# Patient Record
Sex: FEMALE | Race: WHITE | NOT HISPANIC OR LATINO | Employment: UNEMPLOYED | URBAN - METROPOLITAN AREA
[De-identification: names, ages, dates, MRNs, and addresses within clinical notes are randomized per-mention and may not be internally consistent; named-entity substitution may affect disease eponyms.]

---

## 2022-10-14 ENCOUNTER — HOSPITAL ENCOUNTER (INPATIENT)
Facility: HOSPITAL | Age: 32
LOS: 9 days | Discharge: HOME | End: 2022-10-23
Attending: EMERGENCY MEDICINE | Admitting: INTERNAL MEDICINE

## 2022-10-14 ENCOUNTER — APPOINTMENT (OUTPATIENT)
Dept: GENERAL RADIOLOGY | Facility: HOSPITAL | Age: 32
End: 2022-10-14

## 2022-10-14 ENCOUNTER — APPOINTMENT (OUTPATIENT)
Dept: CT IMAGING | Facility: HOSPITAL | Age: 32
End: 2022-10-14

## 2022-10-14 DIAGNOSIS — I24.9 ACS (ACUTE CORONARY SYNDROME): Primary | ICD-10-CM

## 2022-10-14 DIAGNOSIS — Z95.1 S/P CABG (CORONARY ARTERY BYPASS GRAFT): ICD-10-CM

## 2022-10-14 LAB
ALBUMIN SERPL-MCNC: 5.1 G/DL (ref 3.5–5.2)
ALBUMIN/GLOB SERPL: 2.6 G/DL
ALP SERPL-CCNC: 48 U/L (ref 39–117)
ALT SERPL W P-5'-P-CCNC: 16 U/L (ref 1–33)
ANION GAP SERPL CALCULATED.3IONS-SCNC: 9.7 MMOL/L (ref 5–15)
APTT PPP: 30.1 SECONDS (ref 22.7–35.4)
APTT PPP: 31.8 SECONDS (ref 22.7–35.4)
AST SERPL-CCNC: 56 U/L (ref 1–32)
BASOPHILS # BLD AUTO: 0.02 10*3/MM3 (ref 0–0.2)
BASOPHILS NFR BLD AUTO: 0.2 % (ref 0–1.5)
BILIRUB SERPL-MCNC: 0.5 MG/DL (ref 0–1.2)
BUN SERPL-MCNC: 9 MG/DL (ref 6–20)
BUN/CREAT SERPL: 12.7 (ref 7–25)
CALCIUM SPEC-SCNC: 9.3 MG/DL (ref 8.6–10.5)
CHLORIDE SERPL-SCNC: 103 MMOL/L (ref 98–107)
CO2 SERPL-SCNC: 24.3 MMOL/L (ref 22–29)
CREAT SERPL-MCNC: 0.71 MG/DL (ref 0.57–1)
D DIMER PPP FEU-MCNC: 2.07 MCGFEU/ML (ref 0–0.49)
DEPRECATED RDW RBC AUTO: 40.6 FL (ref 37–54)
EGFRCR SERPLBLD CKD-EPI 2021: 115.3 ML/MIN/1.73
EOSINOPHIL # BLD AUTO: 0.03 10*3/MM3 (ref 0–0.4)
EOSINOPHIL NFR BLD AUTO: 0.3 % (ref 0.3–6.2)
ERYTHROCYTE [DISTWIDTH] IN BLOOD BY AUTOMATED COUNT: 12.5 % (ref 12.3–15.4)
GLOBULIN UR ELPH-MCNC: 2 GM/DL
GLUCOSE SERPL-MCNC: 154 MG/DL (ref 65–99)
HCG SERPL QL: NEGATIVE
HCT VFR BLD AUTO: 35.3 % (ref 34–46.6)
HGB BLD-MCNC: 11.9 G/DL (ref 12–15.9)
IMM GRANULOCYTES # BLD AUTO: 0.05 10*3/MM3 (ref 0–0.05)
IMM GRANULOCYTES NFR BLD AUTO: 0.5 % (ref 0–0.5)
INR PPP: 1.11 (ref 0.9–1.1)
LIPASE SERPL-CCNC: 27 U/L (ref 13–60)
LYMPHOCYTES # BLD AUTO: 1.44 10*3/MM3 (ref 0.7–3.1)
LYMPHOCYTES NFR BLD AUTO: 15.4 % (ref 19.6–45.3)
MCH RBC QN AUTO: 30.2 PG (ref 26.6–33)
MCHC RBC AUTO-ENTMCNC: 33.7 G/DL (ref 31.5–35.7)
MCV RBC AUTO: 89.6 FL (ref 79–97)
MONOCYTES # BLD AUTO: 0.48 10*3/MM3 (ref 0.1–0.9)
MONOCYTES NFR BLD AUTO: 5.1 % (ref 5–12)
NEUTROPHILS NFR BLD AUTO: 7.36 10*3/MM3 (ref 1.7–7)
NEUTROPHILS NFR BLD AUTO: 78.5 % (ref 42.7–76)
NRBC BLD AUTO-RTO: 0 /100 WBC (ref 0–0.2)
NT-PROBNP SERPL-MCNC: 461 PG/ML (ref 0–450)
PLATELET # BLD AUTO: 218 10*3/MM3 (ref 140–450)
PMV BLD AUTO: 8.9 FL (ref 6–12)
POTASSIUM SERPL-SCNC: 3.3 MMOL/L (ref 3.5–5.2)
PROT SERPL-MCNC: 7.1 G/DL (ref 6–8.5)
PROTHROMBIN TIME: 14.5 SECONDS (ref 11.7–14.2)
QT INTERVAL: 388 MS
RBC # BLD AUTO: 3.94 10*6/MM3 (ref 3.77–5.28)
SODIUM SERPL-SCNC: 137 MMOL/L (ref 136–145)
TROPONIN T SERPL-MCNC: 1.02 NG/ML (ref 0–0.03)
TROPONIN T SERPL-MCNC: 1.18 NG/ML (ref 0–0.03)
WBC NRBC COR # BLD: 9.38 10*3/MM3 (ref 3.4–10.8)

## 2022-10-14 PROCEDURE — 85610 PROTHROMBIN TIME: CPT | Performed by: EMERGENCY MEDICINE

## 2022-10-14 PROCEDURE — 93010 ELECTROCARDIOGRAM REPORT: CPT | Performed by: INTERNAL MEDICINE

## 2022-10-14 PROCEDURE — 84484 ASSAY OF TROPONIN QUANT: CPT | Performed by: EMERGENCY MEDICINE

## 2022-10-14 PROCEDURE — 25010000002 HEPARIN (PORCINE) 25000-0.45 UT/250ML-% SOLUTION: Performed by: EMERGENCY MEDICINE

## 2022-10-14 PROCEDURE — 25010000002 HEPARIN (PORCINE) PER 1000 UNITS: Performed by: EMERGENCY MEDICINE

## 2022-10-14 PROCEDURE — 0 IOPAMIDOL PER 1 ML: Performed by: EMERGENCY MEDICINE

## 2022-10-14 PROCEDURE — 86900 BLOOD TYPING SEROLOGIC ABO: CPT

## 2022-10-14 PROCEDURE — 83880 ASSAY OF NATRIURETIC PEPTIDE: CPT | Performed by: EMERGENCY MEDICINE

## 2022-10-14 PROCEDURE — 71275 CT ANGIOGRAPHY CHEST: CPT

## 2022-10-14 PROCEDURE — 85025 COMPLETE CBC W/AUTO DIFF WBC: CPT | Performed by: EMERGENCY MEDICINE

## 2022-10-14 PROCEDURE — 93005 ELECTROCARDIOGRAM TRACING: CPT | Performed by: EMERGENCY MEDICINE

## 2022-10-14 PROCEDURE — 86901 BLOOD TYPING SEROLOGIC RH(D): CPT

## 2022-10-14 PROCEDURE — 83690 ASSAY OF LIPASE: CPT | Performed by: EMERGENCY MEDICINE

## 2022-10-14 PROCEDURE — 82565 ASSAY OF CREATININE: CPT

## 2022-10-14 PROCEDURE — 85379 FIBRIN DEGRADATION QUANT: CPT | Performed by: EMERGENCY MEDICINE

## 2022-10-14 PROCEDURE — 99284 EMERGENCY DEPT VISIT MOD MDM: CPT

## 2022-10-14 PROCEDURE — 85730 THROMBOPLASTIN TIME PARTIAL: CPT | Performed by: EMERGENCY MEDICINE

## 2022-10-14 PROCEDURE — 84703 CHORIONIC GONADOTROPIN ASSAY: CPT | Performed by: EMERGENCY MEDICINE

## 2022-10-14 PROCEDURE — 80053 COMPREHEN METABOLIC PANEL: CPT | Performed by: EMERGENCY MEDICINE

## 2022-10-14 PROCEDURE — 71045 X-RAY EXAM CHEST 1 VIEW: CPT

## 2022-10-14 RX ORDER — SODIUM CHLORIDE 0.9 % (FLUSH) 0.9 %
10 SYRINGE (ML) INJECTION AS NEEDED
Status: DISCONTINUED | OUTPATIENT
Start: 2022-10-14 | End: 2022-10-16

## 2022-10-14 RX ORDER — ASPIRIN 325 MG
325 TABLET ORAL ONCE
Status: COMPLETED | OUTPATIENT
Start: 2022-10-14 | End: 2022-10-14

## 2022-10-14 RX ORDER — HEPARIN SODIUM 5000 [USP'U]/ML
30-60 INJECTION, SOLUTION INTRAVENOUS; SUBCUTANEOUS EVERY 6 HOURS PRN
Status: DISCONTINUED | OUTPATIENT
Start: 2022-10-14 | End: 2022-10-16 | Stop reason: SDUPTHER

## 2022-10-14 RX ORDER — HEPARIN SODIUM 10000 [USP'U]/100ML
12 INJECTION, SOLUTION INTRAVENOUS
Status: DISCONTINUED | OUTPATIENT
Start: 2022-10-14 | End: 2022-10-16 | Stop reason: SDUPTHER

## 2022-10-14 RX ORDER — HEPARIN SODIUM 5000 [USP'U]/ML
60 INJECTION, SOLUTION INTRAVENOUS; SUBCUTANEOUS ONCE
Status: COMPLETED | OUTPATIENT
Start: 2022-10-14 | End: 2022-10-14

## 2022-10-14 RX ADMIN — HEPARIN SODIUM 3800 UNITS: 5000 INJECTION INTRAVENOUS; SUBCUTANEOUS at 20:49

## 2022-10-14 RX ADMIN — ASPIRIN 325 MG: 325 TABLET ORAL at 20:44

## 2022-10-14 RX ADMIN — HEPARIN SODIUM 12 UNITS/KG/HR: 10000 INJECTION, SOLUTION INTRAVENOUS at 20:50

## 2022-10-14 RX ADMIN — IOPAMIDOL 100 ML: 755 INJECTION, SOLUTION INTRAVENOUS at 20:23

## 2022-10-15 ENCOUNTER — APPOINTMENT (OUTPATIENT)
Dept: GENERAL RADIOLOGY | Facility: HOSPITAL | Age: 32
End: 2022-10-15

## 2022-10-15 ENCOUNTER — ANESTHESIA EVENT (OUTPATIENT)
Dept: PERIOP | Facility: HOSPITAL | Age: 32
End: 2022-10-15

## 2022-10-15 ENCOUNTER — ANCILLARY PROCEDURE (OUTPATIENT)
Dept: PERIOP | Facility: HOSPITAL | Age: 32
End: 2022-10-15

## 2022-10-15 ENCOUNTER — ANESTHESIA (OUTPATIENT)
Dept: PERIOP | Facility: HOSPITAL | Age: 32
End: 2022-10-15

## 2022-10-15 LAB
ABO GROUP BLD: NORMAL
ACT BLD: 103 SECONDS (ref 82–152)
ACT BLD: 126 SECONDS (ref 82–152)
ACT BLD: 370 SECONDS (ref 82–152)
ACT BLD: 387 SECONDS (ref 82–152)
ACT BLD: 399 SECONDS (ref 82–152)
ACT BLD: 422 SECONDS (ref 82–152)
ACT BLD: 474 SECONDS (ref 82–152)
ACT BLD: 567 SECONDS (ref 82–152)
ALBUMIN SERPL-MCNC: 4.6 G/DL (ref 3.5–5.2)
ANION GAP SERPL CALCULATED.3IONS-SCNC: 10.5 MMOL/L (ref 5–15)
ANION GAP SERPL CALCULATED.3IONS-SCNC: 9.8 MMOL/L (ref 5–15)
APTT PPP: 32.3 SECONDS (ref 22.7–35.4)
APTT PPP: 72.9 SECONDS (ref 22.7–35.4)
ARTERIAL PATENCY WRIST A: ABNORMAL
ATMOSPHERIC PRESS: 754.6 MMHG
BASE EXCESS BLDA CALC-SCNC: -1 MMOL/L (ref -5–5)
BASE EXCESS BLDA CALC-SCNC: -1.2 MMOL/L (ref 0–2)
BASE EXCESS BLDA CALC-SCNC: -2 MMOL/L (ref -5–5)
BASE EXCESS BLDA CALC-SCNC: -4 MMOL/L (ref -5–5)
BASE EXCESS BLDA CALC-SCNC: -5 MMOL/L (ref -5–5)
BASE EXCESS BLDA CALC-SCNC: -5 MMOL/L (ref -5–5)
BASE EXCESS BLDA CALC-SCNC: 0 MMOL/L (ref -5–5)
BASE EXCESS BLDA CALC-SCNC: 2 MMOL/L (ref -5–5)
BASE EXCESS BLDA CALC-SCNC: 3 MMOL/L (ref -5–5)
BASOPHILS # BLD AUTO: 0.03 10*3/MM3 (ref 0–0.2)
BASOPHILS # BLD AUTO: 0.06 10*3/MM3 (ref 0–0.2)
BASOPHILS NFR BLD AUTO: 0.3 % (ref 0–1.5)
BASOPHILS NFR BLD AUTO: 0.4 % (ref 0–1.5)
BDY SITE: ABNORMAL
BLD GP AB SCN SERPL QL: NEGATIVE
BUN SERPL-MCNC: 7 MG/DL (ref 6–20)
BUN SERPL-MCNC: 9 MG/DL (ref 6–20)
BUN/CREAT SERPL: 13 (ref 7–25)
BUN/CREAT SERPL: 9.2 (ref 7–25)
CA-I BLD-MCNC: 4.9 MG/DL (ref 4.6–5.4)
CA-I BLDA-SCNC: ABNORMAL MMOL/L
CA-I SERPL ISE-MCNC: 1.22 MMOL/L (ref 1.15–1.35)
CALCIUM SPEC-SCNC: 9 MG/DL (ref 8.6–10.5)
CALCIUM SPEC-SCNC: 9.1 MG/DL (ref 8.6–10.5)
CHLORIDE SERPL-SCNC: 102 MMOL/L (ref 98–107)
CHLORIDE SERPL-SCNC: 113 MMOL/L (ref 98–107)
CO2 BLDA-SCNC: 21 MMOL/L (ref 24–29)
CO2 BLDA-SCNC: 23 MMOL/L (ref 24–29)
CO2 BLDA-SCNC: 23 MMOL/L (ref 24–29)
CO2 BLDA-SCNC: 25 MMOL/L (ref 24–29)
CO2 BLDA-SCNC: 26 MMOL/L (ref 24–29)
CO2 BLDA-SCNC: 26 MMOL/L (ref 24–29)
CO2 BLDA-SCNC: 28 MMOL/L (ref 24–29)
CO2 BLDA-SCNC: 29 MMOL/L (ref 24–29)
CO2 SERPL-SCNC: 22.2 MMOL/L (ref 22–29)
CO2 SERPL-SCNC: 24.5 MMOL/L (ref 22–29)
CREAT BLDA-MCNC: 0.7 MG/DL (ref 0.6–1.3)
CREAT SERPL-MCNC: 0.69 MG/DL (ref 0.57–1)
CREAT SERPL-MCNC: 0.76 MG/DL (ref 0.57–1)
DEPRECATED RDW RBC AUTO: 40.3 FL (ref 37–54)
DEPRECATED RDW RBC AUTO: 42 FL (ref 37–54)
EGFRCR SERPLBLD CKD-EPI 2021: 106.3 ML/MIN/1.73
EGFRCR SERPLBLD CKD-EPI 2021: 117.7 ML/MIN/1.73
EOSINOPHIL # BLD AUTO: 0.03 10*3/MM3 (ref 0–0.4)
EOSINOPHIL # BLD AUTO: 0.09 10*3/MM3 (ref 0–0.4)
EOSINOPHIL NFR BLD AUTO: 0.1 % (ref 0.3–6.2)
EOSINOPHIL NFR BLD AUTO: 1.2 % (ref 0.3–6.2)
ERYTHROCYTE [DISTWIDTH] IN BLOOD BY AUTOMATED COUNT: 12.5 % (ref 12.3–15.4)
ERYTHROCYTE [DISTWIDTH] IN BLOOD BY AUTOMATED COUNT: 12.8 % (ref 12.3–15.4)
FIBRINOGEN PPP-MCNC: 169 MG/DL (ref 219–464)
GLUCOSE BLDC GLUCOMTR-MCNC: 125 MG/DL (ref 70–130)
GLUCOSE BLDC GLUCOMTR-MCNC: 131 MG/DL (ref 70–130)
GLUCOSE BLDC GLUCOMTR-MCNC: 132 MG/DL (ref 70–130)
GLUCOSE BLDC GLUCOMTR-MCNC: 138 MG/DL (ref 70–130)
GLUCOSE BLDC GLUCOMTR-MCNC: 142 MG/DL (ref 70–130)
GLUCOSE BLDC GLUCOMTR-MCNC: 160 MG/DL (ref 70–130)
GLUCOSE BLDC GLUCOMTR-MCNC: 162 MG/DL (ref 70–130)
GLUCOSE BLDC GLUCOMTR-MCNC: 164 MG/DL (ref 70–130)
GLUCOSE BLDC GLUCOMTR-MCNC: 165 MG/DL (ref 70–130)
GLUCOSE BLDC GLUCOMTR-MCNC: 204 MG/DL (ref 70–130)
GLUCOSE BLDC GLUCOMTR-MCNC: 211 MG/DL (ref 70–130)
GLUCOSE SERPL-MCNC: 128 MG/DL (ref 65–99)
GLUCOSE SERPL-MCNC: 86 MG/DL (ref 65–99)
HCO3 BLDA-SCNC: 19.9 MMOL/L (ref 22–26)
HCO3 BLDA-SCNC: 21.2 MMOL/L (ref 22–26)
HCO3 BLDA-SCNC: 22.1 MMOL/L (ref 22–26)
HCO3 BLDA-SCNC: 22.3 MMOL/L (ref 22–28)
HCO3 BLDA-SCNC: 23.6 MMOL/L (ref 22–26)
HCO3 BLDA-SCNC: 24.7 MMOL/L (ref 22–26)
HCO3 BLDA-SCNC: 25.2 MMOL/L (ref 22–26)
HCO3 BLDA-SCNC: 26.8 MMOL/L (ref 22–26)
HCO3 BLDA-SCNC: 27.9 MMOL/L (ref 22–26)
HCT VFR BLD AUTO: 30.9 % (ref 34–46.6)
HCT VFR BLD AUTO: 35.7 % (ref 34–46.6)
HCT VFR BLDA CALC: 21 % (ref 38–51)
HCT VFR BLDA CALC: 21 % (ref 38–51)
HCT VFR BLDA CALC: 22 % (ref 38–51)
HCT VFR BLDA CALC: 24 % (ref 38–51)
HCT VFR BLDA CALC: 24 % (ref 38–51)
HCT VFR BLDA CALC: 25 % (ref 38–51)
HCT VFR BLDA CALC: 25 % (ref 38–51)
HCT VFR BLDA CALC: 37 % (ref 38–51)
HGB BLD-MCNC: 10.7 G/DL (ref 12–15.9)
HGB BLD-MCNC: 12 G/DL (ref 12–15.9)
HGB BLDA-MCNC: 12.6 G/DL (ref 12–17)
HGB BLDA-MCNC: 7.1 G/DL (ref 12–17)
HGB BLDA-MCNC: 7.1 G/DL (ref 12–17)
HGB BLDA-MCNC: 7.5 G/DL (ref 12–17)
HGB BLDA-MCNC: 8.2 G/DL (ref 12–17)
HGB BLDA-MCNC: 8.2 G/DL (ref 12–17)
HGB BLDA-MCNC: 8.5 G/DL (ref 12–17)
HGB BLDA-MCNC: 8.5 G/DL (ref 12–17)
IMM GRANULOCYTES # BLD AUTO: 0.02 10*3/MM3 (ref 0–0.05)
IMM GRANULOCYTES # BLD AUTO: 0.11 10*3/MM3 (ref 0–0.05)
IMM GRANULOCYTES NFR BLD AUTO: 0.3 % (ref 0–0.5)
IMM GRANULOCYTES NFR BLD AUTO: 0.5 % (ref 0–0.5)
INHALED O2 CONCENTRATION: 100 %
INR PPP: 1.66 (ref 0.9–1.1)
LYMPHOCYTES # BLD AUTO: 2.65 10*3/MM3 (ref 0.7–3.1)
LYMPHOCYTES # BLD AUTO: 2.87 10*3/MM3 (ref 0.7–3.1)
LYMPHOCYTES NFR BLD AUTO: 14 % (ref 19.6–45.3)
LYMPHOCYTES NFR BLD AUTO: 36.1 % (ref 19.6–45.3)
MAGNESIUM SERPL-MCNC: 3.4 MG/DL (ref 1.6–2.6)
MCH RBC QN AUTO: 30.1 PG (ref 26.6–33)
MCH RBC QN AUTO: 31 PG (ref 26.6–33)
MCHC RBC AUTO-ENTMCNC: 33.6 G/DL (ref 31.5–35.7)
MCHC RBC AUTO-ENTMCNC: 34.6 G/DL (ref 31.5–35.7)
MCV RBC AUTO: 89.5 FL (ref 79–97)
MCV RBC AUTO: 89.6 FL (ref 79–97)
MODALITY: ABNORMAL
MONOCYTES # BLD AUTO: 0.53 10*3/MM3 (ref 0.1–0.9)
MONOCYTES # BLD AUTO: 1.68 10*3/MM3 (ref 0.1–0.9)
MONOCYTES NFR BLD AUTO: 7.2 % (ref 5–12)
MONOCYTES NFR BLD AUTO: 8.2 % (ref 5–12)
NEUTROPHILS NFR BLD AUTO: 15.73 10*3/MM3 (ref 1.7–7)
NEUTROPHILS NFR BLD AUTO: 4.02 10*3/MM3 (ref 1.7–7)
NEUTROPHILS NFR BLD AUTO: 54.8 % (ref 42.7–76)
NEUTROPHILS NFR BLD AUTO: 76.9 % (ref 42.7–76)
NRBC BLD AUTO-RTO: 0 /100 WBC (ref 0–0.2)
NRBC BLD AUTO-RTO: 0 /100 WBC (ref 0–0.2)
O2 A-A PPRESDIFF RESPIRATORY: 0.6 MMHG
PCO2 BLDA: 32.3 MM HG (ref 35–45)
PCO2 BLDA: 34.8 MM HG (ref 35–45)
PCO2 BLDA: 41 MM HG (ref 35–45)
PCO2 BLDA: 41.2 MM HG (ref 35–45)
PCO2 BLDA: 41.5 MM HG (ref 35–45)
PCO2 BLDA: 43.1 MM HG (ref 35–45)
PCO2 BLDA: 44.2 MM HG (ref 35–45)
PCO2 BLDA: 44.7 MM HG (ref 35–45)
PCO2 BLDA: 45.1 MM HG (ref 35–45)
PEEP RESPIRATORY: 5 CM[H2O]
PH BLDA: 7.29 PH UNITS (ref 7.35–7.6)
PH BLDA: 7.37 PH UNITS (ref 7.35–7.6)
PH BLDA: 7.38 PH UNITS (ref 7.35–7.6)
PH BLDA: 7.39 PH UNITS (ref 7.35–7.6)
PH BLDA: 7.43 PH UNITS (ref 7.35–7.6)
PH BLDA: 7.43 PH UNITS (ref 7.35–7.6)
PH BLDA: 7.45 PH UNITS (ref 7.35–7.45)
PHOSPHATE SERPL-MCNC: 3.2 MG/DL (ref 2.5–4.5)
PLATELET # BLD AUTO: 164 10*3/MM3 (ref 140–450)
PLATELET # BLD AUTO: 247 10*3/MM3 (ref 140–450)
PMV BLD AUTO: 8.9 FL (ref 6–12)
PMV BLD AUTO: 9.1 FL (ref 6–12)
PO2 BLDA: 232 MMHG (ref 80–105)
PO2 BLDA: 245 MMHG (ref 80–105)
PO2 BLDA: 377 MMHG (ref 80–105)
PO2 BLDA: 39 MMHG (ref 80–105)
PO2 BLDA: 408 MMHG (ref 80–105)
PO2 BLDA: 422 MMHG (ref 80–105)
PO2 BLDA: 445.1 MM HG (ref 80–100)
PO2 BLDA: 447 MMHG (ref 80–105)
PO2 BLDA: 454 MMHG (ref 80–105)
POTASSIUM BLDA-SCNC: 3.2 MMOL/L (ref 3.5–4.9)
POTASSIUM BLDA-SCNC: 3.6 MMOL/L (ref 3.5–4.9)
POTASSIUM BLDA-SCNC: 3.7 MMOL/L (ref 3.5–4.9)
POTASSIUM BLDA-SCNC: 4.4 MMOL/L (ref 3.5–4.9)
POTASSIUM BLDA-SCNC: 4.8 MMOL/L (ref 3.5–4.9)
POTASSIUM BLDA-SCNC: 4.9 MMOL/L (ref 3.5–4.9)
POTASSIUM BLDA-SCNC: 5.6 MMOL/L (ref 3.5–4.9)
POTASSIUM BLDA-SCNC: 5.6 MMOL/L (ref 3.5–4.9)
POTASSIUM SERPL-SCNC: 3.8 MMOL/L (ref 3.5–5.2)
POTASSIUM SERPL-SCNC: 5.2 MMOL/L (ref 3.5–5.2)
PROTHROMBIN TIME: 19.8 SECONDS (ref 11.7–14.2)
QT INTERVAL: 364 MS
RBC # BLD AUTO: 3.45 10*6/MM3 (ref 3.77–5.28)
RBC # BLD AUTO: 3.99 10*6/MM3 (ref 3.77–5.28)
RH BLD: POSITIVE
SAO2 % BLDA: 100 % (ref 95–98)
SAO2 % BLDA: 72 % (ref 95–98)
SAO2 % BLDCOA: 100 % (ref 92–99)
SET MECH RESP RATE: 20
SODIUM SERPL-SCNC: 137 MMOL/L (ref 136–145)
SODIUM SERPL-SCNC: 145 MMOL/L (ref 136–145)
T&S EXPIRATION DATE: NORMAL
TOTAL RATE: 20 BREATHS/MINUTE
TROPONIN T SERPL-MCNC: 0.8 NG/ML (ref 0–0.03)
VENTILATOR MODE: AC
VT ON VENT VENT: 550 ML
WBC NRBC COR # BLD: 20.48 10*3/MM3 (ref 3.4–10.8)
WBC NRBC COR # BLD: 7.34 10*3/MM3 (ref 3.4–10.8)

## 2022-10-15 PROCEDURE — 021109W BYPASS CORONARY ARTERY, TWO ARTERIES FROM AORTA WITH AUTOLOGOUS VENOUS TISSUE, OPEN APPROACH: ICD-10-PCS | Performed by: THORACIC SURGERY (CARDIOTHORACIC VASCULAR SURGERY)

## 2022-10-15 PROCEDURE — 25010000002 ALBUMIN HUMAN 25% PER 50 ML

## 2022-10-15 PROCEDURE — 25010000002 ONDANSETRON PER 1 MG

## 2022-10-15 PROCEDURE — 25010000002 MIDAZOLAM PER 1 MG: Performed by: ANESTHESIOLOGY

## 2022-10-15 PROCEDURE — 33508 ENDOSCOPIC VEIN HARVEST: CPT | Performed by: PHYSICIAN ASSISTANT

## 2022-10-15 PROCEDURE — 86923 COMPATIBILITY TEST ELECTRIC: CPT

## 2022-10-15 PROCEDURE — 25010000002 EPINEPHRINE 1 MG/10ML SOLUTION PREFILLED SYRINGE: Performed by: ANESTHESIOLOGY

## 2022-10-15 PROCEDURE — 82947 ASSAY GLUCOSE BLOOD QUANT: CPT

## 2022-10-15 PROCEDURE — 25010000002 CEFAZOLIN IN DEXTROSE 2-4 GM/100ML-% SOLUTION: Performed by: NURSE PRACTITIONER

## 2022-10-15 PROCEDURE — 94799 UNLISTED PULMONARY SVC/PX: CPT

## 2022-10-15 PROCEDURE — 71045 X-RAY EXAM CHEST 1 VIEW: CPT

## 2022-10-15 PROCEDURE — P9047 ALBUMIN (HUMAN), 25%, 50ML: HCPCS

## 2022-10-15 PROCEDURE — C1751 CATH, INF, PER/CENT/MIDLINE: HCPCS | Performed by: ANESTHESIOLOGY

## 2022-10-15 PROCEDURE — 85610 PROTHROMBIN TIME: CPT | Performed by: NURSE PRACTITIONER

## 2022-10-15 PROCEDURE — 82803 BLOOD GASES ANY COMBINATION: CPT

## 2022-10-15 PROCEDURE — 85347 COAGULATION TIME ACTIVATED: CPT

## 2022-10-15 PROCEDURE — 25010000002 HEPARIN (PORCINE) PER 1000 UNITS

## 2022-10-15 PROCEDURE — 25010000002 EPINEPHRINE 1 MG/10ML SOLUTION PREFILLED SYRINGE

## 2022-10-15 PROCEDURE — 33518 CABG ARTERY-VEIN TWO: CPT | Performed by: PHYSICIAN ASSISTANT

## 2022-10-15 PROCEDURE — 25010000002 MORPHINE PER 10 MG: Performed by: THORACIC SURGERY (CARDIOTHORACIC VASCULAR SURGERY)

## 2022-10-15 PROCEDURE — 33518 CABG ARTERY-VEIN TWO: CPT | Performed by: THORACIC SURGERY (CARDIOTHORACIC VASCULAR SURGERY)

## 2022-10-15 PROCEDURE — 02Q00ZZ REPAIR CORONARY ARTERY, ONE ARTERY, OPEN APPROACH: ICD-10-PCS | Performed by: THORACIC SURGERY (CARDIOTHORACIC VASCULAR SURGERY)

## 2022-10-15 PROCEDURE — 82330 ASSAY OF CALCIUM: CPT | Performed by: NURSE PRACTITIONER

## 2022-10-15 PROCEDURE — 25010000002 EPINEPHRINE PER 0.1 MG: Performed by: ANESTHESIOLOGY

## 2022-10-15 PROCEDURE — 02100Z9 BYPASS CORONARY ARTERY, ONE ARTERY FROM LEFT INTERNAL MAMMARY, OPEN APPROACH: ICD-10-PCS | Performed by: THORACIC SURGERY (CARDIOTHORACIC VASCULAR SURGERY)

## 2022-10-15 PROCEDURE — 25010000002 VANCOMYCIN 1 G RECONSTITUTED SOLUTION

## 2022-10-15 PROCEDURE — 86850 RBC ANTIBODY SCREEN: CPT | Performed by: THORACIC SURGERY (CARDIOTHORACIC VASCULAR SURGERY)

## 2022-10-15 PROCEDURE — 25010000002 ONDANSETRON PER 1 MG: Performed by: ANESTHESIOLOGY

## 2022-10-15 PROCEDURE — 25010000002 AMIODARONE IN DEXTROSE 5% 360-4.14 MG/200ML-% SOLUTION: Performed by: ANESTHESIOLOGY

## 2022-10-15 PROCEDURE — 4A023N7 MEASUREMENT OF CARDIAC SAMPLING AND PRESSURE, LEFT HEART, PERCUTANEOUS APPROACH: ICD-10-PCS | Performed by: INTERNAL MEDICINE

## 2022-10-15 PROCEDURE — 06BQ4ZZ EXCISION OF LEFT SAPHENOUS VEIN, PERCUTANEOUS ENDOSCOPIC APPROACH: ICD-10-PCS | Performed by: THORACIC SURGERY (CARDIOTHORACIC VASCULAR SURGERY)

## 2022-10-15 PROCEDURE — 86900 BLOOD TYPING SEROLOGIC ABO: CPT | Performed by: THORACIC SURGERY (CARDIOTHORACIC VASCULAR SURGERY)

## 2022-10-15 PROCEDURE — 33533 CABG ARTERIAL SINGLE: CPT | Performed by: PHYSICIAN ASSISTANT

## 2022-10-15 PROCEDURE — 25010000002 MORPHINE SULFATE (PF) 2 MG/ML SOLUTION: Performed by: INTERNAL MEDICINE

## 2022-10-15 PROCEDURE — 86901 BLOOD TYPING SEROLOGIC RH(D): CPT | Performed by: THORACIC SURGERY (CARDIOTHORACIC VASCULAR SURGERY)

## 2022-10-15 PROCEDURE — B2111ZZ FLUOROSCOPY OF MULTIPLE CORONARY ARTERIES USING LOW OSMOLAR CONTRAST: ICD-10-PCS | Performed by: INTERNAL MEDICINE

## 2022-10-15 PROCEDURE — 25010000002 PROTAMINE SULFATE PER 10 MG: Performed by: ANESTHESIOLOGY

## 2022-10-15 PROCEDURE — 85018 HEMOGLOBIN: CPT

## 2022-10-15 PROCEDURE — 25010000002 MAGNESIUM SULFATE IN D5W 1G/100ML (PREMIX) 1-5 GM/100ML-% SOLUTION: Performed by: NURSE PRACTITIONER

## 2022-10-15 PROCEDURE — 25010000002 PAPAVERINE PER 60 MG: Performed by: THORACIC SURGERY (CARDIOTHORACIC VASCULAR SURGERY)

## 2022-10-15 PROCEDURE — 25010000002 HEPARIN (PORCINE) PER 1000 UNITS: Performed by: THORACIC SURGERY (CARDIOTHORACIC VASCULAR SURGERY)

## 2022-10-15 PROCEDURE — 25010000002 PHENYLEPHRINE 10 MG/ML SOLUTION 5 ML VIAL: Performed by: ANESTHESIOLOGY

## 2022-10-15 PROCEDURE — 36415 COLL VENOUS BLD VENIPUNCTURE: CPT | Performed by: EMERGENCY MEDICINE

## 2022-10-15 PROCEDURE — 25010000002 FENTANYL CITRATE (PF) 100 MCG/2ML SOLUTION: Performed by: ANESTHESIOLOGY

## 2022-10-15 PROCEDURE — C1894 INTRO/SHEATH, NON-LASER: HCPCS | Performed by: INTERNAL MEDICINE

## 2022-10-15 PROCEDURE — 25010000002 MAGNESIUM SULFATE PER 500 MG OF MAGNESIUM: Performed by: ANESTHESIOLOGY

## 2022-10-15 PROCEDURE — 99152 MOD SED SAME PHYS/QHP 5/>YRS: CPT | Performed by: INTERNAL MEDICINE

## 2022-10-15 PROCEDURE — B2151ZZ FLUOROSCOPY OF LEFT HEART USING LOW OSMOLAR CONTRAST: ICD-10-PCS | Performed by: INTERNAL MEDICINE

## 2022-10-15 PROCEDURE — 0 IOPAMIDOL PER 1 ML: Performed by: INTERNAL MEDICINE

## 2022-10-15 PROCEDURE — 25010000002 FENTANYL CITRATE (PF) 50 MCG/ML SOLUTION: Performed by: INTERNAL MEDICINE

## 2022-10-15 PROCEDURE — 93010 ELECTROCARDIOGRAM REPORT: CPT | Performed by: INTERNAL MEDICINE

## 2022-10-15 PROCEDURE — 5A1221Z PERFORMANCE OF CARDIAC OUTPUT, CONTINUOUS: ICD-10-PCS | Performed by: THORACIC SURGERY (CARDIOTHORACIC VASCULAR SURGERY)

## 2022-10-15 PROCEDURE — 85730 THROMBOPLASTIN TIME PARTIAL: CPT | Performed by: EMERGENCY MEDICINE

## 2022-10-15 PROCEDURE — 99153 MOD SED SAME PHYS/QHP EA: CPT | Performed by: INTERNAL MEDICINE

## 2022-10-15 PROCEDURE — P9041 ALBUMIN (HUMAN),5%, 50ML: HCPCS | Performed by: NURSE PRACTITIONER

## 2022-10-15 PROCEDURE — C1729 CATH, DRAINAGE: HCPCS | Performed by: THORACIC SURGERY (CARDIOTHORACIC VASCULAR SURGERY)

## 2022-10-15 PROCEDURE — 85730 THROMBOPLASTIN TIME PARTIAL: CPT | Performed by: NURSE PRACTITIONER

## 2022-10-15 PROCEDURE — 25010000002 METOCLOPRAMIDE PER 10 MG: Performed by: NURSE PRACTITIONER

## 2022-10-15 PROCEDURE — 25010000002 HEPARIN (PORCINE) PER 1000 UNITS: Performed by: ANESTHESIOLOGY

## 2022-10-15 PROCEDURE — 25010000002 PROPOFOL 10 MG/ML EMULSION: Performed by: ANESTHESIOLOGY

## 2022-10-15 PROCEDURE — B24BZZ4 ULTRASONOGRAPHY OF HEART WITH AORTA, TRANSESOPHAGEAL: ICD-10-PCS | Performed by: ANESTHESIOLOGY

## 2022-10-15 PROCEDURE — 93458 L HRT ARTERY/VENTRICLE ANGIO: CPT | Performed by: INTERNAL MEDICINE

## 2022-10-15 PROCEDURE — 83735 ASSAY OF MAGNESIUM: CPT | Performed by: NURSE PRACTITIONER

## 2022-10-15 PROCEDURE — 85025 COMPLETE CBC W/AUTO DIFF WBC: CPT | Performed by: NURSE PRACTITIONER

## 2022-10-15 PROCEDURE — A4648 IMPLANTABLE TISSUE MARKER: HCPCS | Performed by: THORACIC SURGERY (CARDIOTHORACIC VASCULAR SURGERY)

## 2022-10-15 PROCEDURE — 0 POTASSIUM CHLORIDE PER 2 MEQ: Performed by: ANESTHESIOLOGY

## 2022-10-15 PROCEDURE — 25010000002 MIDAZOLAM PER 1 MG: Performed by: INTERNAL MEDICINE

## 2022-10-15 PROCEDURE — 25010000002 HEPARIN (PORCINE) PER 1000 UNITS: Performed by: INTERNAL MEDICINE

## 2022-10-15 PROCEDURE — 63710000001 INSULIN REGULAR HUMAN PER 5 UNITS: Performed by: ANESTHESIOLOGY

## 2022-10-15 PROCEDURE — 85025 COMPLETE CBC W/AUTO DIFF WBC: CPT | Performed by: EMERGENCY MEDICINE

## 2022-10-15 PROCEDURE — 25010000002 ALBUMIN HUMAN 5% PER 50 ML: Performed by: NURSE PRACTITIONER

## 2022-10-15 PROCEDURE — 85014 HEMATOCRIT: CPT

## 2022-10-15 PROCEDURE — 0 METHADONE PER 10 MG: Performed by: ANESTHESIOLOGY

## 2022-10-15 PROCEDURE — 99222 1ST HOSP IP/OBS MODERATE 55: CPT | Performed by: THORACIC SURGERY (CARDIOTHORACIC VASCULAR SURGERY)

## 2022-10-15 PROCEDURE — 93318 ECHO TRANSESOPHAGEAL INTRAOP: CPT | Performed by: ANESTHESIOLOGY

## 2022-10-15 PROCEDURE — 84484 ASSAY OF TROPONIN QUANT: CPT | Performed by: INTERNAL MEDICINE

## 2022-10-15 PROCEDURE — 80048 BASIC METABOLIC PNL TOTAL CA: CPT | Performed by: INTERNAL MEDICINE

## 2022-10-15 PROCEDURE — 99222 1ST HOSP IP/OBS MODERATE 55: CPT | Performed by: INTERNAL MEDICINE

## 2022-10-15 PROCEDURE — 80069 RENAL FUNCTION PANEL: CPT | Performed by: NURSE PRACTITIONER

## 2022-10-15 PROCEDURE — 93005 ELECTROCARDIOGRAM TRACING: CPT | Performed by: INTERNAL MEDICINE

## 2022-10-15 PROCEDURE — 33533 CABG ARTERIAL SINGLE: CPT | Performed by: THORACIC SURGERY (CARDIOTHORACIC VASCULAR SURGERY)

## 2022-10-15 PROCEDURE — 33508 ENDOSCOPIC VEIN HARVEST: CPT | Performed by: THORACIC SURGERY (CARDIOTHORACIC VASCULAR SURGERY)

## 2022-10-15 PROCEDURE — 25010000002 NEOSTIGMINE 5 MG/10ML SOLUTION: Performed by: ANESTHESIOLOGY

## 2022-10-15 PROCEDURE — 85384 FIBRINOGEN ACTIVITY: CPT | Performed by: NURSE PRACTITIONER

## 2022-10-15 PROCEDURE — C1769 GUIDE WIRE: HCPCS | Performed by: INTERNAL MEDICINE

## 2022-10-15 PROCEDURE — 94002 VENT MGMT INPAT INIT DAY: CPT

## 2022-10-15 PROCEDURE — 93005 ELECTROCARDIOGRAM TRACING: CPT | Performed by: NURSE PRACTITIONER

## 2022-10-15 PROCEDURE — 82962 GLUCOSE BLOOD TEST: CPT

## 2022-10-15 RX ORDER — MILRINONE LACTATE 0.2 MG/ML
.25-.75 INJECTION, SOLUTION INTRAVENOUS CONTINUOUS PRN
Status: DISCONTINUED | OUTPATIENT
Start: 2022-10-15 | End: 2022-10-16

## 2022-10-15 RX ORDER — CHLORHEXIDINE GLUCONATE 500 MG/1
1 CLOTH TOPICAL EVERY 12 HOURS PRN
Status: DISCONTINUED | OUTPATIENT
Start: 2022-10-15 | End: 2022-10-16

## 2022-10-15 RX ORDER — FENTANYL CITRATE 50 UG/ML
INJECTION, SOLUTION INTRAMUSCULAR; INTRAVENOUS
Status: DISCONTINUED | OUTPATIENT
Start: 2022-10-15 | End: 2022-10-15 | Stop reason: HOSPADM

## 2022-10-15 RX ORDER — PHENYLEPHRINE HCL IN 0.9% NACL 0.5 MG/5ML
.2-3 SYRINGE (ML) INTRAVENOUS CONTINUOUS PRN
Status: DISCONTINUED | OUTPATIENT
Start: 2022-10-15 | End: 2022-10-15

## 2022-10-15 RX ORDER — MAGNESIUM SULFATE HEPTAHYDRATE 500 MG/ML
INJECTION, SOLUTION INTRAMUSCULAR; INTRAVENOUS AS NEEDED
Status: DISCONTINUED | OUTPATIENT
Start: 2022-10-15 | End: 2022-10-15 | Stop reason: SURG

## 2022-10-15 RX ORDER — SODIUM CHLORIDE 9 MG/ML
INJECTION, SOLUTION INTRAVENOUS CONTINUOUS PRN
Status: DISCONTINUED | OUTPATIENT
Start: 2022-10-15 | End: 2022-10-15 | Stop reason: SURG

## 2022-10-15 RX ORDER — MORPHINE SULFATE 2 MG/ML
INJECTION, SOLUTION INTRAMUSCULAR; INTRAVENOUS
Status: DISCONTINUED | OUTPATIENT
Start: 2022-10-15 | End: 2022-10-15 | Stop reason: HOSPADM

## 2022-10-15 RX ORDER — NICARDIPINE HYDROCHLORIDE 2.5 MG/ML
INJECTION INTRAVENOUS AS NEEDED
Status: DISCONTINUED | OUTPATIENT
Start: 2022-10-15 | End: 2022-10-15 | Stop reason: SURG

## 2022-10-15 RX ORDER — HEPARIN SODIUM 1000 [USP'U]/ML
INJECTION, SOLUTION INTRAVENOUS; SUBCUTANEOUS
Status: DISCONTINUED | OUTPATIENT
Start: 2022-10-15 | End: 2022-10-15 | Stop reason: HOSPADM

## 2022-10-15 RX ORDER — SODIUM CHLORIDE 9 MG/ML
30 INJECTION, SOLUTION INTRAVENOUS CONTINUOUS
Status: DISCONTINUED | OUTPATIENT
Start: 2022-10-15 | End: 2022-10-23 | Stop reason: HOSPADM

## 2022-10-15 RX ORDER — MORPHINE SULFATE 2 MG/ML
4 INJECTION, SOLUTION INTRAMUSCULAR; INTRAVENOUS
Status: DISCONTINUED | OUTPATIENT
Start: 2022-10-15 | End: 2022-10-15

## 2022-10-15 RX ORDER — CEFAZOLIN SODIUM 2 G/100ML
2 INJECTION, SOLUTION INTRAVENOUS
Status: COMPLETED | OUTPATIENT
Start: 2022-10-16 | End: 2022-10-15

## 2022-10-15 RX ORDER — ACETAMINOPHEN 160 MG/5ML
650 SOLUTION ORAL EVERY 4 HOURS PRN
Status: DISCONTINUED | OUTPATIENT
Start: 2022-10-16 | End: 2022-10-17

## 2022-10-15 RX ORDER — MIDAZOLAM HYDROCHLORIDE 1 MG/ML
2 INJECTION INTRAMUSCULAR; INTRAVENOUS
Status: DISCONTINUED | OUTPATIENT
Start: 2022-10-15 | End: 2022-10-17

## 2022-10-15 RX ORDER — POTASSIUM CHLORIDE 29.8 MG/ML
INJECTION INTRAVENOUS CONTINUOUS PRN
Status: DISCONTINUED | OUTPATIENT
Start: 2022-10-15 | End: 2022-10-15 | Stop reason: SURG

## 2022-10-15 RX ORDER — LIDOCAINE HYDROCHLORIDE 20 MG/ML
INJECTION, SOLUTION INFILTRATION; PERINEURAL
Status: DISCONTINUED | OUTPATIENT
Start: 2022-10-15 | End: 2022-10-15 | Stop reason: HOSPADM

## 2022-10-15 RX ORDER — MAGNESIUM SULFATE 1 G/100ML
1 INJECTION INTRAVENOUS EVERY 8 HOURS
Status: DISPENSED | OUTPATIENT
Start: 2022-10-15 | End: 2022-10-16

## 2022-10-15 RX ORDER — NITROGLYCERIN 20 MG/100ML
5-200 INJECTION INTRAVENOUS
Status: DISCONTINUED | OUTPATIENT
Start: 2022-10-15 | End: 2022-10-16

## 2022-10-15 RX ORDER — NITROGLYCERIN 0.4 MG/1
0.4 TABLET SUBLINGUAL
Status: DISCONTINUED | OUTPATIENT
Start: 2022-10-15 | End: 2022-10-15

## 2022-10-15 RX ORDER — POTASSIUM CHLORIDE 750 MG/1
40 TABLET, FILM COATED, EXTENDED RELEASE ORAL AS NEEDED
Status: DISCONTINUED | OUTPATIENT
Start: 2022-10-15 | End: 2022-10-23 | Stop reason: HOSPADM

## 2022-10-15 RX ORDER — AMINOCAPROIC ACID 250 MG/ML
INJECTION, SOLUTION INTRAVENOUS AS NEEDED
Status: DISCONTINUED | OUTPATIENT
Start: 2022-10-15 | End: 2022-10-15 | Stop reason: SURG

## 2022-10-15 RX ORDER — MIDAZOLAM HYDROCHLORIDE 1 MG/ML
INJECTION INTRAMUSCULAR; INTRAVENOUS AS NEEDED
Status: DISCONTINUED | OUTPATIENT
Start: 2022-10-15 | End: 2022-10-15 | Stop reason: SURG

## 2022-10-15 RX ORDER — NITROGLYCERIN 20 MG/100ML
INJECTION INTRAVENOUS
Status: COMPLETED | OUTPATIENT
Start: 2022-10-15 | End: 2022-10-15

## 2022-10-15 RX ORDER — MORPHINE SULFATE 1 MG/ML
1 INJECTION, SOLUTION EPIDURAL; INTRATHECAL; INTRAVENOUS EVERY 4 HOURS PRN
Status: DISCONTINUED | OUTPATIENT
Start: 2022-10-15 | End: 2022-10-15

## 2022-10-15 RX ORDER — METOCLOPRAMIDE HYDROCHLORIDE 5 MG/ML
10 INJECTION INTRAMUSCULAR; INTRAVENOUS EVERY 6 HOURS
Status: DISPENSED | OUTPATIENT
Start: 2022-10-15 | End: 2022-10-16

## 2022-10-15 RX ORDER — ALBUMIN, HUMAN INJ 5% 5 %
1500 SOLUTION INTRAVENOUS AS NEEDED
Status: DISPENSED | OUTPATIENT
Start: 2022-10-15 | End: 2022-10-16

## 2022-10-15 RX ORDER — DEXMEDETOMIDINE HYDROCHLORIDE 4 UG/ML
INJECTION, SOLUTION INTRAVENOUS CONTINUOUS PRN
Status: DISCONTINUED | OUTPATIENT
Start: 2022-10-15 | End: 2022-10-15 | Stop reason: SURG

## 2022-10-15 RX ORDER — MIDAZOLAM HYDROCHLORIDE 1 MG/ML
INJECTION INTRAMUSCULAR; INTRAVENOUS
Status: DISCONTINUED | OUTPATIENT
Start: 2022-10-15 | End: 2022-10-15 | Stop reason: HOSPADM

## 2022-10-15 RX ORDER — CHLORHEXIDINE GLUCONATE 0.12 MG/ML
15 RINSE ORAL ONCE
Status: DISCONTINUED | OUTPATIENT
Start: 2022-10-15 | End: 2022-10-16

## 2022-10-15 RX ORDER — ACETAMINOPHEN 325 MG/1
650 TABLET ORAL EVERY 4 HOURS PRN
Status: DISCONTINUED | OUTPATIENT
Start: 2022-10-16 | End: 2022-10-17

## 2022-10-15 RX ORDER — POTASSIUM CHLORIDE 7.45 MG/ML
10 INJECTION INTRAVENOUS
Status: DISCONTINUED | OUTPATIENT
Start: 2022-10-15 | End: 2022-10-23 | Stop reason: HOSPADM

## 2022-10-15 RX ORDER — POTASSIUM CHLORIDE 29.8 MG/ML
20 INJECTION INTRAVENOUS
Status: DISCONTINUED | OUTPATIENT
Start: 2022-10-15 | End: 2022-10-23 | Stop reason: HOSPADM

## 2022-10-15 RX ORDER — METHADONE HYDROCHLORIDE 10 MG/ML
INJECTION, SOLUTION INTRAMUSCULAR; INTRAVENOUS; SUBCUTANEOUS AS NEEDED
Status: DISCONTINUED | OUTPATIENT
Start: 2022-10-15 | End: 2022-10-15

## 2022-10-15 RX ORDER — NOREPINEPHRINE BITARTRATE 1 MG/ML
INJECTION, SOLUTION INTRAVENOUS CONTINUOUS PRN
Status: DISCONTINUED | OUTPATIENT
Start: 2022-10-15 | End: 2022-10-15

## 2022-10-15 RX ORDER — CEFAZOLIN SODIUM 2 G/100ML
2 INJECTION, SOLUTION INTRAVENOUS EVERY 8 HOURS
Status: COMPLETED | OUTPATIENT
Start: 2022-10-15 | End: 2022-10-17

## 2022-10-15 RX ORDER — AMOXICILLIN 250 MG
2 CAPSULE ORAL NIGHTLY
Status: DISCONTINUED | OUTPATIENT
Start: 2022-10-16 | End: 2022-10-23 | Stop reason: HOSPADM

## 2022-10-15 RX ORDER — DOPAMINE HYDROCHLORIDE 160 MG/100ML
2-20 INJECTION, SOLUTION INTRAVENOUS CONTINUOUS PRN
Status: DISCONTINUED | OUTPATIENT
Start: 2022-10-15 | End: 2022-10-15

## 2022-10-15 RX ORDER — NEOSTIGMINE METHYLSULFATE 0.5 MG/ML
INJECTION, SOLUTION INTRAVENOUS AS NEEDED
Status: DISCONTINUED | OUTPATIENT
Start: 2022-10-15 | End: 2022-10-15 | Stop reason: SURG

## 2022-10-15 RX ORDER — PROTAMINE SULFATE 10 MG/ML
INJECTION, SOLUTION INTRAVENOUS AS NEEDED
Status: DISCONTINUED | OUTPATIENT
Start: 2022-10-15 | End: 2022-10-15 | Stop reason: SURG

## 2022-10-15 RX ORDER — VERAPAMIL HYDROCHLORIDE 2.5 MG/ML
INJECTION, SOLUTION INTRAVENOUS
Status: DISCONTINUED | OUTPATIENT
Start: 2022-10-15 | End: 2022-10-15 | Stop reason: HOSPADM

## 2022-10-15 RX ORDER — OXYCODONE HYDROCHLORIDE 5 MG/1
10 TABLET ORAL EVERY 4 HOURS PRN
Status: DISCONTINUED | OUTPATIENT
Start: 2022-10-15 | End: 2022-10-15

## 2022-10-15 RX ORDER — LIDOCAINE HYDROCHLORIDE 20 MG/ML
INJECTION, SOLUTION EPIDURAL; INFILTRATION; INTRACAUDAL; PERINEURAL AS NEEDED
Status: DISCONTINUED | OUTPATIENT
Start: 2022-10-15 | End: 2022-10-15 | Stop reason: SURG

## 2022-10-15 RX ORDER — NALOXONE HCL 0.4 MG/ML
0.4 VIAL (ML) INJECTION
Status: DISCONTINUED | OUTPATIENT
Start: 2022-10-15 | End: 2022-10-23 | Stop reason: HOSPADM

## 2022-10-15 RX ORDER — FENTANYL CITRATE 50 UG/ML
INJECTION, SOLUTION INTRAMUSCULAR; INTRAVENOUS AS NEEDED
Status: DISCONTINUED | OUTPATIENT
Start: 2022-10-15 | End: 2022-10-15

## 2022-10-15 RX ORDER — ACETAMINOPHEN 650 MG/1
650 SUPPOSITORY RECTAL EVERY 4 HOURS PRN
Status: DISCONTINUED | OUTPATIENT
Start: 2022-10-16 | End: 2022-10-17

## 2022-10-15 RX ORDER — ACETAMINOPHEN 10 MG/ML
1000 INJECTION, SOLUTION INTRAVENOUS EVERY 8 HOURS
Status: DISPENSED | OUTPATIENT
Start: 2022-10-15 | End: 2022-10-16

## 2022-10-15 RX ORDER — PROPOFOL 10 MG/ML
VIAL (ML) INTRAVENOUS AS NEEDED
Status: DISCONTINUED | OUTPATIENT
Start: 2022-10-15 | End: 2022-10-15 | Stop reason: SURG

## 2022-10-15 RX ORDER — ASPIRIN 325 MG
325 TABLET, DELAYED RELEASE (ENTERIC COATED) ORAL DAILY
Status: DISCONTINUED | OUTPATIENT
Start: 2022-10-16 | End: 2022-10-17

## 2022-10-15 RX ORDER — POTASSIUM CHLORIDE 1.5 G/1.77G
40 POWDER, FOR SOLUTION ORAL AS NEEDED
Status: DISCONTINUED | OUTPATIENT
Start: 2022-10-15 | End: 2022-10-23 | Stop reason: HOSPADM

## 2022-10-15 RX ORDER — ONDANSETRON 2 MG/ML
4 INJECTION INTRAMUSCULAR; INTRAVENOUS EVERY 6 HOURS PRN
Status: DISCONTINUED | OUTPATIENT
Start: 2022-10-15 | End: 2022-10-23 | Stop reason: HOSPADM

## 2022-10-15 RX ORDER — NICOTINE POLACRILEX 4 MG
15 LOZENGE BUCCAL
Status: DISCONTINUED | OUTPATIENT
Start: 2022-10-15 | End: 2022-10-23 | Stop reason: HOSPADM

## 2022-10-15 RX ORDER — EPINEPHRINE 0.1 MG/ML
SYRINGE (ML) INJECTION AS NEEDED
Status: DISCONTINUED | OUTPATIENT
Start: 2022-10-15 | End: 2022-10-15 | Stop reason: SURG

## 2022-10-15 RX ORDER — NOREPINEPHRINE BIT/0.9 % NACL 8 MG/250ML
.02-.05 INFUSION BOTTLE (ML) INTRAVENOUS CONTINUOUS PRN
Status: DISCONTINUED | OUTPATIENT
Start: 2022-10-15 | End: 2022-10-17

## 2022-10-15 RX ORDER — CHLORHEXIDINE GLUCONATE 0.12 MG/ML
15 RINSE ORAL EVERY 12 HOURS
Status: DISCONTINUED | OUTPATIENT
Start: 2022-10-15 | End: 2022-10-18

## 2022-10-15 RX ORDER — BUPIVACAINE HCL/0.9 % NACL/PF 0.125 %
PLASTIC BAG, INJECTION (ML) EPIDURAL AS NEEDED
Status: DISCONTINUED | OUTPATIENT
Start: 2022-10-15 | End: 2022-10-15 | Stop reason: SURG

## 2022-10-15 RX ORDER — SUCCINYLCHOLINE/SOD CL,ISO/PF 200MG/10ML
SYRINGE (ML) INTRAVENOUS AS NEEDED
Status: DISCONTINUED | OUTPATIENT
Start: 2022-10-15 | End: 2022-10-15 | Stop reason: SURG

## 2022-10-15 RX ORDER — MORPHINE SULFATE 2 MG/ML
2 INJECTION, SOLUTION INTRAMUSCULAR; INTRAVENOUS
Status: DISCONTINUED | OUTPATIENT
Start: 2022-10-15 | End: 2022-10-16

## 2022-10-15 RX ORDER — ENOXAPARIN SODIUM 100 MG/ML
40 INJECTION SUBCUTANEOUS EVERY 24 HOURS
Status: DISCONTINUED | OUTPATIENT
Start: 2022-10-16 | End: 2022-10-23 | Stop reason: HOSPADM

## 2022-10-15 RX ORDER — PAPAVERINE HYDROCHLORIDE 30 MG/ML
INJECTION INTRAMUSCULAR; INTRAVENOUS AS NEEDED
Status: DISCONTINUED | OUTPATIENT
Start: 2022-10-15 | End: 2022-10-15 | Stop reason: HOSPADM

## 2022-10-15 RX ORDER — HEPARIN SODIUM 5000 [USP'U]/ML
INJECTION, SOLUTION INTRAVENOUS; SUBCUTANEOUS AS NEEDED
Status: DISCONTINUED | OUTPATIENT
Start: 2022-10-15 | End: 2022-10-15 | Stop reason: HOSPADM

## 2022-10-15 RX ORDER — DEXTROSE MONOHYDRATE 25 G/50ML
10-50 INJECTION, SOLUTION INTRAVENOUS
Status: DISCONTINUED | OUTPATIENT
Start: 2022-10-15 | End: 2022-10-23 | Stop reason: HOSPADM

## 2022-10-15 RX ORDER — HEPARIN SODIUM 1000 [USP'U]/ML
INJECTION, SOLUTION INTRAVENOUS; SUBCUTANEOUS AS NEEDED
Status: DISCONTINUED | OUTPATIENT
Start: 2022-10-15 | End: 2022-10-15 | Stop reason: SURG

## 2022-10-15 RX ORDER — ROCURONIUM BROMIDE 10 MG/ML
INJECTION, SOLUTION INTRAVENOUS AS NEEDED
Status: DISCONTINUED | OUTPATIENT
Start: 2022-10-15 | End: 2022-10-15 | Stop reason: SURG

## 2022-10-15 RX ORDER — GLYCOPYRROLATE 0.2 MG/ML
INJECTION INTRAMUSCULAR; INTRAVENOUS AS NEEDED
Status: DISCONTINUED | OUTPATIENT
Start: 2022-10-15 | End: 2022-10-15 | Stop reason: SURG

## 2022-10-15 RX ORDER — DEXMEDETOMIDINE HYDROCHLORIDE 4 UG/ML
.2-1.5 INJECTION, SOLUTION INTRAVENOUS
Status: DISCONTINUED | OUTPATIENT
Start: 2022-10-15 | End: 2022-10-16

## 2022-10-15 RX ORDER — ONDANSETRON 2 MG/ML
INJECTION INTRAMUSCULAR; INTRAVENOUS AS NEEDED
Status: DISCONTINUED | OUTPATIENT
Start: 2022-10-15 | End: 2022-10-15 | Stop reason: SURG

## 2022-10-15 RX ORDER — HYDROCODONE BITARTRATE AND ACETAMINOPHEN 5; 325 MG/1; MG/1
2 TABLET ORAL EVERY 4 HOURS PRN
Status: DISCONTINUED | OUTPATIENT
Start: 2022-10-15 | End: 2022-10-23 | Stop reason: HOSPADM

## 2022-10-15 RX ADMIN — POTASSIUM CHLORIDE: 29.8 INJECTION, SOLUTION INTRAVENOUS at 20:06

## 2022-10-15 RX ADMIN — CHLORHEXIDINE GLUCONATE 15 ML: 1.2 SOLUTION ORAL at 21:57

## 2022-10-15 RX ADMIN — SODIUM CHLORIDE 240 UNITS/HR: 900 INJECTION, SOLUTION INTRAVENOUS at 17:25

## 2022-10-15 RX ADMIN — Medication 300 MCG: at 14:35

## 2022-10-15 RX ADMIN — CEFAZOLIN SODIUM 2 G: 2 INJECTION, SOLUTION INTRAVENOUS at 18:36

## 2022-10-15 RX ADMIN — Medication 80 MG: at 14:32

## 2022-10-15 RX ADMIN — NICARDIPINE HYDROCHLORIDE 0.3 MG: 2.5 INJECTION, SOLUTION INTRAVENOUS at 19:20

## 2022-10-15 RX ADMIN — SODIUM CHLORIDE: 9 INJECTION, SOLUTION INTRAVENOUS at 15:06

## 2022-10-15 RX ADMIN — EPINEPHRINE 0.05 MG: 0.1 INJECTION INTRACARDIAC; INTRAVENOUS at 14:34

## 2022-10-15 RX ADMIN — MIDAZOLAM HYDROCHLORIDE 2 MG: 1 INJECTION, SOLUTION INTRAMUSCULAR; INTRAVENOUS at 14:30

## 2022-10-15 RX ADMIN — PROPOFOL 120 MG: 10 INJECTION, EMULSION INTRAVENOUS at 14:33

## 2022-10-15 RX ADMIN — DEXMEDETOMIDINE HYDROCHLORIDE 1 MCG/KG/HR: 4 INJECTION, SOLUTION INTRAVENOUS at 15:07

## 2022-10-15 RX ADMIN — ALBUMIN (HUMAN) 250 ML: 12.5 INJECTION, SOLUTION INTRAVENOUS at 22:30

## 2022-10-15 RX ADMIN — POTASSIUM CHLORIDE: 29.8 INJECTION, SOLUTION INTRAVENOUS at 19:52

## 2022-10-15 RX ADMIN — ALBUMIN (HUMAN) 250 ML: 12.5 INJECTION, SOLUTION INTRAVENOUS at 23:00

## 2022-10-15 RX ADMIN — HEPARIN SODIUM 16000 UNITS: 1000 INJECTION INTRAVENOUS; SUBCUTANEOUS at 16:04

## 2022-10-15 RX ADMIN — SODIUM CHLORIDE: 9 INJECTION, SOLUTION INTRAVENOUS at 15:09

## 2022-10-15 RX ADMIN — PROPOFOL 50 MCG/KG/MIN: 10 INJECTION, EMULSION INTRAVENOUS at 20:02

## 2022-10-15 RX ADMIN — PROTAMINE SULFATE 50 MG: 10 INJECTION, SOLUTION INTRAVENOUS at 19:42

## 2022-10-15 RX ADMIN — PHENYLEPHRINE HYDROCHLORIDE 1 MCG/KG/MIN: 10 INJECTION INTRAVENOUS at 14:33

## 2022-10-15 RX ADMIN — ROCURONIUM BROMIDE 10 MG: 50 INJECTION INTRAVENOUS at 19:35

## 2022-10-15 RX ADMIN — MAGNESIUM SULFATE HEPTAHYDRATE 2 G: 500 INJECTION, SOLUTION INTRAMUSCULAR; INTRAVENOUS at 18:54

## 2022-10-15 RX ADMIN — Medication 200 MCG: at 14:34

## 2022-10-15 RX ADMIN — AMIODARONE HYDROCHLORIDE 1 MG/MIN: 1.8 INJECTION, SOLUTION INTRAVENOUS at 18:57

## 2022-10-15 RX ADMIN — MUPIROCIN 1 APPLICATION: 20 OINTMENT TOPICAL at 21:56

## 2022-10-15 RX ADMIN — PROTAMINE SULFATE 200 MG: 10 INJECTION, SOLUTION INTRAVENOUS at 19:15

## 2022-10-15 RX ADMIN — PROPOFOL 50 MCG/KG/MIN: 10 INJECTION, EMULSION INTRAVENOUS at 15:07

## 2022-10-15 RX ADMIN — FENTANYL CITRATE 50 MCG: 50 INJECTION, SOLUTION INTRAMUSCULAR; INTRAVENOUS at 15:17

## 2022-10-15 RX ADMIN — AMINOCAPROIC ACID 10 G: 250 INJECTION, SOLUTION INTRAVENOUS at 15:13

## 2022-10-15 RX ADMIN — ROCURONIUM BROMIDE 20 MG: 50 INJECTION INTRAVENOUS at 18:53

## 2022-10-15 RX ADMIN — METHADONE HYDROCHLORIDE 10 MG: 10 INJECTION, SOLUTION INTRAMUSCULAR; INTRAVENOUS; SUBCUTANEOUS at 15:10

## 2022-10-15 RX ADMIN — NOREPINEPHRINE BITARTRATE 0.04 MCG/KG/MIN: 1 INJECTION, SOLUTION, CONCENTRATE INTRAVENOUS at 15:06

## 2022-10-15 RX ADMIN — Medication 100 MCG: at 14:33

## 2022-10-15 RX ADMIN — SODIUM CHLORIDE: 9 INJECTION, SOLUTION INTRAVENOUS at 14:18

## 2022-10-15 RX ADMIN — NEOSTIGMINE METHYLSULFATE 3 MG: 0.5 INJECTION INTRAVENOUS at 20:00

## 2022-10-15 RX ADMIN — FENTANYL CITRATE 50 MCG: 50 INJECTION, SOLUTION INTRAMUSCULAR; INTRAVENOUS at 14:33

## 2022-10-15 RX ADMIN — ACETAMINOPHEN 1000 MG: 10 INJECTION, SOLUTION INTRAVENOUS at 22:42

## 2022-10-15 RX ADMIN — ONDANSETRON 4 MG: 2 INJECTION INTRAMUSCULAR; INTRAVENOUS at 20:25

## 2022-10-15 RX ADMIN — CEFAZOLIN SODIUM 2 G: 2 INJECTION, SOLUTION INTRAVENOUS at 22:42

## 2022-10-15 RX ADMIN — Medication 100 MG: at 14:33

## 2022-10-15 RX ADMIN — ALBUMIN (HUMAN) 250 ML: 12.5 INJECTION, SOLUTION INTRAVENOUS at 21:57

## 2022-10-15 RX ADMIN — ROCURONIUM BROMIDE 50 MG: 50 INJECTION INTRAVENOUS at 14:38

## 2022-10-15 RX ADMIN — MAGNESIUM SULFATE IN DEXTROSE 1 G: 10 INJECTION, SOLUTION INTRAVENOUS at 21:55

## 2022-10-15 RX ADMIN — MORPHINE SULFATE 2 MG: 2 INJECTION, SOLUTION INTRAMUSCULAR; INTRAVENOUS at 22:41

## 2022-10-15 RX ADMIN — SODIUM CHLORIDE 30 ML/HR: 9 INJECTION, SOLUTION INTRAVENOUS at 22:12

## 2022-10-15 RX ADMIN — NICARDIPINE HYDROCHLORIDE 0.2 MG: 2.5 INJECTION, SOLUTION INTRAVENOUS at 19:22

## 2022-10-15 RX ADMIN — EPINEPHRINE 0.03 MCG/KG/MIN: 1 INJECTION, SOLUTION, CONCENTRATE INTRAVENOUS at 15:10

## 2022-10-15 RX ADMIN — METHADONE HYDROCHLORIDE 10 MG: 10 INJECTION, SOLUTION INTRAMUSCULAR; INTRAVENOUS; SUBCUTANEOUS at 15:35

## 2022-10-15 RX ADMIN — METOCLOPRAMIDE 10 MG: 5 INJECTION, SOLUTION INTRAMUSCULAR; INTRAVENOUS at 21:55

## 2022-10-15 RX ADMIN — CEFAZOLIN SODIUM 2 G: 2 INJECTION, SOLUTION INTRAVENOUS at 14:37

## 2022-10-15 RX ADMIN — GLYCOPYRROLATE 0.4 MCG: 1 INJECTION INTRAMUSCULAR; INTRAVENOUS at 20:00

## 2022-10-15 NOTE — ANESTHESIA PROCEDURE NOTES
Diagnostic IntraOp Anesthesia Mike    Procedure Performed: Diagnostic IntraOp Anesthesia Mike       Start Time:  10/15/2022 2:53 PM       End Time:   10/15/2022 3:05 PM    Preanesthesia Checklist:  Patient identified, IV assessed, risks and benefits discussed, monitors and equipment assessed, procedure being performed at surgeon's request and anesthesia consent obtained.    General Procedure Information  Diagnostic Indications for Echo:  assessment of ascending aorta, assessment of surgical repair and hemodynamic monitoring  Physician Requesting Echo: Lakshmi Teixeira MD  CPT Code:  54919, 46643  ICD Code for Medical Necessity:  Mitral regurgitation non rheumatic, I34.0  Location performed:  OR  Intubated  Bite block placed  Heart visualized  Probe Insertion:  Easy  Probe Type:  Multiplane  Modalities:  Color flow mapping, pulse wave Doppler and continuous wave Doppler    Echocardiographic and Doppler Measurements    Ventricles    Left Ventricle:  Ejection Fraction 41%.      Ventricular Regional Function:  1- Basal Anteroseptal:  hypokinetic  2- Basal Anterior:  hypokinetic  3- Basal Anterolateral:  hypokinetic  4- Basal Inferolateral:  normal  5- Basal Inferior:  normal  6- Basal Inferoseptal:  hypokinetic  7- Mid Anteroseptal:  hypokinetic  8- Mid Anterior:  hypokinetic  9- Mid Anterolateral:  hypokinetic  10- Mid Inferolateral:  normal  11- Mid Inferior:  normal  12- Mid Inferoseptal:  hypokinetic  13- Apical Anterior:  akinetic  14- Apical Lateral:  akinetic  15- Apical Inferior:  akinetic  16- Apical Septal:  akinetic  17- [place]:  akinetic      Valves    Aortic Valve:  Annulus normal.  Stenosis not present.  Area: 1.59 cm².  Mean Gradient: 2 mmHg.  Regurgitation absent.  Leaflets normal.  Leaflet motions normal.      Mitral Valve:  Annulus normal.  Stenosis not present.  Area: 5.79 cm².  Mean Gradient: 0 mmHg.  Regurgitation mild.  Leaflets normal.  Leaflet motions normal.      Tricuspid Valve:  Annulus normal.   Stenosis not present.  Regurgitation absent.  Leaflets normal.  Leaflet motions normal.        Aorta    Ascending Aorta:  Size normal.  Diameter 1.8 cm.  Dissection not present.  Plaque thickness less than 3 mm.  Mobile plaque not present.    Aortic Arch:  Size normal.  Diameter 1.5 cm.  Dissection not present.  Plaque thickness less than 3 mm.  Mobile plaque not present.    Descending Aorta:  Size normal.  Diameter 1.3 cm.  Dissection not present.  Plaque thickness less than 3 mm.  Mobile plaque not present.        Atria    Right Atrium:  Size normal.  Spontaneous echo contrast not present.  Thrombus not present.  Tumor not present.  Device not present.      Left Atrium:  Size normal.  Spontaneous echo contrast not present.  Thrombus not present.  Tumor not present.  Device not present.    Left atrial appendage normal.          Diastolic Function Measurements:  Diastolic Dysfunction Grade=  E=  60.6 ms  A=  30.1 ms  E/A Ratio=  2  DT=  129 ms  S/D=  .8  IVRT=    Other Findings  Pericardium:  normal  Pleural Effusion:  none      Anesthesia Information  Performed Personally  Anesthesiologist:  Steven Kelly MD      Echocardiogram Comments:       Postbypass results:  MV trace MR no MS mean gradient 1 mmHg   AV no AI no AS mean gradient 3 mmHg   V no TR no TS   LVEF 45% mild improvement in anterior and septal hypokinesis in apical and midpapillary segments   Normal RHF

## 2022-10-15 NOTE — ANESTHESIA PREPROCEDURE EVALUATION
Anesthesia Evaluation     Patient summary reviewed and Nursing notes reviewed   no history of anesthetic complications:  NPO Solid Status: > 8 hours  NPO Liquid Status: > 8 hours           Airway   Mallampati: II  TM distance: >3 FB  Neck ROM: full  No difficulty expected  Dental - normal exam     Pulmonary    (-) asthma, sleep apnea, rhonchi, decreased breath sounds, wheezes, not a smoker  Cardiovascular   Exercise tolerance: excellent (>7 METS)    Rhythm: regular  Rate: normal    (+) angina, CHF Systolic <55%, murmur,   (-) hypertension, CAD, ALVAREZ      Neuro/Psych  (-) CVA  GI/Hepatic/Renal/Endo    (-) liver disease, no renal disease, diabetes, no thyroid disorder    Musculoskeletal     Abdominal     Abdomen: soft.   Substance History      OB/GYN          Other                        Anesthesia Plan    ASA 4 - emergent     general, Kelli, CVL and PAC     intravenous induction   Postoperative Plan: Expected vent after surgery  Anesthetic plan, risks, benefits, and alternatives have been provided, discussed and informed consent has been obtained with: patient.        CODE STATUS:    Code Status (Patient has no pulse and is not breathing): CPR (Attempt to Resuscitate)  Medical Interventions (Patient has pulse or is breathing): Full Support  Release to patient: Routine Release

## 2022-10-15 NOTE — ANESTHESIA PROCEDURE NOTES
Central Line      Patient reassessed immediately prior to procedure    Patient location during procedure: OR  Start time: 10/15/2022 2:39 PM  Stop Time:10/15/2022 2:48 PM  Indications: vascular access  Staff  Anesthesiologist: Steven Kelly MD  Preanesthetic Checklist  Completed: patient identified, IV checked, site marked, risks and benefits discussed, surgical consent, monitors and equipment checked, pre-op evaluation and timeout performed  Central Line Prep  Sterile Tech:cap, gloves, gown, mask and sterile barriers  Prep: chloraprep  Patient monitoring: continuous pulse oximetry, blood pressure monitoring and EKG  Central Line Procedure  Laterality:right  Location:internal jugular  Catheter Type:Cordis and double lumen  Catheter Size:9 Fr  Guidance:ultrasound guided  PROCEDURE NOTE/ULTRASOUND INTERPRETATION.  Using ultrasound guidance the potential vascular sites for insertion of the catheter were visualized to determine the patency of the vessel to be used for vascular access.  After selecting the appropriate site for insertion, the needle was visualized under ultrasound being inserted into the internal jugular vein, followed by ultrasound confirmation of wire and catheter placement. There were no abnormalities seen on ultrasound; an image was taken; and the patient tolerated the procedure with no complications. Images: still images obtained, printed/placed on chart  Assessment  Post procedure:biopatch applied, line sutured and occlusive dressing applied  Assessement:blood return through all ports, free fluid flow, placement verified by x-ray, Errol Test, no pneumothorax on x-ray and chest x-ray ordered  Complications:no  Patient Tolerance:patient tolerated the procedure well with no apparent complications  Additional Notes  Ultrasound guidance was used for needle placement.

## 2022-10-15 NOTE — ANESTHESIA PROCEDURE NOTES
Central Line      Patient reassessed immediately prior to procedure    Patient location during procedure: OR  Start time: 10/15/2022 2:48 PM  Stop Time:10/15/2022 2:51 PM  Indications: central pressure monitoring  Staff  Anesthesiologist: Steven Kelly MD  Preanesthetic Checklist  Completed: patient identified, IV checked, site marked, risks and benefits discussed, surgical consent, monitors and equipment checked, pre-op evaluation and timeout performed  Central Line Prep  Sterile Tech:cap, gloves, gown, mask and sterile barriers  Prep: chloraprep  Patient monitoring: blood pressure monitoring, continuous pulse oximetry and EKG  Central Line Procedure  Laterality:right  Location:internal jugular  Catheter Type:Worth-Ignacio  Assessment  Post procedure:line sutured, biopatch applied and occlusive dressing applied  Assessement:chest x-ray ordered, no pneumothorax on x-ray and free fluid flow  Complications:no  Patient Tolerance:patient tolerated the procedure well with no apparent complications

## 2022-10-15 NOTE — ANESTHESIA PROCEDURE NOTES
Airway  Urgency: elective    Airway not difficult    General Information and Staff    Patient location during procedure: OR  Anesthesiologist: Steven Kelly MD    Indications and Patient Condition  Indications for airway management: airway protection    Preoxygenated: yes  Mask difficulty assessment: 0 - not attempted    Final Airway Details  Final airway type: endotracheal airway      Successful airway: ETT  Cuffed: yes   Successful intubation technique: direct laryngoscopy and RSI  Facilitating devices/methods: intubating stylet  Endotracheal tube insertion site: oral  Blade: Deejay  Blade size: 4  ETT size (mm): 8.0  Cormack-Lehane Classification: grade IIa - partial view of glottis  Placement verified by: chest auscultation   Measured from: lips  ETT/EBT  to lips (cm): 21  Number of attempts at approach: 1  Assessment: lips, teeth, and gum same as pre-op and atraumatic intubation

## 2022-10-16 ENCOUNTER — APPOINTMENT (OUTPATIENT)
Dept: CARDIOLOGY | Facility: HOSPITAL | Age: 32
End: 2022-10-16

## 2022-10-16 ENCOUNTER — APPOINTMENT (OUTPATIENT)
Dept: GENERAL RADIOLOGY | Facility: HOSPITAL | Age: 32
End: 2022-10-16

## 2022-10-16 LAB
ALBUMIN SERPL-MCNC: 4.2 G/DL (ref 3.5–5.2)
ALBUMIN SERPL-MCNC: 4.3 G/DL (ref 3.5–5.2)
ALBUMIN SERPL-MCNC: 4.6 G/DL (ref 3.5–5.2)
ALBUMIN SERPL-MCNC: 4.8 G/DL (ref 3.5–5.2)
ANION GAP SERPL CALCULATED.3IONS-SCNC: 11 MMOL/L (ref 5–15)
ANION GAP SERPL CALCULATED.3IONS-SCNC: 11.3 MMOL/L (ref 5–15)
ANION GAP SERPL CALCULATED.3IONS-SCNC: 11.3 MMOL/L (ref 5–15)
ANION GAP SERPL CALCULATED.3IONS-SCNC: 13.5 MMOL/L (ref 5–15)
AORTIC DIMENSIONLESS INDEX: 0.8 (DI)
APTT PPP: 43 SECONDS (ref 22.7–35.4)
ARTERIAL PATENCY WRIST A: ABNORMAL
ATMOSPHERIC PRESS: 753.7 MMHG
ATMOSPHERIC PRESS: 753.7 MMHG
ATMOSPHERIC PRESS: 754.4 MMHG
ATMOSPHERIC PRESS: 754.4 MMHG
BASE EXCESS BLDA CALC-SCNC: -0.3 MMOL/L (ref 0–2)
BASE EXCESS BLDA CALC-SCNC: -2.7 MMOL/L (ref 0–2)
BASE EXCESS BLDA CALC-SCNC: -5.1 MMOL/L (ref 0–2)
BASE EXCESS BLDA CALC-SCNC: -5.6 MMOL/L (ref 0–2)
BASOPHILS # BLD AUTO: 0.01 10*3/MM3 (ref 0–0.2)
BASOPHILS NFR BLD AUTO: 0.1 % (ref 0–1.5)
BDY SITE: ABNORMAL
BH CV ECHO MEAS - ACS: 1.47 CM
BH CV ECHO MEAS - AO MAX PG: 2.9 MMHG
BH CV ECHO MEAS - AO MEAN PG: 1.44 MMHG
BH CV ECHO MEAS - AO V2 MAX: 85.8 CM/SEC
BH CV ECHO MEAS - AO V2 VTI: 12.7 CM
BH CV ECHO MEAS - AVA(I,D): 1.85 CM2
BH CV ECHO MEAS - EDV(CUBED): 75.5 ML
BH CV ECHO MEAS - EDV(MOD-SP2): 57 ML
BH CV ECHO MEAS - EDV(MOD-SP4): 52 ML
BH CV ECHO MEAS - EF(MOD-BP): 32.7 %
BH CV ECHO MEAS - EF(MOD-SP2): 24.6 %
BH CV ECHO MEAS - EF(MOD-SP4): 38.5 %
BH CV ECHO MEAS - ESV(CUBED): 29.1 ML
BH CV ECHO MEAS - ESV(MOD-SP2): 43 ML
BH CV ECHO MEAS - ESV(MOD-SP4): 32 ML
BH CV ECHO MEAS - FS: 27.2 %
BH CV ECHO MEAS - IVS/LVPW: 0.79 CM
BH CV ECHO MEAS - IVSD: 0.68 CM
BH CV ECHO MEAS - LAT PEAK E' VEL: 7 CM/SEC
BH CV ECHO MEAS - LV DIASTOLIC VOL/BSA (35-75): 33.1 CM2
BH CV ECHO MEAS - LV MASS(C)D: 96.3 GRAMS
BH CV ECHO MEAS - LV MAX PG: 1.4 MMHG
BH CV ECHO MEAS - LV MEAN PG: 0.87 MMHG
BH CV ECHO MEAS - LV SYSTOLIC VOL/BSA (12-30): 20.3 CM2
BH CV ECHO MEAS - LV V1 MAX: 59.1 CM/SEC
BH CV ECHO MEAS - LV V1 VTI: 9.7 CM
BH CV ECHO MEAS - LVIDD: 4.2 CM
BH CV ECHO MEAS - LVIDS: 3.1 CM
BH CV ECHO MEAS - LVOT AREA: 2.42 CM2
BH CV ECHO MEAS - LVOT DIAM: 1.76 CM
BH CV ECHO MEAS - LVPWD: 0.85 CM
BH CV ECHO MEAS - MED PEAK E' VEL: 8.6 CM/SEC
BH CV ECHO MEAS - MV A DUR: 0.11 SEC
BH CV ECHO MEAS - MV A MAX VEL: 44.6 CM/SEC
BH CV ECHO MEAS - MV DEC SLOPE: 424.9 CM/SEC2
BH CV ECHO MEAS - MV DEC TIME: 0.15 MSEC
BH CV ECHO MEAS - MV E MAX VEL: 65.1 CM/SEC
BH CV ECHO MEAS - MV E/A: 1.46
BH CV ECHO MEAS - MV MAX PG: 2.7 MMHG
BH CV ECHO MEAS - MV MEAN PG: 0.88 MMHG
BH CV ECHO MEAS - MV P1/2T: 53.2 MSEC
BH CV ECHO MEAS - MV V2 VTI: 12.9 CM
BH CV ECHO MEAS - MVA(P1/2T): 4.1 CM2
BH CV ECHO MEAS - MVA(VTI): 1.81 CM2
BH CV ECHO MEAS - PA ACC TIME: 0.11 SEC
BH CV ECHO MEAS - PA PR(ACCEL): 30.3 MMHG
BH CV ECHO MEAS - PA V2 MAX: 74.7 CM/SEC
BH CV ECHO MEAS - PULM DIAS VEL: 48.8 CM/SEC
BH CV ECHO MEAS - PULM S/D: 0.65
BH CV ECHO MEAS - PULM SYS VEL: 31.7 CM/SEC
BH CV ECHO MEAS - RAP SYSTOLE: 8 MMHG
BH CV ECHO MEAS - RV MAX PG: 0.49 MMHG
BH CV ECHO MEAS - RV V1 MAX: 35.1 CM/SEC
BH CV ECHO MEAS - RV V1 VTI: 6.7 CM
BH CV ECHO MEAS - RVSP: 22 MMHG
BH CV ECHO MEAS - SI(MOD-SP2): 8.9 ML/M2
BH CV ECHO MEAS - SI(MOD-SP4): 12.7 ML/M2
BH CV ECHO MEAS - SV(LVOT): 23.5 ML
BH CV ECHO MEAS - SV(MOD-SP2): 14 ML
BH CV ECHO MEAS - SV(MOD-SP4): 20 ML
BH CV ECHO MEAS - TAPSE (>1.6): 1.08 CM
BH CV ECHO MEAS - TR MAX PG: 14.4 MMHG
BH CV ECHO MEAS - TR MAX VEL: 190 CM/SEC
BH CV ECHO MEASUREMENTS AVERAGE E/E' RATIO: 8.35
BH CV XLRA - RV BASE: 2.41 CM
BH CV XLRA - RV LENGTH: 6.3 CM
BH CV XLRA - RV MID: 2.15 CM
BH CV XLRA - TDI S': 7.7 CM/SEC
BUN SERPL-MCNC: 12 MG/DL (ref 6–20)
BUN SERPL-MCNC: 8 MG/DL (ref 6–20)
BUN SERPL-MCNC: 9 MG/DL (ref 6–20)
BUN SERPL-MCNC: 9 MG/DL (ref 6–20)
BUN/CREAT SERPL: 12.9 (ref 7–25)
BUN/CREAT SERPL: 7.7 (ref 7–25)
BUN/CREAT SERPL: 9.1 (ref 7–25)
BUN/CREAT SERPL: 9.7 (ref 7–25)
CA-I BLD-MCNC: 4.6 MG/DL (ref 4.6–5.4)
CA-I SERPL ISE-MCNC: 1.15 MMOL/L (ref 1.15–1.35)
CALCIUM SPEC-SCNC: 10 MG/DL (ref 8.6–10.5)
CALCIUM SPEC-SCNC: 8.3 MG/DL (ref 8.6–10.5)
CALCIUM SPEC-SCNC: 8.4 MG/DL (ref 8.6–10.5)
CALCIUM SPEC-SCNC: 8.7 MG/DL (ref 8.6–10.5)
CHLORIDE SERPL-SCNC: 108 MMOL/L (ref 98–107)
CHLORIDE SERPL-SCNC: 111 MMOL/L (ref 98–107)
CHLORIDE SERPL-SCNC: 113 MMOL/L (ref 98–107)
CHLORIDE SERPL-SCNC: 113 MMOL/L (ref 98–107)
CHOLEST SERPL-MCNC: 48 MG/DL (ref 0–200)
CO2 SERPL-SCNC: 19 MMOL/L (ref 22–29)
CO2 SERPL-SCNC: 20.7 MMOL/L (ref 22–29)
CO2 SERPL-SCNC: 24.5 MMOL/L (ref 22–29)
CO2 SERPL-SCNC: 25.7 MMOL/L (ref 22–29)
CREAT SERPL-MCNC: 0.93 MG/DL (ref 0.57–1)
CREAT SERPL-MCNC: 0.93 MG/DL (ref 0.57–1)
CREAT SERPL-MCNC: 0.99 MG/DL (ref 0.57–1)
CREAT SERPL-MCNC: 1.04 MG/DL (ref 0.57–1)
DEPRECATED RDW RBC AUTO: 38.9 FL (ref 37–54)
DEPRECATED RDW RBC AUTO: 40.7 FL (ref 37–54)
EGFRCR SERPLBLD CKD-EPI 2021: 72.9 ML/MIN/1.73
EGFRCR SERPLBLD CKD-EPI 2021: 77.4 ML/MIN/1.73
EGFRCR SERPLBLD CKD-EPI 2021: 83.4 ML/MIN/1.73
EGFRCR SERPLBLD CKD-EPI 2021: 83.4 ML/MIN/1.73
EOSINOPHIL # BLD AUTO: 0 10*3/MM3 (ref 0–0.4)
EOSINOPHIL NFR BLD AUTO: 0 % (ref 0.3–6.2)
ERYTHROCYTE [DISTWIDTH] IN BLOOD BY AUTOMATED COUNT: 12.3 % (ref 12.3–15.4)
ERYTHROCYTE [DISTWIDTH] IN BLOOD BY AUTOMATED COUNT: 12.8 % (ref 12.3–15.4)
GLUCOSE BLDC GLUCOMTR-MCNC: 138 MG/DL (ref 70–130)
GLUCOSE BLDC GLUCOMTR-MCNC: 147 MG/DL (ref 70–130)
GLUCOSE BLDC GLUCOMTR-MCNC: 148 MG/DL (ref 70–130)
GLUCOSE BLDC GLUCOMTR-MCNC: 156 MG/DL (ref 70–130)
GLUCOSE BLDC GLUCOMTR-MCNC: 160 MG/DL (ref 70–130)
GLUCOSE BLDC GLUCOMTR-MCNC: 169 MG/DL (ref 70–130)
GLUCOSE BLDC GLUCOMTR-MCNC: 170 MG/DL (ref 70–130)
GLUCOSE BLDC GLUCOMTR-MCNC: 178 MG/DL (ref 70–130)
GLUCOSE BLDC GLUCOMTR-MCNC: 190 MG/DL (ref 70–130)
GLUCOSE BLDC GLUCOMTR-MCNC: 211 MG/DL (ref 70–130)
GLUCOSE SERPL-MCNC: 147 MG/DL (ref 65–99)
GLUCOSE SERPL-MCNC: 150 MG/DL (ref 65–99)
GLUCOSE SERPL-MCNC: 198 MG/DL (ref 65–99)
GLUCOSE SERPL-MCNC: 206 MG/DL (ref 65–99)
HBA1C MFR BLD: 5.1 % (ref 4.8–5.6)
HCO3 BLDA-SCNC: 19.5 MMOL/L (ref 22–28)
HCO3 BLDA-SCNC: 21.6 MMOL/L (ref 22–28)
HCO3 BLDA-SCNC: 23.9 MMOL/L (ref 22–28)
HCO3 BLDA-SCNC: 24.7 MMOL/L (ref 22–28)
HCT VFR BLD AUTO: 24.9 % (ref 34–46.6)
HCT VFR BLD AUTO: 26.7 % (ref 34–46.6)
HCT VFR BLD AUTO: 27.8 % (ref 34–46.6)
HDLC SERPL-MCNC: 23 MG/DL (ref 40–60)
HGB BLD-MCNC: 8.7 G/DL (ref 12–15.9)
HGB BLD-MCNC: 9.4 G/DL (ref 12–15.9)
HGB BLD-MCNC: 9.6 G/DL (ref 12–15.9)
IMM GRANULOCYTES # BLD AUTO: 0.05 10*3/MM3 (ref 0–0.05)
IMM GRANULOCYTES NFR BLD AUTO: 0.5 % (ref 0–0.5)
INHALED O2 CONCENTRATION: 40 %
LDLC SERPL CALC-MCNC: 13 MG/DL (ref 0–100)
LDLC/HDLC SERPL: 0.8 {RATIO}
LEFT ATRIUM VOLUME INDEX: 20.5 ML/M2
LYMPHOCYTES # BLD AUTO: 0.83 10*3/MM3 (ref 0.7–3.1)
LYMPHOCYTES NFR BLD AUTO: 8.2 % (ref 19.6–45.3)
MAGNESIUM SERPL-MCNC: 2.5 MG/DL (ref 1.6–2.6)
MAGNESIUM SERPL-MCNC: 3.8 MG/DL (ref 1.6–2.6)
MAXIMAL PREDICTED HEART RATE: 187 BPM
MCH RBC QN AUTO: 30.5 PG (ref 26.6–33)
MCH RBC QN AUTO: 30.7 PG (ref 26.6–33)
MCHC RBC AUTO-ENTMCNC: 34.9 G/DL (ref 31.5–35.7)
MCHC RBC AUTO-ENTMCNC: 35.2 G/DL (ref 31.5–35.7)
MCV RBC AUTO: 86.7 FL (ref 79–97)
MCV RBC AUTO: 88 FL (ref 79–97)
MODALITY: ABNORMAL
MONOCYTES # BLD AUTO: 0.6 10*3/MM3 (ref 0.1–0.9)
MONOCYTES NFR BLD AUTO: 6 % (ref 5–12)
NEUTROPHILS NFR BLD AUTO: 8.58 10*3/MM3 (ref 1.7–7)
NEUTROPHILS NFR BLD AUTO: 85.2 % (ref 42.7–76)
NRBC BLD AUTO-RTO: 0 /100 WBC (ref 0–0.2)
O2 A-A PPRESDIFF RESPIRATORY: 0.5 MMHG
O2 A-A PPRESDIFF RESPIRATORY: 0.6 MMHG
PCO2 BLDA: 33.2 MM HG (ref 35–45)
PCO2 BLDA: 41.3 MM HG (ref 35–45)
PCO2 BLDA: 49.1 MM HG (ref 35–45)
PCO2 BLDA: 49.5 MM HG (ref 35–45)
PEEP RESPIRATORY: 5 CM[H2O]
PH BLDA: 7.25 PH UNITS (ref 7.35–7.45)
PH BLDA: 7.3 PH UNITS (ref 7.35–7.45)
PH BLDA: 7.38 PH UNITS (ref 7.35–7.45)
PH BLDA: 7.38 PH UNITS (ref 7.35–7.45)
PHOSPHATE SERPL-MCNC: 2 MG/DL (ref 2.5–4.5)
PHOSPHATE SERPL-MCNC: 3.9 MG/DL (ref 2.5–4.5)
PHOSPHATE SERPL-MCNC: 4 MG/DL (ref 2.5–4.5)
PHOSPHATE SERPL-MCNC: 4.6 MG/DL (ref 2.5–4.5)
PLATELET # BLD AUTO: 114 10*3/MM3 (ref 140–450)
PLATELET # BLD AUTO: 132 10*3/MM3 (ref 140–450)
PMV BLD AUTO: 9.3 FL (ref 6–12)
PMV BLD AUTO: 9.8 FL (ref 6–12)
PO2 BLDA: 128 MM HG (ref 80–100)
PO2 BLDA: 138.2 MM HG (ref 80–100)
PO2 BLDA: 140.5 MM HG (ref 80–100)
PO2 BLDA: 148.6 MM HG (ref 80–100)
POTASSIUM SERPL-SCNC: 3.4 MMOL/L (ref 3.5–5.2)
POTASSIUM SERPL-SCNC: 3.5 MMOL/L (ref 3.5–5.2)
POTASSIUM SERPL-SCNC: 4.7 MMOL/L (ref 3.5–5.2)
POTASSIUM SERPL-SCNC: 4.8 MMOL/L (ref 3.5–5.2)
POTASSIUM SERPL-SCNC: 5 MMOL/L (ref 3.5–5.2)
PSV: 7 CMH2O
QT INTERVAL: 339 MS
QT INTERVAL: 350 MS
RBC # BLD AUTO: 2.83 10*6/MM3 (ref 3.77–5.28)
RBC # BLD AUTO: 3.08 10*6/MM3 (ref 3.77–5.28)
SAO2 % BLDCOA: 98.5 % (ref 92–99)
SAO2 % BLDCOA: 98.7 % (ref 92–99)
SAO2 % BLDCOA: 99.1 % (ref 92–99)
SAO2 % BLDCOA: 99.3 % (ref 92–99)
SET MECH RESP RATE: 20
SINUS: 2.26 CM
SODIUM SERPL-SCNC: 140 MMOL/L (ref 136–145)
SODIUM SERPL-SCNC: 143 MMOL/L (ref 136–145)
SODIUM SERPL-SCNC: 148 MMOL/L (ref 136–145)
SODIUM SERPL-SCNC: 151 MMOL/L (ref 136–145)
STRESS TARGET HR: 159 BPM
TOTAL RATE: 15 BREATHS/MINUTE
TOTAL RATE: 20 BREATHS/MINUTE
TOTAL RATE: 20 BREATHS/MINUTE
TOTAL RATE: 27 BREATHS/MINUTE
TRIGL SERPL-MCNC: 33 MG/DL (ref 0–150)
VENTILATOR MODE: ABNORMAL
VENTILATOR MODE: ABNORMAL
VENTILATOR MODE: AC
VENTILATOR MODE: AC
VLDLC SERPL-MCNC: 12 MG/DL (ref 5–40)
VT ON VENT VENT: 500 ML
WBC NRBC COR # BLD: 10.07 10*3/MM3 (ref 3.4–10.8)
WBC NRBC COR # BLD: 14.38 10*3/MM3 (ref 3.4–10.8)

## 2022-10-16 PROCEDURE — 93005 ELECTROCARDIOGRAM TRACING: CPT | Performed by: NURSE PRACTITIONER

## 2022-10-16 PROCEDURE — 83735 ASSAY OF MAGNESIUM: CPT | Performed by: THORACIC SURGERY (CARDIOTHORACIC VASCULAR SURGERY)

## 2022-10-16 PROCEDURE — 83735 ASSAY OF MAGNESIUM: CPT | Performed by: NURSE PRACTITIONER

## 2022-10-16 PROCEDURE — 25010000002 MAGNESIUM SULFATE IN D5W 1G/100ML (PREMIX) 1-5 GM/100ML-% SOLUTION: Performed by: NURSE PRACTITIONER

## 2022-10-16 PROCEDURE — 84132 ASSAY OF SERUM POTASSIUM: CPT | Performed by: THORACIC SURGERY (CARDIOTHORACIC VASCULAR SURGERY)

## 2022-10-16 PROCEDURE — 85025 COMPLETE CBC W/AUTO DIFF WBC: CPT | Performed by: EMERGENCY MEDICINE

## 2022-10-16 PROCEDURE — 25010000002 MORPHINE PER 10 MG: Performed by: THORACIC SURGERY (CARDIOTHORACIC VASCULAR SURGERY)

## 2022-10-16 PROCEDURE — P9041 ALBUMIN (HUMAN),5%, 50ML: HCPCS | Performed by: NURSE PRACTITIONER

## 2022-10-16 PROCEDURE — 83036 HEMOGLOBIN GLYCOSYLATED A1C: CPT | Performed by: NURSE PRACTITIONER

## 2022-10-16 PROCEDURE — 80069 RENAL FUNCTION PANEL: CPT | Performed by: NURSE PRACTITIONER

## 2022-10-16 PROCEDURE — 0 POTASSIUM CHLORIDE PER 2 MEQ: Performed by: NURSE PRACTITIONER

## 2022-10-16 PROCEDURE — 94799 UNLISTED PULMONARY SVC/PX: CPT

## 2022-10-16 PROCEDURE — 25010000002 ONDANSETRON PER 1 MG: Performed by: NURSE PRACTITIONER

## 2022-10-16 PROCEDURE — 93306 TTE W/DOPPLER COMPLETE: CPT | Performed by: INTERNAL MEDICINE

## 2022-10-16 PROCEDURE — 25010000002 AMIODARONE IN DEXTROSE 5% 360-4.14 MG/200ML-% SOLUTION: Performed by: NURSE PRACTITIONER

## 2022-10-16 PROCEDURE — 99024 POSTOP FOLLOW-UP VISIT: CPT | Performed by: THORACIC SURGERY (CARDIOTHORACIC VASCULAR SURGERY)

## 2022-10-16 PROCEDURE — 25010000002 ENOXAPARIN PER 10 MG: Performed by: NURSE PRACTITIONER

## 2022-10-16 PROCEDURE — 99232 SBSQ HOSP IP/OBS MODERATE 35: CPT | Performed by: INTERNAL MEDICINE

## 2022-10-16 PROCEDURE — 82803 BLOOD GASES ANY COMBINATION: CPT

## 2022-10-16 PROCEDURE — 93010 ELECTROCARDIOGRAM REPORT: CPT | Performed by: INTERNAL MEDICINE

## 2022-10-16 PROCEDURE — 25010000002 AMIODARONE IN DEXTROSE 5% 360-4.14 MG/200ML-% SOLUTION: Performed by: THORACIC SURGERY (CARDIOTHORACIC VASCULAR SURGERY)

## 2022-10-16 PROCEDURE — 85018 HEMOGLOBIN: CPT | Performed by: THORACIC SURGERY (CARDIOTHORACIC VASCULAR SURGERY)

## 2022-10-16 PROCEDURE — 94003 VENT MGMT INPAT SUBQ DAY: CPT

## 2022-10-16 PROCEDURE — 85014 HEMATOCRIT: CPT | Performed by: THORACIC SURGERY (CARDIOTHORACIC VASCULAR SURGERY)

## 2022-10-16 PROCEDURE — 63710000001 INSULIN LISPRO (HUMAN) PER 5 UNITS: Performed by: THORACIC SURGERY (CARDIOTHORACIC VASCULAR SURGERY)

## 2022-10-16 PROCEDURE — 80061 LIPID PANEL: CPT | Performed by: NURSE PRACTITIONER

## 2022-10-16 PROCEDURE — 25010000002 PERFLUTREN (DEFINITY) 8.476 MG IN SODIUM CHLORIDE (PF) 0.9 % 10 ML INJECTION: Performed by: INTERNAL MEDICINE

## 2022-10-16 PROCEDURE — 82962 GLUCOSE BLOOD TEST: CPT

## 2022-10-16 PROCEDURE — 94760 N-INVAS EAR/PLS OXIMETRY 1: CPT

## 2022-10-16 PROCEDURE — 25010000002 CEFAZOLIN IN DEXTROSE 2-4 GM/100ML-% SOLUTION: Performed by: NURSE PRACTITIONER

## 2022-10-16 PROCEDURE — 25010000002 METOCLOPRAMIDE PER 10 MG: Performed by: NURSE PRACTITIONER

## 2022-10-16 PROCEDURE — 80069 RENAL FUNCTION PANEL: CPT | Performed by: THORACIC SURGERY (CARDIOTHORACIC VASCULAR SURGERY)

## 2022-10-16 PROCEDURE — 71045 X-RAY EXAM CHEST 1 VIEW: CPT

## 2022-10-16 PROCEDURE — 85730 THROMBOPLASTIN TIME PARTIAL: CPT | Performed by: EMERGENCY MEDICINE

## 2022-10-16 PROCEDURE — 85027 COMPLETE CBC AUTOMATED: CPT | Performed by: NURSE PRACTITIONER

## 2022-10-16 PROCEDURE — 93306 TTE W/DOPPLER COMPLETE: CPT

## 2022-10-16 PROCEDURE — 25010000002 ALBUMIN HUMAN 5% PER 50 ML: Performed by: THORACIC SURGERY (CARDIOTHORACIC VASCULAR SURGERY)

## 2022-10-16 PROCEDURE — 82330 ASSAY OF CALCIUM: CPT | Performed by: THORACIC SURGERY (CARDIOTHORACIC VASCULAR SURGERY)

## 2022-10-16 PROCEDURE — P9041 ALBUMIN (HUMAN),5%, 50ML: HCPCS | Performed by: THORACIC SURGERY (CARDIOTHORACIC VASCULAR SURGERY)

## 2022-10-16 PROCEDURE — 25010000002 ALBUMIN HUMAN 5% PER 50 ML: Performed by: NURSE PRACTITIONER

## 2022-10-16 RX ORDER — ALBUMIN, HUMAN INJ 5% 5 %
250 SOLUTION INTRAVENOUS ONCE
Status: COMPLETED | OUTPATIENT
Start: 2022-10-16 | End: 2022-10-16

## 2022-10-16 RX ORDER — DEXTROSE MONOHYDRATE 25 G/50ML
25 INJECTION, SOLUTION INTRAVENOUS
Status: DISCONTINUED | OUTPATIENT
Start: 2022-10-16 | End: 2022-10-23 | Stop reason: HOSPADM

## 2022-10-16 RX ORDER — NICOTINE POLACRILEX 4 MG
15 LOZENGE BUCCAL
Status: DISCONTINUED | OUTPATIENT
Start: 2022-10-16 | End: 2022-10-23 | Stop reason: HOSPADM

## 2022-10-16 RX ORDER — ALBUMIN, HUMAN INJ 5% 5 %
500 SOLUTION INTRAVENOUS ONCE
Status: COMPLETED | OUTPATIENT
Start: 2022-10-16 | End: 2022-10-16

## 2022-10-16 RX ORDER — CALCIUM CHLORIDE 100 MG/ML
1 INJECTION INTRAVENOUS; INTRAVENTRICULAR ONCE
Status: COMPLETED | OUTPATIENT
Start: 2022-10-16 | End: 2022-10-16

## 2022-10-16 RX ORDER — ALBUMIN, HUMAN INJ 5% 5 %
12.5 SOLUTION INTRAVENOUS ONCE
Status: COMPLETED | OUTPATIENT
Start: 2022-10-16 | End: 2022-10-16

## 2022-10-16 RX ORDER — INSULIN LISPRO 100 [IU]/ML
0-7 INJECTION, SOLUTION INTRAVENOUS; SUBCUTANEOUS
Status: DISCONTINUED | OUTPATIENT
Start: 2022-10-16 | End: 2022-10-19

## 2022-10-16 RX ADMIN — MORPHINE SULFATE 2 MG: 2 INJECTION, SOLUTION INTRAMUSCULAR; INTRAVENOUS at 04:33

## 2022-10-16 RX ADMIN — SODIUM BICARBONATE 100 MEQ: 84 INJECTION INTRAVENOUS at 04:04

## 2022-10-16 RX ADMIN — HYDROCODONE BITARTRATE AND ACETAMINOPHEN 2 TABLET: 5; 325 TABLET ORAL at 08:56

## 2022-10-16 RX ADMIN — ENOXAPARIN SODIUM 40 MG: 100 INJECTION SUBCUTANEOUS at 17:42

## 2022-10-16 RX ADMIN — CEFAZOLIN SODIUM 2 G: 2 INJECTION, SOLUTION INTRAVENOUS at 20:51

## 2022-10-16 RX ADMIN — SENNOSIDES AND DOCUSATE SODIUM 2 TABLET: 8.6; 5 TABLET ORAL at 20:51

## 2022-10-16 RX ADMIN — CHLORHEXIDINE GLUCONATE 15 ML: 1.2 SOLUTION ORAL at 08:54

## 2022-10-16 RX ADMIN — ALBUMIN (HUMAN) 250 ML: 12.5 SOLUTION INTRAVENOUS at 13:19

## 2022-10-16 RX ADMIN — SODIUM BICARBONATE 50 MEQ: 84 INJECTION INTRAVENOUS at 02:12

## 2022-10-16 RX ADMIN — CALCIUM CHLORIDE 1 G: 100 INJECTION INTRAVENOUS; INTRAVENTRICULAR at 09:06

## 2022-10-16 RX ADMIN — CHLORHEXIDINE GLUCONATE 15 ML: 1.2 SOLUTION ORAL at 20:51

## 2022-10-16 RX ADMIN — ASPIRIN 325 MG: 325 TABLET, COATED ORAL at 08:55

## 2022-10-16 RX ADMIN — ONDANSETRON 4 MG: 2 INJECTION INTRAMUSCULAR; INTRAVENOUS at 09:04

## 2022-10-16 RX ADMIN — ONDANSETRON 4 MG: 2 INJECTION INTRAMUSCULAR; INTRAVENOUS at 15:00

## 2022-10-16 RX ADMIN — CEFAZOLIN SODIUM 2 G: 2 INJECTION, SOLUTION INTRAVENOUS at 04:33

## 2022-10-16 RX ADMIN — AMIODARONE HYDROCHLORIDE 0.5 MG/MIN: 1.8 INJECTION, SOLUTION INTRAVENOUS at 00:47

## 2022-10-16 RX ADMIN — ALBUMIN (HUMAN) 250 ML: 12.5 INJECTION, SOLUTION INTRAVENOUS at 06:19

## 2022-10-16 RX ADMIN — CEFAZOLIN SODIUM 2 G: 2 INJECTION, SOLUTION INTRAVENOUS at 13:19

## 2022-10-16 RX ADMIN — ALBUMIN (HUMAN) 12.5 G: 12.5 INJECTION, SOLUTION INTRAVENOUS at 04:05

## 2022-10-16 RX ADMIN — INSULIN HUMAN 2.6 UNITS/HR: 1 INJECTION, SOLUTION INTRAVENOUS at 02:24

## 2022-10-16 RX ADMIN — METOCLOPRAMIDE 10 MG: 5 INJECTION, SOLUTION INTRAMUSCULAR; INTRAVENOUS at 02:11

## 2022-10-16 RX ADMIN — ACETAMINOPHEN 1000 MG: 10 INJECTION, SOLUTION INTRAVENOUS at 05:59

## 2022-10-16 RX ADMIN — MAGNESIUM SULFATE IN DEXTROSE 1 G: 10 INJECTION, SOLUTION INTRAVENOUS at 04:05

## 2022-10-16 RX ADMIN — POTASSIUM CHLORIDE 20 MEQ: 29.8 INJECTION, SOLUTION INTRAVENOUS at 10:10

## 2022-10-16 RX ADMIN — ONDANSETRON 4 MG: 2 INJECTION INTRAMUSCULAR; INTRAVENOUS at 02:08

## 2022-10-16 RX ADMIN — HYDROCODONE BITARTRATE AND ACETAMINOPHEN 2 TABLET: 5; 325 TABLET ORAL at 17:42

## 2022-10-16 RX ADMIN — ALBUMIN (HUMAN) 250 ML: 12.5 INJECTION, SOLUTION INTRAVENOUS at 08:23

## 2022-10-16 RX ADMIN — AMIODARONE HYDROCHLORIDE 0.5 MG/MIN: 1.8 INJECTION, SOLUTION INTRAVENOUS at 14:24

## 2022-10-16 RX ADMIN — PERFLUTREN 2 ML: 6.52 INJECTION, SUSPENSION INTRAVENOUS at 07:55

## 2022-10-16 RX ADMIN — ALBUMIN (HUMAN) 500 ML: 12.5 SOLUTION INTRAVENOUS at 17:42

## 2022-10-16 RX ADMIN — ALBUMIN (HUMAN) 250 ML: 12.5 INJECTION, SOLUTION INTRAVENOUS at 02:08

## 2022-10-16 RX ADMIN — HYDROCODONE BITARTRATE AND ACETAMINOPHEN 2 TABLET: 5; 325 TABLET ORAL at 13:19

## 2022-10-16 RX ADMIN — MORPHINE SULFATE 2 MG: 2 INJECTION, SOLUTION INTRAMUSCULAR; INTRAVENOUS at 02:12

## 2022-10-16 RX ADMIN — MUPIROCIN 1 APPLICATION: 20 OINTMENT TOPICAL at 08:54

## 2022-10-16 RX ADMIN — INSULIN LISPRO 2 UNITS: 100 INJECTION, SOLUTION INTRAVENOUS; SUBCUTANEOUS at 17:53

## 2022-10-16 RX ADMIN — MUPIROCIN 1 APPLICATION: 20 OINTMENT TOPICAL at 20:51

## 2022-10-17 ENCOUNTER — APPOINTMENT (OUTPATIENT)
Dept: GENERAL RADIOLOGY | Facility: HOSPITAL | Age: 32
End: 2022-10-17

## 2022-10-17 LAB
ANION GAP SERPL CALCULATED.3IONS-SCNC: 10.8 MMOL/L (ref 5–15)
BUN SERPL-MCNC: 13 MG/DL (ref 6–20)
BUN/CREAT SERPL: 17.6 (ref 7–25)
CALCIUM SPEC-SCNC: 8.3 MG/DL (ref 8.6–10.5)
CHLORIDE SERPL-SCNC: 107 MMOL/L (ref 98–107)
CO2 SERPL-SCNC: 23.2 MMOL/L (ref 22–29)
CREAT SERPL-MCNC: 0.74 MG/DL (ref 0.57–1)
DEPRECATED RDW RBC AUTO: 42 FL (ref 37–54)
EGFRCR SERPLBLD CKD-EPI 2021: 109.7 ML/MIN/1.73
ERYTHROCYTE [DISTWIDTH] IN BLOOD BY AUTOMATED COUNT: 13.1 % (ref 12.3–15.4)
GLUCOSE BLDC GLUCOMTR-MCNC: 114 MG/DL (ref 70–130)
GLUCOSE BLDC GLUCOMTR-MCNC: 129 MG/DL (ref 70–130)
GLUCOSE BLDC GLUCOMTR-MCNC: 136 MG/DL (ref 70–130)
GLUCOSE BLDC GLUCOMTR-MCNC: 137 MG/DL (ref 70–130)
GLUCOSE SERPL-MCNC: 153 MG/DL (ref 65–99)
HCT VFR BLD AUTO: 26.9 % (ref 34–46.6)
HCT VFR BLD AUTO: 27 % (ref 34–46.6)
HGB BLD-MCNC: 9 G/DL (ref 12–15.9)
HGB BLD-MCNC: 9.2 G/DL (ref 12–15.9)
MCH RBC QN AUTO: 29.8 PG (ref 26.6–33)
MCHC RBC AUTO-ENTMCNC: 33.3 G/DL (ref 31.5–35.7)
MCV RBC AUTO: 89.4 FL (ref 79–97)
PLATELET # BLD AUTO: 144 10*3/MM3 (ref 140–450)
PMV BLD AUTO: 9.6 FL (ref 6–12)
POTASSIUM SERPL-SCNC: 4 MMOL/L (ref 3.5–5.2)
QT INTERVAL: 346 MS
QT INTERVAL: 361 MS
RBC # BLD AUTO: 3.02 10*6/MM3 (ref 3.77–5.28)
SODIUM SERPL-SCNC: 141 MMOL/L (ref 136–145)
WBC NRBC COR # BLD: 13.58 10*3/MM3 (ref 3.4–10.8)

## 2022-10-17 PROCEDURE — 25010000002 AMIODARONE IN DEXTROSE 5% 360-4.14 MG/200ML-% SOLUTION: Performed by: NURSE PRACTITIONER

## 2022-10-17 PROCEDURE — 93005 ELECTROCARDIOGRAM TRACING: CPT | Performed by: NURSE PRACTITIONER

## 2022-10-17 PROCEDURE — 80048 BASIC METABOLIC PNL TOTAL CA: CPT | Performed by: NURSE PRACTITIONER

## 2022-10-17 PROCEDURE — 93005 ELECTROCARDIOGRAM TRACING: CPT | Performed by: INTERNAL MEDICINE

## 2022-10-17 PROCEDURE — 94799 UNLISTED PULMONARY SVC/PX: CPT

## 2022-10-17 PROCEDURE — 93010 ELECTROCARDIOGRAM REPORT: CPT | Performed by: INTERNAL MEDICINE

## 2022-10-17 PROCEDURE — 85027 COMPLETE CBC AUTOMATED: CPT | Performed by: NURSE PRACTITIONER

## 2022-10-17 PROCEDURE — 99024 POSTOP FOLLOW-UP VISIT: CPT | Performed by: THORACIC SURGERY (CARDIOTHORACIC VASCULAR SURGERY)

## 2022-10-17 PROCEDURE — 25010000002 FUROSEMIDE PER 20 MG: Performed by: NURSE PRACTITIONER

## 2022-10-17 PROCEDURE — 85018 HEMOGLOBIN: CPT | Performed by: NURSE PRACTITIONER

## 2022-10-17 PROCEDURE — 97530 THERAPEUTIC ACTIVITIES: CPT

## 2022-10-17 PROCEDURE — 94760 N-INVAS EAR/PLS OXIMETRY 1: CPT

## 2022-10-17 PROCEDURE — 82962 GLUCOSE BLOOD TEST: CPT

## 2022-10-17 PROCEDURE — 25010000002 CEFAZOLIN IN DEXTROSE 2-4 GM/100ML-% SOLUTION: Performed by: NURSE PRACTITIONER

## 2022-10-17 PROCEDURE — 99232 SBSQ HOSP IP/OBS MODERATE 35: CPT | Performed by: INTERNAL MEDICINE

## 2022-10-17 PROCEDURE — 25010000002 ONDANSETRON PER 1 MG: Performed by: NURSE PRACTITIONER

## 2022-10-17 PROCEDURE — 25010000002 ENOXAPARIN PER 10 MG: Performed by: NURSE PRACTITIONER

## 2022-10-17 PROCEDURE — 97162 PT EVAL MOD COMPLEX 30 MIN: CPT

## 2022-10-17 PROCEDURE — 85014 HEMATOCRIT: CPT | Performed by: NURSE PRACTITIONER

## 2022-10-17 PROCEDURE — 94761 N-INVAS EAR/PLS OXIMETRY MLT: CPT

## 2022-10-17 PROCEDURE — 71045 X-RAY EXAM CHEST 1 VIEW: CPT

## 2022-10-17 PROCEDURE — 25010000002 CALCIUM GLUCONATE-NACL 1-0.675 GM/50ML-% SOLUTION: Performed by: NURSE PRACTITIONER

## 2022-10-17 RX ORDER — OXYCODONE HYDROCHLORIDE 5 MG/1
5 TABLET ORAL EVERY 4 HOURS PRN
Status: DISCONTINUED | OUTPATIENT
Start: 2022-10-17 | End: 2022-10-17

## 2022-10-17 RX ORDER — ASPIRIN 81 MG/1
81 TABLET ORAL DAILY
Status: DISCONTINUED | OUTPATIENT
Start: 2022-10-17 | End: 2022-10-23 | Stop reason: HOSPADM

## 2022-10-17 RX ORDER — POTASSIUM CHLORIDE 750 MG/1
10 TABLET, FILM COATED, EXTENDED RELEASE ORAL ONCE
Status: COMPLETED | OUTPATIENT
Start: 2022-10-17 | End: 2022-10-17

## 2022-10-17 RX ORDER — CALCIUM GLUCONATE 20 MG/ML
2 INJECTION, SOLUTION INTRAVENOUS ONCE
Status: COMPLETED | OUTPATIENT
Start: 2022-10-17 | End: 2022-10-17

## 2022-10-17 RX ORDER — ACETAMINOPHEN 500 MG
500 TABLET ORAL EVERY 4 HOURS PRN
Status: DISCONTINUED | OUTPATIENT
Start: 2022-10-17 | End: 2022-10-23 | Stop reason: HOSPADM

## 2022-10-17 RX ORDER — POTASSIUM CHLORIDE 750 MG/1
20 TABLET, FILM COATED, EXTENDED RELEASE ORAL ONCE
Status: DISCONTINUED | OUTPATIENT
Start: 2022-10-17 | End: 2022-10-17

## 2022-10-17 RX ORDER — FUROSEMIDE 10 MG/ML
40 INJECTION INTRAMUSCULAR; INTRAVENOUS ONCE
Status: DISCONTINUED | OUTPATIENT
Start: 2022-10-17 | End: 2022-10-17

## 2022-10-17 RX ORDER — FUROSEMIDE 10 MG/ML
20 INJECTION INTRAMUSCULAR; INTRAVENOUS ONCE
Status: COMPLETED | OUTPATIENT
Start: 2022-10-17 | End: 2022-10-17

## 2022-10-17 RX ADMIN — ONDANSETRON 4 MG: 2 INJECTION INTRAMUSCULAR; INTRAVENOUS at 18:16

## 2022-10-17 RX ADMIN — HYDROCODONE BITARTRATE AND ACETAMINOPHEN 1 TABLET: 5; 325 TABLET ORAL at 06:31

## 2022-10-17 RX ADMIN — ASPIRIN 81 MG: 81 TABLET, FILM COATED ORAL at 08:58

## 2022-10-17 RX ADMIN — HYDROCODONE BITARTRATE AND ACETAMINOPHEN 2 TABLET: 5; 325 TABLET ORAL at 10:53

## 2022-10-17 RX ADMIN — HYDROCODONE BITARTRATE AND ACETAMINOPHEN 1 TABLET: 5; 325 TABLET ORAL at 04:17

## 2022-10-17 RX ADMIN — HYDROCODONE BITARTRATE AND ACETAMINOPHEN 2 TABLET: 5; 325 TABLET ORAL at 15:10

## 2022-10-17 RX ADMIN — AMIODARONE HYDROCHLORIDE 0.5 MG/MIN: 1.8 INJECTION, SOLUTION INTRAVENOUS at 04:07

## 2022-10-17 RX ADMIN — CHLORHEXIDINE GLUCONATE 15 ML: 1.2 SOLUTION ORAL at 07:58

## 2022-10-17 RX ADMIN — SENNOSIDES AND DOCUSATE SODIUM 2 TABLET: 8.6; 5 TABLET ORAL at 20:38

## 2022-10-17 RX ADMIN — HYDROCODONE BITARTRATE AND ACETAMINOPHEN 2 TABLET: 5; 325 TABLET ORAL at 00:18

## 2022-10-17 RX ADMIN — ENOXAPARIN SODIUM 40 MG: 100 INJECTION SUBCUTANEOUS at 18:19

## 2022-10-17 RX ADMIN — MUPIROCIN 1 APPLICATION: 20 OINTMENT TOPICAL at 20:38

## 2022-10-17 RX ADMIN — MUPIROCIN 1 APPLICATION: 20 OINTMENT TOPICAL at 08:58

## 2022-10-17 RX ADMIN — CHLORHEXIDINE GLUCONATE 15 ML: 1.2 SOLUTION ORAL at 20:38

## 2022-10-17 RX ADMIN — FUROSEMIDE 20 MG: 10 INJECTION, SOLUTION INTRAMUSCULAR; INTRAVENOUS at 08:58

## 2022-10-17 RX ADMIN — CALCIUM GLUCONATE 2 G: 20 INJECTION, SOLUTION INTRAVENOUS at 07:58

## 2022-10-17 RX ADMIN — POTASSIUM CHLORIDE 10 MEQ: 750 TABLET, EXTENDED RELEASE ORAL at 08:57

## 2022-10-17 RX ADMIN — CEFAZOLIN SODIUM 2 G: 2 INJECTION, SOLUTION INTRAVENOUS at 04:07

## 2022-10-17 RX ADMIN — HYDROCODONE BITARTRATE AND ACETAMINOPHEN 2 TABLET: 5; 325 TABLET ORAL at 22:00

## 2022-10-17 RX ADMIN — ONDANSETRON 4 MG: 2 INJECTION INTRAMUSCULAR; INTRAVENOUS at 12:23

## 2022-10-17 NOTE — ANESTHESIA POSTPROCEDURE EVALUATION
"Patient: Esme Herrera    Procedure Summary     Date: 10/15/22 Room / Location: [place] [address] / [facility] CARDIOVASCULAR OPERATING ROOM    Anesthesia Start: 1415 Anesthesia Stop: 2054    Procedure: VERONICA, STERNOTOMY, CORONARY ARTERY BYPASS TIMES 3 WITH INTERNAL MAMMARY ARTERY GRAFT AND LEFT GREATER SAPHENOUS VEIN HARVEST, REPAIR OF CORONARY ARTERY DISSECTION,PRP (Chest) Diagnosis:     Surgeons: Lakshmi Teixeira MD Provider: Steven Kelly MD    Anesthesia Type: general, [place], CVL, PAC ASA Status: 4 - Emergent          Anesthesia Type: general, [place], CVL, PAC    Vitals  Vitals Value Taken Time   BP 93/58 10/17/22 0801   Temp 37.3 °C (99.1 °F) 10/17/22 0400   Pulse 88 10/17/22 0816   Resp 18 10/17/22 0400   SpO2 95 % 10/17/22 0816   Vitals shown include unvalidated device data.        Post Anesthesia Care and Evaluation    Patient location during evaluation: bedside  Patient participation: complete - patient participated  Level of consciousness: awake and alert  Pain management: adequate    Airway patency: patent  Anesthetic complications: No anesthetic complications  PONV Status: none  Cardiovascular status: acceptable  Respiratory status: acceptable  Hydration status: acceptable    Comments: BP 93/60   Pulse 92   Temp 37.3 °C (99.1 °F)   Resp 18   Ht 154.9 cm (61\")   Wt 61.8 kg (136 lb 3.9 oz)   LMP 09/25/2022 (Approximate)   SpO2 98%   BMI 25.74 kg/m²         "

## 2022-10-18 ENCOUNTER — APPOINTMENT (OUTPATIENT)
Dept: GENERAL RADIOLOGY | Facility: HOSPITAL | Age: 32
End: 2022-10-18

## 2022-10-18 LAB
ALBUMIN SERPL-MCNC: 3.8 G/DL (ref 3.5–5.2)
ALBUMIN/GLOB SERPL: 2.7 G/DL
ALP SERPL-CCNC: 28 U/L (ref 39–117)
ALT SERPL W P-5'-P-CCNC: 28 U/L (ref 1–33)
ANION GAP SERPL CALCULATED.3IONS-SCNC: 10 MMOL/L (ref 5–15)
ANION GAP SERPL CALCULATED.3IONS-SCNC: 9.8 MMOL/L (ref 5–15)
AST SERPL-CCNC: 55 U/L (ref 1–32)
BASOPHILS # BLD AUTO: 0.02 10*3/MM3 (ref 0–0.2)
BASOPHILS NFR BLD AUTO: 0.2 % (ref 0–1.5)
BH BB BLOOD EXPIRATION DATE: NORMAL
BH BB BLOOD EXPIRATION DATE: NORMAL
BH BB BLOOD TYPE BARCODE: 6200
BH BB BLOOD TYPE BARCODE: 6200
BH BB DISPENSE STATUS: NORMAL
BH BB DISPENSE STATUS: NORMAL
BH BB PRODUCT CODE: NORMAL
BH BB PRODUCT CODE: NORMAL
BH BB UNIT NUMBER: NORMAL
BH BB UNIT NUMBER: NORMAL
BILIRUB SERPL-MCNC: 0.5 MG/DL (ref 0–1.2)
BUN SERPL-MCNC: 16 MG/DL (ref 6–20)
BUN SERPL-MCNC: 9 MG/DL (ref 6–20)
BUN/CREAT SERPL: 18.4 (ref 7–25)
BUN/CREAT SERPL: 28.6 (ref 7–25)
CALCIUM SPEC-SCNC: 8.4 MG/DL (ref 8.6–10.5)
CALCIUM SPEC-SCNC: 8.8 MG/DL (ref 8.6–10.5)
CHLORIDE SERPL-SCNC: 101 MMOL/L (ref 98–107)
CHLORIDE SERPL-SCNC: 103 MMOL/L (ref 98–107)
CO2 SERPL-SCNC: 27 MMOL/L (ref 22–29)
CO2 SERPL-SCNC: 27.2 MMOL/L (ref 22–29)
CREAT SERPL-MCNC: 0.49 MG/DL (ref 0.57–1)
CREAT SERPL-MCNC: 0.56 MG/DL (ref 0.57–1)
CROSSMATCH INTERPRETATION: NORMAL
CROSSMATCH INTERPRETATION: NORMAL
DEPRECATED RDW RBC AUTO: 41.1 FL (ref 37–54)
DEPRECATED RDW RBC AUTO: 41.2 FL (ref 37–54)
EGFRCR SERPLBLD CKD-EPI 2021: 123.8 ML/MIN/1.73
EGFRCR SERPLBLD CKD-EPI 2021: 127.8 ML/MIN/1.73
EOSINOPHIL # BLD AUTO: 0.02 10*3/MM3 (ref 0–0.4)
EOSINOPHIL NFR BLD AUTO: 0.2 % (ref 0.3–6.2)
ERYTHROCYTE [DISTWIDTH] IN BLOOD BY AUTOMATED COUNT: 12.3 % (ref 12.3–15.4)
ERYTHROCYTE [DISTWIDTH] IN BLOOD BY AUTOMATED COUNT: 12.3 % (ref 12.3–15.4)
GLOBULIN UR ELPH-MCNC: 1.4 GM/DL
GLUCOSE BLDC GLUCOMTR-MCNC: 114 MG/DL (ref 70–130)
GLUCOSE BLDC GLUCOMTR-MCNC: 118 MG/DL (ref 70–130)
GLUCOSE BLDC GLUCOMTR-MCNC: 128 MG/DL (ref 70–130)
GLUCOSE BLDC GLUCOMTR-MCNC: 162 MG/DL (ref 70–130)
GLUCOSE SERPL-MCNC: 116 MG/DL (ref 65–99)
GLUCOSE SERPL-MCNC: 128 MG/DL (ref 65–99)
HCT VFR BLD AUTO: 27.1 % (ref 34–46.6)
HCT VFR BLD AUTO: 27.2 % (ref 34–46.6)
HGB BLD-MCNC: 8.8 G/DL (ref 12–15.9)
HGB BLD-MCNC: 9.1 G/DL (ref 12–15.9)
IMM GRANULOCYTES # BLD AUTO: 0.05 10*3/MM3 (ref 0–0.05)
IMM GRANULOCYTES NFR BLD AUTO: 0.5 % (ref 0–0.5)
IRON 24H UR-MRATE: 25 MCG/DL (ref 37–145)
IRON SATN MFR SERPL: 19 % (ref 20–50)
LYMPHOCYTES # BLD AUTO: 1.82 10*3/MM3 (ref 0.7–3.1)
LYMPHOCYTES NFR BLD AUTO: 18 % (ref 19.6–45.3)
MAGNESIUM SERPL-MCNC: 2.1 MG/DL (ref 1.6–2.6)
MCH RBC QN AUTO: 29.7 PG (ref 26.6–33)
MCH RBC QN AUTO: 30.7 PG (ref 26.6–33)
MCHC RBC AUTO-ENTMCNC: 32.4 G/DL (ref 31.5–35.7)
MCHC RBC AUTO-ENTMCNC: 33.6 G/DL (ref 31.5–35.7)
MCV RBC AUTO: 91.6 FL (ref 79–97)
MCV RBC AUTO: 91.9 FL (ref 79–97)
MONOCYTES # BLD AUTO: 0.8 10*3/MM3 (ref 0.1–0.9)
MONOCYTES NFR BLD AUTO: 7.9 % (ref 5–12)
NEUTROPHILS NFR BLD AUTO: 7.38 10*3/MM3 (ref 1.7–7)
NEUTROPHILS NFR BLD AUTO: 73.2 % (ref 42.7–76)
NRBC BLD AUTO-RTO: 0.1 /100 WBC (ref 0–0.2)
PLATELET # BLD AUTO: 152 10*3/MM3 (ref 140–450)
PLATELET # BLD AUTO: 156 10*3/MM3 (ref 140–450)
PMV BLD AUTO: 10.1 FL (ref 6–12)
PMV BLD AUTO: 10.2 FL (ref 6–12)
POTASSIUM SERPL-SCNC: 3.6 MMOL/L (ref 3.5–5.2)
POTASSIUM SERPL-SCNC: 4.2 MMOL/L (ref 3.5–5.2)
PROT SERPL-MCNC: 5.2 G/DL (ref 6–8.5)
RBC # BLD AUTO: 2.96 10*6/MM3 (ref 3.77–5.28)
RBC # BLD AUTO: 2.96 10*6/MM3 (ref 3.77–5.28)
SODIUM SERPL-SCNC: 138 MMOL/L (ref 136–145)
SODIUM SERPL-SCNC: 140 MMOL/L (ref 136–145)
TIBC SERPL-MCNC: 133 MCG/DL (ref 298–536)
TRANSFERRIN SERPL-MCNC: 89 MG/DL (ref 200–360)
UNIT  ABO: NORMAL
UNIT  ABO: NORMAL
UNIT  RH: NORMAL
UNIT  RH: NORMAL
WBC NRBC COR # BLD: 10.09 10*3/MM3 (ref 3.4–10.8)
WBC NRBC COR # BLD: 11.71 10*3/MM3 (ref 3.4–10.8)

## 2022-10-18 PROCEDURE — 25010000002 ENOXAPARIN PER 10 MG: Performed by: NURSE PRACTITIONER

## 2022-10-18 PROCEDURE — 93010 ELECTROCARDIOGRAM REPORT: CPT | Performed by: INTERNAL MEDICINE

## 2022-10-18 PROCEDURE — 71046 X-RAY EXAM CHEST 2 VIEWS: CPT

## 2022-10-18 PROCEDURE — 99024 POSTOP FOLLOW-UP VISIT: CPT | Performed by: THORACIC SURGERY (CARDIOTHORACIC VASCULAR SURGERY)

## 2022-10-18 PROCEDURE — 85025 COMPLETE CBC W/AUTO DIFF WBC: CPT | Performed by: INTERNAL MEDICINE

## 2022-10-18 PROCEDURE — 83540 ASSAY OF IRON: CPT | Performed by: NURSE PRACTITIONER

## 2022-10-18 PROCEDURE — 83735 ASSAY OF MAGNESIUM: CPT | Performed by: THORACIC SURGERY (CARDIOTHORACIC VASCULAR SURGERY)

## 2022-10-18 PROCEDURE — 80053 COMPREHEN METABOLIC PANEL: CPT | Performed by: THORACIC SURGERY (CARDIOTHORACIC VASCULAR SURGERY)

## 2022-10-18 PROCEDURE — 97530 THERAPEUTIC ACTIVITIES: CPT

## 2022-10-18 PROCEDURE — 84466 ASSAY OF TRANSFERRIN: CPT | Performed by: NURSE PRACTITIONER

## 2022-10-18 PROCEDURE — 94799 UNLISTED PULMONARY SVC/PX: CPT

## 2022-10-18 PROCEDURE — 85027 COMPLETE CBC AUTOMATED: CPT | Performed by: NURSE PRACTITIONER

## 2022-10-18 PROCEDURE — 25010000002 CALCIUM GLUCONATE-NACL 1-0.675 GM/50ML-% SOLUTION: Performed by: NURSE PRACTITIONER

## 2022-10-18 PROCEDURE — 25010000002 ONDANSETRON PER 1 MG: Performed by: NURSE PRACTITIONER

## 2022-10-18 PROCEDURE — 99232 SBSQ HOSP IP/OBS MODERATE 35: CPT | Performed by: INTERNAL MEDICINE

## 2022-10-18 PROCEDURE — 83735 ASSAY OF MAGNESIUM: CPT | Performed by: INTERNAL MEDICINE

## 2022-10-18 PROCEDURE — 82962 GLUCOSE BLOOD TEST: CPT

## 2022-10-18 PROCEDURE — 93005 ELECTROCARDIOGRAM TRACING: CPT | Performed by: THORACIC SURGERY (CARDIOTHORACIC VASCULAR SURGERY)

## 2022-10-18 RX ORDER — CALCIUM GLUCONATE 20 MG/ML
2 INJECTION, SOLUTION INTRAVENOUS ONCE
Status: DISCONTINUED | OUTPATIENT
Start: 2022-10-18 | End: 2022-10-18

## 2022-10-18 RX ORDER — IRON POLYSACCHARIDE COMPLEX 150 MG
150 CAPSULE ORAL DAILY
Status: DISCONTINUED | OUTPATIENT
Start: 2022-10-18 | End: 2022-10-23 | Stop reason: HOSPADM

## 2022-10-18 RX ORDER — CALCIUM GLUCONATE 20 MG/ML
2 INJECTION, SOLUTION INTRAVENOUS ONCE
Status: COMPLETED | OUTPATIENT
Start: 2022-10-18 | End: 2022-10-18

## 2022-10-18 RX ORDER — NITROGLYCERIN 0.4 MG/1
0.4 TABLET SUBLINGUAL
Status: DISCONTINUED | OUTPATIENT
Start: 2022-10-18 | End: 2022-10-23 | Stop reason: HOSPADM

## 2022-10-18 RX ADMIN — ONDANSETRON 4 MG: 2 INJECTION INTRAMUSCULAR; INTRAVENOUS at 04:20

## 2022-10-18 RX ADMIN — HYDROCODONE BITARTRATE AND ACETAMINOPHEN 2 TABLET: 5; 325 TABLET ORAL at 04:20

## 2022-10-18 RX ADMIN — SENNOSIDES AND DOCUSATE SODIUM 2 TABLET: 8.6; 5 TABLET ORAL at 21:41

## 2022-10-18 RX ADMIN — Medication 150 MG: at 13:18

## 2022-10-18 RX ADMIN — ENOXAPARIN SODIUM 40 MG: 100 INJECTION SUBCUTANEOUS at 18:19

## 2022-10-18 RX ADMIN — ASPIRIN 81 MG: 81 TABLET, FILM COATED ORAL at 08:58

## 2022-10-18 RX ADMIN — ONDANSETRON 4 MG: 2 INJECTION INTRAMUSCULAR; INTRAVENOUS at 14:50

## 2022-10-18 RX ADMIN — HYDROCODONE BITARTRATE AND ACETAMINOPHEN 2 TABLET: 5; 325 TABLET ORAL at 09:17

## 2022-10-18 RX ADMIN — HYDROCODONE BITARTRATE AND ACETAMINOPHEN 2 TABLET: 5; 325 TABLET ORAL at 18:30

## 2022-10-18 RX ADMIN — MUPIROCIN 1 APPLICATION: 20 OINTMENT TOPICAL at 08:58

## 2022-10-18 RX ADMIN — CALCIUM GLUCONATE 2 G: 20 INJECTION, SOLUTION INTRAVENOUS at 08:58

## 2022-10-18 RX ADMIN — ACETAMINOPHEN 500 MG: 500 TABLET, FILM COATED ORAL at 21:48

## 2022-10-18 RX ADMIN — MAGNESIUM OXIDE 400 MG (241.3 MG MAGNESIUM) TABLET 400 MG: TABLET at 13:18

## 2022-10-18 RX ADMIN — MUPIROCIN 1 APPLICATION: 20 OINTMENT TOPICAL at 21:39

## 2022-10-18 RX ADMIN — HYDROCODONE BITARTRATE AND ACETAMINOPHEN 2 TABLET: 5; 325 TABLET ORAL at 13:28

## 2022-10-19 ENCOUNTER — APPOINTMENT (OUTPATIENT)
Dept: GENERAL RADIOLOGY | Facility: HOSPITAL | Age: 32
End: 2022-10-19

## 2022-10-19 ENCOUNTER — APPOINTMENT (OUTPATIENT)
Dept: CARDIOLOGY | Facility: HOSPITAL | Age: 32
End: 2022-10-19

## 2022-10-19 LAB
ANION GAP SERPL CALCULATED.3IONS-SCNC: 10.3 MMOL/L (ref 5–15)
BASOPHILS # BLD AUTO: 0.02 10*3/MM3 (ref 0–0.2)
BASOPHILS NFR BLD AUTO: 0.2 % (ref 0–1.5)
BH BB BLOOD EXPIRATION DATE: NORMAL
BH BB BLOOD EXPIRATION DATE: NORMAL
BH BB BLOOD TYPE BARCODE: 6200
BH BB BLOOD TYPE BARCODE: 6200
BH BB DISPENSE STATUS: NORMAL
BH BB DISPENSE STATUS: NORMAL
BH BB PRODUCT CODE: NORMAL
BH BB PRODUCT CODE: NORMAL
BH BB UNIT NUMBER: NORMAL
BH BB UNIT NUMBER: NORMAL
BH CV ECHO MEAS - EDV(MOD-SP2): 77 ML
BH CV ECHO MEAS - EDV(MOD-SP4): 59 ML
BH CV ECHO MEAS - EF(MOD-BP): 32.2 %
BH CV ECHO MEAS - EF(MOD-SP2): 32.5 %
BH CV ECHO MEAS - EF(MOD-SP4): 30.5 %
BH CV ECHO MEAS - ESV(MOD-SP2): 52 ML
BH CV ECHO MEAS - ESV(MOD-SP4): 41 ML
BH CV ECHO MEAS - LV DIASTOLIC VOL/BSA (35-75): 38.9 CM2
BH CV ECHO MEAS - LV SYSTOLIC VOL/BSA (12-30): 27.1 CM2
BH CV ECHO MEAS - SI(MOD-SP2): 16.5 ML/M2
BH CV ECHO MEAS - SI(MOD-SP4): 11.9 ML/M2
BH CV ECHO MEAS - SV(MOD-SP2): 25 ML
BH CV ECHO MEAS - SV(MOD-SP4): 18 ML
BUN SERPL-MCNC: 9 MG/DL (ref 6–20)
BUN/CREAT SERPL: 22.5 (ref 7–25)
CALCIUM SPEC-SCNC: 8.6 MG/DL (ref 8.6–10.5)
CHLORIDE SERPL-SCNC: 100 MMOL/L (ref 98–107)
CO2 SERPL-SCNC: 26.7 MMOL/L (ref 22–29)
CREAT SERPL-MCNC: 0.4 MG/DL (ref 0.57–1)
CROSSMATCH INTERPRETATION: NORMAL
CROSSMATCH INTERPRETATION: NORMAL
DEPRECATED RDW RBC AUTO: 40.1 FL (ref 37–54)
EGFRCR SERPLBLD CKD-EPI 2021: 134.2 ML/MIN/1.73
EOSINOPHIL # BLD AUTO: 0.02 10*3/MM3 (ref 0–0.4)
EOSINOPHIL NFR BLD AUTO: 0.2 % (ref 0.3–6.2)
ERYTHROCYTE [DISTWIDTH] IN BLOOD BY AUTOMATED COUNT: 11.9 % (ref 12.3–15.4)
GLUCOSE BLDC GLUCOMTR-MCNC: 106 MG/DL (ref 70–130)
GLUCOSE SERPL-MCNC: 158 MG/DL (ref 65–99)
HCT VFR BLD AUTO: 26.2 % (ref 34–46.6)
HGB BLD-MCNC: 8.8 G/DL (ref 12–15.9)
IMM GRANULOCYTES # BLD AUTO: 0.04 10*3/MM3 (ref 0–0.05)
IMM GRANULOCYTES NFR BLD AUTO: 0.5 % (ref 0–0.5)
LYMPHOCYTES # BLD AUTO: 1.65 10*3/MM3 (ref 0.7–3.1)
LYMPHOCYTES NFR BLD AUTO: 19.3 % (ref 19.6–45.3)
MAGNESIUM SERPL-MCNC: 3.5 MG/DL (ref 1.6–2.6)
MAXIMAL PREDICTED HEART RATE: 187 BPM
MCH RBC QN AUTO: 30.7 PG (ref 26.6–33)
MCHC RBC AUTO-ENTMCNC: 33.6 G/DL (ref 31.5–35.7)
MCV RBC AUTO: 91.3 FL (ref 79–97)
MONOCYTES # BLD AUTO: 0.65 10*3/MM3 (ref 0.1–0.9)
MONOCYTES NFR BLD AUTO: 7.6 % (ref 5–12)
NEUTROPHILS NFR BLD AUTO: 6.19 10*3/MM3 (ref 1.7–7)
NEUTROPHILS NFR BLD AUTO: 72.2 % (ref 42.7–76)
NRBC BLD AUTO-RTO: 0.1 /100 WBC (ref 0–0.2)
PLATELET # BLD AUTO: 165 10*3/MM3 (ref 140–450)
PMV BLD AUTO: 10.3 FL (ref 6–12)
POTASSIUM SERPL-SCNC: 3.5 MMOL/L (ref 3.5–5.2)
POTASSIUM SERPL-SCNC: 3.8 MMOL/L (ref 3.5–5.2)
QT INTERVAL: 410 MS
RBC # BLD AUTO: 2.87 10*6/MM3 (ref 3.77–5.28)
SODIUM SERPL-SCNC: 137 MMOL/L (ref 136–145)
STRESS TARGET HR: 159 BPM
UNIT  ABO: NORMAL
UNIT  ABO: NORMAL
UNIT  RH: NORMAL
UNIT  RH: NORMAL
WBC NRBC COR # BLD: 8.57 10*3/MM3 (ref 3.4–10.8)

## 2022-10-19 PROCEDURE — 97530 THERAPEUTIC ACTIVITIES: CPT

## 2022-10-19 PROCEDURE — 93308 TTE F-UP OR LMTD: CPT | Performed by: INTERNAL MEDICINE

## 2022-10-19 PROCEDURE — 25010000002 ONDANSETRON PER 1 MG: Performed by: NURSE PRACTITIONER

## 2022-10-19 PROCEDURE — 25010000002 PERFLUTREN (DEFINITY) 8.476 MG IN SODIUM CHLORIDE (PF) 0.9 % 10 ML INJECTION: Performed by: NURSE PRACTITIONER

## 2022-10-19 PROCEDURE — 93325 DOPPLER ECHO COLOR FLOW MAPG: CPT

## 2022-10-19 PROCEDURE — 71045 X-RAY EXAM CHEST 1 VIEW: CPT

## 2022-10-19 PROCEDURE — 82962 GLUCOSE BLOOD TEST: CPT

## 2022-10-19 PROCEDURE — 25010000002 ENOXAPARIN PER 10 MG: Performed by: NURSE PRACTITIONER

## 2022-10-19 PROCEDURE — 25010000002 CALCIUM GLUCONATE-NACL 1-0.675 GM/50ML-% SOLUTION: Performed by: NURSE PRACTITIONER

## 2022-10-19 PROCEDURE — 93325 DOPPLER ECHO COLOR FLOW MAPG: CPT | Performed by: INTERNAL MEDICINE

## 2022-10-19 PROCEDURE — 93308 TTE F-UP OR LMTD: CPT

## 2022-10-19 PROCEDURE — 80048 BASIC METABOLIC PNL TOTAL CA: CPT | Performed by: NURSE PRACTITIONER

## 2022-10-19 PROCEDURE — 99232 SBSQ HOSP IP/OBS MODERATE 35: CPT | Performed by: INTERNAL MEDICINE

## 2022-10-19 PROCEDURE — 99024 POSTOP FOLLOW-UP VISIT: CPT | Performed by: THORACIC SURGERY (CARDIOTHORACIC VASCULAR SURGERY)

## 2022-10-19 PROCEDURE — 84132 ASSAY OF SERUM POTASSIUM: CPT | Performed by: THORACIC SURGERY (CARDIOTHORACIC VASCULAR SURGERY)

## 2022-10-19 PROCEDURE — 85025 COMPLETE CBC W/AUTO DIFF WBC: CPT | Performed by: INTERNAL MEDICINE

## 2022-10-19 RX ORDER — FUROSEMIDE 20 MG/1
20 TABLET ORAL ONCE
Status: COMPLETED | OUTPATIENT
Start: 2022-10-19 | End: 2022-10-19

## 2022-10-19 RX ORDER — CALCIUM GLUCONATE 20 MG/ML
2 INJECTION, SOLUTION INTRAVENOUS ONCE
Status: COMPLETED | OUTPATIENT
Start: 2022-10-19 | End: 2022-10-19

## 2022-10-19 RX ORDER — POTASSIUM CHLORIDE 750 MG/1
10 TABLET, FILM COATED, EXTENDED RELEASE ORAL ONCE
Status: COMPLETED | OUTPATIENT
Start: 2022-10-19 | End: 2022-10-19

## 2022-10-19 RX ADMIN — Medication 150 MG: at 08:08

## 2022-10-19 RX ADMIN — PERFLUTREN 2 ML: 6.52 INJECTION, SUSPENSION INTRAVENOUS at 15:27

## 2022-10-19 RX ADMIN — ONDANSETRON 4 MG: 2 INJECTION INTRAMUSCULAR; INTRAVENOUS at 08:28

## 2022-10-19 RX ADMIN — FUROSEMIDE 20 MG: 20 TABLET ORAL at 17:50

## 2022-10-19 RX ADMIN — MUPIROCIN 1 APPLICATION: 20 OINTMENT TOPICAL at 20:03

## 2022-10-19 RX ADMIN — POTASSIUM CHLORIDE 10 MEQ: 750 TABLET, EXTENDED RELEASE ORAL at 17:50

## 2022-10-19 RX ADMIN — CALCIUM GLUCONATE 2 G: 20 INJECTION, SOLUTION INTRAVENOUS at 10:29

## 2022-10-19 RX ADMIN — HYDROCODONE BITARTRATE AND ACETAMINOPHEN 2 TABLET: 5; 325 TABLET ORAL at 14:41

## 2022-10-19 RX ADMIN — SENNOSIDES AND DOCUSATE SODIUM 2 TABLET: 8.6; 5 TABLET ORAL at 20:03

## 2022-10-19 RX ADMIN — ASPIRIN 81 MG: 81 TABLET, FILM COATED ORAL at 08:08

## 2022-10-19 RX ADMIN — ENOXAPARIN SODIUM 40 MG: 100 INJECTION SUBCUTANEOUS at 17:51

## 2022-10-19 RX ADMIN — HYDROCODONE BITARTRATE AND ACETAMINOPHEN 2 TABLET: 5; 325 TABLET ORAL at 01:35

## 2022-10-19 RX ADMIN — MUPIROCIN 1 APPLICATION: 20 OINTMENT TOPICAL at 08:09

## 2022-10-19 RX ADMIN — POTASSIUM CHLORIDE 40 MEQ: 750 TABLET, EXTENDED RELEASE ORAL at 08:08

## 2022-10-19 RX ADMIN — HYDROCODONE BITARTRATE AND ACETAMINOPHEN 2 TABLET: 5; 325 TABLET ORAL at 21:28

## 2022-10-19 RX ADMIN — MAGNESIUM OXIDE 400 MG (241.3 MG MAGNESIUM) TABLET 400 MG: TABLET at 08:08

## 2022-10-19 RX ADMIN — HYDROCODONE BITARTRATE AND ACETAMINOPHEN 2 TABLET: 5; 325 TABLET ORAL at 08:28

## 2022-10-20 ENCOUNTER — APPOINTMENT (OUTPATIENT)
Dept: GENERAL RADIOLOGY | Facility: HOSPITAL | Age: 32
End: 2022-10-20

## 2022-10-20 LAB
ANION GAP SERPL CALCULATED.3IONS-SCNC: 7.2 MMOL/L (ref 5–15)
BUN SERPL-MCNC: 6 MG/DL (ref 6–20)
BUN/CREAT SERPL: 12.8 (ref 7–25)
CALCIUM SPEC-SCNC: 8.7 MG/DL (ref 8.6–10.5)
CHLORIDE SERPL-SCNC: 105 MMOL/L (ref 98–107)
CO2 SERPL-SCNC: 26.8 MMOL/L (ref 22–29)
CREAT SERPL-MCNC: 0.47 MG/DL (ref 0.57–1)
DEPRECATED RDW RBC AUTO: 39.9 FL (ref 37–54)
EGFRCR SERPLBLD CKD-EPI 2021: 129.1 ML/MIN/1.73
ERYTHROCYTE [DISTWIDTH] IN BLOOD BY AUTOMATED COUNT: 12.1 % (ref 12.3–15.4)
GLUCOSE BLDC GLUCOMTR-MCNC: 115 MG/DL (ref 70–130)
GLUCOSE SERPL-MCNC: 113 MG/DL (ref 65–99)
HCT VFR BLD AUTO: 25.4 % (ref 34–46.6)
HGB BLD-MCNC: 8.6 G/DL (ref 12–15.9)
MCH RBC QN AUTO: 30.8 PG (ref 26.6–33)
MCHC RBC AUTO-ENTMCNC: 33.9 G/DL (ref 31.5–35.7)
MCV RBC AUTO: 91 FL (ref 79–97)
PLATELET # BLD AUTO: 200 10*3/MM3 (ref 140–450)
PMV BLD AUTO: 9.8 FL (ref 6–12)
POTASSIUM SERPL-SCNC: 3.8 MMOL/L (ref 3.5–5.2)
RBC # BLD AUTO: 2.79 10*6/MM3 (ref 3.77–5.28)
SODIUM SERPL-SCNC: 139 MMOL/L (ref 136–145)
WBC NRBC COR # BLD: 5.72 10*3/MM3 (ref 3.4–10.8)

## 2022-10-20 PROCEDURE — 25010000002 ONDANSETRON PER 1 MG: Performed by: NURSE PRACTITIONER

## 2022-10-20 PROCEDURE — 85027 COMPLETE CBC AUTOMATED: CPT | Performed by: THORACIC SURGERY (CARDIOTHORACIC VASCULAR SURGERY)

## 2022-10-20 PROCEDURE — 71045 X-RAY EXAM CHEST 1 VIEW: CPT

## 2022-10-20 PROCEDURE — 99024 POSTOP FOLLOW-UP VISIT: CPT | Performed by: THORACIC SURGERY (CARDIOTHORACIC VASCULAR SURGERY)

## 2022-10-20 PROCEDURE — 80048 BASIC METABOLIC PNL TOTAL CA: CPT | Performed by: NURSE PRACTITIONER

## 2022-10-20 PROCEDURE — 82962 GLUCOSE BLOOD TEST: CPT

## 2022-10-20 PROCEDURE — 99232 SBSQ HOSP IP/OBS MODERATE 35: CPT | Performed by: INTERNAL MEDICINE

## 2022-10-20 PROCEDURE — 97530 THERAPEUTIC ACTIVITIES: CPT

## 2022-10-20 PROCEDURE — 25010000002 ENOXAPARIN PER 10 MG: Performed by: NURSE PRACTITIONER

## 2022-10-20 RX ORDER — MIDODRINE HYDROCHLORIDE 2.5 MG/1
2.5 TABLET ORAL
Status: DISCONTINUED | OUTPATIENT
Start: 2022-10-20 | End: 2022-10-23 | Stop reason: HOSPADM

## 2022-10-20 RX ORDER — TEMAZEPAM 7.5 MG/1
7.5 CAPSULE ORAL NIGHTLY PRN
Status: DISCONTINUED | OUTPATIENT
Start: 2022-10-20 | End: 2022-10-23 | Stop reason: HOSPADM

## 2022-10-20 RX ADMIN — HYDROCODONE BITARTRATE AND ACETAMINOPHEN 2 TABLET: 5; 325 TABLET ORAL at 22:10

## 2022-10-20 RX ADMIN — Medication 150 MG: at 09:20

## 2022-10-20 RX ADMIN — MUPIROCIN 1 APPLICATION: 20 OINTMENT TOPICAL at 20:16

## 2022-10-20 RX ADMIN — MUPIROCIN 1 APPLICATION: 20 OINTMENT TOPICAL at 09:20

## 2022-10-20 RX ADMIN — MAGNESIUM OXIDE 400 MG (241.3 MG MAGNESIUM) TABLET 400 MG: TABLET at 09:20

## 2022-10-20 RX ADMIN — ENOXAPARIN SODIUM 40 MG: 100 INJECTION SUBCUTANEOUS at 17:18

## 2022-10-20 RX ADMIN — ONDANSETRON 4 MG: 2 INJECTION INTRAMUSCULAR; INTRAVENOUS at 20:14

## 2022-10-20 RX ADMIN — HYDROCODONE BITARTRATE AND ACETAMINOPHEN 2 TABLET: 5; 325 TABLET ORAL at 13:51

## 2022-10-20 RX ADMIN — HYDROCODONE BITARTRATE AND ACETAMINOPHEN 2 TABLET: 5; 325 TABLET ORAL at 17:17

## 2022-10-20 RX ADMIN — MIDODRINE HYDROCHLORIDE 2.5 MG: 2.5 TABLET ORAL at 17:17

## 2022-10-20 RX ADMIN — ASPIRIN 81 MG: 81 TABLET, FILM COATED ORAL at 09:20

## 2022-10-20 RX ADMIN — HYDROCODONE BITARTRATE AND ACETAMINOPHEN 2 TABLET: 5; 325 TABLET ORAL at 04:50

## 2022-10-20 RX ADMIN — SENNOSIDES AND DOCUSATE SODIUM 2 TABLET: 8.6; 5 TABLET ORAL at 20:17

## 2022-10-20 RX ADMIN — HYDROCODONE BITARTRATE AND ACETAMINOPHEN 2 TABLET: 5; 325 TABLET ORAL at 09:33

## 2022-10-20 RX ADMIN — MIDODRINE HYDROCHLORIDE 2.5 MG: 2.5 TABLET ORAL at 13:49

## 2022-10-21 ENCOUNTER — APPOINTMENT (OUTPATIENT)
Dept: GENERAL RADIOLOGY | Facility: HOSPITAL | Age: 32
End: 2022-10-21

## 2022-10-21 LAB
ANION GAP SERPL CALCULATED.3IONS-SCNC: 12.1 MMOL/L (ref 5–15)
BUN SERPL-MCNC: 6 MG/DL (ref 6–20)
BUN/CREAT SERPL: 12 (ref 7–25)
CALCIUM SPEC-SCNC: 9.3 MG/DL (ref 8.6–10.5)
CHLORIDE SERPL-SCNC: 103 MMOL/L (ref 98–107)
CO2 SERPL-SCNC: 23.9 MMOL/L (ref 22–29)
CREAT SERPL-MCNC: 0.5 MG/DL (ref 0.57–1)
EGFRCR SERPLBLD CKD-EPI 2021: 127.2 ML/MIN/1.73
GLUCOSE BLDC GLUCOMTR-MCNC: 115 MG/DL (ref 70–130)
GLUCOSE BLDC GLUCOMTR-MCNC: 122 MG/DL (ref 70–130)
GLUCOSE BLDC GLUCOMTR-MCNC: 145 MG/DL (ref 70–130)
GLUCOSE SERPL-MCNC: 128 MG/DL (ref 65–99)
POTASSIUM SERPL-SCNC: 3.9 MMOL/L (ref 3.5–5.2)
SODIUM SERPL-SCNC: 139 MMOL/L (ref 136–145)

## 2022-10-21 PROCEDURE — 99232 SBSQ HOSP IP/OBS MODERATE 35: CPT | Performed by: NURSE PRACTITIONER

## 2022-10-21 PROCEDURE — 97530 THERAPEUTIC ACTIVITIES: CPT

## 2022-10-21 PROCEDURE — 94799 UNLISTED PULMONARY SVC/PX: CPT

## 2022-10-21 PROCEDURE — 99024 POSTOP FOLLOW-UP VISIT: CPT | Performed by: THORACIC SURGERY (CARDIOTHORACIC VASCULAR SURGERY)

## 2022-10-21 PROCEDURE — 71045 X-RAY EXAM CHEST 1 VIEW: CPT

## 2022-10-21 PROCEDURE — 82962 GLUCOSE BLOOD TEST: CPT

## 2022-10-21 PROCEDURE — 25010000002 ENOXAPARIN PER 10 MG: Performed by: NURSE PRACTITIONER

## 2022-10-21 PROCEDURE — 80048 BASIC METABOLIC PNL TOTAL CA: CPT | Performed by: NURSE PRACTITIONER

## 2022-10-21 RX ADMIN — SENNOSIDES AND DOCUSATE SODIUM 2 TABLET: 8.6; 5 TABLET ORAL at 20:37

## 2022-10-21 RX ADMIN — MAGNESIUM OXIDE 400 MG (241.3 MG MAGNESIUM) TABLET 400 MG: TABLET at 09:00

## 2022-10-21 RX ADMIN — HYDROCODONE BITARTRATE AND ACETAMINOPHEN 2 TABLET: 5; 325 TABLET ORAL at 02:46

## 2022-10-21 RX ADMIN — HYDROCODONE BITARTRATE AND ACETAMINOPHEN 2 TABLET: 5; 325 TABLET ORAL at 15:35

## 2022-10-21 RX ADMIN — MIDODRINE HYDROCHLORIDE 2.5 MG: 2.5 TABLET ORAL at 07:47

## 2022-10-21 RX ADMIN — HYDROCODONE BITARTRATE AND ACETAMINOPHEN 2 TABLET: 5; 325 TABLET ORAL at 20:37

## 2022-10-21 RX ADMIN — MIDODRINE HYDROCHLORIDE 2.5 MG: 2.5 TABLET ORAL at 12:00

## 2022-10-21 RX ADMIN — ENOXAPARIN SODIUM 40 MG: 100 INJECTION SUBCUTANEOUS at 18:44

## 2022-10-21 RX ADMIN — ASPIRIN 81 MG: 81 TABLET, FILM COATED ORAL at 09:00

## 2022-10-21 RX ADMIN — Medication 150 MG: at 09:00

## 2022-10-21 RX ADMIN — HYDROCODONE BITARTRATE AND ACETAMINOPHEN 2 TABLET: 5; 325 TABLET ORAL at 09:36

## 2022-10-21 RX ADMIN — MIDODRINE HYDROCHLORIDE 2.5 MG: 2.5 TABLET ORAL at 18:44

## 2022-10-22 ENCOUNTER — HOME HEALTH ADMISSION (OUTPATIENT)
Dept: HOME HEALTH SERVICES | Facility: HOME HEALTHCARE | Age: 32
End: 2022-10-22

## 2022-10-22 LAB
ANION GAP SERPL CALCULATED.3IONS-SCNC: 9.3 MMOL/L (ref 5–15)
BUN SERPL-MCNC: 8 MG/DL (ref 6–20)
BUN/CREAT SERPL: 14.5 (ref 7–25)
CALCIUM SPEC-SCNC: 9 MG/DL (ref 8.6–10.5)
CHLORIDE SERPL-SCNC: 101 MMOL/L (ref 98–107)
CO2 SERPL-SCNC: 26.7 MMOL/L (ref 22–29)
CREAT SERPL-MCNC: 0.55 MG/DL (ref 0.57–1)
EGFRCR SERPLBLD CKD-EPI 2021: 124.3 ML/MIN/1.73
GLUCOSE BLDC GLUCOMTR-MCNC: 110 MG/DL (ref 70–130)
GLUCOSE BLDC GLUCOMTR-MCNC: 124 MG/DL (ref 70–130)
GLUCOSE BLDC GLUCOMTR-MCNC: 125 MG/DL (ref 70–130)
GLUCOSE SERPL-MCNC: 87 MG/DL (ref 65–99)
POTASSIUM SERPL-SCNC: 4.2 MMOL/L (ref 3.5–5.2)
SODIUM SERPL-SCNC: 137 MMOL/L (ref 136–145)

## 2022-10-22 PROCEDURE — 94799 UNLISTED PULMONARY SVC/PX: CPT

## 2022-10-22 PROCEDURE — 80048 BASIC METABOLIC PNL TOTAL CA: CPT | Performed by: NURSE PRACTITIONER

## 2022-10-22 PROCEDURE — 99239 HOSP IP/OBS DSCHRG MGMT >30: CPT | Performed by: STUDENT IN AN ORGANIZED HEALTH CARE EDUCATION/TRAINING PROGRAM

## 2022-10-22 PROCEDURE — 97530 THERAPEUTIC ACTIVITIES: CPT

## 2022-10-22 PROCEDURE — 25010000002 ONDANSETRON PER 1 MG: Performed by: NURSE PRACTITIONER

## 2022-10-22 PROCEDURE — 25010000002 ENOXAPARIN PER 10 MG: Performed by: NURSE PRACTITIONER

## 2022-10-22 PROCEDURE — 82962 GLUCOSE BLOOD TEST: CPT

## 2022-10-22 RX ORDER — CLOPIDOGREL BISULFATE 75 MG/1
75 TABLET ORAL DAILY
Status: DISCONTINUED | OUTPATIENT
Start: 2022-10-22 | End: 2022-10-23 | Stop reason: HOSPADM

## 2022-10-22 RX ORDER — CLOPIDOGREL BISULFATE 75 MG/1
75 TABLET ORAL DAILY
Qty: 30 TABLET | Refills: 0 | Status: SHIPPED | OUTPATIENT
Start: 2022-10-23 | End: 2022-12-15

## 2022-10-22 RX ORDER — ACETAMINOPHEN 500 MG
500 TABLET ORAL EVERY 4 HOURS PRN
Qty: 30 TABLET | Refills: 0 | Status: SHIPPED | OUTPATIENT
Start: 2022-10-22

## 2022-10-22 RX ORDER — ASPIRIN 81 MG/1
81 TABLET ORAL DAILY
Qty: 30 TABLET | Refills: 11 | Status: SHIPPED | OUTPATIENT
Start: 2022-10-23

## 2022-10-22 RX ORDER — HYDROCODONE BITARTRATE AND ACETAMINOPHEN 5; 325 MG/1; MG/1
1 TABLET ORAL EVERY 4 HOURS PRN
Qty: 42 TABLET | Refills: 0 | Status: SHIPPED | OUTPATIENT
Start: 2022-10-22 | End: 2022-10-29

## 2022-10-22 RX ADMIN — Medication 150 MG: at 09:23

## 2022-10-22 RX ADMIN — MAGNESIUM OXIDE 400 MG (241.3 MG MAGNESIUM) TABLET 400 MG: TABLET at 09:22

## 2022-10-22 RX ADMIN — CLOPIDOGREL 75 MG: 75 TABLET, FILM COATED ORAL at 09:23

## 2022-10-22 RX ADMIN — HYDROCODONE BITARTRATE AND ACETAMINOPHEN 2 TABLET: 5; 325 TABLET ORAL at 20:52

## 2022-10-22 RX ADMIN — MIDODRINE HYDROCHLORIDE 2.5 MG: 2.5 TABLET ORAL at 18:00

## 2022-10-22 RX ADMIN — ENOXAPARIN SODIUM 40 MG: 100 INJECTION SUBCUTANEOUS at 18:00

## 2022-10-22 RX ADMIN — HYDROCODONE BITARTRATE AND ACETAMINOPHEN 2 TABLET: 5; 325 TABLET ORAL at 09:27

## 2022-10-22 RX ADMIN — ONDANSETRON 4 MG: 2 INJECTION INTRAMUSCULAR; INTRAVENOUS at 20:52

## 2022-10-22 RX ADMIN — ASPIRIN 81 MG: 81 TABLET, FILM COATED ORAL at 09:22

## 2022-10-22 RX ADMIN — MIDODRINE HYDROCHLORIDE 2.5 MG: 2.5 TABLET ORAL at 13:05

## 2022-10-22 RX ADMIN — HYDROCODONE BITARTRATE AND ACETAMINOPHEN 2 TABLET: 5; 325 TABLET ORAL at 03:41

## 2022-10-22 RX ADMIN — MUPIROCIN: 20 OINTMENT TOPICAL at 09:30

## 2022-10-22 RX ADMIN — HYDROCODONE BITARTRATE AND ACETAMINOPHEN 2 TABLET: 5; 325 TABLET ORAL at 15:24

## 2022-10-22 RX ADMIN — SENNOSIDES AND DOCUSATE SODIUM 2 TABLET: 8.6; 5 TABLET ORAL at 20:52

## 2022-10-22 RX ADMIN — MIDODRINE HYDROCHLORIDE 2.5 MG: 2.5 TABLET ORAL at 09:22

## 2022-10-23 ENCOUNTER — READMISSION MANAGEMENT (OUTPATIENT)
Dept: CALL CENTER | Facility: HOSPITAL | Age: 32
End: 2022-10-23

## 2022-10-23 VITALS
WEIGHT: 111.5 LBS | SYSTOLIC BLOOD PRESSURE: 100 MMHG | OXYGEN SATURATION: 94 % | BODY MASS INDEX: 21.05 KG/M2 | HEART RATE: 106 BPM | DIASTOLIC BLOOD PRESSURE: 45 MMHG | TEMPERATURE: 97.7 F | RESPIRATION RATE: 16 BRPM | HEIGHT: 61 IN

## 2022-10-23 LAB
ANION GAP SERPL CALCULATED.3IONS-SCNC: 12.7 MMOL/L (ref 5–15)
BUN SERPL-MCNC: 7 MG/DL (ref 6–20)
BUN/CREAT SERPL: 12.1 (ref 7–25)
CALCIUM SPEC-SCNC: 9.7 MG/DL (ref 8.6–10.5)
CHLORIDE SERPL-SCNC: 101 MMOL/L (ref 98–107)
CO2 SERPL-SCNC: 23.3 MMOL/L (ref 22–29)
CREAT SERPL-MCNC: 0.58 MG/DL (ref 0.57–1)
EGFRCR SERPLBLD CKD-EPI 2021: 122.7 ML/MIN/1.73
GLUCOSE SERPL-MCNC: 111 MG/DL (ref 65–99)
POTASSIUM SERPL-SCNC: 3.9 MMOL/L (ref 3.5–5.2)
SODIUM SERPL-SCNC: 137 MMOL/L (ref 136–145)

## 2022-10-23 PROCEDURE — 25010000002 ONDANSETRON PER 1 MG: Performed by: NURSE PRACTITIONER

## 2022-10-23 PROCEDURE — 80048 BASIC METABOLIC PNL TOTAL CA: CPT | Performed by: NURSE PRACTITIONER

## 2022-10-23 PROCEDURE — 99232 SBSQ HOSP IP/OBS MODERATE 35: CPT | Performed by: STUDENT IN AN ORGANIZED HEALTH CARE EDUCATION/TRAINING PROGRAM

## 2022-10-23 RX ADMIN — CLOPIDOGREL 75 MG: 75 TABLET, FILM COATED ORAL at 09:37

## 2022-10-23 RX ADMIN — HYDROCODONE BITARTRATE AND ACETAMINOPHEN 2 TABLET: 5; 325 TABLET ORAL at 06:55

## 2022-10-23 RX ADMIN — HYDROCODONE BITARTRATE AND ACETAMINOPHEN 2 TABLET: 5; 325 TABLET ORAL at 02:22

## 2022-10-23 RX ADMIN — MAGNESIUM OXIDE 400 MG (241.3 MG MAGNESIUM) TABLET 400 MG: TABLET at 09:37

## 2022-10-23 RX ADMIN — ONDANSETRON 4 MG: 2 INJECTION INTRAMUSCULAR; INTRAVENOUS at 06:55

## 2022-10-23 RX ADMIN — MUPIROCIN 1 APPLICATION: 20 OINTMENT TOPICAL at 09:37

## 2022-10-23 RX ADMIN — ASPIRIN 81 MG: 81 TABLET, FILM COATED ORAL at 09:37

## 2022-10-23 RX ADMIN — Medication 150 MG: at 09:37

## 2022-10-23 RX ADMIN — MIDODRINE HYDROCHLORIDE 2.5 MG: 2.5 TABLET ORAL at 06:55

## 2022-10-23 NOTE — OUTREACH NOTE
Prep Survey    Flowsheet Row Responses   [facility] patient discharged from? [facility]   Is LACE score < 7 ? No   Emergency Room discharge w/ pulse ox? No   Eligibility Readm Mgmt   Discharge diagnosis CORONARY ARTERY BYPASS GRAFT   Does the patient have one of the following disease processes/diagnoses(primary or secondary)? Cardiothoracic surgery   Does the patient have Home health ordered? Yes   What is the Home health agency?   [facility]   Is there a DME ordered? No   Prep survey completed? Yes          FABIOLA COHEN - Registered Nurse

## 2022-10-24 ENCOUNTER — HOME CARE VISIT (OUTPATIENT)
Dept: HOME HEALTH SERVICES | Facility: HOME HEALTHCARE | Age: 32
End: 2022-10-24

## 2022-10-24 PROCEDURE — G0299 HHS/HOSPICE OF RN EA 15 MIN: HCPCS

## 2022-10-25 VITALS
OXYGEN SATURATION: 95 % | HEART RATE: 89 BPM | TEMPERATURE: 97.7 F | RESPIRATION RATE: 18 BRPM | SYSTOLIC BLOOD PRESSURE: 100 MMHG | DIASTOLIC BLOOD PRESSURE: 64 MMHG

## 2022-10-25 NOTE — HOME HEALTH
32F with no medical history.  Recent hospitalization due to NSTEMI/CABG.  Ambulates without assist.  Lives at home with family who assist with care.  SN to T/I CABG, incision care, and medication regime.

## 2022-10-26 ENCOUNTER — HOME CARE VISIT (OUTPATIENT)
Dept: HOME HEALTH SERVICES | Facility: HOME HEALTHCARE | Age: 32
End: 2022-10-26

## 2022-10-26 VITALS
DIASTOLIC BLOOD PRESSURE: 68 MMHG | TEMPERATURE: 97 F | HEART RATE: 92 BPM | RESPIRATION RATE: 16 BRPM | OXYGEN SATURATION: 95 % | SYSTOLIC BLOOD PRESSURE: 94 MMHG

## 2022-10-26 PROCEDURE — G0493 RN CARE EA 15 MIN HH/HOSPICE: HCPCS

## 2022-10-27 ENCOUNTER — TELEPHONE (OUTPATIENT)
Dept: CARDIOLOGY | Facility: CLINIC | Age: 32
End: 2022-10-27

## 2022-10-27 ENCOUNTER — READMISSION MANAGEMENT (OUTPATIENT)
Dept: CALL CENTER | Facility: HOSPITAL | Age: 32
End: 2022-10-27

## 2022-10-27 NOTE — TELEPHONE ENCOUNTER
Caller: Esme Herrera    Relationship: Self    Best call back number: [phone]    What is the best time to reach you: MORNINGS    Who are you requesting to speak with (clinical staff, provider,  specific staff member): ANY        What was the call regarding: PT WAS RECENTLY DISCHARGED AND WAS TOLD TO FOLLOW UP WITH DR. OESI IN 1 WEEK. [facility] SCHEDULED 1ST AVAILABLE ON 11-8. PLEASE CALL PT BACK IF PT CAN BE SEEN SOONER.     Do you require a callback: YES

## 2022-10-27 NOTE — OUTREACH NOTE
CT Surgery Week 1 Survey    Flowsheet Row Responses   [facility] patient discharged from? [facility]   Does the patient have one of the following disease processes/diagnoses(primary or secondary)? Cardiothoracic surgery   Week 1 attempt successful? Yes   Call start time 1208   Call end time 1212   Discharge diagnosis CORONARY ARTERY BYPASS GRAFT   Meds reviewed with patient/caregiver? Yes   Is the patient having any side effects they believe may be caused by any medication additions or changes? No   Does the patient have all medications related to this admission filled (includes all antibiotics, pain medications, cardiac medications, etc.) Yes   Is the patient taking all medications as directed (includes completed medication regime)? Yes   Does the patient have a primary care provider?  No   PCP Nursing Intervention Provided number to obtain PCP   Does the patient have an appointment scheduled with their C/T surgeon? Yes  [FU apt with surgeon's office in one week ]   Has the patient kept scheduled appointments due by today? N/A   What is the Home health agency?   [facility]   Has home health visited the patient within 72 hours of discharge? Yes   Home health comments  has already visited and is scheduled to visit 10/28/22   Psychosocial issues? No   Did the patient receive a copy of their discharge instructions? Yes   Nursing interventions Reviewed instructions with patient   What is the patient's perception of their health status since discharge? Improving   Nursing interventions Nurse provided patient education   Is the patient/caregiver able to teach back normal signs of recovery? Nausea and lack of appetite, Constipation, Depression or irritability, Pain or discomfort at incisional site   Nursing interventions Reassured on normal signs of recovery   Is the patient /caregiver able to teach back basic post-op care? Continue use of incentive spirometry at least 1 week post discharge, Drive as instructed  by MD in discharge instructions, Take showers only when approved by MD-sponge bathe until then, Keep incision areas clean, dry and protected, Lifting as instructed by MD in discharge instructions   Is the patient/caregiver able to teach back signs and symptoms of incisional infection? Increased redness, swelling or pain at the incisonal site   Is the patient/caregiver able to teach back steps to recovery at home? Set small, achievable goals for return to baseline health, Rest and rebuild strength, gradually increase activity, Make a list of questions for surgeon's appointment, Eat a well-balance diet   If the patient is a current smoker, are they able to teach back resources for cessation? Not a smoker   Is the patient/caregiver able to teach back the hierarchy of who to call/visit for symptoms/problems? PCP, Specialist, Home health nurse, Urgent Care, ED, 911 Yes   Week 1 call completed? Yes            MIRTA GARRISON - Registered Nurse

## 2022-10-28 ENCOUNTER — HOME CARE VISIT (OUTPATIENT)
Dept: HOME HEALTH SERVICES | Facility: HOME HEALTHCARE | Age: 32
End: 2022-10-28

## 2022-10-28 VITALS
OXYGEN SATURATION: 98 % | HEART RATE: 84 BPM | SYSTOLIC BLOOD PRESSURE: 92 MMHG | RESPIRATION RATE: 16 BRPM | DIASTOLIC BLOOD PRESSURE: 64 MMHG | TEMPERATURE: 98.2 F

## 2022-10-28 PROCEDURE — G0493 RN CARE EA 15 MIN HH/HOSPICE: HCPCS

## 2022-11-01 ENCOUNTER — HOME CARE VISIT (OUTPATIENT)
Dept: HOME HEALTH SERVICES | Facility: HOME HEALTHCARE | Age: 32
End: 2022-11-01

## 2022-11-01 PROCEDURE — G0300 HHS/HOSPICE OF LPN EA 15 MIN: HCPCS

## 2022-11-02 VITALS
SYSTOLIC BLOOD PRESSURE: 100 MMHG | BODY MASS INDEX: 21.35 KG/M2 | OXYGEN SATURATION: 98 % | DIASTOLIC BLOOD PRESSURE: 60 MMHG | TEMPERATURE: 97.4 F | HEART RATE: 95 BPM | RESPIRATION RATE: 18 BRPM | WEIGHT: 113 LBS

## 2022-11-02 NOTE — HOME HEALTH
SNV for cp assessment and t/i on disease management r/t CABG  Chest incision healed  Patient wearing chest hugger  Nurse given pamphlet on Cardiac rehab and instructed on exercises  Family members present in home

## 2022-11-04 ENCOUNTER — HOME CARE VISIT (OUTPATIENT)
Dept: HOME HEALTH SERVICES | Facility: HOME HEALTHCARE | Age: 32
End: 2022-11-04

## 2022-11-04 PROCEDURE — G0493 RN CARE EA 15 MIN HH/HOSPICE: HCPCS

## 2022-11-05 VITALS
OXYGEN SATURATION: 98 % | DIASTOLIC BLOOD PRESSURE: 70 MMHG | HEART RATE: 70 BPM | RESPIRATION RATE: 16 BRPM | SYSTOLIC BLOOD PRESSURE: 100 MMHG

## 2022-11-05 NOTE — HOME HEALTH
VSS.  Incision healed without complication.  Wearing compression stockings.  Seeing surgeon next week.  Ready for discharge from [facility].

## 2022-11-08 ENCOUNTER — OFFICE VISIT (OUTPATIENT)
Dept: CARDIOLOGY | Facility: CLINIC | Age: 32
End: 2022-11-08

## 2022-11-08 ENCOUNTER — HOME CARE VISIT (OUTPATIENT)
Dept: HOME HEALTH SERVICES | Facility: HOME HEALTHCARE | Age: 32
End: 2022-11-08

## 2022-11-08 VITALS
HEIGHT: 61 IN | SYSTOLIC BLOOD PRESSURE: 120 MMHG | WEIGHT: 112.6 LBS | HEART RATE: 90 BPM | DIASTOLIC BLOOD PRESSURE: 60 MMHG | OXYGEN SATURATION: 99 % | BODY MASS INDEX: 21.26 KG/M2

## 2022-11-08 DIAGNOSIS — I25.42 SPONTANEOUS DISSECTION OF CORONARY ARTERY: Primary | ICD-10-CM

## 2022-11-08 DIAGNOSIS — Z95.1 S/P CABG X 3: ICD-10-CM

## 2022-11-08 DIAGNOSIS — I25.5 ISCHEMIC CARDIOMYOPATHY: ICD-10-CM

## 2022-11-08 PROCEDURE — 93000 ELECTROCARDIOGRAM COMPLETE: CPT | Performed by: INTERNAL MEDICINE

## 2022-11-08 PROCEDURE — G0493 RN CARE EA 15 MIN HH/HOSPICE: HCPCS

## 2022-11-08 PROCEDURE — 99214 OFFICE O/P EST MOD 30 MIN: CPT | Performed by: INTERNAL MEDICINE

## 2022-11-08 RX ORDER — METOPROLOL SUCCINATE 25 MG/1
12.5 TABLET, EXTENDED RELEASE ORAL NIGHTLY
Qty: 30 TABLET | Refills: 2 | Status: SHIPPED | OUTPATIENT
Start: 2022-11-08 | End: 2022-11-20 | Stop reason: HOSPADM

## 2022-11-08 NOTE — PROGRESS NOTES
Subjective:     Encounter Date:11/08/2022      Patient ID: Esme Herrera is a 32 y.o. female.    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    I initially saw the patient after she presented to the hospital on 10/14/2022 with complaints of severe substernal chest pain which radiated to her back.  Symptoms started at rest and occurred abruptly.  The pain lasted for a while before resolving on its own.  She ended up going to the emergency room due to the severity of her symptoms.  By the time she got to the emergency room her symptoms had resolved shortly after.  She reported that she had had a similar episode about a day prior that was not as severe but did last longer.  Episodes mainly occurred at rest and were not associated with any diaphoresis or nausea.  She did report associated shortness of breath.  Her initial EKG following her arrival showed mild ST elevation in 1 and aVL and inferior ST depression.  Repeat EKG in the ER showed resolution of her ST changes.  However her initial troponin was elevated at 1.180.  D-dimer was elevated at 2.  proBNP was mildly elevated.  A CT angiogram of the chest showed no evidence of pulmonary embolism.    Following her admission she was started on heparin infusion.  She remained pain-free even with exertion.  Repeat troponins were trending down.  Repeat EKG showed development of anterior T wave changes.  She denies any prior medical history, tobacco or drug use, or any family history of heart disease.  She denied any increased recent stressors.    The patient underwent cardiac catheterization on 10/15/2022.  This showed spontaneous coronary artery dissection involving the distal left main, left circumflex, left anterior  descending, and ramus intermedius branches along with thrombus.  There appeared to be at most SHERLEY I flow distally.  Her right coronary artery appeared to be completely normal.  Left ventriculogram showed akinesis of the mid to distal anterior wall and apex with an EF of about 30 to 35%.  Due to the extent of her spontaneous coronary artery dissection and poor distal flow cardiothoracic surgery was consulted since PCI was not a good option.  She was evaluated by Dr. Teixeira and subsequently taken emergently for CABG x3 with LIMA to LAD, saphenous vein graft to an OM1, and saphenous vein graft to the ramus intermedius.  Postoperatively she progressed slowly.  She had issues with persistent hypotension that eventually resolved.  We were unable to start her on any guideline directed management for her scad or cardiomyopathy outside of aspirin.  An echocardiogram performed on 10/16/2022 showed mildly dilated left ventricle with an EF of 26 to 30% and anterior and apical wall motion abnormalities.  A repeat echocardiogram on 10/19/2022 was performed due to persistent hypotension which showed an EF of 30 to 35% again anterior and apical wall motion abnormalities and swirling of contrast in the apex but no evidence of thrombus.    The patient was finally discharged on 11/2/2022 on aspirin and clopidogrel for 1 month.    She returns today for routine hospital follow-up.  She reports that she is slowly improving.  She denies any incisional pain but reports some soreness in her bilateral chest.  She reports some fatigue and shortness of breath with activity but this is slowly improving.  She is walking up to 20 minutes at a time now.  She denies any significant palpitations.  She feels like she has been having issues with headaches and lightheadedness after starting the clopidogrel.  She denies any lower extremity swelling, orthopnea, near-syncope or syncope.    Review of Systems   Constitutional: Negative for malaise/fatigue.    HENT: Negative for hearing loss, hoarse voice, nosebleeds and sore throat.    Eyes: Negative for pain.   Cardiovascular: Positive for dyspnea on exertion. Negative for chest pain, claudication, cyanosis, irregular heartbeat, leg swelling, near-syncope, orthopnea, palpitations, paroxysmal nocturnal dyspnea and syncope.   Respiratory: Negative for shortness of breath and snoring.    Endocrine: Negative for cold intolerance, heat intolerance, polydipsia, polyphagia and polyuria.   Skin: Negative for itching and rash.   Musculoskeletal: Negative for arthritis, falls, joint pain, joint swelling, muscle cramps, muscle weakness and myalgias.   Gastrointestinal: Negative for constipation, diarrhea, dysphagia, heartburn, hematemesis, hematochezia, melena, nausea and vomiting.   Genitourinary: Negative for frequency, hematuria and hesitancy.   Neurological: Positive for headaches and light-headedness. Negative for excessive daytime sleepiness, dizziness, numbness and weakness.   Psychiatric/Behavioral: Negative for depression. The patient is not nervous/anxious.          Current Outpatient Medications:   •  acetaminophen (TYLENOL) 500 MG tablet, Take 1 tablet by mouth Every 4 (Four) Hours As Needed for Mild Pain., Disp: 30 tablet, Rfl: 0  •  aspirin 81 MG EC tablet, Take 1 tablet by mouth Daily., Disp: 30 tablet, Rfl: 11  •  clopidogrel (PLAVIX) 75 MG tablet, Take 1 tablet by mouth Daily., Disp: 30 tablet, Rfl: 0    History reviewed. No pertinent past medical history.    Past Surgical History:   Procedure Laterality Date   • CARDIAC CATHETERIZATION N/A 10/15/2022    Procedure: Left Heart Cath;  Surgeon: Deandra Dumas MD;  Location: [facility] INVASIVE LOCATION;  Service: Cardiology;  Laterality: N/A;   • CARDIAC CATHETERIZATION N/A 10/15/2022    Procedure: Coronary angiography;  Surgeon: Deandra Dumas MD;  Location: [facility] INVASIVE LOCATION;  Service: Cardiology;  Laterality: N/A;   • CARDIAC CATHETERIZATION N/A  "10/15/2022    Procedure: Left ventriculography;  Surgeon: Deandra Dumas MD;  Location: [facility] CATH INVASIVE LOCATION;  Service: Cardiology;  Laterality: N/A;   • CORONARY ARTERY BYPASS GRAFT N/A 10/15/2022    Procedure: VERONICA, STERNOTOMY, CORONARY ARTERY BYPASS TIMES 3 WITH INTERNAL MAMMARY ARTERY GRAFT AND LEFT GREATER SAPHENOUS VEIN HARVEST, REPAIR OF CORONARY ARTERY DISSECTION,PRP;  Surgeon: Lakshmi Teixeira MD;  Location: [facility] CVOR;  Service: Cardiothoracic;  Laterality: N/A;       History reviewed. No pertinent family history.    Social History     Tobacco Use   • Smoking status: Never   • Smokeless tobacco: Never         ECG 12 Lead    Date/Time: 11/8/2022 11:22 AM  Performed by: Deandra Dumas MD  Authorized by: Deandra Dumas MD   Comparison: compared with previous ECG   Comparison to previous ECG: Anterior ST elevations have improved  Rhythm: sinus rhythm  Q waves: V1, V2, V3 and V4    T inversion: V2, V3, V4, V5, V6, I and aVL                 Objective:     Visit Vitals  /60 (BP Location: Left arm, Patient Position: Sitting, Cuff Size: Adult)   Pulse 90   Ht 154.9 cm (61\")   Wt 51.1 kg (112 lb 9.6 oz)   SpO2 99%   BMI 21.28 kg/m²         Constitutional:       Appearance: Normal appearance. Well-developed.   Eyes:      General: Lids are normal.      Conjunctiva/sclera: Conjunctivae normal.      Pupils: Pupils are equal, round, and reactive to light.   HENT:      Head: Normocephalic and atraumatic.   Neck:      Vascular: No carotid bruit or JVD.      Lymphadenopathy: No cervical adenopathy.   Pulmonary:      Effort: Pulmonary effort is normal.      Breath sounds: Normal breath sounds.   Cardiovascular:      Normal rate. Regular rhythm.      No gallop.   Pulses:     Radial: 2+ bilaterally.  Edema:     Peripheral edema absent.   Abdominal:      Palpations: Abdomen is soft.   Musculoskeletal:      Cervical back: Full passive range of motion without pain, normal range of motion and neck supple. " Skin:     General: Skin is warm and dry.   Neurological:      Mental Status: Alert and oriented to person, place, and time.             Assessment:          Diagnosis Plan   1. Spontaneous dissection of coronary artery        2. S/P CABG x 3        3. Ischemic cardiomyopathy               Plan:         1.  Spontaneous coronary artery dissection.  Involving left main, LAD, ramus intermedius, and left circumflex arteries.  Due to the extent of dissection and poor distal flow she required CABG x3.  She appears slowly improving from her event and surgery.  She is currently on aspirin and clopidogrel.  Clopidogrel is to stop in a couple weeks.  She is having some symptoms that is unclear if it is related to the clopidogrel or not.  The patient reports that is tolerable enough that she will be able to complete the 1 month.  She has an appointment with cardiac rehab later this month.  She does not have a follow-up with surgery scheduled yet.  2.  Ischemic cardiomyopathy.  EF of 30 to 35%.  Unable to start any guideline directed management during her admission due to low blood pressures.  Blood pressures look a little better today in the office.  I am going start her on metoprolol succinate 12.5 mg which have asked her to take at nighttime.  Asked her to notify me if she has any issues with this medication.  I am also concerned about the possibility of development of apical thrombus and she had significant apical wall motion abnormalities on her last echocardiogram.  We will proceed with a repeat echocardiogram to ensure that there is no evidence of this.    I will call and discuss results of her echocardiogram.  I will reach out to Dr. Teixeira's office to have follow-up arranged in their office.  Otherwise I will have her return for follow-up in about 1 month with us.

## 2022-11-09 ENCOUNTER — READMISSION MANAGEMENT (OUTPATIENT)
Dept: CALL CENTER | Facility: HOSPITAL | Age: 32
End: 2022-11-09

## 2022-11-09 VITALS
OXYGEN SATURATION: 98 % | HEART RATE: 78 BPM | RESPIRATION RATE: 16 BRPM | DIASTOLIC BLOOD PRESSURE: 60 MMHG | SYSTOLIC BLOOD PRESSURE: 100 MMHG

## 2022-11-09 NOTE — OUTREACH NOTE
CT Surgery Week 3 Survey    Flowsheet Row Responses   [facility] patient discharged from? [facility]   Does the patient have one of the following disease processes/diagnoses(primary or secondary)? Cardiothoracic surgery   Week 3 attempt successful? Yes   Call start time 1650   Call end time 1653   Discharge diagnosis CORONARY ARTERY BYPASS GRAFT   Meds reviewed with patient/caregiver? Yes   Is the patient taking all medications as directed (includes completed medication regime)? Yes   Has the patient kept scheduled appointments due by today? Yes   What is the Home health agency?   [facility]   Has home health visited the patient within 72 hours of discharge? Yes   Psychosocial issues? No   Comments Pt c/o headache. 100/59 HR88. denies chest pain or SOA. Incisions healing well, no s/sx of infection. No issues with appetite or voiding. When walking 20min some weakness, dizziness on occas.    What is the patient's perception of their health status since discharge? Improving   Is the patient/caregiver able to teach back signs and symptoms of incisional infection? Increased redness, swelling or pain at the incisonal site   Is the patient/caregiver able to teach back steps to recovery at home? Set small, achievable goals for return to baseline health   Is the patient/caregiver able to teach back the hierarchy of who to call/visit for symptoms/problems? PCP, Specialist, Home health nurse, Urgent Care, ED, 911 Yes   Week 3 call completed? Yes   Revoked No further contact(revokes)-requires comment   Graduated/Revoked comments davide CHAIDEZ - Registered Nurse

## 2022-11-16 ENCOUNTER — HOSPITAL ENCOUNTER (OUTPATIENT)
Dept: CARDIOLOGY | Facility: HOSPITAL | Age: 32
Discharge: HOME | End: 2022-11-16
Admitting: INTERNAL MEDICINE

## 2022-11-16 ENCOUNTER — OFFICE VISIT (OUTPATIENT)
Dept: CARDIAC SURGERY | Facility: CLINIC | Age: 32
End: 2022-11-16

## 2022-11-16 VITALS
SYSTOLIC BLOOD PRESSURE: 90 MMHG | HEIGHT: 61 IN | DIASTOLIC BLOOD PRESSURE: 60 MMHG | BODY MASS INDEX: 21.14 KG/M2 | WEIGHT: 112 LBS | HEART RATE: 78 BPM | OXYGEN SATURATION: 98 %

## 2022-11-16 VITALS
TEMPERATURE: 97.1 F | WEIGHT: 115 LBS | DIASTOLIC BLOOD PRESSURE: 62 MMHG | HEART RATE: 74 BPM | OXYGEN SATURATION: 100 % | BODY MASS INDEX: 21.71 KG/M2 | RESPIRATION RATE: 20 BRPM | SYSTOLIC BLOOD PRESSURE: 95 MMHG | HEIGHT: 61 IN

## 2022-11-16 DIAGNOSIS — I25.42 SPONTANEOUS DISSECTION OF CORONARY ARTERY: ICD-10-CM

## 2022-11-16 DIAGNOSIS — Z95.1 S/P CABG X 3: ICD-10-CM

## 2022-11-16 DIAGNOSIS — I25.5 ISCHEMIC CARDIOMYOPATHY: ICD-10-CM

## 2022-11-16 LAB
AORTIC ARCH: 1.5 CM
ASCENDING AORTA: 2 CM
BH CV ECHO LEFT VENTRICLE GLOBAL LONGITUDINAL STRAIN: -15.1 %
BH CV ECHO MEAS - ACS: 1.64 CM
BH CV ECHO MEAS - AO MAX PG: 3.5 MMHG
BH CV ECHO MEAS - AO MEAN PG: 2 MMHG
BH CV ECHO MEAS - AO ROOT DIAM: 2.15 CM
BH CV ECHO MEAS - AO V2 MAX: 94.1 CM/SEC
BH CV ECHO MEAS - AO V2 VTI: 18.9 CM
BH CV ECHO MEAS - AVA(I,D): 2.03 CM2
BH CV ECHO MEAS - EDV(CUBED): 100.1 ML
BH CV ECHO MEAS - EDV(MOD-SP2): 109 ML
BH CV ECHO MEAS - EDV(MOD-SP4): 91 ML
BH CV ECHO MEAS - EF(MOD-BP): 52.3 %
BH CV ECHO MEAS - EF(MOD-SP2): 60.6 %
BH CV ECHO MEAS - EF(MOD-SP4): 44 %
BH CV ECHO MEAS - ESV(CUBED): 25.5 ML
BH CV ECHO MEAS - ESV(MOD-SP2): 43 ML
BH CV ECHO MEAS - ESV(MOD-SP4): 51 ML
BH CV ECHO MEAS - FS: 36.6 %
BH CV ECHO MEAS - IVS/LVPW: 0.82 CM
BH CV ECHO MEAS - IVSD: 0.67 CM
BH CV ECHO MEAS - LAT PEAK E' VEL: 16.2 CM/SEC
BH CV ECHO MEAS - LV DIASTOLIC VOL/BSA (35-75): 61.6 CM2
BH CV ECHO MEAS - LV MASS(C)D: 108.5 GRAMS
BH CV ECHO MEAS - LV MAX PG: 3.1 MMHG
BH CV ECHO MEAS - LV MEAN PG: 2 MMHG
BH CV ECHO MEAS - LV SYSTOLIC VOL/BSA (12-30): 34.5 CM2
BH CV ECHO MEAS - LV V1 MAX: 87.4 CM/SEC
BH CV ECHO MEAS - LV V1 VTI: 16.6 CM
BH CV ECHO MEAS - LVIDD: 4.6 CM
BH CV ECHO MEAS - LVIDS: 2.9 CM
BH CV ECHO MEAS - LVOT AREA: 2.31 CM2
BH CV ECHO MEAS - LVOT DIAM: 1.72 CM
BH CV ECHO MEAS - LVPWD: 0.82 CM
BH CV ECHO MEAS - MED PEAK E' VEL: 9 CM/SEC
BH CV ECHO MEAS - MV A DUR: 0.11 SEC
BH CV ECHO MEAS - MV A MAX VEL: 45.4 CM/SEC
BH CV ECHO MEAS - MV DEC SLOPE: 431.5 CM/SEC2
BH CV ECHO MEAS - MV DEC TIME: 0.14 MSEC
BH CV ECHO MEAS - MV E MAX VEL: 86.5 CM/SEC
BH CV ECHO MEAS - MV E/A: 1.91
BH CV ECHO MEAS - MV MAX PG: 3 MMHG
BH CV ECHO MEAS - MV MEAN PG: 1.15 MMHG
BH CV ECHO MEAS - MV P1/2T: 57.3 MSEC
BH CV ECHO MEAS - MV V2 VTI: 21.3 CM
BH CV ECHO MEAS - MVA(P1/2T): 3.8 CM2
BH CV ECHO MEAS - MVA(VTI): 1.8 CM2
BH CV ECHO MEAS - PA ACC TIME: 0.15 SEC
BH CV ECHO MEAS - PA PR(ACCEL): 12.5 MMHG
BH CV ECHO MEAS - PA V2 MAX: 100.4 CM/SEC
BH CV ECHO MEAS - PI END-D VEL: 93.7 CM/SEC
BH CV ECHO MEAS - PULM A REVS DUR: 0.12 SEC
BH CV ECHO MEAS - PULM A REVS VEL: 38.1 CM/SEC
BH CV ECHO MEAS - PULM DIAS VEL: 69.8 CM/SEC
BH CV ECHO MEAS - PULM S/D: 0.86
BH CV ECHO MEAS - PULM SYS VEL: 60 CM/SEC
BH CV ECHO MEAS - QP/QS: 1.06
BH CV ECHO MEAS - RAP SYSTOLE: 8 MMHG
BH CV ECHO MEAS - RV MAX PG: 1.46 MMHG
BH CV ECHO MEAS - RV V1 MAX: 60.4 CM/SEC
BH CV ECHO MEAS - RV V1 VTI: 14.2 CM
BH CV ECHO MEAS - RVOT DIAM: 1.91 CM
BH CV ECHO MEAS - RVSP: 23 MMHG
BH CV ECHO MEAS - SI(MOD-SP2): 44.7 ML/M2
BH CV ECHO MEAS - SI(MOD-SP4): 27.1 ML/M2
BH CV ECHO MEAS - SUP REN AO DIAM: 1.5 CM
BH CV ECHO MEAS - SV(LVOT): 38.4 ML
BH CV ECHO MEAS - SV(MOD-SP2): 66 ML
BH CV ECHO MEAS - SV(MOD-SP4): 40 ML
BH CV ECHO MEAS - SV(RVOT): 40.6 ML
BH CV ECHO MEAS - TAPSE (>1.6): 1.67 CM
BH CV ECHO MEAS - TR MAX PG: 15.1 MMHG
BH CV ECHO MEAS - TR MAX VEL: 194.1 CM/SEC
BH CV ECHO MEASUREMENTS AVERAGE E/E' RATIO: 6.87
BH CV XLRA - RV BASE: 2.9 CM
BH CV XLRA - RV LENGTH: 5.6 CM
BH CV XLRA - RV MID: 1.97 CM
BH CV XLRA - TDI S': 9.6 CM/SEC
LEFT ATRIUM VOLUME INDEX: 28.9 ML/M2
MAXIMAL PREDICTED HEART RATE: 187 BPM
SINUS: 2.45 CM
STJ: 2.08 CM
STRESS TARGET HR: 159 BPM

## 2022-11-16 PROCEDURE — 99024 POSTOP FOLLOW-UP VISIT: CPT | Performed by: NURSE PRACTITIONER

## 2022-11-16 PROCEDURE — 93306 TTE W/DOPPLER COMPLETE: CPT

## 2022-11-16 PROCEDURE — 25010000002 PERFLUTREN (DEFINITY) 8.476 MG IN SODIUM CHLORIDE (PF) 0.9 % 10 ML INJECTION: Performed by: INTERNAL MEDICINE

## 2022-11-16 PROCEDURE — 93356 MYOCRD STRAIN IMG SPCKL TRCK: CPT

## 2022-11-16 PROCEDURE — 93306 TTE W/DOPPLER COMPLETE: CPT | Performed by: INTERNAL MEDICINE

## 2022-11-16 PROCEDURE — 93356 MYOCRD STRAIN IMG SPCKL TRCK: CPT | Performed by: INTERNAL MEDICINE

## 2022-11-16 RX ADMIN — PERFLUTREN 1.5 ML: 6.52 INJECTION, SUSPENSION INTRAVENOUS at 07:38

## 2022-11-16 NOTE — PROGRESS NOTES
"CARDIOVASCULAR SURGERY FOLLOW-UP PROGRESS NOTE  Chief Complaint: post op        HPI:   Dear Dr. Contreras, No Known and colleagues:    It was nice to see Esme Herrera in follow up 1 month  after surgery.  As you know, she is a 32 y.o. female with NSTEMI, with left main dissection who underwent emergent CABGx3 by Dr. Teixeira 10/15. She did well postoperatively and continues to do well. She comes in today complaining of dizziness.  She was recently started on 12.5mg of Toprol XL she has been taking at night.  I have encouraged her to monitor her symptoms and if her dizziness persists for her to call Dr. Dumas's office.  Her activity level has been good.  Her incisions healing well, sternum stable no popping or clicking with deep inspiration.    Physical Exam:         BP 95/62 (BP Location: Left arm, Patient Position: Sitting, Cuff Size: Adult)   Pulse 74   Temp 97.1 °F (36.2 °C)   Resp 20   Ht 154.9 cm (61\")   Wt 52.2 kg (115 lb)   SpO2 100%   BMI 21.73 kg/m²   Heart:  regular rate and rhythm  Lungs:  clear to auscultation bilaterally  Extremities:  no edema  Incision(s):  sternum stable    Assessment/Plan:     S/P CABG. Overall, she is doing well.    No significant post-op complications    No heavy lifting > 10 pounds for 2 more weeks  OK to begin cardiac rehab  Follow-up as scheduled with cardiology  Follow-up as scheduled with PCP          Thank you for allowing me to participate in the care of your   patient.  Regards,  SHIRLENE Dyer      "

## 2022-11-19 ENCOUNTER — HOSPITAL ENCOUNTER (OUTPATIENT)
Facility: HOSPITAL | Age: 32
Setting detail: OBSERVATION
Discharge: HOME | End: 2022-11-20
Attending: EMERGENCY MEDICINE | Admitting: EMERGENCY MEDICINE

## 2022-11-19 ENCOUNTER — APPOINTMENT (OUTPATIENT)
Dept: GENERAL RADIOLOGY | Facility: HOSPITAL | Age: 32
End: 2022-11-19

## 2022-11-19 DIAGNOSIS — R07.9 CHEST PAIN, UNSPECIFIED TYPE: Primary | ICD-10-CM

## 2022-11-19 LAB
BASOPHILS # BLD AUTO: 0.05 10*3/MM3 (ref 0–0.2)
BASOPHILS NFR BLD AUTO: 0.9 % (ref 0–1.5)
DEPRECATED RDW RBC AUTO: 38 FL (ref 37–54)
EOSINOPHIL # BLD AUTO: 0.26 10*3/MM3 (ref 0–0.4)
EOSINOPHIL NFR BLD AUTO: 4.6 % (ref 0.3–6.2)
ERYTHROCYTE [DISTWIDTH] IN BLOOD BY AUTOMATED COUNT: 11.8 % (ref 12.3–15.4)
HCT VFR BLD AUTO: 37.5 % (ref 34–46.6)
HGB BLD-MCNC: 12.8 G/DL (ref 12–15.9)
IMM GRANULOCYTES # BLD AUTO: 0.01 10*3/MM3 (ref 0–0.05)
IMM GRANULOCYTES NFR BLD AUTO: 0.2 % (ref 0–0.5)
LYMPHOCYTES # BLD AUTO: 1.92 10*3/MM3 (ref 0.7–3.1)
LYMPHOCYTES NFR BLD AUTO: 33.7 % (ref 19.6–45.3)
MCH RBC QN AUTO: 29.9 PG (ref 26.6–33)
MCHC RBC AUTO-ENTMCNC: 34.1 G/DL (ref 31.5–35.7)
MCV RBC AUTO: 87.6 FL (ref 79–97)
MONOCYTES # BLD AUTO: 0.57 10*3/MM3 (ref 0.1–0.9)
MONOCYTES NFR BLD AUTO: 10 % (ref 5–12)
NEUTROPHILS NFR BLD AUTO: 2.89 10*3/MM3 (ref 1.7–7)
NEUTROPHILS NFR BLD AUTO: 50.6 % (ref 42.7–76)
NRBC BLD AUTO-RTO: 0 /100 WBC (ref 0–0.2)
PLATELET # BLD AUTO: 229 10*3/MM3 (ref 140–450)
PMV BLD AUTO: 8.6 FL (ref 6–12)
RBC # BLD AUTO: 4.28 10*6/MM3 (ref 3.77–5.28)
WBC NRBC COR # BLD: 5.7 10*3/MM3 (ref 3.4–10.8)

## 2022-11-19 PROCEDURE — 84703 CHORIONIC GONADOTROPIN ASSAY: CPT | Performed by: PHYSICIAN ASSISTANT

## 2022-11-19 PROCEDURE — 93010 ELECTROCARDIOGRAM REPORT: CPT | Performed by: INTERNAL MEDICINE

## 2022-11-19 PROCEDURE — 84484 ASSAY OF TROPONIN QUANT: CPT | Performed by: PHYSICIAN ASSISTANT

## 2022-11-19 PROCEDURE — 83735 ASSAY OF MAGNESIUM: CPT | Performed by: PHYSICIAN ASSISTANT

## 2022-11-19 PROCEDURE — 93005 ELECTROCARDIOGRAM TRACING: CPT

## 2022-11-19 PROCEDURE — 80053 COMPREHEN METABOLIC PANEL: CPT | Performed by: PHYSICIAN ASSISTANT

## 2022-11-19 PROCEDURE — 93005 ELECTROCARDIOGRAM TRACING: CPT | Performed by: EMERGENCY MEDICINE

## 2022-11-19 PROCEDURE — 85652 RBC SED RATE AUTOMATED: CPT | Performed by: PHYSICIAN ASSISTANT

## 2022-11-19 PROCEDURE — 71045 X-RAY EXAM CHEST 1 VIEW: CPT

## 2022-11-19 PROCEDURE — 85025 COMPLETE CBC W/AUTO DIFF WBC: CPT | Performed by: PHYSICIAN ASSISTANT

## 2022-11-20 ENCOUNTER — APPOINTMENT (OUTPATIENT)
Dept: CT IMAGING | Facility: HOSPITAL | Age: 32
End: 2022-11-20

## 2022-11-20 VITALS
DIASTOLIC BLOOD PRESSURE: 50 MMHG | RESPIRATION RATE: 16 BRPM | OXYGEN SATURATION: 97 % | SYSTOLIC BLOOD PRESSURE: 90 MMHG | HEIGHT: 61 IN | BODY MASS INDEX: 21.52 KG/M2 | TEMPERATURE: 98 F | HEART RATE: 76 BPM | WEIGHT: 114 LBS

## 2022-11-20 PROBLEM — R07.9 CHEST PAIN: Status: ACTIVE | Noted: 2022-11-20

## 2022-11-20 LAB
ALBUMIN SERPL-MCNC: 4.3 G/DL (ref 3.5–5.2)
ALBUMIN/GLOB SERPL: 1.5 G/DL
ALP SERPL-CCNC: 53 U/L (ref 39–117)
ALT SERPL W P-5'-P-CCNC: 11 U/L (ref 1–33)
ANION GAP SERPL CALCULATED.3IONS-SCNC: 8.5 MMOL/L (ref 5–15)
AST SERPL-CCNC: 9 U/L (ref 1–32)
BACTERIA UR QL AUTO: ABNORMAL /HPF
BILIRUB SERPL-MCNC: 0.4 MG/DL (ref 0–1.2)
BILIRUB UR QL STRIP: NEGATIVE
BUN SERPL-MCNC: 10 MG/DL (ref 6–20)
BUN/CREAT SERPL: 13.7 (ref 7–25)
CALCIUM SPEC-SCNC: 9.3 MG/DL (ref 8.6–10.5)
CHLORIDE SERPL-SCNC: 106 MMOL/L (ref 98–107)
CHOLEST SERPL-MCNC: 155 MG/DL (ref 0–200)
CLARITY UR: CLEAR
CO2 SERPL-SCNC: 23.5 MMOL/L (ref 22–29)
COLOR UR: YELLOW
CREAT SERPL-MCNC: 0.73 MG/DL (ref 0.57–1)
CRP SERPL-MCNC: <0.3 MG/DL (ref 0–0.5)
EGFRCR SERPLBLD CKD-EPI 2021: 111.5 ML/MIN/1.73
ERYTHROCYTE [SEDIMENTATION RATE] IN BLOOD: 4 MM/HR (ref 0–20)
GLOBULIN UR ELPH-MCNC: 2.9 GM/DL
GLUCOSE SERPL-MCNC: 92 MG/DL (ref 65–99)
GLUCOSE UR STRIP-MCNC: NEGATIVE MG/DL
HCG SERPL QL: NEGATIVE
HDLC SERPL-MCNC: 57 MG/DL (ref 40–60)
HGB UR QL STRIP.AUTO: NEGATIVE
HYALINE CASTS UR QL AUTO: ABNORMAL /LPF
KETONES UR QL STRIP: NEGATIVE
LDLC SERPL CALC-MCNC: 87 MG/DL (ref 0–100)
LDLC/HDLC SERPL: 1.53 {RATIO}
LEUKOCYTE ESTERASE UR QL STRIP.AUTO: ABNORMAL
MAGNESIUM SERPL-MCNC: 2.3 MG/DL (ref 1.6–2.6)
NITRITE UR QL STRIP: NEGATIVE
PH UR STRIP.AUTO: 6 [PH] (ref 5–8)
POTASSIUM SERPL-SCNC: 3.9 MMOL/L (ref 3.5–5.2)
PROT SERPL-MCNC: 7.2 G/DL (ref 6–8.5)
PROT UR QL STRIP: NEGATIVE
QT INTERVAL: 391 MS
QT INTERVAL: 409 MS
QT INTERVAL: 411 MS
QT INTERVAL: 440 MS
RBC # UR STRIP: ABNORMAL /HPF
REF LAB TEST METHOD: ABNORMAL
SODIUM SERPL-SCNC: 138 MMOL/L (ref 136–145)
SP GR UR STRIP: >=1.03 (ref 1–1.03)
SQUAMOUS #/AREA URNS HPF: ABNORMAL /HPF
TRIGL SERPL-MCNC: 55 MG/DL (ref 0–150)
TROPONIN T SERPL-MCNC: <0.01 NG/ML (ref 0–0.03)
UROBILINOGEN UR QL STRIP: ABNORMAL
VLDLC SERPL-MCNC: 11 MG/DL (ref 5–40)
WBC # UR STRIP: ABNORMAL /HPF

## 2022-11-20 PROCEDURE — G0378 HOSPITAL OBSERVATION PER HR: HCPCS

## 2022-11-20 PROCEDURE — 71275 CT ANGIOGRAPHY CHEST: CPT

## 2022-11-20 PROCEDURE — 80061 LIPID PANEL: CPT

## 2022-11-20 PROCEDURE — 86140 C-REACTIVE PROTEIN: CPT | Performed by: PHYSICIAN ASSISTANT

## 2022-11-20 PROCEDURE — 25010000002 HYDROMORPHONE PER 4 MG: Performed by: EMERGENCY MEDICINE

## 2022-11-20 PROCEDURE — 84484 ASSAY OF TROPONIN QUANT: CPT | Performed by: PHYSICIAN ASSISTANT

## 2022-11-20 PROCEDURE — 96361 HYDRATE IV INFUSION ADD-ON: CPT

## 2022-11-20 PROCEDURE — 84484 ASSAY OF TROPONIN QUANT: CPT

## 2022-11-20 PROCEDURE — 0 IOPAMIDOL PER 1 ML: Performed by: EMERGENCY MEDICINE

## 2022-11-20 PROCEDURE — 99244 OFF/OP CNSLTJ NEW/EST MOD 40: CPT | Performed by: INTERNAL MEDICINE

## 2022-11-20 PROCEDURE — 36415 COLL VENOUS BLD VENIPUNCTURE: CPT

## 2022-11-20 PROCEDURE — 99284 EMERGENCY DEPT VISIT MOD MDM: CPT

## 2022-11-20 PROCEDURE — 81001 URINALYSIS AUTO W/SCOPE: CPT | Performed by: PHYSICIAN ASSISTANT

## 2022-11-20 PROCEDURE — 93005 ELECTROCARDIOGRAM TRACING: CPT | Performed by: PHYSICIAN ASSISTANT

## 2022-11-20 PROCEDURE — 93010 ELECTROCARDIOGRAM REPORT: CPT | Performed by: INTERNAL MEDICINE

## 2022-11-20 PROCEDURE — 96376 TX/PRO/DX INJ SAME DRUG ADON: CPT

## 2022-11-20 PROCEDURE — 25010000002 ONDANSETRON PER 1 MG: Performed by: EMERGENCY MEDICINE

## 2022-11-20 PROCEDURE — 96374 THER/PROPH/DIAG INJ IV PUSH: CPT

## 2022-11-20 PROCEDURE — 96375 TX/PRO/DX INJ NEW DRUG ADDON: CPT

## 2022-11-20 PROCEDURE — 25010000002 MORPHINE PER 10 MG: Performed by: EMERGENCY MEDICINE

## 2022-11-20 PROCEDURE — 93005 ELECTROCARDIOGRAM TRACING: CPT

## 2022-11-20 RX ORDER — FENTANYL CITRATE 50 UG/ML
25 INJECTION, SOLUTION INTRAMUSCULAR; INTRAVENOUS
Status: DISCONTINUED | OUTPATIENT
Start: 2022-11-20 | End: 2022-11-20

## 2022-11-20 RX ORDER — HYDROCODONE BITARTRATE AND ACETAMINOPHEN 5; 325 MG/1; MG/1
1 TABLET ORAL EVERY 6 HOURS PRN
COMMUNITY
End: 2022-12-15

## 2022-11-20 RX ORDER — MORPHINE SULFATE 2 MG/ML
4 INJECTION, SOLUTION INTRAMUSCULAR; INTRAVENOUS ONCE
Status: COMPLETED | OUTPATIENT
Start: 2022-11-20 | End: 2022-11-20

## 2022-11-20 RX ORDER — HYDROMORPHONE HYDROCHLORIDE 1 MG/ML
0.5 INJECTION, SOLUTION INTRAMUSCULAR; INTRAVENOUS; SUBCUTANEOUS ONCE
Status: COMPLETED | OUTPATIENT
Start: 2022-11-20 | End: 2022-11-20

## 2022-11-20 RX ORDER — LIDOCAINE HYDROCHLORIDE 20 MG/ML
10 SOLUTION OROPHARYNGEAL ONCE
Status: COMPLETED | OUTPATIENT
Start: 2022-11-20 | End: 2022-11-20

## 2022-11-20 RX ORDER — CLOPIDOGREL BISULFATE 75 MG/1
75 TABLET ORAL DAILY
Status: DISCONTINUED | OUTPATIENT
Start: 2022-11-20 | End: 2022-11-20 | Stop reason: HOSPADM

## 2022-11-20 RX ORDER — NITROGLYCERIN 0.4 MG/1
0.4 TABLET SUBLINGUAL
Status: DISCONTINUED | OUTPATIENT
Start: 2022-11-20 | End: 2022-11-20 | Stop reason: HOSPADM

## 2022-11-20 RX ORDER — ACETAMINOPHEN 500 MG
1000 TABLET ORAL ONCE
Status: COMPLETED | OUTPATIENT
Start: 2022-11-20 | End: 2022-11-20

## 2022-11-20 RX ORDER — ALUMINA, MAGNESIA, AND SIMETHICONE 2400; 2400; 240 MG/30ML; MG/30ML; MG/30ML
15 SUSPENSION ORAL EVERY 6 HOURS PRN
Status: DISCONTINUED | OUTPATIENT
Start: 2022-11-20 | End: 2022-11-20 | Stop reason: HOSPADM

## 2022-11-20 RX ORDER — SODIUM CHLORIDE 9 MG/ML
125 INJECTION, SOLUTION INTRAVENOUS CONTINUOUS
Status: DISCONTINUED | OUTPATIENT
Start: 2022-11-20 | End: 2022-11-20 | Stop reason: HOSPADM

## 2022-11-20 RX ORDER — METOPROLOL SUCCINATE 25 MG/1
12.5 TABLET, EXTENDED RELEASE ORAL NIGHTLY
Status: DISCONTINUED | OUTPATIENT
Start: 2022-11-20 | End: 2022-11-20

## 2022-11-20 RX ORDER — SODIUM CHLORIDE 0.9 % (FLUSH) 0.9 %
10 SYRINGE (ML) INJECTION AS NEEDED
Status: DISCONTINUED | OUTPATIENT
Start: 2022-11-20 | End: 2022-11-20 | Stop reason: HOSPADM

## 2022-11-20 RX ORDER — SODIUM CHLORIDE 9 MG/ML
40 INJECTION, SOLUTION INTRAVENOUS AS NEEDED
Status: DISCONTINUED | OUTPATIENT
Start: 2022-11-20 | End: 2022-11-20 | Stop reason: HOSPADM

## 2022-11-20 RX ORDER — HYDROCODONE BITARTRATE AND ACETAMINOPHEN 5; 325 MG/1; MG/1
1 TABLET ORAL EVERY 6 HOURS PRN
Status: DISCONTINUED | OUTPATIENT
Start: 2022-11-20 | End: 2022-11-20 | Stop reason: HOSPADM

## 2022-11-20 RX ORDER — ASPIRIN 81 MG/1
81 TABLET ORAL DAILY
Status: DISCONTINUED | OUTPATIENT
Start: 2022-11-20 | End: 2022-11-20 | Stop reason: HOSPADM

## 2022-11-20 RX ORDER — ONDANSETRON 2 MG/ML
4 INJECTION INTRAMUSCULAR; INTRAVENOUS ONCE
Status: COMPLETED | OUTPATIENT
Start: 2022-11-20 | End: 2022-11-20

## 2022-11-20 RX ORDER — SODIUM CHLORIDE 0.9 % (FLUSH) 0.9 %
10 SYRINGE (ML) INJECTION EVERY 12 HOURS SCHEDULED
Status: DISCONTINUED | OUTPATIENT
Start: 2022-11-20 | End: 2022-11-20 | Stop reason: HOSPADM

## 2022-11-20 RX ORDER — FAMOTIDINE 20 MG/1
20 TABLET, FILM COATED ORAL 2 TIMES DAILY
Qty: 60 TABLET | Refills: 0 | Status: SHIPPED | OUTPATIENT
Start: 2022-11-20 | End: 2022-12-15

## 2022-11-20 RX ADMIN — ONDANSETRON 4 MG: 2 INJECTION INTRAMUSCULAR; INTRAVENOUS at 00:18

## 2022-11-20 RX ADMIN — IOPAMIDOL 95 ML: 755 INJECTION, SOLUTION INTRAVENOUS at 00:31

## 2022-11-20 RX ADMIN — ACETAMINOPHEN 1000 MG: 500 TABLET ORAL at 13:51

## 2022-11-20 RX ADMIN — MORPHINE SULFATE 4 MG: 2 INJECTION, SOLUTION INTRAMUSCULAR; INTRAVENOUS at 01:55

## 2022-11-20 RX ADMIN — Medication 10 ML: at 03:02

## 2022-11-20 RX ADMIN — SODIUM CHLORIDE 125 ML/HR: 9 INJECTION, SOLUTION INTRAVENOUS at 08:35

## 2022-11-20 RX ADMIN — MORPHINE SULFATE 4 MG: 2 INJECTION, SOLUTION INTRAMUSCULAR; INTRAVENOUS at 00:19

## 2022-11-20 RX ADMIN — CLOPIDOGREL 75 MG: 75 TABLET, FILM COATED ORAL at 08:34

## 2022-11-20 RX ADMIN — ASPIRIN 81 MG: 81 TABLET, COATED ORAL at 08:34

## 2022-11-20 RX ADMIN — LIDOCAINE HYDROCHLORIDE 10 ML: 20 SOLUTION ORAL; TOPICAL at 10:19

## 2022-11-20 RX ADMIN — Medication 10 ML: at 08:35

## 2022-11-20 RX ADMIN — HYDROMORPHONE HYDROCHLORIDE 0.5 MG: 1 INJECTION, SOLUTION INTRAMUSCULAR; INTRAVENOUS; SUBCUTANEOUS at 03:03

## 2022-11-20 RX ADMIN — SODIUM CHLORIDE 1000 ML: 9 INJECTION, SOLUTION INTRAVENOUS at 03:04

## 2022-11-20 NOTE — H&P
[facility]   HISTORY AND PHYSICAL    Patient Name: Esme Herrera  : 1990  MRN: [id number]  Primary Care Physician:  Provider, No Known  Date of admission: 2022    Subjective   Subjective     Chief Complaint: Chest pain    History of Present Illness  Esme Herrera is a 32-year-old female with a recent history of coronary dissection and a CABG, that presented to the emergency department with a complaint of chest pain.  She states that she had an episode of pain yesterday a.m. that resolved quickly and then had a second episode last night that lasted about half an hour.  She describes the pain as a sharp stabbing pain that is in her left chest radiating to her neck.  She states that she did have some shortness of breath and nausea but denies diaphoresis.  Review of Systems   Constitutional: Negative.  Negative for diaphoresis.   HENT: Negative.    Eyes: Negative.    Respiratory: Positive for shortness of breath.    Cardiovascular: Positive for chest pain. Negative for palpitations.   Gastrointestinal: Positive for nausea. Negative for vomiting.   Endocrine: Negative.    Genitourinary: Negative.    Musculoskeletal: Negative.    Skin: Negative.    Allergic/Immunologic: Negative.    Neurological: Negative.    Hematological: Negative.    Psychiatric/Behavioral: Negative.         Personal History     No past medical history on file.    Past Surgical History:   Procedure Laterality Date   • CARDIAC CATHETERIZATION N/A 10/15/2022    Procedure: Left Heart Cath;  Surgeon: Deandra Duams MD;  Location: [facility] INVASIVE LOCATION;  Service: Cardiology;  Laterality: N/A;   • CARDIAC CATHETERIZATION N/A 10/15/2022    Procedure: Coronary angiography;  Surgeon: Deandra Dumas MD;  Location: [facility] CATH INVASIVE LOCATION;  Service: Cardiology;  Laterality: N/A;   • CARDIAC CATHETERIZATION N/A 10/15/2022    Procedure: Left ventriculography;  Surgeon: Deandra Dumas MD;  Location: [facility] INVASIVE  LOCATION;  Service: Cardiology;  Laterality: N/A;   • CORONARY ARTERY BYPASS GRAFT N/A 10/15/2022    Procedure: VERONICA, STERNOTOMY, CORONARY ARTERY BYPASS TIMES 3 WITH INTERNAL MAMMARY ARTERY GRAFT AND LEFT GREATER SAPHENOUS VEIN HARVEST, REPAIR OF CORONARY ARTERY DISSECTION,PRP;  Surgeon: Lakshmi Teixeira MD;  Location: [facility];  Service: Cardiothoracic;  Laterality: N/A;       Family History: family history is not on file. Otherwise pertinent FHx was reviewed and not pertinent to current issue.    Social History:  reports that she has never smoked. She has never used smokeless tobacco.    Home Medications:  acetaminophen, aspirin, clopidogrel, and metoprolol succinate XL    Allergies:  No Known Allergies    Objective    Objective     Vitals:   Temp:  [98.1 °F (36.7 °C)-98.3 °F (36.8 °C)] 98.3 °F (36.8 °C)  Heart Rate:  [73-80] 78  Resp:  [16-18] 16  BP: ()/(43-67) 96/49    Physical Exam  Constitutional:       Appearance: Normal appearance. She is normal weight.   Cardiovascular:      Rate and Rhythm: Normal rate and regular rhythm.      Heart sounds: Normal heart sounds, S1 normal and S2 normal.   Pulmonary:      Effort: Pulmonary effort is normal.      Breath sounds: Normal breath sounds.   Abdominal:      General: Abdomen is flat. Bowel sounds are normal.      Palpations: Abdomen is soft.   Musculoskeletal:         General: Normal range of motion.   Skin:     General: Skin is warm and dry.      Capillary Refill: Capillary refill takes less than 2 seconds.   Neurological:      General: No focal deficit present.      Mental Status: She is alert and oriented to person, place, and time.   Psychiatric:         Mood and Affect: Mood normal.         Behavior: Behavior normal.         Thought Content: Thought content normal.         Judgment: Judgment normal.         Result Review    Result Review:  I have personally reviewed the results from the time of this admission to 11/20/2022 02:58 EST and agree with  these findings:  [x]  Laboratory list / accordion  []  Microbiology  [x]  Radiology  [x]  EKG/Telemetry   []  Cardiology/Vascular   []  Pathology  [x]  Old records  []  Other:        Assessment & Plan   Assessment / Plan     Brief Patient Summary:  Esme Herrera is a 32 y.o. female who was admitted to the observation unit for further evaluation of her chest pain.    Active Hospital Problems:  Active Hospital Problems    Diagnosis    • **Chest pain      Plan:   Chest pain  -Vital signs every 4 hours  -Cardiac monitoring  -N.p.o.  -Cardiology consult  -Troponin <0.010 x 2, trend  -EKG sinus rhythm, inferior T wave abnormalities unchanged from previous EKG on 11/8/2022        DVT prophylaxis:  Mechanical DVT prophylaxis orders are present.    CODE STATUS:    Code Status (Patient has no pulse and is not breathing): CPR (Attempt to Resuscitate)  Medical Interventions (Patient has pulse or is breathing): Full Support  Release to patient: Routine Release    Admission Status:  I believe this patient meets observation status.    Amanda Hernandez, APRN

## 2022-11-20 NOTE — ED NOTES
Pt is aware of need for urine sample. Pt states she does not feel like she can provide one just yet, will notify staff when she feels she is able. Call light in reach.

## 2022-11-20 NOTE — CONSULTS
Date of Hospital Visit: 2022  Date of consult: 2022  Encounter Provider: Fortino Prince MD  Place of Service: [facility] CARDIOLOGY  Patient Name: Esme Herrera  :1990  Referral Provider: Moises Rasheed, *    Chief complaint: Chest pain    Reason for consult: Chest pain    History of Present Illness  Ms. Javier acosta 32 years old female with a recent history of SCAD/CABG came to the ER with complaints of chest pain that started yesterday morning.  She described pain as sharp localized to the retrosternal right and left anterior chest and interscapular area.  Overall moderate severity.  She thinks she has some shortness of breath.  Pain is intermittent.  She took hydrocodone at around noon yesterday with some relief of pain but did not help during another pain episode in the evening.  She was given Dilaudid and morphine in the ER.  She has been chest pain-free since then.  She thinks pain is similar to the one she had last month but less intense.    She reports she has been feeling sick/dizzy tired since she was started on metoprolol several days ago.    She denies any fainting.      No past medical history on file.    Past Surgical History:   Procedure Laterality Date   • CARDIAC CATHETERIZATION N/A 10/15/2022    Procedure: Left Heart Cath;  Surgeon: Deandra Dumas MD;  Location: [facility] CATH INVASIVE LOCATION;  Service: Cardiology;  Laterality: N/A;   • CARDIAC CATHETERIZATION N/A 10/15/2022    Procedure: Coronary angiography;  Surgeon: Deandra Dumas MD;  Location: [facility] CATH INVASIVE LOCATION;  Service: Cardiology;  Laterality: N/A;   • CARDIAC CATHETERIZATION N/A 10/15/2022    Procedure: Left ventriculography;  Surgeon: Deandra Dumas MD;  Location: [facility] CATH INVASIVE LOCATION;  Service: Cardiology;  Laterality: N/A;   • CORONARY ARTERY BYPASS GRAFT N/A 10/15/2022    Procedure: VERONICA, STERNOTOMY, CORONARY ARTERY BYPASS TIMES 3 WITH INTERNAL  "MAMMARY ARTERY GRAFT AND LEFT GREATER SAPHENOUS VEIN HARVEST, REPAIR OF CORONARY ARTERY DISSECTION,PRP;  Surgeon: Lakshmi Teixeira MD;  Location: [facility];  Service: Cardiothoracic;  Laterality: N/A;       Medications Prior to Admission   Medication Sig Dispense Refill Last Dose   • acetaminophen (TYLENOL) 500 MG tablet Take 1 tablet by mouth Every 4 (Four) Hours As Needed for Mild Pain. 30 tablet 0    • aspirin 81 MG EC tablet Take 1 tablet by mouth Daily. 30 tablet 11 11/19/2022 at 0800   • clopidogrel (PLAVIX) 75 MG tablet Take 1 tablet by mouth Daily. 30 tablet 0 11/19/2022 at 1200   • metoprolol succinate XL (TOPROL-XL) 25 MG 24 hr tablet Take 0.5 tablets by mouth Every Night. 30 tablet 2 11/19/2022 at 2000   • HYDROcodone-acetaminophen (NORCO) 5-325 MG per tablet Take 1 tablet by mouth Every 6 (Six) Hours As Needed.          Current Meds  Scheduled Meds:aspirin, 81 mg, Oral, Daily  clopidogrel, 75 mg, Oral, Daily  metoprolol succinate XL, 12.5 mg, Oral, Nightly  sodium chloride, 10 mL, Intravenous, Q12H      Continuous Infusions:sodium chloride, 125 mL/hr, Last Rate: 125 mL/hr (11/20/22 0835)      PRN Meds:.•  HYDROcodone-acetaminophen  •  nitroglycerin  •  sodium chloride  •  sodium chloride    Allergies as of 11/19/2022   • (No Known Allergies)       Social History     Socioeconomic History   • Marital status:    Tobacco Use   • Smoking status: Never   • Smokeless tobacco: Never       No family history on file.    REVIEW OF SYSTEMS:   All systems reviewed and pertinent positives include in HPI otherwise negative review of systems.       Objective:   Temp:  [98.1 °F (36.7 °C)-98.3 °F (36.8 °C)] 98.1 °F (36.7 °C)  Heart Rate:  [69-80] 79  Resp:  [16-18] 16  BP: ()/(43-67) 82/46  Body mass index is 21.54 kg/m².  Flowsheet Rows    Flowsheet Row First Filed Value   Admission Height 154.9 cm (61\") Documented at 11/19/2022 2314   Admission Weight 51.7 kg (114 lb) Documented at 11/20/2022 0300    "     Vitals:    11/20/22 0759   BP: (!) 82/46   Pulse: 79   Resp:    Temp:    SpO2: 98%       General Appearance:    Alert, cooperative, in no acute distress   Head:    Normocephalic, without obvious abnormality, atraumatic   Eyes:            Lids and lashes normal, conjunctivae and sclerae normal, no   icterus, no pallor, corneas clear, PERRLA   Ears:    Ears appear intact with no abnormalities noted   Throat:   No oral lesions, no thrush, oral mucosa moist   Neck:   No adenopathy, supple, trachea midline, no thyromegaly, no   carotid bruit, no JVD   Back:     No kyphosis present, no scoliosis present, no skin lesions, erythema or scars, no tenderness to percussion or palpation, range of motion normal   Lungs:     Clear to auscultation,respirations regular, even and unlabored    Heart:    Regular rhythm and normal rate, normal S1 and S2, no murmur, no gallop, no rub, no click   Chest Wall:    No abnormalities observed   Abdomen:     Normal bowel sounds, no masses, no organomegaly, soft nontender, nondistended, no guarding, no rebound  tenderness   Extremities:   Moves all extremities well, no edema, no cyanosis, no redness   Pulses:   Pulses palpable and equal bilaterally. Normal radial, carotid, femoral, dorsalis pedis and posterior tibial pulses bilaterally. Normal abdominal aorta   Skin:  Neurology:   Psychiatric:   No bleeding, bruising or rash   Normal speech and cranial nerve exam, no focal deficit   Alert and oriented x 3, normal mood and affect                 Review of Data:      Results from last 7 days   Lab Units 11/19/22  2345   SODIUM mmol/L 138   POTASSIUM mmol/L 3.9   CHLORIDE mmol/L 106   CO2 mmol/L 23.5   BUN mg/dL 10   CREATININE mg/dL 0.73   CALCIUM mg/dL 9.3   BILIRUBIN mg/dL 0.4   ALK PHOS U/L 53   ALT (SGPT) U/L 11   AST (SGOT) U/L 9   GLUCOSE mg/dL 92     Results from last 7 days   Lab Units 11/20/22  0512 11/20/22  0129 11/19/22  2345   TROPONIN T ng/mL <0.010 <0.010 <0.010      @LABRCNTbnp@  Results from last 7 days   Lab Units 11/19/22  2345   WBC 10*3/mm3 5.70   HEMOGLOBIN g/dL 12.8   HEMATOCRIT % 37.5   PLATELETS 10*3/mm3 229         Results from last 7 days   Lab Units 11/19/22  2345   MAGNESIUM mg/dL 2.3     @LABRCNTIP(chol,trig,hdl,ldl)    I personally viewed and interpreted the patient's EKG/Telemetry data  )  Patient Active Problem List   Diagnosis   • ACS (acute coronary syndrome) (HCC)   • Spontaneous dissection of coronary artery   • S/P CABG x 3   • Ischemic cardiomyopathy   • Chest pain         Assessment and Plan:      1.  Atypical chest pain-negative troponin x2  EKG with precordial Q waves and inverted T waves without significant changes from prior EKGs.  ACS ruled out.  Pain resolved after IV narcotic  She is chest pain-free during exam.  2.  Status post CABG x3 on 10/15/2022 with LIMA to LAD, SVG to ramus, SVG to OM1 for spontaneous coronary artery dissection of the left main.  3.  Ischemic cardiomyopathy-echocardiogram on 11/16/2022 showed recovered left ventricular ejection fraction but akinetic apex  History of intolerance to guideline directed medical therapy because of hypotension  Currently on dual antiplatelet therapy  Noted  to be intermittently hypotensive during ER visit.    Discontinue metoprolol that she is not tolerating well.  Continue dual antiplatelet therapy.  She will start cardiac rehab soon.    I have discussed patient with observation unit    Cardiology will sign off  Thank you for consulting with cardiology.    Fortino Prince MD  11/20/22  08:54 EST.  Time spent in reviewing chart, discussion and examination:

## 2022-11-20 NOTE — ED PROVIDER NOTES
MD ATTESTATION NOTE    The KEVIN and I have discussed this patient's history, physical exam, and treatment plan.  I have reviewed the documentation and personally had a face to face interaction with the patient. I affirm the documentation and agree with the treatment and plan.  The attached note describes my personal findings.    I provided a substantive portion of the care of this patient. I personally performed the physical exam, in its entirety.    Esme Herrera is a 32 y.o. female who presents to the ED c/o chest pain.  She reports that she recently had open heart surgery.  She developed pain in her anterior chest this morning and then again this evening.  She states that it feels different than her prior cardiac pain.  Nothing makes it worse or better.  She does report shortness of breath and nausea.  Old records show that she had a left main dissection and required a CABG.      On exam:  GENERAL: Awake, alert, no acute distress  SKIN: Warm, dry  HENT: Normocephalic, atraumatic  EYES: no scleral icterus  CV: regular rhythm, regular rate  RESPIRATORY: normal effort, lungs clear  ABDOMEN: soft, nontender, nondistended  MUSCULOSKELETAL: no deformity, no tenderness to the right calf but there is tenderness to the left calf  NEURO: alert, moves all extremities, follows commands    Labs  Recent Results (from the past 24 hour(s))   ECG 12 Lead Chest Pain    Collection Time: 11/19/22 11:05 PM   Result Value Ref Range    QT Interval 391 ms   Comprehensive Metabolic Panel    Collection Time: 11/19/22 11:45 PM    Specimen: Blood   Result Value Ref Range    Glucose 92 65 - 99 mg/dL    BUN 10 6 - 20 mg/dL    Creatinine 0.73 0.57 - 1.00 mg/dL    Sodium 138 136 - 145 mmol/L    Potassium 3.9 3.5 - 5.2 mmol/L    Chloride 106 98 - 107 mmol/L    CO2 23.5 22.0 - 29.0 mmol/L    Calcium 9.3 8.6 - 10.5 mg/dL    Total Protein 7.2 6.0 - 8.5 g/dL    Albumin 4.30 3.50 - 5.20 g/dL    ALT (SGPT) 11 1 - 33 U/L    AST (SGOT) 9 1 - 32 U/L     Alkaline Phosphatase 53 39 - 117 U/L    Total Bilirubin 0.4 0.0 - 1.2 mg/dL    Globulin 2.9 gm/dL    A/G Ratio 1.5 g/dL    BUN/Creatinine Ratio 13.7 7.0 - 25.0    Anion Gap 8.5 5.0 - 15.0 mmol/L    eGFR 111.5 >60.0 mL/min/1.73   Troponin    Collection Time: 11/19/22 11:45 PM    Specimen: Blood   Result Value Ref Range    Troponin T <0.010 0.000 - 0.030 ng/mL   Magnesium    Collection Time: 11/19/22 11:45 PM    Specimen: Blood   Result Value Ref Range    Magnesium 2.3 1.6 - 2.6 mg/dL   hCG, Serum, Qualitative    Collection Time: 11/19/22 11:45 PM    Specimen: Blood   Result Value Ref Range    HCG Qualitative Negative Negative   CBC Auto Differential    Collection Time: 11/19/22 11:45 PM    Specimen: Blood   Result Value Ref Range    WBC 5.70 3.40 - 10.80 10*3/mm3    RBC 4.28 3.77 - 5.28 10*6/mm3    Hemoglobin 12.8 12.0 - 15.9 g/dL    Hematocrit 37.5 34.0 - 46.6 %    MCV 87.6 79.0 - 97.0 fL    MCH 29.9 26.6 - 33.0 pg    MCHC 34.1 31.5 - 35.7 g/dL    RDW 11.8 (L) 12.3 - 15.4 %    RDW-SD 38.0 37.0 - 54.0 fl    MPV 8.6 6.0 - 12.0 fL    Platelets 229 140 - 450 10*3/mm3    Neutrophil % 50.6 42.7 - 76.0 %    Lymphocyte % 33.7 19.6 - 45.3 %    Monocyte % 10.0 5.0 - 12.0 %    Eosinophil % 4.6 0.3 - 6.2 %    Basophil % 0.9 0.0 - 1.5 %    Immature Grans % 0.2 0.0 - 0.5 %    Neutrophils, Absolute 2.89 1.70 - 7.00 10*3/mm3    Lymphocytes, Absolute 1.92 0.70 - 3.10 10*3/mm3    Monocytes, Absolute 0.57 0.10 - 0.90 10*3/mm3    Eosinophils, Absolute 0.26 0.00 - 0.40 10*3/mm3    Basophils, Absolute 0.05 0.00 - 0.20 10*3/mm3    Immature Grans, Absolute 0.01 0.00 - 0.05 10*3/mm3    nRBC 0.0 0.0 - 0.2 /100 WBC   Sedimentation Rate    Collection Time: 11/19/22 11:45 PM    Specimen: Blood   Result Value Ref Range    Sed Rate 4 0 - 20 mm/hr   ECG 12 Lead Chest Pain    Collection Time: 11/20/22 12:01 AM   Result Value Ref Range    QT Interval 411 ms   Urinalysis With Microscopic If Indicated (No Culture) - Urine, Clean Catch    Collection  Time: 11/20/22 12:53 AM    Specimen: Urine, Clean Catch   Result Value Ref Range    Color, UA Yellow Yellow, Straw    Appearance, UA Clear Clear    pH, UA 6.0 5.0 - 8.0    Specific Gravity, UA >=1.030 1.005 - 1.030    Glucose, UA Negative Negative    Ketones, UA Negative Negative    Bilirubin, UA Negative Negative    Blood, UA Negative Negative    Protein, UA Negative Negative    Leuk Esterase, UA Small (1+) (A) Negative    Nitrite, UA Negative Negative    Urobilinogen, UA 0.2 E.U./dL 0.2 - 1.0 E.U./dL   Urinalysis, Microscopic Only - Urine, Clean Catch    Collection Time: 11/20/22 12:53 AM    Specimen: Urine, Clean Catch   Result Value Ref Range    RBC, UA 0-2 None Seen, 0-2 /HPF    WBC, UA 3-5 (A) None Seen, 0-2 /HPF    Bacteria, UA None Seen None Seen /HPF    Squamous Epithelial Cells, UA 0-2 None Seen, 0-2 /HPF    Hyaline Casts, UA 0-2 None Seen /LPF    Methodology Automated Microscopy    Troponin    Collection Time: 11/20/22  1:29 AM    Specimen: Arm, Right; Blood   Result Value Ref Range    Troponin T <0.010 0.000 - 0.030 ng/mL   C-reactive Protein    Collection Time: 11/20/22  1:29 AM    Specimen: Arm, Right; Blood   Result Value Ref Range    C-Reactive Protein <0.30 0.00 - 0.50 mg/dL   Lipid Panel    Collection Time: 11/20/22  1:29 AM    Specimen: Arm, Right; Blood   Result Value Ref Range    Total Cholesterol 155 0 - 200 mg/dL    Triglycerides 55 0 - 150 mg/dL    HDL Cholesterol 57 40 - 60 mg/dL    LDL Cholesterol  87 0 - 100 mg/dL    VLDL Cholesterol 11 5 - 40 mg/dL    LDL/HDL Ratio 1.53    Troponin    Collection Time: 11/20/22  5:12 AM    Specimen: Blood   Result Value Ref Range    Troponin T <0.010 0.000 - 0.030 ng/mL   ECG 12 Lead    Collection Time: 11/20/22  5:57 AM   Result Value Ref Range    QT Interval 440 ms       Radiology  XR Chest 1 View    Result Date: 11/20/2022  CHEST SINGLE VIEW  HISTORY: Chest pain.  COMPARISON: AP chest 10/21/2022, 10/20/2022.  FINDINGS: Heart size is within normal  limits.  Median sternotomy wires and coronary artery markers are present. Lungs appear clear of focal airspace disease and there is no evidence for pulmonary edema or pleural effusion or infiltrate.      No evidence for active disease in the chest.  This report was finalized on 11/20/2022 12:01 AM by Dr. Nikhil Cevallos M.D.      CT Angiogram Chest    Result Date: 11/20/2022  CT ANGIOGRAM OF THE CHEST. MULTIPLE CORONAL, SAGITTAL, AND 3D RECONSTRUCTIONS  HISTORY: Chest pain, left calf pain. Evaluate for pulmonary embolus.  TECHNIQUE: Radiation dose reduction techniques were utilized, including automated exposure control and exposure modulation based on body size. CT angiogram of the chest was performed following the administration of IV contrast. Coronal, sagittal, and 3-D reconstruction images were obtained.  COMPARISON:  CT angiogram chest 10/14/2022.  FINDINGS: There are no abnormal areas of intrapulmonary arterial filling defect to diagnose a pulmonary embolus.  Patient has undergone recent median sternotomy and sternotomy wires are present. There are postoperative changes in the anterior mediastinum where there is mild stranding. There is no pericardial effusion. Small right pleural effusion layers dependently. There is no evidence for infiltrate or suspicious pulmonary nodule. No endotracheal or endobronchial lesion is evident. Imaging through the upper abdomen appears within normal limits.      1. No evidence for pulmonary thromboembolic disease. 2. Recent median sternotomy with postsurgical changes in the anterior mediastinum.  Discussed with LADY Key, in the emergency room on 11/20/2022 at 12:43 AM.  This report was finalized on 11/20/2022 12:56 AM by Dr. Nikhil Cevallos M.D.        Medical Decision Making:  ED Course as of 11/20/22 0644   Sat Nov 19, 2022   2315 EKG          EKG time: 2305  Rhythm/Rate: Sinus/78  P waves and SD: Normal SD interval/probable LAD  QRS, axis: QRS morphology  unremarked  ST and T waves: Diffuse flipped T waves/no acute ST depression elevated noted    Interpreted Contemporaneously by me, independently viewed  -No acute change when compared to 11/8/2022. [RC]   Sun Nov 20, 2022   0001 EKG          EKG time: 1201  Rhythm/Rate: 75/sinus  P waves and MI: Normal MI interval  QRS, axis: Normal axis  ST and T waves: Flipped T waves diffusely    Interpreted Contemporaneously by me, independently viewed  -No change from an hour ago [RC]   0004 Patient was pain-free on initial evaluation.  She is now complaining of a sharp chest pain once again.  We will repeat EKG at this time. [RC]   0005 Repeat EKG looks unchanged from earlier.  We will attempt to control the patient's pain with morphine and nitro.  I only have the CBC back at this juncture.  The remainder of the cardiac work-up is pending.   [RC]   0127 WBC: 5.70 [RC]   0127 RBC: 4.28 [RC]   0127 Hemoglobin: 12.8 [RC]   0127 Hematocrit: 37.5 [RC]   0127 Platelets: 229 [RC]   0127 BUN: 10 [RC]   0127 Creatinine: 0.73 [RC]   0127 Sodium: 138 [RC]   0127 Potassium: 3.9 [RC]   0127 CO2: 23.5 [RC]   0127 Anion Gap: 8.5 [RC]   0132 HEART SCORE    History Slightly or non-suspicious (0)  ECG Nonspecific repol disturbance (1)  Age < or = 45 (0)  Risk factors > or = to 3 RF for atherosclerotic dx (2)  Troponin < or = Normal limit (0)    This patient's HEART score is 3    HEART Score Key:  Scores 0-3: 0.9-1.7% risk of adverse cardiac event. In the HEART Score study, these patients were discharged (0.99% in the retrospective study, 1.7% in the prospective study)  Scores 4-6: 12-16.6% risk of adverse cardiac event. In the HEART Score study, these patients were admitted to the hospital. (11.6% retrospective, 16.6% prospective)  Scores ?7: 50-65% risk of adverse cardiac event. In the HEART Score study, these patients were candidates for early invasive measures. (65.2% retrospective, 50.1% prospective)    [RC]   0132 The morphine initially  helped but the patient states the pain has returned. [RC]   0158 Assessed patient he was still having pain after 8 mg of morphine and nitro x1.  Will order 0.5 of Dilaudid to see if we can achieve better pain control. [RC]   0207 Heart score is a 3.  Call placed to local cardiology at this time.  Work-up to this point is unremarkable although we have had trouble controlling the patient's pain. [RC]   0250 Discussed with Dr. Prince.  To add sed, crp.  Control pain and place in obs.  Cardiology will see in am. [RC]   0253 Discussed with the observation unit.  They will accept the patient at this time. [RC]      ED Course User Index  [RC] Efren Mujica III, PA       Given the patient's recent surgical history, recent ischemic cardiac disease, and left calf pain I think we need to rule out STEMI, non-STEMI, PE as well as other causes of chest pain.  We will CT her chest as a D-dimer would not be sufficient to rule out PE given her recent postop period.  We will obtain EKG, troponin.  Her EKG is not significantly changed from 11/8/2022.    Procedures:  Procedures      PPE: The patient wore a mask and I wore an N95 mask throughout the entire patient encounter.      The patient has started, but not completed, their COVID-19 vaccination series.    Diagnosis  Final diagnoses:   Chest pain, unspecified type       Note Disclaimer: At [facility], we believe that sharing information builds trust and better relationships. You are receiving this note because you recently visited [facility]. It is possible you will see health information before a provider has talked with you about it. This kind of information can be easy to misunderstand. To help you fully understand what it means for your health, we urge you to discuss this note with your provider.     Asa Joshi MD  11/20/22 0697

## 2022-11-20 NOTE — ED PROVIDER NOTES
EMERGENCY DEPARTMENT ENCOUNTER    Room Number:  107/1  Date of encounter:  11/20/2022  PCP: Provider, No Known  Historian: Patient      HPI:  Chief Complaint: Chest pain  A complete HPI/ROS/PMH/PSH/SH/FH are unobtainable due to: Nothing    Context: Esme Herrera is a 32 y.o. female who presents to the ED c/o chest pain.  She reports that she recently had to have a a CABG and coronary dissection repair.  Earlier this morning she developed some anterior chest pain that lasted approximately 2 minutes and subsided.  Tonight at approximately 9 PM the pain began again.  Said to again last approximately 30 minutes prior to subsiding.  At present she denies pain.  Pain is said to be sharp.  Nothing makes it better or worse.  She does report shortness of breath and nausea as well as left calf pain.    Reviewing the patient's chart 10/15/2022 she had a spontaneous coronary artery dissection involving the distal left main, circumflex, and LAD.  There was some secondary ischemic cardiomyopathy.  CABG was performed on 10/15/2022 by Dr. Teixeira.  Patient also had a heart cath performed by Dr. Dumas on the same day.          PAST MEDICAL HISTORY  Active Ambulatory Problems     Diagnosis Date Noted   • ACS (acute coronary syndrome) (HCC) 10/14/2022   • Spontaneous dissection of coronary artery 11/08/2022   • S/P CABG x 3 11/08/2022   • Ischemic cardiomyopathy 11/08/2022     Resolved Ambulatory Problems     Diagnosis Date Noted   • No Resolved Ambulatory Problems     No Additional Past Medical History         PAST SURGICAL HISTORY  Past Surgical History:   Procedure Laterality Date   • CARDIAC CATHETERIZATION N/A 10/15/2022    Procedure: Left Heart Cath;  Surgeon: Deandra Dumas MD;  Location: [facility] CATH INVASIVE LOCATION;  Service: Cardiology;  Laterality: N/A;   • CARDIAC CATHETERIZATION N/A 10/15/2022    Procedure: Coronary angiography;  Surgeon: Deandra Dumas MD;  Location:  [facility] CATH INVASIVE LOCATION;  Service:  Cardiology;  Laterality: N/A;   • CARDIAC CATHETERIZATION N/A 10/15/2022    Procedure: Left ventriculography;  Surgeon: Deandra Dumas MD;  Location: [facility] CATH INVASIVE LOCATION;  Service: Cardiology;  Laterality: N/A;   • CORONARY ARTERY BYPASS GRAFT N/A 10/15/2022    Procedure: VERONICA, STERNOTOMY, CORONARY ARTERY BYPASS TIMES 3 WITH INTERNAL MAMMARY ARTERY GRAFT AND LEFT GREATER SAPHENOUS VEIN HARVEST, REPAIR OF CORONARY ARTERY DISSECTION,PRP;  Surgeon: Lakshmi Teixeira MD;  Location: [facility] CVOR;  Service: Cardiothoracic;  Laterality: N/A;         FAMILY HISTORY  No family history on file.      SOCIAL HISTORY  Social History     Socioeconomic History   • Marital status:    Tobacco Use   • Smoking status: Never   • Smokeless tobacco: Never         ALLERGIES  Patient has no known allergies.        REVIEW OF SYSTEMS  Review of Systems   Constitutional: Negative for chills and fever.   HENT: Negative.    Eyes: Negative.    Respiratory: Negative for cough and shortness of breath.    Cardiovascular: Positive for chest pain. Negative for leg swelling.   Gastrointestinal: Negative for abdominal pain, nausea and vomiting.   Genitourinary: Negative.    Musculoskeletal: Negative.    Skin: Negative.    Neurological: Negative.    Psychiatric/Behavioral: Negative.         All systems reviewed and negative except for those discussed in HPI.       PHYSICAL EXAM    I have reviewed the triage vital signs and nursing notes.    ED Triage Vitals [11/19/22 2314]   Temp Heart Rate Resp BP SpO2   98.1 °F (36.7 °C) 80 18 110/60 100 %      Temp src Heart Rate Source Patient Position BP Location FiO2 (%)   Oral Monitor Lying Left arm --       Physical Exam  GENERAL: Very pleasant, nontoxic, does appear uncomfortable  HENT: nares patent  EYES: no scleral icterus  CV: regular rhythm, regular rate, normal S1-S2, no unilateral leg swelling or pedal edema  RESPIRATORY: normal effort, lungs CTA B   ABDOMEN: soft,  nontender  MUSCULOSKELETAL: no deformity  NEURO: alert, moves all extremities, follows commands  SKIN: warm, dry        LAB RESULTS  Recent Results (from the past 24 hour(s))   ECG 12 Lead Chest Pain    Collection Time: 11/19/22 11:05 PM   Result Value Ref Range    QT Interval 391 ms   Comprehensive Metabolic Panel    Collection Time: 11/19/22 11:45 PM    Specimen: Blood   Result Value Ref Range    Glucose 92 65 - 99 mg/dL    BUN 10 6 - 20 mg/dL    Creatinine 0.73 0.57 - 1.00 mg/dL    Sodium 138 136 - 145 mmol/L    Potassium 3.9 3.5 - 5.2 mmol/L    Chloride 106 98 - 107 mmol/L    CO2 23.5 22.0 - 29.0 mmol/L    Calcium 9.3 8.6 - 10.5 mg/dL    Total Protein 7.2 6.0 - 8.5 g/dL    Albumin 4.30 3.50 - 5.20 g/dL    ALT (SGPT) 11 1 - 33 U/L    AST (SGOT) 9 1 - 32 U/L    Alkaline Phosphatase 53 39 - 117 U/L    Total Bilirubin 0.4 0.0 - 1.2 mg/dL    Globulin 2.9 gm/dL    A/G Ratio 1.5 g/dL    BUN/Creatinine Ratio 13.7 7.0 - 25.0    Anion Gap 8.5 5.0 - 15.0 mmol/L    eGFR 111.5 >60.0 mL/min/1.73   Troponin    Collection Time: 11/19/22 11:45 PM    Specimen: Blood   Result Value Ref Range    Troponin T <0.010 0.000 - 0.030 ng/mL   Magnesium    Collection Time: 11/19/22 11:45 PM    Specimen: Blood   Result Value Ref Range    Magnesium 2.3 1.6 - 2.6 mg/dL   hCG, Serum, Qualitative    Collection Time: 11/19/22 11:45 PM    Specimen: Blood   Result Value Ref Range    HCG Qualitative Negative Negative   CBC Auto Differential    Collection Time: 11/19/22 11:45 PM    Specimen: Blood   Result Value Ref Range    WBC 5.70 3.40 - 10.80 10*3/mm3    RBC 4.28 3.77 - 5.28 10*6/mm3    Hemoglobin 12.8 12.0 - 15.9 g/dL    Hematocrit 37.5 34.0 - 46.6 %    MCV 87.6 79.0 - 97.0 fL    MCH 29.9 26.6 - 33.0 pg    MCHC 34.1 31.5 - 35.7 g/dL    RDW 11.8 (L) 12.3 - 15.4 %    RDW-SD 38.0 37.0 - 54.0 fl    MPV 8.6 6.0 - 12.0 fL    Platelets 229 140 - 450 10*3/mm3    Neutrophil % 50.6 42.7 - 76.0 %    Lymphocyte % 33.7 19.6 - 45.3 %    Monocyte % 10.0 5.0 -  12.0 %    Eosinophil % 4.6 0.3 - 6.2 %    Basophil % 0.9 0.0 - 1.5 %    Immature Grans % 0.2 0.0 - 0.5 %    Neutrophils, Absolute 2.89 1.70 - 7.00 10*3/mm3    Lymphocytes, Absolute 1.92 0.70 - 3.10 10*3/mm3    Monocytes, Absolute 0.57 0.10 - 0.90 10*3/mm3    Eosinophils, Absolute 0.26 0.00 - 0.40 10*3/mm3    Basophils, Absolute 0.05 0.00 - 0.20 10*3/mm3    Immature Grans, Absolute 0.01 0.00 - 0.05 10*3/mm3    nRBC 0.0 0.0 - 0.2 /100 WBC   Sedimentation Rate    Collection Time: 11/19/22 11:45 PM    Specimen: Blood   Result Value Ref Range    Sed Rate 4 0 - 20 mm/hr   ECG 12 Lead Chest Pain    Collection Time: 11/20/22 12:01 AM   Result Value Ref Range    QT Interval 411 ms   Urinalysis With Microscopic If Indicated (No Culture) - Urine, Clean Catch    Collection Time: 11/20/22 12:53 AM    Specimen: Urine, Clean Catch   Result Value Ref Range    Color, UA Yellow Yellow, Straw    Appearance, UA Clear Clear    pH, UA 6.0 5.0 - 8.0    Specific Gravity, UA >=1.030 1.005 - 1.030    Glucose, UA Negative Negative    Ketones, UA Negative Negative    Bilirubin, UA Negative Negative    Blood, UA Negative Negative    Protein, UA Negative Negative    Leuk Esterase, UA Small (1+) (A) Negative    Nitrite, UA Negative Negative    Urobilinogen, UA 0.2 E.U./dL 0.2 - 1.0 E.U./dL   Urinalysis, Microscopic Only - Urine, Clean Catch    Collection Time: 11/20/22 12:53 AM    Specimen: Urine, Clean Catch   Result Value Ref Range    RBC, UA 0-2 None Seen, 0-2 /HPF    WBC, UA 3-5 (A) None Seen, 0-2 /HPF    Bacteria, UA None Seen None Seen /HPF    Squamous Epithelial Cells, UA 0-2 None Seen, 0-2 /HPF    Hyaline Casts, UA 0-2 None Seen /LPF    Methodology Automated Microscopy    Troponin    Collection Time: 11/20/22  1:29 AM    Specimen: Arm, Right; Blood   Result Value Ref Range    Troponin T <0.010 0.000 - 0.030 ng/mL   C-reactive Protein    Collection Time: 11/20/22  1:29 AM    Specimen: Arm, Right; Blood   Result Value Ref Range     C-Reactive Protein <0.30 0.00 - 0.50 mg/dL   Lipid Panel    Collection Time: 11/20/22  1:29 AM    Specimen: Arm, Right; Blood   Result Value Ref Range    Total Cholesterol 155 0 - 200 mg/dL    Triglycerides 55 0 - 150 mg/dL    HDL Cholesterol 57 40 - 60 mg/dL    LDL Cholesterol  87 0 - 100 mg/dL    VLDL Cholesterol 11 5 - 40 mg/dL    LDL/HDL Ratio 1.53    Troponin    Collection Time: 11/20/22  5:12 AM    Specimen: Blood   Result Value Ref Range    Troponin T <0.010 0.000 - 0.030 ng/mL       Ordered the above labs and independently reviewed the results.        RADIOLOGY  XR Chest 1 View    Result Date: 11/20/2022  CHEST SINGLE VIEW  HISTORY: Chest pain.  COMPARISON: AP chest 10/21/2022, 10/20/2022.  FINDINGS: Heart size is within normal limits.  Median sternotomy wires and coronary artery markers are present. Lungs appear clear of focal airspace disease and there is no evidence for pulmonary edema or pleural effusion or infiltrate.      No evidence for active disease in the chest.  This report was finalized on 11/20/2022 12:01 AM by Dr. Nikhil Cevallos M.D.      CT Angiogram Chest    Result Date: 11/20/2022  CT ANGIOGRAM OF THE CHEST. MULTIPLE CORONAL, SAGITTAL, AND 3D RECONSTRUCTIONS  HISTORY: Chest pain, left calf pain. Evaluate for pulmonary embolus.  TECHNIQUE: Radiation dose reduction techniques were utilized, including automated exposure control and exposure modulation based on body size. CT angiogram of the chest was performed following the administration of IV contrast. Coronal, sagittal, and 3-D reconstruction images were obtained.  COMPARISON:  CT angiogram chest 10/14/2022.  FINDINGS: There are no abnormal areas of intrapulmonary arterial filling defect to diagnose a pulmonary embolus.  Patient has undergone recent median sternotomy and sternotomy wires are present. There are postoperative changes in the anterior mediastinum where there is mild stranding. There is no pericardial effusion. Small right  pleural effusion layers dependently. There is no evidence for infiltrate or suspicious pulmonary nodule. No endotracheal or endobronchial lesion is evident. Imaging through the upper abdomen appears within normal limits.      1. No evidence for pulmonary thromboembolic disease. 2. Recent median sternotomy with postsurgical changes in the anterior mediastinum.  Discussed with LADY Key, in the emergency room on 11/20/2022 at 12:43 AM.  This report was finalized on 11/20/2022 12:56 AM by Dr. Nikhil Cevallos M.D.        I ordered the above noted radiological studies. Reviewed by me and discussed with radiologist.  See dictation for official radiology interpretation.      PROCEDURES    Procedures      MEDICATIONS GIVEN IN ER    Medications   nitroglycerin (NITROSTAT) SL tablet 0.4 mg (has no administration in time range)   sodium chloride 0.9 % flush 10 mL (10 mL Intravenous Not Given 11/20/22 0402)   sodium chloride 0.9 % flush 10 mL (10 mL Intravenous Given 11/20/22 0302)   sodium chloride 0.9 % infusion 40 mL (has no administration in time range)   aspirin EC tablet 81 mg (has no administration in time range)   clopidogrel (PLAVIX) tablet 75 mg (has no administration in time range)   metoprolol succinate XL (TOPROL-XL) 24 hr tablet 12.5 mg (has no administration in time range)   HYDROcodone-acetaminophen (NORCO) 5-325 MG per tablet 1 tablet (has no administration in time range)   morphine injection 4 mg (4 mg Intravenous Given 11/20/22 0019)   ondansetron (ZOFRAN) injection 4 mg (4 mg Intravenous Given 11/20/22 0018)   iopamidol (ISOVUE-370) 76 % injection 100 mL (95 mL Intravenous Given 11/20/22 0031)   morphine injection 4 mg (4 mg Intravenous Given 11/20/22 0155)   sodium chloride 0.9 % bolus 1,000 mL (0 mL Intravenous Stopped 11/20/22 0400)   HYDROmorphone (DILAUDID) injection 0.5 mg (0.5 mg Intravenous Given 11/20/22 0303)         PROGRESS, DATA ANALYSIS, CONSULTS, AND MEDICAL DECISION MAKING    All  labs have been independently reviewed by me.  All radiology studies have been reviewed by me and discussed with radiologist dictating the report.   EKG's independently viewed and interpreted by me.  Discussion below represents my analysis of pertinent findings related to patient's condition, differential diagnosis, treatment plan and final disposition.    DDx: ACS, PTX, PE, coronary artery dissection, acute peritonitis, PNA, infectious process, chest wall pain secondary to recent CABG.  Will obtain CBC, CMP, troponin x2, EKG, portable chest x-ray to further evaluate.  If chest x-ray is negative will obtain CT angiogram to ensure the above is ruled out.    ED Course as of 11/20/22 0553   Sat Nov 19, 2022   2315 EKG          EKG time: 2305  Rhythm/Rate: Sinus/78  P waves and AZ: Normal AZ interval/probable LAD  QRS, axis: QRS morphology unremarked  ST and T waves: Diffuse flipped T waves/no acute ST depression elevated noted    Interpreted Contemporaneously by me, independently viewed  -No acute change when compared to 11/8/2022. [RC]   Sun Nov 20, 2022   0001 EKG          EKG time: 1201  Rhythm/Rate: 75/sinus  P waves and AZ: Normal AZ interval  QRS, axis: Normal axis  ST and T waves: Flipped T waves diffusely    Interpreted Contemporaneously by me, independently viewed  -No change from an hour ago [RC]   0004 Patient was pain-free on initial evaluation.  She is now complaining of a sharp chest pain once again.  We will repeat EKG at this time. [RC]   0005 Repeat EKG looks unchanged from earlier.  We will attempt to control the patient's pain with morphine and nitro.  I only have the CBC back at this juncture.  The remainder of the cardiac work-up is pending.   [RC]   0127 WBC: 5.70 [RC]   0127 RBC: 4.28 [RC]   0127 Hemoglobin: 12.8 [RC]   0127 Hematocrit: 37.5 [RC]   0127 Platelets: 229 [RC]   0127 BUN: 10 [RC]   0127 Creatinine: 0.73 [RC]   0127 Sodium: 138 [RC]   0127 Potassium: 3.9 [RC]   0127 CO2: 23.5 [RC]    0127 Anion Gap: 8.5 [RC]   0132 HEART SCORE    History Slightly or non-suspicious (0)  ECG Nonspecific repol disturbance (1)  Age < or = 45 (0)  Risk factors > or = to 3 RF for atherosclerotic dx (2)  Troponin < or = Normal limit (0)    This patient's HEART score is 3    HEART Score Key:  Scores 0-3: 0.9-1.7% risk of adverse cardiac event. In the HEART Score study, these patients were discharged (0.99% in the retrospective study, 1.7% in the prospective study)  Scores 4-6: 12-16.6% risk of adverse cardiac event. In the HEART Score study, these patients were admitted to the hospital. (11.6% retrospective, 16.6% prospective)  Scores ?7: 50-65% risk of adverse cardiac event. In the HEART Score study, these patients were candidates for early invasive measures. (65.2% retrospective, 50.1% prospective)    [RC]   0132 The morphine initially helped but the patient states the pain has returned. [RC]   0158 Assessed patient he was still having pain after 8 mg of morphine and nitro x1.  Will order 0.5 of Dilaudid to see if we can achieve better pain control. [RC]   0207 Heart score is a 3.  Call placed to local cardiology at this time.  Work-up to this point is unremarkable although we have had trouble controlling the patient's pain. [RC]   0250 Discussed with Dr. Prince.  To add sed, crp.  Control pain and place in obs.  Cardiology will see in am. [RC]   0253 Discussed with the observation unit.  They will accept the patient at this time. [RC]      ED Course User Index  [RC] Efren Mujica III, PA           PPE: The patient wore a surgical mask throughout the entire patient encounter. I wore an N95.    AS OF 05:53 EST VITALS:    BP - 96/49  HR - 78  TEMP - 98.3 °F (36.8 °C) (Oral)  O2 SATS - 100%        DIAGNOSIS  Final diagnoses:   Chest pain, unspecified type         DISPOSITION  Admission to the observation unit           Efren Mujica III, PA  11/20/22 0568

## 2022-11-20 NOTE — CASE MANAGEMENT/SOCIAL WORK
Discharge Planning Assessment  [facility]     Patient Name: Esme Herrera  MRN: [id number]  Today's Date: 11/20/2022    Admit Date: 11/19/2022    Plan: Pt intends to return home upon discharge   Discharge Needs Assessment     Row Name 11/20/22 1209       Living Environment    People in Home spouse    Name(s) of People in Home Sherine Herrera [phone]    Current Living Arrangements home    Primary Care Provided by self    Provides Primary Care For no one    Family Caregiver if Needed spouse    Family Caregiver Names Sherine Herrera    Quality of Family Relationships helpful;involved    Able to Return to Prior Arrangements yes       Resource/Environmental Concerns    Resource/Environmental Concerns none    Transportation Concerns none       Transition Planning    Patient/Family Anticipates Transition to home;home with family    Patient/Family Anticipated Services at Transition none    Transportation Anticipated car, drives self;family or friend will provide       Discharge Needs Assessment    Readmission Within the Last 30 Days no previous admission in last 30 days    Equipment Currently Used at Home none    Concerns to be Addressed no discharge needs identified;denies needs/concerns at this time    Anticipated Changes Related to Illness none    Equipment Needed After Discharge none    Provided Post Acute Provider List? N/A    Provided Post Acute Provider Quality & Resource List? N/A               Discharge Plan     Row Name 11/20/22 1211       Plan    Plan Pt intends to return home upon discharge    Patient/Family in Agreement with Plan yes    Provided Post Acute Provider List? N/A    Provided Post Acute Provider Quality & Resource List? N/A    Plan Comments Face sheet verified, role of CCP explained. Pt is independent in ADL's and denies the need for any assistive devices. Pt denies any difficulty paying for medications. Advised pt to contact case management if any further needs arise              Continued  Care and Services - Admitted Since 11/19/2022    Coordination has not been started for this encounter.          Demographic Summary    No documentation.                Functional Status    No documentation.                Psychosocial    No documentation.                Abuse/Neglect    No documentation.                Legal    No documentation.                Substance Abuse    No documentation.                Patient Forms    No documentation.                   Caitie Alves RN

## 2022-11-20 NOTE — PROGRESS NOTES
ED OBSERVATION PROGRESS/DISCHARGE SUMMARY    Date of Admission: 11/19/2022   LOS: 0 days   PCP: Provider, No Known    Final Diagnosis Chest pain    Patient seen at:  Subjective     Hospital Outcome: Patient is 32-year-old female admitted to observation unit for further evaluation of chest pain.  Recent history of coronary dissection and CABG.  No significant changes from prior EKGs.  Cardiology has evaluated the patient.  Recommends discontinuing metoprolol and continuing dual antiplatelet therapy.  Patient did have repeat episode of chest pain following cardiac evaluation so further discussion with cardiology recommended repeat troponin and GI cocktail.  Does seem to improve patient's symptoms.  Cleared for discharge home.  Patient agreeable with plan.    ROS:  General: no fevers, chills  Respiratory: no cough, dyspnea  Cardiovascular: no chest pain, palpitations  Abdomen: No abdominal pain, nausea, vomiting, or diarrhea  Neurologic: No focal weakness    Objective   Physical Exam:  I have reviewed the vital signs.  Temp:  [98.1 °F (36.7 °C)-98.3 °F (36.8 °C)] 98.1 °F (36.7 °C)  Heart Rate:  [69-80] 79  Resp:  [16-18] 16  BP: ()/(43-67) 82/46  General Appearance:    Alert, cooperative, no distress  Head:    Normocephalic, atraumatic  Eyes:    Sclerae anicteric  Neck:   Supple, no mass  Lungs: Clear to auscultation bilaterally, respirations unlabored  Heart: Regular rate and rhythm, S1 and S2 normal, no murmur, rub or gallop  Abdomen:  Soft, non-tender, bowel sounds active, nondistended  Extremities: No clubbing, cyanosis, or edema to lower extremities  Pulses:  2+ and symmetric in distal lower extremities  Skin: No rashes   Neurologic: Oriented x3, Normal strength to extremities    Results Review:    I have reviewed the labs, radiology results and diagnostic studies.    Results from last 7 days   Lab Units 11/19/22  2345   WBC 10*3/mm3 5.70   HEMOGLOBIN g/dL 12.8   HEMATOCRIT % 37.5   PLATELETS 10*3/mm3 229      Results from last 7 days   Lab Units 11/19/22  2345   SODIUM mmol/L 138   POTASSIUM mmol/L 3.9   CHLORIDE mmol/L 106   CO2 mmol/L 23.5   BUN mg/dL 10   CREATININE mg/dL 0.73   CALCIUM mg/dL 9.3   BILIRUBIN mg/dL 0.4   ALK PHOS U/L 53   ALT (SGPT) U/L 11   AST (SGOT) U/L 9   GLUCOSE mg/dL 92     Imaging Results (Last 24 Hours)     Procedure Component Value Units Date/Time    CT Angiogram Chest [[id number]] Collected: 11/20/22 0051     Updated: 11/20/22 0059    Narrative:      CT ANGIOGRAM OF THE CHEST. MULTIPLE CORONAL, SAGITTAL, AND 3D  RECONSTRUCTIONS     HISTORY: Chest pain, left calf pain. Evaluate for pulmonary embolus.     TECHNIQUE: Radiation dose reduction techniques were utilized, including  automated exposure control and exposure modulation based on body size.   CT angiogram of the chest was performed following the administration of  IV contrast. Coronal, sagittal, and 3-D reconstruction images were  obtained.     COMPARISON:  CT angiogram chest 10/14/2022.     FINDINGS: There are no abnormal areas of intrapulmonary arterial filling  defect to diagnose a pulmonary embolus.     Patient has undergone recent median sternotomy and sternotomy wires are  present. There are postoperative changes in the anterior mediastinum  where there is mild stranding. There is no pericardial effusion. Small  right pleural effusion layers dependently. There is no evidence for  infiltrate or suspicious pulmonary nodule. No endotracheal or  endobronchial lesion is evident. Imaging through the upper abdomen  appears within normal limits.       Impression:      1. No evidence for pulmonary thromboembolic disease.  2. Recent median sternotomy with postsurgical changes in the anterior  mediastinum.     Discussed with LADY Key, in the emergency room on 11/20/2022  at 12:43 AM.     This report was finalized on 11/20/2022 12:56 AM by Dr. Nikhil Cevallos M.D.       XR Chest 1 View [[id number]] Collected: 11/20/22 0000      Updated: 11/20/22 0004    Narrative:      CHEST SINGLE VIEW     HISTORY: Chest pain.     COMPARISON: AP chest 10/21/2022, 10/20/2022.     FINDINGS: Heart size is within normal limits.  Median sternotomy wires  and coronary artery markers are present. Lungs appear clear of focal  airspace disease and there is no evidence for pulmonary edema or pleural  effusion or infiltrate.       Impression:      No evidence for active disease in the chest.     This report was finalized on 11/20/2022 12:01 AM by Dr. Nikhil Cevallos M.D.             I have reviewed the medications.  ---------------------------------------------------------------------------------------------  Assessment & Plan   Assessment/Problem List    Chest pain      Plan:  Chest Pain  -cardiology consulted, will discontinue metoprolol and continue dual antiplatelet therapy, cleared for dc  -troponin <0.010 x 4  -EKG unchanged from previous  -GI cocktail improved patient recurrent symptoms, will start on antacid and follow with PCP    Disposition: Discharge home    Follow-up after Discharge: PCP, Cardiology, GI as needed    This note will serve as discharge summary    LADY Lucero 11/20/22 09:13 EST

## 2022-11-20 NOTE — PLAN OF CARE
Goal Outcome Evaluation:      BP 90/50, asymptomatic. C/o chest pain 8/10 earlier on shift. EKG and troponin obtained. Consulted cardiologist. Xylocaine 2% solution given with good effect. Tolerating oral food and fluids. Discharge order in place. Discharge instruction/plan given to patient. Nil concerns voiced a the time of report.

## 2022-11-20 NOTE — PROGRESS NOTES
MD ATTESTATION NOTE    The KEVIN and I have discussed this patient's history, physical exam, and treatment plan.  I have reviewed the documentation and personally had a face to face interaction with the patient. I affirm the documentation and agree with the treatment and plan.  The attached note describes my personal findings.      I provided a substantive portion of the care of the patient.  I personally performed the physical exam in its entirety, and below are my findings.  For this patient encounter, the patient wore surgical mask, I wore full protective PPE including N95 and eye protection.      Brief HPI: Patient admitted for chest pain yesterday morning.  Patient with history of CABG secondary to left main coronary dissection.    PHYSICAL EXAM  ED Triage Vitals [11/19/22 2314]   Temp Heart Rate Resp BP SpO2   98.1 °F (36.7 °C) 80 18 110/60 100 %      Temp src Heart Rate Source Patient Position BP Location FiO2 (%)   Oral Monitor Lying Left arm --         GENERAL: no acute distress  HENT: nares patent  EYES: no scleral icterus  CV: regular rhythm, normal rate  RESPIRATORY: normal effort  ABDOMEN: soft  MUSCULOSKELETAL: no deformity  NEURO: alert, moves all extremities, follows commands  PSYCH:  calm, cooperative  SKIN: warm, dry    Vital signs and nursing notes reviewed.        Plan: Cardiology consult.  Troponin negative x3.  EKG without acute changes.  Patient with some hypotension overnight continue to hydrate.

## 2022-11-20 NOTE — PLAN OF CARE
Goal Outcome Evaluation:  Plan of Care Reviewed With: patient        Progress: no change  Outcome Evaluation: A/O, no complaints of pain, IV bolus given, NPO, cardiology consult pending, soft SBP, will continue to monitor.

## 2022-11-23 ENCOUNTER — OFFICE VISIT (OUTPATIENT)
Dept: CARDIAC REHAB | Facility: HOSPITAL | Age: 32
End: 2022-11-23

## 2022-11-23 DIAGNOSIS — Z95.1 S/P CABG (CORONARY ARTERY BYPASS GRAFT): Primary | ICD-10-CM

## 2022-11-23 PROCEDURE — 93798 PHYS/QHP OP CAR RHAB W/ECG: CPT

## 2022-11-30 ENCOUNTER — TREATMENT (OUTPATIENT)
Dept: CARDIAC REHAB | Facility: HOSPITAL | Age: 32
End: 2022-11-30

## 2022-11-30 DIAGNOSIS — Z95.1 S/P CABG (CORONARY ARTERY BYPASS GRAFT): Primary | ICD-10-CM

## 2022-11-30 PROCEDURE — 93798 PHYS/QHP OP CAR RHAB W/ECG: CPT

## 2022-12-02 ENCOUNTER — TREATMENT (OUTPATIENT)
Dept: CARDIAC REHAB | Facility: HOSPITAL | Age: 32
End: 2022-12-02

## 2022-12-02 DIAGNOSIS — Z95.1 S/P CABG (CORONARY ARTERY BYPASS GRAFT): Primary | ICD-10-CM

## 2022-12-02 PROCEDURE — 93798 PHYS/QHP OP CAR RHAB W/ECG: CPT

## 2022-12-05 ENCOUNTER — TREATMENT (OUTPATIENT)
Dept: CARDIAC REHAB | Facility: HOSPITAL | Age: 32
End: 2022-12-05

## 2022-12-05 DIAGNOSIS — Z95.1 S/P CABG (CORONARY ARTERY BYPASS GRAFT): Primary | ICD-10-CM

## 2022-12-05 PROCEDURE — 93798 PHYS/QHP OP CAR RHAB W/ECG: CPT

## 2022-12-07 ENCOUNTER — TREATMENT (OUTPATIENT)
Dept: CARDIAC REHAB | Facility: HOSPITAL | Age: 32
End: 2022-12-07

## 2022-12-07 DIAGNOSIS — Z95.1 S/P CABG (CORONARY ARTERY BYPASS GRAFT): Primary | ICD-10-CM

## 2022-12-07 PROCEDURE — 93798 PHYS/QHP OP CAR RHAB W/ECG: CPT

## 2022-12-09 ENCOUNTER — TREATMENT (OUTPATIENT)
Dept: CARDIAC REHAB | Facility: HOSPITAL | Age: 32
End: 2022-12-09

## 2022-12-09 DIAGNOSIS — Z95.1 S/P CABG (CORONARY ARTERY BYPASS GRAFT): Primary | ICD-10-CM

## 2022-12-09 PROCEDURE — 93798 PHYS/QHP OP CAR RHAB W/ECG: CPT

## 2022-12-12 ENCOUNTER — TREATMENT (OUTPATIENT)
Dept: CARDIAC REHAB | Facility: HOSPITAL | Age: 32
End: 2022-12-12

## 2022-12-12 DIAGNOSIS — Z95.1 S/P CABG (CORONARY ARTERY BYPASS GRAFT): Primary | ICD-10-CM

## 2022-12-12 PROCEDURE — 93798 PHYS/QHP OP CAR RHAB W/ECG: CPT

## 2022-12-14 ENCOUNTER — TREATMENT (OUTPATIENT)
Dept: CARDIAC REHAB | Facility: HOSPITAL | Age: 32
End: 2022-12-14

## 2022-12-14 DIAGNOSIS — Z95.1 S/P CABG (CORONARY ARTERY BYPASS GRAFT): Primary | ICD-10-CM

## 2022-12-14 PROCEDURE — 93798 PHYS/QHP OP CAR RHAB W/ECG: CPT

## 2022-12-15 ENCOUNTER — OFFICE VISIT (OUTPATIENT)
Dept: CARDIOLOGY | Facility: CLINIC | Age: 32
End: 2022-12-15

## 2022-12-15 VITALS
DIASTOLIC BLOOD PRESSURE: 40 MMHG | SYSTOLIC BLOOD PRESSURE: 102 MMHG | HEART RATE: 79 BPM | WEIGHT: 120 LBS | BODY MASS INDEX: 22.66 KG/M2 | HEIGHT: 61 IN

## 2022-12-15 DIAGNOSIS — I25.42 SPONTANEOUS DISSECTION OF CORONARY ARTERY: Primary | ICD-10-CM

## 2022-12-15 DIAGNOSIS — Z95.1 S/P CABG X 3: ICD-10-CM

## 2022-12-15 DIAGNOSIS — K21.9 GASTROESOPHAGEAL REFLUX DISEASE, UNSPECIFIED WHETHER ESOPHAGITIS PRESENT: ICD-10-CM

## 2022-12-15 PROCEDURE — 99214 OFFICE O/P EST MOD 30 MIN: CPT | Performed by: INTERNAL MEDICINE

## 2022-12-15 PROCEDURE — 93000 ELECTROCARDIOGRAM COMPLETE: CPT | Performed by: INTERNAL MEDICINE

## 2022-12-15 NOTE — PROGRESS NOTES
Subjective:     Encounter Date:12/15/2022      Patient ID: Esme Herrera is a 32 y.o. female.    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     I initially saw the patient after she presented to the hospital on 10/14/2022 with complaints of severe substernal chest pain which radiated to her back.  Symptoms started at rest and occurred abruptly.  The pain lasted for a while before resolving on its own.  She ended up going to the emergency room due to the severity of her symptoms.  By the time she got to the emergency room her symptoms had resolved shortly after.  She reported that she had had a similar episode about a day prior that was not as severe but did last longer.  Episodes mainly occurred at rest and were not associated with any diaphoresis or nausea.  She did report associated shortness of breath.  Her initial EKG following her arrival showed mild ST elevation in 1 and aVL and inferior ST depression.  Repeat EKG in the ER showed resolution of her ST changes.  However her initial troponin was elevated at 1.180.  D-dimer was elevated at 2.  proBNP was mildly elevated.  A CT angiogram of the chest showed no evidence of pulmonary embolism.     Following her admission she was started on heparin infusion.  She remained pain-free even with exertion.  Repeat troponins were trending down.  Repeat EKG showed development of anterior T wave changes.  She denies any prior medical history, tobacco or drug use, or any family history of heart disease.  She denied any increased recent stressors.     The patient underwent cardiac catheterization on 10/15/2022.  This showed spontaneous coronary artery dissection involving the distal left main, left circumflex, left anterior  descending, and ramus intermedius branches along with thrombus.  There appeared to be at most SHERLEY I flow distally.  Her right coronary artery appeared to be completely normal.  Left ventriculogram showed akinesis of the mid to distal anterior wall and apex with an EF of about 30 to 35%.  Due to the extent of her spontaneous coronary artery dissection and poor distal flow cardiothoracic surgery was consulted since PCI was not a good option.  She was evaluated by Dr. Teixeira and subsequently taken emergently for CABG x3 with LIMA to LAD, saphenous vein graft to an OM1, and saphenous vein graft to the ramus intermedius.  Postoperatively she progressed slowly.  She had issues with persistent hypotension that eventually resolved.  We were unable to start her on any guideline directed management for her scad or cardiomyopathy outside of aspirin.  An echocardiogram performed on 10/16/2022 showed mildly dilated left ventricle with an EF of 26 to 30% and anterior and apical wall motion abnormalities.  A repeat echocardiogram on 10/19/2022 was performed due to persistent hypotension which showed an EF of 30 to 35% again anterior and apical wall motion abnormalities and swirling of contrast in the apex but no evidence of thrombus.     The patient was finally discharged on 11/2/2022 on aspirin and clopidogrel for 1 month.     In follow-up on 11/8/2022 the patient reported that she was slowly improving.  Blood pressure is little bit better so I tried to start her on low-dose of metoprolol succinate 12.5 mg daily.  I recommended that she complete her month-long course of clopidogrel and then discontinue it.  I also recommended proceeding with a repeat echocardiogram to ensure that she had not developed any evidence of left ventricular apical thrombus and to see if there is any improvement in her LV function.      She underwent a repeat echocardiogram on 11/16/2022 which showed improvement of her left ventricular systolic function  to an EF of 52.3% although she continued to have akinesis of her apex.  Otherwise she had normal diastolic function, normal valvular function, and normal right ventricular systolic pressure.    Shortly after that she was admitted to the observation unit on 11/19/2022 with chest pain and lightheadedness.  Felt that the pain was atypical.  Troponins and were normal and EKG was unchanged.  Her metoprolol succinate was discontinued since she was not tolerating it.  No other changes were made to her management.    She returns today for follow-up.  She is only on aspirin 81 mg daily.  She has been attending cardiac rehab which she appears to be tolerating.  She overall feels like she is doing better.  She reports some lightheadedness but this is overall improved.  She reports some chest discomfort related to her incision.  She feels like her is since she has swelling in her right thigh but nowhere else in her lower extremities.  She denies any palpitations, orthopnea, near-syncope or syncope.    Review of Systems   Constitutional: Negative for malaise/fatigue.   HENT: Negative for hearing loss, hoarse voice, nosebleeds and sore throat.    Eyes: Negative for pain.   Cardiovascular: Positive for leg swelling. Negative for chest pain, claudication, cyanosis, dyspnea on exertion, irregular heartbeat, near-syncope, orthopnea, palpitations, paroxysmal nocturnal dyspnea and syncope.   Respiratory: Negative for shortness of breath and snoring.    Endocrine: Negative for cold intolerance, heat intolerance, polydipsia, polyphagia and polyuria.   Skin: Negative for itching and rash.   Musculoskeletal: Negative for arthritis, falls, joint pain, joint swelling, muscle cramps, muscle weakness and myalgias.   Gastrointestinal: Negative for constipation, diarrhea, dysphagia, heartburn, hematemesis, hematochezia, melena, nausea and vomiting.   Genitourinary: Negative for frequency, hematuria and hesitancy.   Neurological: Positive for  "light-headedness. Negative for excessive daytime sleepiness, dizziness, headaches, numbness and weakness.   Psychiatric/Behavioral: Negative for depression. The patient is not nervous/anxious.          Current Outpatient Medications:   •  acetaminophen (TYLENOL) 500 MG tablet, Take 1 tablet by mouth Every 4 (Four) Hours As Needed for Mild Pain., Disp: 30 tablet, Rfl: 0  •  aspirin 81 MG EC tablet, Take 1 tablet by mouth Daily., Disp: 30 tablet, Rfl: 11    No past medical history on file.    Past Surgical History:   Procedure Laterality Date   • CARDIAC CATHETERIZATION N/A 10/15/2022    Procedure: Left Heart Cath;  Surgeon: Deandra Dumas MD;  Location: [facility] CATH INVASIVE LOCATION;  Service: Cardiology;  Laterality: N/A;   • CARDIAC CATHETERIZATION N/A 10/15/2022    Procedure: Coronary angiography;  Surgeon: Deandra Dumas MD;  Location: [facility] CATH INVASIVE LOCATION;  Service: Cardiology;  Laterality: N/A;   • CARDIAC CATHETERIZATION N/A 10/15/2022    Procedure: Left ventriculography;  Surgeon: Deandra Dumas MD;  Location: [facility] CATH INVASIVE LOCATION;  Service: Cardiology;  Laterality: N/A;   • CORONARY ARTERY BYPASS GRAFT N/A 10/15/2022    Procedure: VERONICA, STERNOTOMY, CORONARY ARTERY BYPASS TIMES 3 WITH INTERNAL MAMMARY ARTERY GRAFT AND LEFT GREATER SAPHENOUS VEIN HARVEST, REPAIR OF CORONARY ARTERY DISSECTION,PRP;  Surgeon: Lakshmi Teixeira MD;  Location: [facility];  Service: Cardiothoracic;  Laterality: N/A;       No family history on file.    Social History     Tobacco Use   • Smoking status: Never   • Smokeless tobacco: Never         ECG 12 Lead    Date/Time: 12/15/2022 3:27 PM  Performed by: Deandra Dumas MD  Authorized by: Deandra Dumas MD   Comparison: compared with previous ECG   Similar to previous ECG  Rhythm: sinus rhythm  T inversion: V2, V3, V4, I and aVL                 Objective:     Visit Vitals  /40   Pulse 79   Ht 154.9 cm (61\")   Wt 54.4 kg (120 lb)   BMI 22.67 kg/m² "         Constitutional:       Appearance: Normal appearance. Well-developed.   Eyes:      General: Lids are normal.      Conjunctiva/sclera: Conjunctivae normal.      Pupils: Pupils are equal, round, and reactive to light.   HENT:      Head: Normocephalic and atraumatic.   Neck:      Vascular: No carotid bruit or JVD.      Lymphadenopathy: No cervical adenopathy.   Pulmonary:      Effort: Pulmonary effort is normal.      Breath sounds: Normal breath sounds.   Cardiovascular:      Normal rate. Regular rhythm.      No gallop.   Pulses:     Radial: 2+ bilaterally.  Edema:     Peripheral edema absent.   Abdominal:      Palpations: Abdomen is soft.   Musculoskeletal:      Cervical back: Full passive range of motion without pain, normal range of motion and neck supple. Skin:     General: Skin is warm and dry.   Neurological:      Mental Status: Alert and oriented to person, place, and time.             Assessment:          Diagnosis Plan   1. Spontaneous dissection of coronary artery        2. S/P CABG x 3        3. Gastroesophageal reflux disease, unspecified whether esophagitis present  Ambulatory Referral to Gastroenterology             Plan:         1.  Spontaneous coronary artery dissection.  Involving left main, LAD, ramus intermedius, and left circumflex arteries.  Due to the extent of her dissection and poor distal flow she required CABG x3.  Overall she has recovered pretty well from this.  She is on aspirin only which I would continue for now.  Her EKG continues to show stable anterolateral T wave inversions.  Fully expect that as the dissection involving her native coronary arteries that her grafts will occlude.  2.  Ischemic cardiomyopathy.  Normalized EF to 52%.  Continues to have apical akinesis.  3.  GERD.  Patient is requesting a gastroenterology referral.  We will go ahead and place.    We will plan on seeing the patient back again in 3 months.

## 2022-12-16 ENCOUNTER — TREATMENT (OUTPATIENT)
Dept: CARDIAC REHAB | Facility: HOSPITAL | Age: 32
End: 2022-12-16

## 2022-12-16 DIAGNOSIS — Z95.1 S/P CABG (CORONARY ARTERY BYPASS GRAFT): Primary | ICD-10-CM

## 2022-12-16 PROCEDURE — 93798 PHYS/QHP OP CAR RHAB W/ECG: CPT

## 2022-12-19 ENCOUNTER — TREATMENT (OUTPATIENT)
Dept: CARDIAC REHAB | Facility: HOSPITAL | Age: 32
End: 2022-12-19

## 2022-12-19 DIAGNOSIS — Z95.1 S/P CABG (CORONARY ARTERY BYPASS GRAFT): Primary | ICD-10-CM

## 2022-12-19 PROCEDURE — 93798 PHYS/QHP OP CAR RHAB W/ECG: CPT

## 2022-12-21 ENCOUNTER — TREATMENT (OUTPATIENT)
Dept: CARDIAC REHAB | Facility: HOSPITAL | Age: 32
End: 2022-12-21

## 2022-12-21 DIAGNOSIS — Z95.1 S/P CABG (CORONARY ARTERY BYPASS GRAFT): Primary | ICD-10-CM

## 2022-12-21 PROCEDURE — 93798 PHYS/QHP OP CAR RHAB W/ECG: CPT

## 2022-12-27 ENCOUNTER — TELEPHONE (OUTPATIENT)
Dept: GASTROENTEROLOGY | Facility: CLINIC | Age: 32
End: 2022-12-27

## 2022-12-27 ENCOUNTER — OFFICE VISIT (OUTPATIENT)
Dept: GASTROENTEROLOGY | Facility: CLINIC | Age: 32
End: 2022-12-27

## 2022-12-27 VITALS — BODY MASS INDEX: 22.24 KG/M2 | HEIGHT: 61 IN | WEIGHT: 117.8 LBS | TEMPERATURE: 97.3 F

## 2022-12-27 DIAGNOSIS — R10.13 EPIGASTRIC PAIN: Primary | ICD-10-CM

## 2022-12-27 DIAGNOSIS — R10.13 DYSPEPSIA: ICD-10-CM

## 2022-12-27 DIAGNOSIS — R11.0 NAUSEA: ICD-10-CM

## 2022-12-27 PROCEDURE — 99214 OFFICE O/P EST MOD 30 MIN: CPT | Performed by: NURSE PRACTITIONER

## 2022-12-27 RX ORDER — LIFITEGRAST 50 MG/ML
SOLUTION/ DROPS OPHTHALMIC
COMMUNITY
Start: 2022-12-19

## 2022-12-27 RX ORDER — FAMOTIDINE 40 MG/1
40 TABLET, FILM COATED ORAL DAILY
Qty: 30 TABLET | Refills: 3 | Status: SHIPPED | OUTPATIENT
Start: 2022-12-27 | End: 2023-01-12 | Stop reason: HOSPADM

## 2022-12-27 NOTE — H&P (VIEW-ONLY)
Chief Complaint   Patient presents with   • Abdominal Pain       HPI    Esme Herrera is a  32 y.o. female here to establish care as a new patient for complaints of dyspepsia with abdominal pain.    This patient will also follow with Dr. Quiroz.    History of ischemic cardiomyopathy and CABG x3 secondary to spontaneous coronary artery dissection October of this year.    Patient reports off-and-on symptoms even prior to cardiac surgery which included epigastric pain, nausea, and dyspepsia.  She was given Pepcid during her hospital stay and for short duration afterwards which seemed to help.  After she stopped Pepcid her symptoms worsened.  No vomiting.  Appetite slowly improving.  No dysphagia or odynophagia.  She has never had an EGD or been tested for H. pylori.  She denies NSAID use.  She does not smoke or drink alcohol.    No lower GI complaints.  No family history of colon cancer that she is aware of.    She still has her gallbladder.    Recent hemogram and LFTs normal.    History reviewed. No pertinent past medical history.    Past Surgical History:   Procedure Laterality Date   • CARDIAC CATHETERIZATION N/A 10/15/2022    Procedure: Left Heart Cath;  Surgeon: Deandra Dumas MD;  Location: [facility] INVASIVE LOCATION;  Service: Cardiology;  Laterality: N/A;   • CARDIAC CATHETERIZATION N/A 10/15/2022    Procedure: Coronary angiography;  Surgeon: Deandra Dumas MD;  Location: [facility] INVASIVE LOCATION;  Service: Cardiology;  Laterality: N/A;   • CARDIAC CATHETERIZATION N/A 10/15/2022    Procedure: Left ventriculography;  Surgeon: Deandra Dumas MD;  Location: [facility] INVASIVE LOCATION;  Service: Cardiology;  Laterality: N/A;   • CORONARY ARTERY BYPASS GRAFT N/A 10/15/2022    Procedure: VERONICA, STERNOTOMY, CORONARY ARTERY BYPASS TIMES 3 WITH INTERNAL MAMMARY ARTERY GRAFT AND LEFT GREATER SAPHENOUS VEIN HARVEST, REPAIR OF CORONARY ARTERY DISSECTION,PRP;  Surgeon: Lakshmi Teixeira MD;  Location:   [facility];  Service: Cardiothoracic;  Laterality: N/A;       Scheduled Meds:     Continuous Infusions: No current facility-administered medications for this visit.      PRN Meds:     No Known Allergies    Social History     Socioeconomic History   • Marital status:    Tobacco Use   • Smoking status: Never   • Smokeless tobacco: Never       History reviewed. No pertinent family history.    Review of Systems   Constitutional: Negative for activity change, appetite change, fatigue and unexpected weight change.   HENT: Negative for trouble swallowing.    Eyes: Negative.    Respiratory: Negative.    Cardiovascular: Negative.    Gastrointestinal: Positive for abdominal pain and nausea. Negative for abdominal distention, anal bleeding, blood in stool, constipation, diarrhea, rectal pain and vomiting.   Endocrine: Negative.    Genitourinary: Negative.    Musculoskeletal: Negative.    Allergic/Immunologic: Negative.    Neurological: Negative.    Hematological: Negative.    Psychiatric/Behavioral: Negative.        Vitals:    12/27/22 1301   Temp: 97.3 °F (36.3 °C)       Physical Exam  Constitutional:       Appearance: She is well-developed.   Abdominal:      General: Bowel sounds are normal. There is no distension.      Palpations: Abdomen is soft. There is no mass.      Tenderness: There is no abdominal tenderness. There is no guarding.      Hernia: No hernia is present.   Skin:     General: Skin is warm and dry.      Capillary Refill: Capillary refill takes less than 2 seconds.   Neurological:      Mental Status: She is alert and oriented to person, place, and time.   Psychiatric:         Behavior: Behavior normal.     Assessment    Diagnoses and all orders for this visit:    1. Epigastric pain (Primary)    2. Nausea    3. Dyspepsia    Other orders  -     famotidine (Pepcid) 40 MG tablet; Take 1 tablet by mouth Daily.  Dispense: 30 tablet; Refill: 3       Plan    Recommend EGD with Dr. Quiroz for the above-mentioned  symptoms exclude peptic ulcer disease, esophagitis, gastritis, H. pylori, celiac disease, etc.  Obtain cardiac clearance for procedure.  Resume H2 blocker in the interim.  Follow an antireflux diet.  Celiac comprehensive panel today.  Follow-up and further recommendations pending aforementioned work-up.  Consider gallbladder work-up if warranted.         SHIRLENE Burgess  [facility]  [address] [place] [address]  Office: [phone]

## 2022-12-27 NOTE — TELEPHONE ENCOUNTER
OK FOR HUB TO READ CANDICE patient via telephone for EGD. Scheduled 01/23/23 with arrival time of 0100PM. Prep paperwork mailed to verified address on file. Patient advised arrival time may change based on [facility] guidelines. CANDICE HOLLINGSWORTH   
01-Aug-2016

## 2022-12-27 NOTE — PROGRESS NOTES
Chief Complaint   Patient presents with   • Abdominal Pain       HPI    Esme Herrera is a  32 y.o. female here to establish care as a new patient for complaints of dyspepsia with abdominal pain.    This patient will also follow with Dr. Quiroz.    History of ischemic cardiomyopathy and CABG x3 secondary to spontaneous coronary artery dissection October of this year.    Patient reports off-and-on symptoms even prior to cardiac surgery which included epigastric pain, nausea, and dyspepsia.  She was given Pepcid during her hospital stay and for short duration afterwards which seemed to help.  After she stopped Pepcid her symptoms worsened.  No vomiting.  Appetite slowly improving.  No dysphagia or odynophagia.  She has never had an EGD or been tested for H. pylori.  She denies NSAID use.  She does not smoke or drink alcohol.    No lower GI complaints.  No family history of colon cancer that she is aware of.    She still has her gallbladder.    Recent hemogram and LFTs normal.    History reviewed. No pertinent past medical history.    Past Surgical History:   Procedure Laterality Date   • CARDIAC CATHETERIZATION N/A 10/15/2022    Procedure: Left Heart Cath;  Surgeon: Deandra Dumas MD;  Location: [facility] INVASIVE LOCATION;  Service: Cardiology;  Laterality: N/A;   • CARDIAC CATHETERIZATION N/A 10/15/2022    Procedure: Coronary angiography;  Surgeon: Deandra Dumas MD;  Location: [facility] INVASIVE LOCATION;  Service: Cardiology;  Laterality: N/A;   • CARDIAC CATHETERIZATION N/A 10/15/2022    Procedure: Left ventriculography;  Surgeon: Deandra Dumas MD;  Location: [facility] INVASIVE LOCATION;  Service: Cardiology;  Laterality: N/A;   • CORONARY ARTERY BYPASS GRAFT N/A 10/15/2022    Procedure: VERONICA, STERNOTOMY, CORONARY ARTERY BYPASS TIMES 3 WITH INTERNAL MAMMARY ARTERY GRAFT AND LEFT GREATER SAPHENOUS VEIN HARVEST, REPAIR OF CORONARY ARTERY DISSECTION,PRP;  Surgeon: Lakshmi Teixeira MD;  Location:   [facility];  Service: Cardiothoracic;  Laterality: N/A;       Scheduled Meds:     Continuous Infusions: No current facility-administered medications for this visit.      PRN Meds:     No Known Allergies    Social History     Socioeconomic History   • Marital status:    Tobacco Use   • Smoking status: Never   • Smokeless tobacco: Never       History reviewed. No pertinent family history.    Review of Systems   Constitutional: Negative for activity change, appetite change, fatigue and unexpected weight change.   HENT: Negative for trouble swallowing.    Eyes: Negative.    Respiratory: Negative.    Cardiovascular: Negative.    Gastrointestinal: Positive for abdominal pain and nausea. Negative for abdominal distention, anal bleeding, blood in stool, constipation, diarrhea, rectal pain and vomiting.   Endocrine: Negative.    Genitourinary: Negative.    Musculoskeletal: Negative.    Allergic/Immunologic: Negative.    Neurological: Negative.    Hematological: Negative.    Psychiatric/Behavioral: Negative.        Vitals:    12/27/22 1301   Temp: 97.3 °F (36.3 °C)       Physical Exam  Constitutional:       Appearance: She is well-developed.   Abdominal:      General: Bowel sounds are normal. There is no distension.      Palpations: Abdomen is soft. There is no mass.      Tenderness: There is no abdominal tenderness. There is no guarding.      Hernia: No hernia is present.   Skin:     General: Skin is warm and dry.      Capillary Refill: Capillary refill takes less than 2 seconds.   Neurological:      Mental Status: She is alert and oriented to person, place, and time.   Psychiatric:         Behavior: Behavior normal.     Assessment    Diagnoses and all orders for this visit:    1. Epigastric pain (Primary)    2. Nausea    3. Dyspepsia    Other orders  -     famotidine (Pepcid) 40 MG tablet; Take 1 tablet by mouth Daily.  Dispense: 30 tablet; Refill: 3       Plan    Recommend EGD with Dr. Quiroz for the above-mentioned  symptoms exclude peptic ulcer disease, esophagitis, gastritis, H. pylori, celiac disease, etc.  Obtain cardiac clearance for procedure.  Resume H2 blocker in the interim.  Follow an antireflux diet.  Celiac comprehensive panel today.  Follow-up and further recommendations pending aforementioned work-up.  Consider gallbladder work-up if warranted.         SHIRLENE Burgess  [facility]  [address] [place] [address]  Office: [phone]

## 2022-12-28 ENCOUNTER — TREATMENT (OUTPATIENT)
Dept: CARDIAC REHAB | Facility: HOSPITAL | Age: 32
End: 2022-12-28

## 2022-12-28 ENCOUNTER — TELEPHONE (OUTPATIENT)
Dept: GASTROENTEROLOGY | Facility: CLINIC | Age: 32
End: 2022-12-28

## 2022-12-28 DIAGNOSIS — Z95.1 S/P CABG (CORONARY ARTERY BYPASS GRAFT): Primary | ICD-10-CM

## 2022-12-28 LAB
ENDOMYSIUM IGA SER QL: NEGATIVE
GLIADIN PEPTIDE IGA SER-ACNC: 8 UNITS (ref 0–19)
GLIADIN PEPTIDE IGG SER-ACNC: 5 UNITS (ref 0–19)
IGA SERPL-MCNC: 192 MG/DL (ref 87–352)
TTG IGA SER-ACNC: <2 U/ML (ref 0–3)
TTG IGG SER-ACNC: <2 U/ML (ref 0–5)

## 2022-12-28 PROCEDURE — 93798 PHYS/QHP OP CAR RHAB W/ECG: CPT

## 2022-12-28 NOTE — TELEPHONE ENCOUNTER
Patient called advised as per Dr. Dumas's note. She verb understanding and will stop her ASA on 01/05/23.     Update to Janneth and Dr. Quiroz.

## 2022-12-28 NOTE — TELEPHONE ENCOUNTER
----- Message from SHIRLENE Burgess sent at 12/27/2022  1:25 PM EST -----  She needs cardiac clearance for EGD with Dr. Quiroz.  Thanks BG

## 2022-12-30 ENCOUNTER — TELEPHONE (OUTPATIENT)
Dept: GASTROENTEROLOGY | Facility: CLINIC | Age: 32
End: 2022-12-30

## 2022-12-30 NOTE — TELEPHONE ENCOUNTER
----- Message from SHIRLENE Burgess sent at 12/29/2022  1:04 PM EST -----  Please inform the patient lab work is normal.

## 2023-01-04 ENCOUNTER — TREATMENT (OUTPATIENT)
Dept: CARDIAC REHAB | Facility: HOSPITAL | Age: 33
End: 2023-01-04
Payer: MEDICAID

## 2023-01-04 DIAGNOSIS — Z95.1 S/P CABG (CORONARY ARTERY BYPASS GRAFT): Primary | ICD-10-CM

## 2023-01-04 PROCEDURE — 93798 PHYS/QHP OP CAR RHAB W/ECG: CPT

## 2023-01-06 ENCOUNTER — TREATMENT (OUTPATIENT)
Dept: CARDIAC REHAB | Facility: HOSPITAL | Age: 33
End: 2023-01-06
Payer: MEDICAID

## 2023-01-06 DIAGNOSIS — Z95.1 S/P CABG (CORONARY ARTERY BYPASS GRAFT): Primary | ICD-10-CM

## 2023-01-06 PROCEDURE — 93798 PHYS/QHP OP CAR RHAB W/ECG: CPT

## 2023-01-09 ENCOUNTER — TREATMENT (OUTPATIENT)
Dept: CARDIAC REHAB | Facility: HOSPITAL | Age: 33
End: 2023-01-09
Payer: MEDICAID

## 2023-01-09 DIAGNOSIS — Z95.1 S/P CABG (CORONARY ARTERY BYPASS GRAFT): Primary | ICD-10-CM

## 2023-01-09 PROCEDURE — 93798 PHYS/QHP OP CAR RHAB W/ECG: CPT

## 2023-01-11 ENCOUNTER — TREATMENT (OUTPATIENT)
Dept: CARDIAC REHAB | Facility: HOSPITAL | Age: 33
End: 2023-01-11
Payer: MEDICAID

## 2023-01-11 DIAGNOSIS — Z95.1 S/P CABG (CORONARY ARTERY BYPASS GRAFT): Primary | ICD-10-CM

## 2023-01-11 PROCEDURE — 93798 PHYS/QHP OP CAR RHAB W/ECG: CPT

## 2023-01-12 ENCOUNTER — HOSPITAL ENCOUNTER (OUTPATIENT)
Facility: HOSPITAL | Age: 33
Setting detail: HOSPITAL OUTPATIENT SURGERY
Discharge: HOME | End: 2023-01-12
Attending: INTERNAL MEDICINE | Admitting: INTERNAL MEDICINE
Payer: MEDICAID

## 2023-01-12 ENCOUNTER — ANESTHESIA (OUTPATIENT)
Dept: GASTROENTEROLOGY | Facility: HOSPITAL | Age: 33
End: 2023-01-12
Payer: MEDICAID

## 2023-01-12 ENCOUNTER — ANESTHESIA EVENT (OUTPATIENT)
Dept: GASTROENTEROLOGY | Facility: HOSPITAL | Age: 33
End: 2023-01-12
Payer: MEDICAID

## 2023-01-12 VITALS
HEIGHT: 61 IN | HEART RATE: 74 BPM | SYSTOLIC BLOOD PRESSURE: 105 MMHG | DIASTOLIC BLOOD PRESSURE: 60 MMHG | BODY MASS INDEX: 22.09 KG/M2 | TEMPERATURE: 98 F | WEIGHT: 117 LBS | OXYGEN SATURATION: 99 % | RESPIRATION RATE: 14 BRPM

## 2023-01-12 DIAGNOSIS — R10.13 EPIGASTRIC PAIN: ICD-10-CM

## 2023-01-12 LAB
B-HCG UR QL: NEGATIVE
EXPIRATION DATE: NORMAL
INTERNAL NEGATIVE CONTROL: NEGATIVE
INTERNAL POSITIVE CONTROL: POSITIVE
Lab: NORMAL

## 2023-01-12 PROCEDURE — 25010000002 PROPOFOL 10 MG/ML EMULSION: Performed by: NURSE ANESTHETIST, CERTIFIED REGISTERED

## 2023-01-12 PROCEDURE — 43239 EGD BIOPSY SINGLE/MULTIPLE: CPT | Performed by: INTERNAL MEDICINE

## 2023-01-12 PROCEDURE — 0 LIDOCAINE 1 % SOLUTION: Performed by: NURSE ANESTHETIST, CERTIFIED REGISTERED

## 2023-01-12 PROCEDURE — 81025 URINE PREGNANCY TEST: CPT | Performed by: INTERNAL MEDICINE

## 2023-01-12 PROCEDURE — 88305 TISSUE EXAM BY PATHOLOGIST: CPT | Performed by: INTERNAL MEDICINE

## 2023-01-12 RX ORDER — LIDOCAINE HYDROCHLORIDE 10 MG/ML
INJECTION, SOLUTION INFILTRATION; PERINEURAL AS NEEDED
Status: DISCONTINUED | OUTPATIENT
Start: 2023-01-12 | End: 2023-01-12 | Stop reason: SURG

## 2023-01-12 RX ORDER — SODIUM CHLORIDE, SODIUM LACTATE, POTASSIUM CHLORIDE, CALCIUM CHLORIDE 600; 310; 30; 20 MG/100ML; MG/100ML; MG/100ML; MG/100ML
30 INJECTION, SOLUTION INTRAVENOUS CONTINUOUS PRN
Status: DISCONTINUED | OUTPATIENT
Start: 2023-01-12 | End: 2023-01-12 | Stop reason: HOSPADM

## 2023-01-12 RX ORDER — PANTOPRAZOLE SODIUM 40 MG/1
40 TABLET, DELAYED RELEASE ORAL DAILY
Qty: 30 TABLET | Refills: 12 | Status: SHIPPED | OUTPATIENT
Start: 2023-01-12

## 2023-01-12 RX ORDER — PROPOFOL 10 MG/ML
VIAL (ML) INTRAVENOUS AS NEEDED
Status: DISCONTINUED | OUTPATIENT
Start: 2023-01-12 | End: 2023-01-12 | Stop reason: SURG

## 2023-01-12 RX ADMIN — PROPOFOL 300 MCG/KG/MIN: 10 INJECTION, EMULSION INTRAVENOUS at 11:42

## 2023-01-12 RX ADMIN — LIDOCAINE HYDROCHLORIDE 30 MG: 10 INJECTION, SOLUTION INFILTRATION; PERINEURAL at 11:41

## 2023-01-12 RX ADMIN — PROPOFOL 50 MG: 10 INJECTION, EMULSION INTRAVENOUS at 11:42

## 2023-01-12 RX ADMIN — SODIUM CHLORIDE, POTASSIUM CHLORIDE, SODIUM LACTATE AND CALCIUM CHLORIDE 30 ML/HR: 600; 310; 30; 20 INJECTION, SOLUTION INTRAVENOUS at 11:32

## 2023-01-12 NOTE — ANESTHESIA PREPROCEDURE EVALUATION
Anesthesia Evaluation     Patient summary reviewed                Airway   No difficulty expected  Dental      Pulmonary    Cardiovascular     ECG reviewed  Rhythm: regular        Neuro/Psych  GI/Hepatic/Renal/Endo    (+)  GERD,      Musculoskeletal     Abdominal    Substance History      OB/GYN          Other                        Anesthesia Plan    ASA 2     MAC       Anesthetic plan, risks, benefits, and alternatives have been provided, discussed and informed consent has been obtained with: patient.        CODE STATUS:

## 2023-01-12 NOTE — DISCHARGE INSTRUCTIONS
For the next 24 hours patient needs to be with a responsible adult.    For 24 hours DO NOT drive, operate machinery, appliances, drink alcohol, make important decisions or sign legal documents.    Start with a light or bland diet if you are feeling sick to your stomach otherwise advance to regular diet as tolerated.    Follow recommendations on procedure report if provided by your doctor.    Call Dr March for problems [phone]    Problems may include but not limited to: large amounts of bleeding, trouble breathing, repeated vomiting, severe unrelieved pain, fever or chills.

## 2023-01-12 NOTE — ANESTHESIA POSTPROCEDURE EVALUATION
Patient: Esme Herrera    Procedure Summary     Date: 01/12/23 Room / Location: [facility] ENDOSCOPY 8 / [facility] ENDOSCOPY    Anesthesia Start: 1137 Anesthesia Stop: 1154    Procedure: ESOPHAGOGASTRODUODENOSCOPY with bxs (Esophagus) Diagnosis:       Epigastric pain      (Epigastric pain [R10.13])    Surgeons: Nish Quiroz MD Provider: Tien Husain MD    Anesthesia Type: MAC ASA Status: 2          Anesthesia Type: MAC    Vitals  No vitals data found for the desired time range.          Post Anesthesia Care and Evaluation    Patient location during evaluation: bedside  Pain management: adequate    Airway patency: patent  Anesthetic complications: No anesthetic complications    Cardiovascular status: acceptable  Respiratory status: acceptable  Hydration status: acceptable

## 2023-01-13 ENCOUNTER — TELEPHONE (OUTPATIENT)
Dept: GASTROENTEROLOGY | Facility: CLINIC | Age: 33
End: 2023-01-13
Payer: MEDICAID

## 2023-01-13 ENCOUNTER — TREATMENT (OUTPATIENT)
Dept: CARDIAC REHAB | Facility: HOSPITAL | Age: 33
End: 2023-01-13
Payer: MEDICAID

## 2023-01-13 DIAGNOSIS — Z95.1 S/P CABG (CORONARY ARTERY BYPASS GRAFT): Primary | ICD-10-CM

## 2023-01-13 LAB
LAB AP CASE REPORT: NORMAL
LAB AP DIAGNOSIS COMMENT: NORMAL
PATH REPORT.FINAL DX SPEC: NORMAL
PATH REPORT.GROSS SPEC: NORMAL

## 2023-01-13 PROCEDURE — 93798 PHYS/QHP OP CAR RHAB W/ECG: CPT

## 2023-01-13 NOTE — TELEPHONE ENCOUNTER
Patient called. Advised as per Dr. Quiroz's note. In the middle of the conversation, the phone call was disconnected attempted to call back.  states the patient is not accepting phone calls at this time.   Will call back.

## 2023-01-13 NOTE — PROGRESS NOTES
H. pylori gastritis  Please call in [facility] x14 days  Office visit Janneth 6 weeks off of PPI 2 weeks to do a H. pylori breath test  Tell her we are going to hold on ultrasound and HIDA scan at this time as H. pylori can cause her symptoms

## 2023-01-13 NOTE — TELEPHONE ENCOUNTER
----- Message from Nish Quiroz MD sent at 1/13/2023 12:59 PM EST -----  H. pylori gastritis  Please call in [facility] x14 days  Office visit Janneth 6 weeks off of PPI 2 weeks to do a H. pylori breath test  Tell her we are going to hold on ultrasound and HIDA scan at this time as H. pylori can cause her symptoms

## 2023-01-18 ENCOUNTER — TREATMENT (OUTPATIENT)
Dept: CARDIAC REHAB | Facility: HOSPITAL | Age: 33
End: 2023-01-18
Payer: MEDICAID

## 2023-01-18 DIAGNOSIS — Z95.1 S/P CABG (CORONARY ARTERY BYPASS GRAFT): Primary | ICD-10-CM

## 2023-01-18 PROCEDURE — 93798 PHYS/QHP OP CAR RHAB W/ECG: CPT

## 2023-01-19 ENCOUNTER — TELEPHONE (OUTPATIENT)
Dept: GASTROENTEROLOGY | Facility: CLINIC | Age: 33
End: 2023-01-19

## 2023-01-19 NOTE — TELEPHONE ENCOUNTER
Provider: JOHN CONTE  Caller: BALAJI LEON  Relationship to Patient: SELF  Phone Number: [phone]  Reason for Call: RETURNING CALL REGARDING RESULTS.

## 2023-01-20 ENCOUNTER — TREATMENT (OUTPATIENT)
Dept: CARDIAC REHAB | Facility: HOSPITAL | Age: 33
End: 2023-01-20
Payer: MEDICAID

## 2023-01-20 ENCOUNTER — TELEPHONE (OUTPATIENT)
Dept: GASTROENTEROLOGY | Facility: CLINIC | Age: 33
End: 2023-01-20

## 2023-01-20 DIAGNOSIS — Z95.1 S/P CABG (CORONARY ARTERY BYPASS GRAFT): Primary | ICD-10-CM

## 2023-01-20 PROCEDURE — 93798 PHYS/QHP OP CAR RHAB W/ECG: CPT

## 2023-01-20 NOTE — TELEPHONE ENCOUNTER
Caller: Esme Herrera     Relationship: [unfilled]     Best call back number: [phone], CAN CALL PATIENT ANY TIME OF DAY.    What is your medical concern? PATIENT HAS SOME QUESTIONS REGARDING MEDICATION (ANITBIOTIC) THAT SHE IS ON.    PATIENT STATES THAT SHE HAS BEEN TRYING TO REACH THE OFFICE BUT WAS UNSUCCESSFUL THIS WEEK.     How long has this issue been going on? FOR A FULL WEEK AS OF TODAY 1-

## 2023-01-23 ENCOUNTER — TREATMENT (OUTPATIENT)
Dept: CARDIAC REHAB | Facility: HOSPITAL | Age: 33
End: 2023-01-23
Payer: MEDICAID

## 2023-01-23 ENCOUNTER — TELEPHONE (OUTPATIENT)
Dept: CARDIOLOGY | Facility: CLINIC | Age: 33
End: 2023-01-23
Payer: MEDICAID

## 2023-01-23 DIAGNOSIS — Z95.1 S/P CABG (CORONARY ARTERY BYPASS GRAFT): Primary | ICD-10-CM

## 2023-01-23 PROCEDURE — 93798 PHYS/QHP OP CAR RHAB W/ECG: CPT

## 2023-01-23 RX ORDER — AMOXICILLIN 500 MG/1
1000 CAPSULE ORAL 2 TIMES DAILY
Qty: 56 CAPSULE | Refills: 0 | OUTPATIENT
Start: 2023-01-23 | End: 2023-01-25

## 2023-01-23 RX ORDER — LANSOPRAZOLE 30 MG/1
30 CAPSULE, DELAYED RELEASE ORAL 2 TIMES DAILY
Qty: 28 CAPSULE | Refills: 0 | Status: SHIPPED | OUTPATIENT
Start: 2023-01-23 | End: 2023-02-06

## 2023-01-23 RX ORDER — CLARITHROMYCIN 500 MG/1
500 TABLET, COATED ORAL 2 TIMES DAILY
Qty: 28 TABLET | Refills: 0 | OUTPATIENT
Start: 2023-01-23 | End: 2023-01-25

## 2023-01-23 NOTE — TELEPHONE ENCOUNTER
Called pt and advised of Dr. Quiroz's note.  Pt verbalized understanding.     Advised pt to keep f/u appt with ALMAZ Lagunas on 2/9.     Rx's e-scribed, sent to Dr Quiroz to cosign.

## 2023-01-23 NOTE — TELEPHONE ENCOUNTER
I would go by the treatment as prescribed by her GI doctor.  I would leave it up to them if they think she needs an antibiotic or not.

## 2023-01-23 NOTE — TELEPHONE ENCOUNTER
[phone]  9:50 am    Pt called to let SG know the results of her endoscopy.  She was diagnosed with H-Pylori.  She states the endo rx'd her a steroid.  However she would like to know if SG would prescribe an antibiotic. Please advise.    [facility] [place]

## 2023-01-25 ENCOUNTER — APPOINTMENT (OUTPATIENT)
Dept: CARDIAC REHAB | Facility: HOSPITAL | Age: 33
End: 2023-01-25
Payer: MEDICAID

## 2023-01-25 ENCOUNTER — HOSPITAL ENCOUNTER (EMERGENCY)
Facility: HOSPITAL | Age: 33
Discharge: HOME | End: 2023-01-25
Attending: EMERGENCY MEDICINE | Admitting: EMERGENCY MEDICINE
Payer: MEDICAID

## 2023-01-25 VITALS
HEIGHT: 61 IN | BODY MASS INDEX: 22.28 KG/M2 | SYSTOLIC BLOOD PRESSURE: 100 MMHG | OXYGEN SATURATION: 98 % | HEART RATE: 87 BPM | TEMPERATURE: 97.3 F | WEIGHT: 118 LBS | DIASTOLIC BLOOD PRESSURE: 84 MMHG | RESPIRATION RATE: 14 BRPM

## 2023-01-25 DIAGNOSIS — H92.01 RIGHT EAR PAIN: ICD-10-CM

## 2023-01-25 DIAGNOSIS — J06.9 VIRAL URI: Primary | ICD-10-CM

## 2023-01-25 LAB
FLUAV SUBTYP SPEC NAA+PROBE: NOT DETECTED
FLUBV RNA ISLT QL NAA+PROBE: NOT DETECTED
S PYO AG THROAT QL: NEGATIVE
SARS-COV-2 RNA PNL SPEC NAA+PROBE: NOT DETECTED

## 2023-01-25 PROCEDURE — 87880 STREP A ASSAY W/OPTIC: CPT | Performed by: PHYSICIAN ASSISTANT

## 2023-01-25 PROCEDURE — 87081 CULTURE SCREEN ONLY: CPT | Performed by: PHYSICIAN ASSISTANT

## 2023-01-25 PROCEDURE — 63710000001 DEXAMETHASONE PER 0.25 MG: Performed by: EMERGENCY MEDICINE

## 2023-01-25 PROCEDURE — 87636 SARSCOV2 & INF A&B AMP PRB: CPT | Performed by: PHYSICIAN ASSISTANT

## 2023-01-25 PROCEDURE — C9803 HOPD COVID-19 SPEC COLLECT: HCPCS

## 2023-01-25 PROCEDURE — 87147 CULTURE TYPE IMMUNOLOGIC: CPT | Performed by: PHYSICIAN ASSISTANT

## 2023-01-25 PROCEDURE — 99283 EMERGENCY DEPT VISIT LOW MDM: CPT

## 2023-01-25 RX ORDER — DEXAMETHASONE 4 MG/1
6 TABLET ORAL ONCE
Status: COMPLETED | OUTPATIENT
Start: 2023-01-25 | End: 2023-01-25

## 2023-01-25 RX ORDER — CETIRIZINE HYDROCHLORIDE, PSEUDOEPHEDRINE HYDROCHLORIDE 5; 120 MG/1; MG/1
1 TABLET, FILM COATED, EXTENDED RELEASE ORAL 2 TIMES DAILY
Qty: 14 TABLET | Refills: 0 | Status: SHIPPED | OUTPATIENT
Start: 2023-01-25

## 2023-01-25 RX ADMIN — DEXAMETHASONE 6 MG: 4 TABLET ORAL at 12:38

## 2023-01-27 ENCOUNTER — APPOINTMENT (OUTPATIENT)
Dept: CARDIAC REHAB | Facility: HOSPITAL | Age: 33
End: 2023-01-27
Payer: MEDICAID

## 2023-01-27 ENCOUNTER — TELEPHONE (OUTPATIENT)
Dept: GASTROENTEROLOGY | Facility: CLINIC | Age: 33
End: 2023-01-27
Payer: MEDICAID

## 2023-01-27 LAB — BACTERIA SPEC AEROBE CULT: ABNORMAL

## 2023-01-30 ENCOUNTER — APPOINTMENT (OUTPATIENT)
Dept: CARDIAC REHAB | Facility: HOSPITAL | Age: 33
End: 2023-01-30
Payer: MEDICAID

## 2023-01-30 NOTE — TELEPHONE ENCOUNTER
Lansoprazole prior authorization is effective for a maximum of 12 fills from 01/27/2023 to 01/26/2024, as long as the member is enrolled in their current health plan. The request was approved with a quantity restriction. This has been approved for a max daily dosage of 2. pharmacy has been notified.

## 2023-02-09 ENCOUNTER — OFFICE VISIT (OUTPATIENT)
Dept: GASTROENTEROLOGY | Facility: CLINIC | Age: 33
End: 2023-02-09
Payer: MEDICAID

## 2023-02-09 VITALS
HEART RATE: 81 BPM | SYSTOLIC BLOOD PRESSURE: 94 MMHG | BODY MASS INDEX: 22.16 KG/M2 | TEMPERATURE: 97.5 F | WEIGHT: 117.4 LBS | OXYGEN SATURATION: 97 % | HEIGHT: 61 IN | DIASTOLIC BLOOD PRESSURE: 55 MMHG

## 2023-02-09 DIAGNOSIS — B96.81 HELICOBACTER PYLORI GASTRITIS: Primary | ICD-10-CM

## 2023-02-09 DIAGNOSIS — K29.70 HELICOBACTER PYLORI GASTRITIS: Primary | ICD-10-CM

## 2023-02-09 PROCEDURE — 99213 OFFICE O/P EST LOW 20 MIN: CPT | Performed by: NURSE PRACTITIONER

## 2023-02-09 NOTE — PROGRESS NOTES
Chief Complaint   Patient presents with   • H. pylori gastritis       HPI    Esme Herrera is a  32 y.o. female here for a follow up visit for abdominal pain.    This patient follows with Dr. Quiroz and myself.    Reviewed EGD dated 1/12/2023 -- Normal findings.  Path was positive for H. pylori gastritis and patient was treated with 14 days of Prevpac.  Gallbladder evaluation was put on hold given findings of H. pylori.  She was treated with PPI therapy, amoxicillin and Biaxin.    Patient is 2 weeks post antibiotic therapy.  She has complete resolution of abdominal pain.  No issues with nausea, vomiting, dysphagia, odynophagia.  She is quite pleased with how she feels.  Some lower abdominal bloat in the evening but this is also improving.  She is off antacid therapy completely.    No lower GI complaints.    Past Medical History:   Diagnosis Date   • GERD (gastroesophageal reflux disease)    • Hypotension        Past Surgical History:   Procedure Laterality Date   • CARDIAC CATHETERIZATION N/A 10/15/2022    Procedure: Left Heart Cath;  Surgeon: Deandra Dumas MD;  Location: [facility] CATH INVASIVE LOCATION;  Service: Cardiology;  Laterality: N/A;   • CARDIAC CATHETERIZATION N/A 10/15/2022    Procedure: Coronary angiography;  Surgeon: Deandra Dumas MD;  Location: [facility] CATH INVASIVE LOCATION;  Service: Cardiology;  Laterality: N/A;   • CARDIAC CATHETERIZATION N/A 10/15/2022    Procedure: Left ventriculography;  Surgeon: Deandra Dumas MD;  Location: [facility] CATH INVASIVE LOCATION;  Service: Cardiology;  Laterality: N/A;   • CORONARY ARTERY BYPASS GRAFT N/A 10/15/2022    Procedure: VERONICA, STERNOTOMY, CORONARY ARTERY BYPASS TIMES 3 WITH INTERNAL MAMMARY ARTERY GRAFT AND LEFT GREATER SAPHENOUS VEIN HARVEST, REPAIR OF CORONARY ARTERY DISSECTION,PRP;  Surgeon: Lakshmi Teixeira MD;  Location: [facility];  Service: Cardiothoracic;  Laterality: N/A;   • ENDOSCOPY N/A 1/12/2023    Procedure: ESOPHAGOGASTRODUODENOSCOPY  with bxs;  Surgeon: Nihs Quiroz MD;  Location: [facility] ENDOSCOPY;  Service: Gastroenterology;  Laterality: N/A;  PRE: Epigastric Pain  POST: Normal       Scheduled Meds:     Continuous Infusions: No current facility-administered medications for this visit.      PRN Meds:     No Known Allergies    Social History     Socioeconomic History   • Marital status:    Tobacco Use   • Smoking status: Never   • Smokeless tobacco: Never   Substance and Sexual Activity   • Alcohol use: Never   • Drug use: Never       No family history on file.    Review of Systems   Constitutional: Negative for activity change, appetite change, fatigue, fever and unexpected weight change.   HENT: Negative for trouble swallowing.    Respiratory: Negative for apnea, cough, choking, chest tightness, shortness of breath and wheezing.    Cardiovascular: Negative for chest pain, palpitations and leg swelling.   Gastrointestinal: Negative for abdominal distention, abdominal pain, anal bleeding, blood in stool, constipation, diarrhea, nausea, rectal pain and vomiting.       Vitals:    02/09/23 0928   BP: 94/55   Pulse: 81   Temp: 97.5 °F (36.4 °C)   SpO2: 97%       Physical Exam  Constitutional:       Appearance: She is well-developed.   Abdominal:      General: Bowel sounds are normal. There is no distension.      Palpations: Abdomen is soft. There is no mass.      Tenderness: There is no abdominal tenderness. There is no guarding.      Hernia: No hernia is present.   Skin:     General: Skin is warm and dry.      Capillary Refill: Capillary refill takes less than 2 seconds.   Neurological:      Mental Status: She is alert and oriented to person, place, and time.   Psychiatric:         Behavior: Behavior normal.     Assessment    Diagnoses and all orders for this visit:    1. Helicobacter pylori gastritis (Primary)  -     H. Pylori Breath Test - Breath, Lung; Future       Plan    Pleasant 32-year-old female status post EGD prompted for  symptoms of abdominal pain found to have H. pylori gastritis on biopsy with completion of antibiotic therapy 2 weeks ago and complete resolution of symptoms.  At this point recommend breath test to verify eradication in 4 weeks and patient is agreeable.  We discussed her endoscopic findings as well as pathology and all questions were answered.  Further recommendations pending breath testing results         SHIRLENE Burgess  [facility]  [address] [place] [address]  Office: [phone]

## 2023-03-09 ENCOUNTER — LAB (OUTPATIENT)
Dept: GASTROENTEROLOGY | Facility: CLINIC | Age: 33
End: 2023-03-09
Payer: MEDICAID

## 2023-03-15 ENCOUNTER — OFFICE VISIT (OUTPATIENT)
Dept: CARDIOLOGY | Facility: CLINIC | Age: 33
End: 2023-03-15
Payer: MEDICAID

## 2023-03-15 VITALS
WEIGHT: 119.6 LBS | DIASTOLIC BLOOD PRESSURE: 64 MMHG | HEART RATE: 72 BPM | HEIGHT: 61 IN | BODY MASS INDEX: 22.58 KG/M2 | SYSTOLIC BLOOD PRESSURE: 110 MMHG

## 2023-03-15 DIAGNOSIS — I25.5 ISCHEMIC CARDIOMYOPATHY: ICD-10-CM

## 2023-03-15 DIAGNOSIS — I25.42 SPONTANEOUS DISSECTION OF CORONARY ARTERY: Primary | ICD-10-CM

## 2023-03-15 DIAGNOSIS — Z95.1 S/P CABG X 3: ICD-10-CM

## 2023-03-15 PROCEDURE — 99214 OFFICE O/P EST MOD 30 MIN: CPT | Performed by: NURSE PRACTITIONER

## 2023-03-15 PROCEDURE — 93000 ELECTROCARDIOGRAM COMPLETE: CPT | Performed by: NURSE PRACTITIONER

## 2023-03-15 RX ORDER — LEVOTHYROXINE SODIUM 0.05 MG/1
TABLET ORAL
COMMUNITY
Start: 2023-03-06

## 2023-03-15 NOTE — PROGRESS NOTES
Date of Office Visit: 03/15/2023  Encounter Provider: SHIRLENE Kelly  Place of Service: [facility] CARDIOLOGY  Patient Name: Esme Herrera  :1990    No chief complaint on file.  : follow up    HPI: Esme Herrera is a 32 y.o. female who is a patient of Dr. Dumas.  She is new to me today and presents for a 6-month follow-up.  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  She underwent CABG x3.  At that time she had ischemic cardiomyopathy with an EF of 30 to 35%.    Patient was initially seen by Dr. Dumas when she presented to the hospital on 10/14/2022 with complaints of severe substernal chest pain which radiated to her back.  Sounds were intermittent and were not related to exertion.  Her initial EKG showed mild ST elevation in leads I and aVL and inferior ST depression.  Her troponin was elevated.  She was started on a heparin drip.  Cardiac cath on 10/15/2022 showed spontaneous coronary artery dissection involving distal left main, left circumflex, LAD and ramus intermedius branches along with thrombus.  There appeared to be at most SHERLEY I flow distally.  Her right coronary artery appeared to be completely normal.  LV gram showed akinesis of mid to distal anterior wall and apex with a EF of about 30 to 35%.  She underwent emergent CABG with LIMA to LAD, SVG to OM1 and SVG to ramus intermedius.  This operatively, patient had persistent hypotension that eventually resolved.  She was unable to be started on guideline directed medical therapy for her scatter cardiomyopathy outside of aspirin.    An echocardiogram on 10/16/2022 showed mildly dilated LV cavity with an EF of 26 to 30% and anterior and apical wall motion abnormalities.  A repeat echo on 10/19/2022 was performed due to persistent hypotension which showed EF 30 to 35% again with anterior and apical wall motion  abnormalities and swirling of contrast in the apex but no evidence of thrombus.  Patient was discharged on 11/2/2022 on aspirin and clopidogrel for 1 month.    In follow-up 11/8/2022, patient was slowly improving.  Blood pressure was a little better and she was tried on low-dose metoprolol succinate 12.5 mg daily.  Repeat echocardiogram was performed on 11/16/2022 and showed improvement in her left ventricular systolic function to an EF of 52% although she continued to have akinesis of her apex.  Otherwise, she had normal diastolic function, normal valvular function and normal RV systolic pressure.    On 11/19/2022, patient was admitted to the observation unit with chest pain and lightheadedness.  Obey's were normal and EKG unchanged.  Metoprolol succinate was discontinued since she was not tolerating.    On her last office visit with Dr. Dumas in December 2022, patient was only on aspirin 81 mg daily.  She had been attending cardiac rehab which she appears to be tolerating.  She had some reports of lightheadedness which was overall improved.  She did have some chest discomfort related to her incision.  She denied any palpitations, near-syncope or syncope.    Today patient presents with no complaints.  She has no chest pain, pressure, lightheadedness, shortness of breath or lower extremity edema.  She has no syncope or near syncope.  She completed cardiac rehab which she said gave her the confidence to exercise.  She had been walking up until the weather got cold again.  Patient has 2 boys 3 and 6 years old.  She is asking today when or if it would be safe to get pregnant again.  Let her know that her risk of recurrence is increased with pregnancy.  Also recommended that she speak with Dr. Dumas on her next follow-up.  Her blood pressure is well controlled.  She is only on baby aspirin daily.    Previous testing and notes have been reviewed by me.   Past Medical History:   Diagnosis Date   • GERD (gastroesophageal  reflux disease)    • Hypotension        Past Surgical History:   Procedure Laterality Date   • CARDIAC CATHETERIZATION N/A 10/15/2022    Procedure: Left Heart Cath;  Surgeon: Deandra Dumas MD;  Location: [facility] CATH INVASIVE LOCATION;  Service: Cardiology;  Laterality: N/A;   • CARDIAC CATHETERIZATION N/A 10/15/2022    Procedure: Coronary angiography;  Surgeon: Deandra Dumas MD;  Location: [facility] CATH INVASIVE LOCATION;  Service: Cardiology;  Laterality: N/A;   • CARDIAC CATHETERIZATION N/A 10/15/2022    Procedure: Left ventriculography;  Surgeon: Deandra Dumas MD;  [facility]VASIVE [facility];  Service: Cardiology;  Laterality: N/A;   • CORONARY ARTERY BYPASS GRAFT N/A 10/15/2022    Procedure: VERONICA, STERNOTOMY, CORONARY ARTERY BYPASS TIMES 3 WITH INTERNAL MAMMARY ARTERY GRAFT AND LEFT GREATER SAPHENOUS VEIN HARVEST, REPAIR OF CORONARY ARTERY DISSECTION,PRP;  Surgeon: Lakshmi Teixeira MD;  Location: [facility] CVOR;  Service: Cardiothoracic;  Laterality: N/A;   • ENDOSCOPY N/A 1/12/2023    Procedure: ESOPHAGOGASTRODUODENOSCOPY with bxs;  Surgeon: Nish Quiroz MD;  Location: [facility] ENDOSCOPY;  Service: Gastroenterology;  Laterality: N/A;  PRE: Epigastric Pain  POST: Normal       Social History     Socioeconomic History   • Marital status:    Tobacco Use   • Smoking status: Never   • Smokeless tobacco: Never   Substance and Sexual Activity   • Alcohol use: Never   • Drug use: Never       No family history on file.    Review of Systems   Constitutional: Negative.   HENT: Negative.    Eyes: Negative.    Cardiovascular: Negative.    Respiratory: Negative.    Endocrine: Negative.    Hematologic/Lymphatic: Negative.    Skin: Negative.    Musculoskeletal: Negative.    Gastrointestinal: Negative.    Genitourinary: Negative.    Neurological: Negative.    Psychiatric/Behavioral: Negative.    Allergic/Immunologic: Negative.        No Known Allergies      Current Outpatient Medications:   •   acetaminophen (TYLENOL) 500 MG tablet, Take 1 tablet by mouth Every 4 (Four) Hours As Needed for Mild Pain., Disp: 30 tablet, Rfl: 0  •  aspirin 81 MG EC tablet, Take 1 tablet by mouth Daily., Disp: 30 tablet, Rfl: 11  •  cetirizine-pseudoephedrine (ZyrTEC-D) 5-120 MG per 12 hr tablet, Take 1 tablet by mouth 2 (Two) Times a Day., Disp: 14 tablet, Rfl: 0  •  pantoprazole (PROTONIX) 40 MG EC tablet, Take 1 tablet by mouth Daily., Disp: 30 tablet, Rfl: 12  •  Xiidra 5 % ophthalmic solution, , Disp: , Rfl:       Objective:     There were no vitals filed for this visit.  There is no height or weight on file to calculate BMI.     2D Echocardiogram 11/16/2022:  •  Left ventricular systolic function is normal. Calculated left ventricular EF = 52.3%.  [place] is akinetic.  •  Left ventricular diastolic function was normal.  •  Estimated right ventricular systolic pressure from tricuspid regurgitation is normal (<35 mmHg).  -No significant valvular disease  -Normal RV size and systolic function    2D Echocardiogram 10/19/2022 (limited):  •  Left ventricular diastolic function was not assessed.  •  Left ventricular ejection fraction appears to be 31 - 35%. Left ventricular systolic function is moderately decreased. Normal left ventricular wall thickness noted. The left ventricular cavity is mildly dilated. There is marked swirling of contrast in the apex.    2D Echocardiogram 10/16/2022:  •  Left ventricular ejection fraction appears to be 26 - 30%. Left ventricular systolic function is severely decreased. The left ventricular cavity is mildly dilated. Left ventricular diastolic function was indeterminate.    Left heart cath 10/15/2022:  SELECTIVE CORONARY ANGIOGRAMS:  1.  Left main artery: Left main is a large-caliber vessel.  There appears to be distal dissection associated with thrombus.  There is poor filling distally.  It appears the vessel at least bifurcates into left anterior descending and left circumflex arteries but is  unclear if there is a ramus branch.  2.  Left anterior descending artery: Poor filling beyond the left main artery.  There appears to be some filling distally late in the cycle.  3.  Left circumflex artery: Poor filling be on the left main artery.  There appears to be some filling of the mid to distal vessel.  4.  Right coronary artery: Moderate caliber vessel with 0% stenosis.  The distal vessel bifurcates into a small caliber posterior descending and a large caliber posterolateral branch both with no significant disease.     LEFT VENTRICULOGRAM:  There is akinesis of the mid to distal anterior wall and apex.  The basal anterior wall and basal to mid inferior wall appear to be vasiliy normally.  Left ventricular systolic function appears to be moderately depressed with a visually estimated ejection fraction of 30 to 35%.     PHYSICAL EXAM:    Constitutional:       Appearance: Healthy appearance. Not in distress.   Neck:      Vascular: No JVR. JVD normal.   Pulmonary:      Effort: Pulmonary effort is normal.      Breath sounds: Normal breath sounds. No wheezing. No rhonchi. No rales.   Chest:      Chest wall: Not tender to palpatation.   Cardiovascular:      PMI at left midclavicular line. Normal rate. Regular rhythm. Normal S1. Normal S2.      Murmurs: There is no murmur.      No gallop. No click. No rub.   Pulses:     Intact distal pulses.   Edema:     Peripheral edema absent.   Abdominal:      General: Bowel sounds are normal.      Palpations: Abdomen is soft.      Tenderness: There is no abdominal tenderness.   Musculoskeletal: Normal range of motion.         General: No tenderness. Skin:     General: Skin is warm and dry.   Neurological:      General: No focal deficit present.      Mental Status: Alert and oriented to person, place and time.           ECG 12 Lead    Date/Time: 3/15/2023 2:29 PM  Performed by: Danna Velasco APRN  Authorized by: Danna Velasco APRN   Comparison: compared with  previous ECG from 12/15/2022  Rhythm: sinus rhythm  Rate: normal  BPM: 72  T flattening: I, II, aVL, V1, V5 and V6 (improvement from previous)                Assessment:       Diagnosis Plan   1. Spontaneous dissection of coronary artery        2. S/P CABG x 3        3. Ischemic cardiomyopathy          No orders of the defined types were placed in this encounter.         Plan:       1.  Spontaneous coronary artery dissection: Due to extent of her dissection and poor distal flow, she required CABG x3 (LIMA to LAD, SVG to OM1 and SVG to ramus intermedius).  Patient did not require beta-blocker.  Her initial LV function was severely depressed with a EF of 25 to 30%, most recent echo shows normalization to 52%.  No chest discomfort.  Stable EKG.     2.  Ischemic cardiomyopathy: Normalized EF to 52%.      3.  GERD: follows with GI .  On protonix    Ms. Herrera follow-up with Dr. Dumas in 3 months.  She will call sooner for any questions or concerns             Your medication list          Accurate as of March 15, 2023 12:12 PM. If you have any questions, ask your nurse or doctor.            CONTINUE taking these medications      Instructions Last Dose Given Next Dose Due   Acetaminophen Extra Strength 500 MG tablet  Generic drug: acetaminophen      Take 1 tablet by mouth Every 4 (Four) Hours As Needed for Mild Pain.       Aspirin Low Dose 81 MG EC tablet  Generic drug: aspirin      Take 1 tablet by mouth Daily.       cetirizine-pseudoephedrine 5-120 MG per 12 hr tablet  Commonly known as: ZyrTEC-D      Take 1 tablet by mouth 2 (Two) Times a Day.       pantoprazole 40 MG EC tablet  Commonly known as: PROTONIX      Take 1 tablet by mouth Daily.       Xiidra 5 % ophthalmic solution  Generic drug: Lifitegrast                    As always, it has been a pleasure to participate in your patient's care.      Sincerely,       SHIRLENE Loya

## 2023-05-18 ENCOUNTER — TELEPHONE (OUTPATIENT)
Dept: GASTROENTEROLOGY | Facility: CLINIC | Age: 33
End: 2023-05-18
Payer: MEDICAID

## 2023-05-18 NOTE — TELEPHONE ENCOUNTER
----- Message from SHIRLENE Burgess sent at 3/13/2023  9:44 AM EDT -----  Please inform the patient H. pylori breath test is negative which is good news.  No evidence of bacterial infection.  If still symptomatic arrange a follow-up.

## 2023-05-18 NOTE — TELEPHONE ENCOUNTER
Call to patient per Brian's APRN results and recommendations.   Patient verbalized understanding    At this time, patient states she thinks the pantoprazole is working

## 2023-06-16 ENCOUNTER — OFFICE VISIT (OUTPATIENT)
Dept: CARDIOLOGY | Facility: CLINIC | Age: 33
End: 2023-06-16
Payer: MEDICAID

## 2023-06-16 VITALS
OXYGEN SATURATION: 97 % | HEART RATE: 80 BPM | SYSTOLIC BLOOD PRESSURE: 115 MMHG | HEIGHT: 61 IN | WEIGHT: 119.8 LBS | BODY MASS INDEX: 22.62 KG/M2 | DIASTOLIC BLOOD PRESSURE: 60 MMHG

## 2023-06-16 DIAGNOSIS — I25.42 SPONTANEOUS DISSECTION OF CORONARY ARTERY: Primary | ICD-10-CM

## 2023-06-16 DIAGNOSIS — Z95.1 S/P CABG X 3: ICD-10-CM

## 2023-06-16 PROBLEM — I24.9 ACS (ACUTE CORONARY SYNDROME): Status: RESOLVED | Noted: 2022-10-14 | Resolved: 2023-06-16

## 2023-06-16 PROCEDURE — 1159F MED LIST DOCD IN RCRD: CPT | Performed by: INTERNAL MEDICINE

## 2023-06-16 PROCEDURE — 93000 ELECTROCARDIOGRAM COMPLETE: CPT | Performed by: INTERNAL MEDICINE

## 2023-06-16 PROCEDURE — 99214 OFFICE O/P EST MOD 30 MIN: CPT | Performed by: INTERNAL MEDICINE

## 2023-06-16 PROCEDURE — 1160F RVW MEDS BY RX/DR IN RCRD: CPT | Performed by: INTERNAL MEDICINE

## 2023-06-16 RX ORDER — ERYTHROMYCIN 5 MG/G
OINTMENT OPHTHALMIC
COMMUNITY
Start: 2023-05-31

## 2023-06-16 NOTE — PROGRESS NOTES
Subjective:     Encounter Date:06/16/2023      Patient ID: Esme Herrera is a 33 y.o. female.    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     I initially saw the patient after she presented to the hospital on 10/14/2022 with complaints of severe substernal chest pain which radiated to her back.  Symptoms started at rest and occurred abruptly.  The pain lasted for a while before resolving on its own.  She ended up going to the emergency room due to the severity of her symptoms.  By the time she got to the emergency room her symptoms had resolved shortly after.  She reported that she had had a similar episode about a day prior that was not as severe but did last longer.  Episodes mainly occurred at rest and were not associated with any diaphoresis or nausea.  She did report associated shortness of breath.  Her initial EKG following her arrival showed mild ST elevation in 1 and aVL and inferior ST depression.  Repeat EKG in the ER showed resolution of her ST changes.  However her initial troponin was elevated at 1.180.  D-dimer was elevated at 2.  proBNP was mildly elevated.  A CT angiogram of the chest showed no evidence of pulmonary embolism.     Following her admission she was started on heparin infusion.  She remained pain-free even with exertion.  Repeat troponins were trending down.  Repeat EKG showed development of anterior T wave changes.  She denies any prior medical history, tobacco or drug use, or any family history of heart disease.  She denied any increased recent stressors.     The patient underwent cardiac catheterization on 10/15/2022.  This showed spontaneous coronary artery dissection involving the distal left main, left circumflex, left anterior descending, and ramus  intermedius branches along with thrombus.  There appeared to be at most SHERLEY I flow distally.  Her right coronary artery appeared to be completely normal.  Left ventriculogram showed akinesis of the mid to distal anterior wall and apex with an EF of about 30 to 35%.  Due to the extent of her spontaneous coronary artery dissection and poor distal flow cardiothoracic surgery was consulted since PCI was not a good option.  She was evaluated by Dr. Teixeira and subsequently taken emergently for CABG x3 with LIMA to LAD, saphenous vein graft to an OM1, and saphenous vein graft to the ramus intermedius.  Postoperatively she progressed slowly.  She had issues with persistent hypotension that eventually resolved.  We were unable to start her on any guideline directed management for her scad or cardiomyopathy outside of aspirin.  An echocardiogram performed on 10/16/2022 showed mildly dilated left ventricle with an EF of 26 to 30% and anterior and apical wall motion abnormalities.  A repeat echocardiogram on 10/19/2022 was performed due to persistent hypotension which showed an EF of 30 to 35% again anterior and apical wall motion abnormalities and swirling of contrast in the apex but no evidence of thrombus.     The patient was finally discharged on 11/2/2022 on aspirin and clopidogrel for 1 month.     In follow-up on 11/8/2022 the patient reported that she was slowly improving.  Blood pressure is little bit better so I tried to start her on low-dose of metoprolol succinate 12.5 mg daily.  I recommended that she complete her month-long course of clopidogrel and then discontinue it.  I also recommended proceeding with a repeat echocardiogram to ensure that she had not developed any evidence of left ventricular apical thrombus and to see if there is any improvement in her LV function.       She underwent a repeat echocardiogram on 11/16/2022 which showed improvement of her left ventricular systolic function to an EF of 52.3%  although she continued to have akinesis of her apex.  Otherwise she had normal diastolic function, normal valvular function, and normal right ventricular systolic pressure.     Shortly after that she was admitted to the observation unit on 11/19/2022 with chest pain and lightheadedness.  Felt that the pain was atypical.  Troponins and were normal and EKG was unchanged.  Her metoprolol succinate was discontinued since she was not tolerating it.  No other changes were made to her management.     I saw her last in the office in 12/2022 at which time she appeared to be doing well from a cardiac standpoint and I did not make any changes to her management.  She since saw SHIRLENE Loya in follow-up in 3/2023 at which time she was continued to do well.    She returns today for routine 3-month follow-up.  She denies any significant chest pain, shortness of breath, palpitations, orthopnea, near-syncope or syncope, or lower extremity edema.  She reports that since I saw her last she underwent genetic testing as further work-up for her history of SCAD.  She also reports that she has been evaluated by neurology.  It does not sound like she has had any recent imaging performed.    She is wondering if and when she can get pregnant again.  She currently has 2 boys ages 6 and 3.    Review of Systems   Constitutional: Positive for malaise/fatigue.   HENT:  Negative for hearing loss, hoarse voice, nosebleeds and sore throat.    Eyes:  Negative for pain.   Cardiovascular:  Negative for chest pain, claudication, cyanosis, dyspnea on exertion, irregular heartbeat, leg swelling, near-syncope, orthopnea, palpitations, paroxysmal nocturnal dyspnea and syncope.   Respiratory:  Negative for shortness of breath and snoring.    Endocrine: Negative for cold intolerance, heat intolerance, polydipsia, polyphagia and polyuria.   Skin:  Negative for itching and rash.   Musculoskeletal:  Negative for arthritis, falls, joint pain, joint swelling,  muscle cramps, muscle weakness and myalgias.   Gastrointestinal:  Negative for constipation, diarrhea, dysphagia, heartburn, hematemesis, hematochezia, melena, nausea and vomiting.   Genitourinary:  Negative for frequency, hematuria and hesitancy.   Neurological:  Negative for excessive daytime sleepiness, dizziness, headaches, light-headedness, numbness and weakness.   Psychiatric/Behavioral:  Negative for depression. The patient is not nervous/anxious.        Current Outpatient Medications:     acetaminophen (TYLENOL) 500 MG tablet, Take 1 tablet by mouth Every 4 (Four) Hours As Needed for Mild Pain., Disp: 30 tablet, Rfl: 0    aspirin 81 MG EC tablet, Take 1 tablet by mouth Daily., Disp: 30 tablet, Rfl: 11    cetirizine-pseudoephedrine (ZyrTEC-D) 5-120 MG per 12 hr tablet, Take 1 tablet by mouth 2 (Two) Times a Day., Disp: 14 tablet, Rfl: 0    Diclofenac Sodium (VOLTAREN) 1 % gel gel, APPLY 4 GRAMS TO AFFECTED AREA TOPICALLY 4 TIMES DAILY. MAX 16 GRAMS /JOINT/ DAY OR 32GRAMS DAILY MAX, Disp: , Rfl:     erythromycin (ROMYCIN) 5 MG/GM ophthalmic ointment, APPLY RICE SIZE AMOUNT BOTH EYES AT BEDTIME, Disp: , Rfl:     levothyroxine (SYNTHROID, LEVOTHROID) 50 MCG tablet, TAKE 1 TABLET BY MOUTH EVERY MORNING BEFORE BREAKFAST WITH WATER THEN WAIT 1 HOUR BEFORE EATING OR DRINKING ANYTHING, Disp: , Rfl:     pantoprazole (PROTONIX) 40 MG EC tablet, Take 1 tablet by mouth Daily., Disp: 30 tablet, Rfl: 12    Xiidra 5 % ophthalmic solution, , Disp: , Rfl:     Past Medical History:   Diagnosis Date    GERD (gastroesophageal reflux disease)     Hypotension        Past Surgical History:   Procedure Laterality Date    CARDIAC CATHETERIZATION N/A 10/15/2022    Procedure: Left Heart Cath;  Surgeon: Deandra Dumas MD;  Location: [facility] INVASIVE LOCATION;  Service: Cardiology;  Laterality: N/A;    CARDIAC CATHETERIZATION N/A 10/15/2022    Procedure: Coronary angiography;  Surgeon: Deandra Dumas MD;  Location: [facility]  "INVASIVE LOCATION;  Service: Cardiology;  Laterality: N/A;    CARDIAC CATHETERIZATION N/A 10/15/2022    Procedure: Left ventriculography;  Surgeon: Deandra Dumas MD;  Location: [facility] CATH INVASIVE LOCATION;  Service: Cardiology;  Laterality: N/A;    CORONARY ARTERY BYPASS GRAFT N/A 10/15/2022    Procedure: VERONICA, STERNOTOMY, CORONARY ARTERY BYPASS TIMES 3 WITH INTERNAL MAMMARY ARTERY GRAFT AND LEFT GREATER SAPHENOUS VEIN HARVEST, REPAIR OF CORONARY ARTERY DISSECTION,PRP;  Surgeon: Lakshmi Teixeira MD;  Location: [facility] CVOR;  Service: Cardiothoracic;  Laterality: N/A;    ENDOSCOPY N/A 1/12/2023    Procedure: ESOPHAGOGASTRODUODENOSCOPY with bxs;  Surgeon: Nish Quiroz MD;  Location: [facility] ENDOSCOPY;  Service: Gastroenterology;  Laterality: N/A;  PRE: Epigastric Pain  POST: Normal       History reviewed. No pertinent family history.    Social History     Tobacco Use    Smoking status: Never    Smokeless tobacco: Never   Vaping Use    Vaping Use: Never used   Substance Use Topics    Alcohol use: Never    Drug use: Never         ECG 12 Lead    Date/Time: 6/16/2023 3:19 PM  Performed by: Deandra Dumas MD  Authorized by: Deandra Dumas MD   Comparison: compared with previous ECG   Similar to previous ECG  Rhythm: sinus rhythm  T flattening: V1, V2, V3 and V4         Objective:     Visit Vitals  /60 (BP Location: Right arm, Patient Position: Sitting, Cuff Size: Adult)   Pulse 80   Ht 154.9 cm (61\")   Wt 54.3 kg (119 lb 12.8 oz)   SpO2 97%   BMI 22.64 kg/m²         Constitutional:       Appearance: Normal appearance. Well-developed.   Eyes:      General: Lids are normal.      Conjunctiva/sclera: Conjunctivae normal.      Pupils: Pupils are equal, round, and reactive to light.   HENT:      Head: Normocephalic and atraumatic.   Neck:      Vascular: No carotid bruit or JVD.      Lymphadenopathy: No cervical adenopathy.   Pulmonary:      Effort: Pulmonary effort is normal.      Breath sounds: Normal breath " sounds.   Cardiovascular:      Normal rate. Regular rhythm.      No gallop.    Pulses:     Radial: 2+ bilaterally.  Edema:     Peripheral edema absent.   Abdominal:      Palpations: Abdomen is soft.   Musculoskeletal:      Cervical back: Full passive range of motion without pain, normal range of motion and neck supple. Skin:     General: Skin is warm and dry.   Neurological:      Mental Status: Alert and oriented to person, place, and time.           Assessment:          Diagnosis Plan   1. Spontaneous dissection of coronary artery        2. S/P CABG x 3               Plan:       1.  Spontaneous coronary artery dissection.  Involving left main artery extending into the LAD and left circumflex arteries.  Required CABG x3.  She has recovered well from this.  More likely than not her coronary artery dissection has healed which is the normal outcome of this disease.  However I suspect she may have some issues with her distal LAD since she continues to have apical akinesis on her echocardiogram and her EKG shows stable anteroapical T wave changes.  She remains on aspirin which we will continue.    Due to the correlation of fibromuscular dysplasia with SCAD I recommended screening for fibromuscular dysplasia with renal artery ultrasound and a CT angiogram of the head and neck.    In terms of future pregnancies I advised the patient against it since this would put her at risk for recurrent spontaneous coronary dissection either during the pregnancy or soon after delivery.  2.  Ischemic cardiomyopathy.  Continues to have apical wall motion abnormalities but her EF has normalized to 52%.    I will call and discuss results of her CT angio of the head and neck and her renal artery ultrasound.  Otherwise we will tentatively plan on seeing the patient back again in 6 months.

## 2023-06-22 PROBLEM — R07.89 CHEST DISCOMFORT: Status: ACTIVE | Noted: 2023-06-22

## 2023-06-22 PROBLEM — R07.89 OTHER CHEST PAIN: Status: ACTIVE | Noted: 2023-06-22

## 2023-07-21 PROBLEM — I77.3 FIBROMUSCULAR DYSPLASIA OF CAROTID ARTERY: Status: ACTIVE | Noted: 2023-07-21

## 2023-07-21 PROBLEM — R07.89 OTHER CHEST PAIN: Status: RESOLVED | Noted: 2023-06-22 | Resolved: 2023-07-21

## 2023-07-21 PROBLEM — R07.9 CHEST PAIN: Status: RESOLVED | Noted: 2022-11-20 | Resolved: 2023-07-21

## 2023-07-21 PROBLEM — R07.89 CHEST DISCOMFORT: Status: RESOLVED | Noted: 2023-06-22 | Resolved: 2023-07-21

## 2023-07-21 PROBLEM — G93.0 INTRACRANIAL ARACHNOID CYST: Status: ACTIVE | Noted: 2023-07-21

## 2023-08-25 NOTE — PROGRESS NOTES
Subjective   History of Present Illness: Esme Herrera is a 33 y.o. female is being seen for consultation today at the request of Deandra Dumas MD for an intracranial arachnoid cyst. CTA head/Carotids done on 7/14/23.  She is doing well today.  She reports that she has had headaches for several years.  She reports that she had coronary bypass surgery approximately 9 months ago.  She reports that after the surgery she has had worsening of her headaches.  The headaches involve her whole head.  The headaches range from mild to severe.  The headaches are several times per week.  She denies any strokelike episodes.  Denies any changes in strength or sensation.    History of Present Illness    The following portions of the patient's history were reviewed and updated as appropriate: allergies, current medications, past family history, past medical history, past social history, past surgical history, and problem list.    Past Medical History:   Diagnosis Date    GERD (gastroesophageal reflux disease)     Hypotension         Past Surgical History:   Procedure Laterality Date    CARDIAC CATHETERIZATION N/A 10/15/2022    Procedure: Left Heart Cath;  Surgeon: Deandra Dumas MD;  Location: [facility] CATH INVASIVE LOCATION;  Service: Cardiology;  Laterality: N/A;    CARDIAC CATHETERIZATION N/A 10/15/2022    Procedure: Coronary angiography;  Surgeon: Deandra Dumas MD;  Location: [facility] CATH INVASIVE LOCATION;  Service: Cardiology;  Laterality: N/A;    CARDIAC CATHETERIZATION N/A 10/15/2022    Procedure: Left ventriculography;  Surgeon: Deandra Dumas MD;  Location: [facility] CATH INVASIVE LOCATION;  Service: Cardiology;  Laterality: N/A;    CARDIAC CATHETERIZATION N/A 6/22/2023    Procedure: Coronary angiography;  Surgeon: Deandra Dumas MD;  Location: [facility] CATH INVASIVE LOCATION;  Service: Cardiology;  Laterality: N/A;    CARDIAC CATHETERIZATION N/A 6/22/2023    Procedure: Left Heart Cath;  Surgeon: Deandra Dumas MD;   Location: [facility] CATH INVASIVE LOCATION;  Service: Cardiology;  Laterality: N/A;    CARDIAC CATHETERIZATION N/A 6/22/2023    Procedure: Left ventriculography;  Surgeon: Deandra Dumas MD;  Location: [facility] CATH INVASIVE LOCATION;  Service: Cardiology;  Laterality: N/A;    CORONARY ARTERY BYPASS GRAFT N/A 10/15/2022    Procedure: VERONICA, STERNOTOMY, CORONARY ARTERY BYPASS TIMES 3 WITH INTERNAL MAMMARY ARTERY GRAFT AND LEFT GREATER SAPHENOUS VEIN HARVEST, REPAIR OF CORONARY ARTERY DISSECTION,PRP;  Surgeon: Lakshmi Teixeira MD;  Location: [facility] CVOR;  Service: Cardiothoracic;  Laterality: N/A;    ENDOSCOPY N/A 1/12/2023    Procedure: ESOPHAGOGASTRODUODENOSCOPY with bxs;  Surgeon: Nish Quiroz MD;  Location: [facility] ENDOSCOPY;  Service: Gastroenterology;  Laterality: N/A;  PRE: Epigastric Pain  POST: Normal          Current Outpatient Medications:     acetaminophen (TYLENOL) 500 MG tablet, Take 1 tablet by mouth Every 4 (Four) Hours As Needed for Mild Pain., Disp: 30 tablet, Rfl: 0    aspirin 81 MG EC tablet, Take 1 tablet by mouth Daily., Disp: 30 tablet, Rfl: 11    Calcium Citrate-Vitamin D3 (CITRACAL) 315-6.25 MG-MCG tablet tablet, Take  by mouth Daily., Disp: , Rfl:     cetirizine-pseudoephedrine (ZyrTEC-D) 5-120 MG per 12 hr tablet, Take 1 tablet by mouth 2 (Two) Times a Day., Disp: 14 tablet, Rfl: 0    Diclofenac Sodium (VOLTAREN) 1 % gel gel, APPLY 4 GRAMS TO AFFECTED AREA TOPICALLY 4 TIMES DAILY. MAX 16 GRAMS /JOINT/ DAY OR 32GRAMS DAILY MAX, Disp: , Rfl:     erythromycin (ROMYCIN) 5 MG/GM ophthalmic ointment, APPLY RICE SIZE AMOUNT BOTH EYES AT BEDTIME, Disp: , Rfl:     famotidine (PEPCID) 20 MG tablet, Take 1 tablet by mouth., Disp: , Rfl:     levothyroxine (SYNTHROID, LEVOTHROID) 50 MCG tablet, TAKE 1 TABLET BY MOUTH EVERY MORNING BEFORE BREAKFAST WITH WATER THEN WAIT 1 HOUR BEFORE EATING OR DRINKING ANYTHING, Disp: , Rfl:     Restasis 0.05 % ophthalmic emulsion, Inject 1 drop into the eye., Disp: , Rfl:  "    thiamine (VITAMIN B-1) 100 MG tablet  tablet, Take 1 tablet by mouth Daily., Disp: , Rfl:     Xiidra 5 % ophthalmic solution, , Disp: , Rfl:     pantoprazole (PROTONIX) 40 MG EC tablet, Take 1 tablet by mouth Daily. (Patient not taking: Reported on 9/1/2023), Disp: 30 tablet, Rfl: 12     No Known Allergies     Social History     Socioeconomic History    Marital status:    Tobacco Use    Smoking status: Never    Smokeless tobacco: Never   Vaping Use    Vaping Use: Never used   Substance and Sexual Activity    Alcohol use: Never    Drug use: Never    Sexual activity: Yes     Partners: Male        History reviewed. No pertinent family history.     Review of Systems   Eyes:  Positive for visual disturbance (dry eyes, blurry vision).   Gastrointestinal:  Positive for nausea. Negative for diarrhea and vomiting.   Musculoskeletal:  Negative for gait problem.   Neurological:  Positive for dizziness, weakness, light-headedness, numbness and headaches. Negative for syncope and speech difficulty.     Objective     Vitals:    09/01/23 1133   BP: 110/70   Pulse: 81   Temp: 97.3 øF (36.3 øC)   SpO2: 96%   Weight: 54.1 kg (119 lb 3.7 oz)   Height: 154.9 cm (61\")     Body mass index is 22.53 kg/mý.      Physical Exam  Neurologic Exam  Awake, alert, oriented x3  Pupils equal round reactive to light  Extraocular muscles intact  Face symmetric  Speech is fluent and clear  No pronator drift  Motor exam  Bilateral deltoids 5/5, bilateral biceps 5/5, bilateral triceps 5/5, bilateral wrist extension 5/5 bilateral hand  5/5  Bilateral hip flexion 5/5, bilateral knee extension 5/5, bilateral DF/PF 5/5  No clonus  No Beltran's reflex  Steady normal gait  Able to detect  light touch in all 4 extremities        Assessment & Plan   Independent Review of Radiographic Studies:      I personally reviewed the images from the following studies.  CTA head and neck from July 14, 2023  The CTA images were reviewed there is an arachnoid " cyst involving the left middle cranial fossa measuring approximately 4 cm x 3 cm.  There is a symmetry of the middle cranial fossa compared to the right side suggesting this was congenital.  There is mild irregularities of the bilateral carotid arteries.  There is no significant stenosis    Medical Decision Makin-year-old female with an incidentally found cystic lesion within the left middle cranial fossa  -The cystic lesion most likely represents an arachnoid cyst.  Arachnoid cyst are mostly benign and do not significantly enlarge over time.  There is asymmetry of the left middle temporal fossa to suggest this is likely congenital.  I will plan for a follow-up MRI in 2 months to evaluate for any changes and to evaluate for any solid components.  This will also help evaluate for any mass effect.  She is also requesting a referral to neurology for further evaluation and management of her headaches.  I think most likely the headaches are unrelated to this cystic lesion  Diagnoses and all orders for this visit:    1. Arachnoid cyst (Primary)    2. Fibromuscular dysplasia of carotid artery  -     Duplex Carotid Ultrasound CAR    3. Other headache syndrome      Return in about 2 months (around 2023).    I spent 45 minutes reviewing the CTA images, discussing the management of arachnoid cyst, discussing the risks and benefits of surgery, discussing the natural history of arachnoid cyst

## 2023-09-01 ENCOUNTER — OFFICE VISIT (OUTPATIENT)
Dept: NEUROSURGERY | Facility: CLINIC | Age: 33
End: 2023-09-01
Payer: MEDICAID

## 2023-09-01 VITALS
WEIGHT: 119.23 LBS | HEIGHT: 61 IN | HEART RATE: 81 BPM | BODY MASS INDEX: 22.51 KG/M2 | DIASTOLIC BLOOD PRESSURE: 70 MMHG | OXYGEN SATURATION: 96 % | TEMPERATURE: 97.3 F | SYSTOLIC BLOOD PRESSURE: 110 MMHG

## 2023-09-01 DIAGNOSIS — G93.0 ARACHNOID CYST: Primary | ICD-10-CM

## 2023-09-01 DIAGNOSIS — G44.89 OTHER HEADACHE SYNDROME: ICD-10-CM

## 2023-09-01 DIAGNOSIS — I77.3 FIBROMUSCULAR DYSPLASIA OF CAROTID ARTERY: ICD-10-CM

## 2023-09-01 PROCEDURE — 1160F RVW MEDS BY RX/DR IN RCRD: CPT | Performed by: NEUROLOGICAL SURGERY

## 2023-09-01 PROCEDURE — 99204 OFFICE O/P NEW MOD 45 MIN: CPT | Performed by: NEUROLOGICAL SURGERY

## 2023-09-01 PROCEDURE — 1159F MED LIST DOCD IN RCRD: CPT | Performed by: NEUROLOGICAL SURGERY

## 2023-10-16 ENCOUNTER — OFFICE VISIT (OUTPATIENT)
Dept: NEUROLOGY | Facility: CLINIC | Age: 33
End: 2023-10-16
Payer: MEDICAID

## 2023-10-16 VITALS
SYSTOLIC BLOOD PRESSURE: 108 MMHG | BODY MASS INDEX: 22.28 KG/M2 | WEIGHT: 118 LBS | HEIGHT: 61 IN | DIASTOLIC BLOOD PRESSURE: 62 MMHG | HEART RATE: 68 BPM | OXYGEN SATURATION: 99 %

## 2023-10-16 DIAGNOSIS — I77.3 FIBROMUSCULAR DYSPLASIA OF CAROTID ARTERY: ICD-10-CM

## 2023-10-16 DIAGNOSIS — G93.0 INTRACRANIAL ARACHNOID CYST: ICD-10-CM

## 2023-10-16 DIAGNOSIS — G43.009 MIGRAINE WITHOUT AURA AND WITHOUT STATUS MIGRAINOSUS, NOT INTRACTABLE: Primary | ICD-10-CM

## 2023-10-16 DIAGNOSIS — I25.42 SPONTANEOUS DISSECTION OF CORONARY ARTERY: ICD-10-CM

## 2023-10-16 DIAGNOSIS — I25.5 ISCHEMIC CARDIOMYOPATHY: ICD-10-CM

## 2023-10-16 PROCEDURE — 1159F MED LIST DOCD IN RCRD: CPT | Performed by: NURSE PRACTITIONER

## 2023-10-16 PROCEDURE — 1160F RVW MEDS BY RX/DR IN RCRD: CPT | Performed by: NURSE PRACTITIONER

## 2023-10-16 PROCEDURE — 99214 OFFICE O/P EST MOD 30 MIN: CPT | Performed by: NURSE PRACTITIONER

## 2023-10-16 RX ORDER — MAGNESIUM OXIDE 400 MG/1
400 TABLET ORAL DAILY
Qty: 90 TABLET | Refills: 0 | Status: SHIPPED | OUTPATIENT
Start: 2023-10-16 | End: 2024-01-14

## 2023-10-16 NOTE — PROGRESS NOTES
[facility] NEUROLOGY         Date of Visit: 10/16/2023    Name: Esme Herrera    :  1990    PCP: Kayce Wade APRN    Visit Type: an initial evaluation         Subjective     Patient ID: Esme is a 33 y.o. female.         History of Present Illness  I had the pleasure of seeing your patient for the first time today.  As you may know she is a 33-year-old female here today for initial evaluation for complaints of headaches.  She was referred by neurosurgery Dr. Sheppard.    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    Patient states that she has had headaches for many years now.  She states headaches worsened over the last year or so and became more severe and frequent.  She states that when she underwent surgery for a spontaneous coronary artery dissection which led to a heart attack they had been doing some additional vessel studies that she was diagnosed with fibromuscular dysplasia.  During her CTA of her head and neck which showed no other aneurysmal like outpouchings patient was found to have a arachnoid cyst in the left middle cranial fossa measuring approximately 4.5 x 3.7 x 2.6 cm.  She was then sent to neurosurgery for additional evaluation.    Patient did consult with Dr. Sheppard who is going to monitor growth of arachnoid cyst with repeat MRI in 2 months.  He does not believe the cyst is likely causing any of the patient's headache symptoms.    Patient states that she does have a family history of headaches with her brother experiencing migraine headache.  She denies any previous head trauma.    Current:    Patient currently reports approximately 8-12 headache days in the last month at least 4-6 of these are migrainous in nature.  She states headaches can last many hours in duration until the patient is able to go to sleep.  She has taken over-the-counter  medications such as Tylenol and ibuprofen for headache which is only sometimes helpful in aborting headache symptoms.  Typically they are not very effective.  She has never been on any migraine or headache abortive medications.  She has never been on any preventative therapies.  She does have mildly low blood pressure so cannot tolerate propranolol.  She also has a history of the previous artery dissection so we would avoid triptan medications especially with a history of CABG and ischemia.    Patient states headaches are mostly left-sided in nature starting in the posterior part of her head and neck and then radiate to a holocephalic headache she describes them as a throbbing or pressure-like sensation.  She rates them as a 10 out of 10 on the pain scale at their worst.  She does experience sensitivity to sound, nausea, and blurriness of vision with the headache.  She has had her eyes checked and they found no other abnormalities.  She denies a known triggers for the headaches.  No other new neurological complaints at today's visit.        The following portions of the patient's history were reviewed and updated as appropriate: allergies, current medications, past family history, past medical history, past social history, past surgical history, and problem list.                 Review of Systems   Constitutional:  Negative for activity change, appetite change, fatigue and unexpected weight change.   HENT:  Negative for hearing loss, tinnitus and trouble swallowing.         Phonophobia   Eyes:  Positive for visual disturbance. Negative for photophobia and pain.   Respiratory:  Negative for chest tightness and shortness of breath.    Cardiovascular:  Negative for palpitations.   Gastrointestinal:  Positive for nausea. Negative for vomiting.   Musculoskeletal:  Positive for neck pain. Negative for gait problem.   Neurological:  Positive for dizziness and headaches. Negative for tremors, seizures, syncope, facial  "asymmetry, speech difficulty, weakness, light-headedness and numbness.   Psychiatric/Behavioral:  Negative for confusion and sleep disturbance.             Current Medications:    Current Outpatient Medications   Medication Instructions    Acetaminophen Extra Strength 500 mg, Oral, Every 4 Hours PRN    Aspirin Low Dose 81 mg, Oral, Daily    Calcium Citrate-Vitamin D3 (CITRACAL) 315-6.25 MG-MCG tablet tablet Oral, Daily    cetirizine-pseudoephedrine (ZyrTEC-D) 5-120 MG per 12 hr tablet 1 tablet, Oral, 2 Times Daily    Diclofenac Sodium (VOLTAREN) 1 % gel gel APPLY 4 GRAMS TO AFFECTED AREA TOPICALLY 4 TIMES DAILY. MAX 16 GRAMS /JOINT/ DAY OR 32GRAMS DAILY MAX    erythromycin (ROMYCIN) 5 MG/GM ophthalmic ointment APPLY RICE SIZE AMOUNT BOTH EYES AT BEDTIME    famotidine (PEPCID) 20 mg    levothyroxine (SYNTHROID, LEVOTHROID) 50 MCG tablet TAKE 1 TABLET BY MOUTH EVERY MORNING BEFORE BREAKFAST WITH WATER THEN WAIT 1 HOUR BEFORE EATING OR DRINKING ANYTHING    magnesium oxide (MAG-OX) 400 mg, Oral, Daily    pantoprazole (PROTONIX) 40 mg, Oral, Daily    Restasis 0.05 % ophthalmic emulsion 1 drop, Intraocular    thiamine (VITAMIN B-1) 100 mg, Oral, Daily    ubrogepant (UBRELVY) 100 mg, Oral, As Needed    Xiidra 5 % ophthalmic solution No dose, route, or frequency recorded.          /62   Pulse 68   Ht 154.9 cm (61\")   Wt 53.5 kg (118 lb)   SpO2 99%   BMI 22.30 kg/m²                Objective     Neurological Exam  Mental Status  Awake, alert and oriented to person, place and time. Speech is normal. Language is fluent with no aphasia.    Cranial Nerves  CN II: Visual fields full to confrontation.  CN III, IV, VI: Extraocular movements intact bilaterally. Normal lids and orbits bilaterally. Pupils equal round and reactive to light bilaterally.  CN V: Facial sensation is normal.  CN VII: Full and symmetric facial movement.  CN IX, X: Palate elevates symmetrically  CN XI: Shoulder shrug strength is normal.  CN XII: " Tongue midline without atrophy or fasciculations.    Motor  Normal muscle bulk throughout. Normal muscle tone. No abnormal involuntary movements. Strength is 5/5 throughout all four extremities.    Sensory  Sensation is intact to light touch, pinprick, vibration and proprioception in all four extremities.    Reflexes  Deep tendon reflexes are 2+ and symmetric in all four extremities.    Coordination    Finger-to-nose, rapid alternating movements and heel-to-shin normal bilaterally without dysmetria.    Gait  Normal casual, toe, heel and tandem gait.      Physical Exam  Constitutional:       Appearance: Normal appearance. She is normal weight.   Eyes:      General: Lids are normal.      Extraocular Movements: Extraocular movements intact.      Pupils: Pupils are equal, round, and reactive to light.   Pulmonary:      Effort: Pulmonary effort is normal.   Skin:     General: Skin is warm.   Neurological:      Motor: Motor strength is normal.     Coordination: Coordination is intact.      Deep Tendon Reflexes: Reflexes are normal and symmetric.   Psychiatric:         Mood and Affect: Mood normal.         Speech: Speech normal.         Behavior: Behavior normal.                     Assessment & Plan     Diagnoses and all orders for this visit:    1. Migraine without aura and without status migrainosus, not intractable (Primary)  -     ubrogepant (Ubrelvy) 100 MG tablet; Take 1 tablet by mouth As Needed (migraine. max dose 2 doses in 24 hours) for up to 180 days.  Dispense: 10 tablet; Refill: 5  -     magnesium oxide (MAG-OX) 400 MG tablet; Take 1 tablet by mouth Daily for 90 days.  Dispense: 90 tablet; Refill: 0    2. Intracranial arachnoid cyst    3. Fibromuscular dysplasia of carotid artery    4. Ischemic cardiomyopathy    5. Spontaneous dissection of coronary artery       At this time I would like to start patient on magnesium for headache prophylaxis.    We will give her samples of Ubrelvy for abortive treatment since  triptans are contraindicated due to cardiac issues.    Follow-up in 2 months or sooner if needed.            Danna Degroot APR    Neurology    [facility] Neurology [place]    Phone: [phone]    10/16/2023 , 14:13 EDT

## 2023-10-30 ENCOUNTER — TELEPHONE (OUTPATIENT)
Dept: NEUROSURGERY | Facility: CLINIC | Age: 33
End: 2023-10-30
Payer: MEDICAID

## 2023-10-30 NOTE — TELEPHONE ENCOUNTER
Called pt unable to leave . Appointment with Dr. Sheppard will need to be r/s until after MRI is completed.

## 2023-10-31 ENCOUNTER — OFFICE VISIT (OUTPATIENT)
Dept: GASTROENTEROLOGY | Facility: CLINIC | Age: 33
End: 2023-10-31
Payer: MEDICAID

## 2023-10-31 VITALS
HEART RATE: 72 BPM | WEIGHT: 117 LBS | TEMPERATURE: 97.6 F | OXYGEN SATURATION: 98 % | SYSTOLIC BLOOD PRESSURE: 86 MMHG | HEIGHT: 61 IN | BODY MASS INDEX: 22.09 KG/M2 | DIASTOLIC BLOOD PRESSURE: 52 MMHG

## 2023-10-31 DIAGNOSIS — R79.89 ELEVATED LIVER FUNCTION TESTS: ICD-10-CM

## 2023-10-31 DIAGNOSIS — R11.2 NAUSEA AND VOMITING, UNSPECIFIED VOMITING TYPE: ICD-10-CM

## 2023-10-31 DIAGNOSIS — R10.10 PAIN OF UPPER ABDOMEN: Primary | ICD-10-CM

## 2023-10-31 PROCEDURE — 1160F RVW MEDS BY RX/DR IN RCRD: CPT | Performed by: NURSE PRACTITIONER

## 2023-10-31 PROCEDURE — 1159F MED LIST DOCD IN RCRD: CPT | Performed by: NURSE PRACTITIONER

## 2023-10-31 PROCEDURE — 99214 OFFICE O/P EST MOD 30 MIN: CPT | Performed by: NURSE PRACTITIONER

## 2023-10-31 NOTE — PROGRESS NOTES
Chief Complaint   Patient presents with    elevated liver function tests       HPI    Esme Herrera is a  33 y.o. female here for a follow up visit for elevated liver function test.    This patient follows with Dr. Quiroz and myself.    She was found to have H. pylori gastritis following EGD in January of this year treated with a combination of Biaxin and amoxicillin.  Follow-up breath testing was negative.    He was referred back to us for elevated liver function test.  CMP 2 months ago normal.  She did have mild elevation 4 months ago.    Right upper quadrant ultrasound in June with gallbladder sludge, no evidence of cholelithiasis and normal-appearing biliary tree.    On visit today she reports postprandial upper abdominal pain that comes and goes.  Symptoms are always triggered by eating.  Associated nausea and episodes of vomiting.  Denies overt dyspeptic symptoms.  No dysphagia odynophagia.  She reports PPI therapy causes chest pain.  She is currently taking Pepcid.    No diarrhea, constipation or rectal bleeding.    Past Medical History:   Diagnosis Date    GERD (gastroesophageal reflux disease)     Hypotension        Past Surgical History:   Procedure Laterality Date    CARDIAC CATHETERIZATION N/A 10/15/2022    Procedure: Left Heart Cath;  Surgeon: Deandra Dumas MD;  Location: [facility] CATH INVASIVE LOCATION;  Service: Cardiology;  Laterality: N/A;    CARDIAC CATHETERIZATION N/A 10/15/2022    Procedure: Coronary angiography;  Surgeon: Deandra Dumas MD;  Location: [facility] CATH INVASIVE LOC[facility];  Service: Cardiology;  Laterality: N/A;    CARDIAC CATHETERIZATION N/A 10/15/2022    Procedure: Left ventriculography;  Surgeon: Deandra Dumas MD;  [facility];  Service: Cardiology;  Laterality: N/A;    CARDIAC CATHETERIZATION N/A 6/22/2023    Procedure: Coronary angiography;  Surgeon: Deandra Dumas MD;  Location:  [facility] CATH INVASIVE LOCATION;  Service: Cardiology;  Laterality:  N/A;    CARDIAC CATHETERIZATION N/A 6/22/2023    Procedure: Left Heart Cath;  Surgeon: Deandra Dumas MD;  Location: [facility] CATH INVASIVE LOCATION;  Service: Cardiology;  Laterality: N/A;    CARDIAC CATHETERIZATION N/A 6/22/2023    Procedure: Left ventriculography;  Surgeon: Deandra Dumas MD;  Location: [facility] CATH INVASIVE LOCATION;  Service: Cardiology;  Laterality: N/A;    CORONARY ARTERY BYPASS GRAFT N/A 10/15/2022    Procedure: VERONICA, STERNOTOMY, CORONARY ARTERY BYPASS TIMES 3 WITH INTERNAL MAMMARY ARTERY GRAFT AND LEFT GREATER SAPHENOUS VEIN HARVEST, REPAIR OF CORONARY ARTERY DISSECTION,PRP;  Surgeon: Lakshmi Teixeira MD;  Location: [facility] CVOR;  Service: Cardiothoracic;  Laterality: N/A;    ENDOSCOPY N/A 1/12/2023    Procedure: ESOPHAGOGASTRODUODENOSCOPY with bxs;  Surgeon: Nish Quiroz MD;  Location: [facility] ENDOSCOPY;  Service: Gastroenterology;  Laterality: N/A;  PRE: Epigastric Pain  POST: Normal       Scheduled Meds:     Continuous Infusions: No current facility-administered medications for this visit.      PRN Meds:     No Known Allergies    Social History     Socioeconomic History    Marital status:    Tobacco Use    Smoking status: Never    Smokeless tobacco: Never   Vaping Use    Vaping Use: Never used   Substance and Sexual Activity    Alcohol use: Never    Drug use: Never    Sexual activity: Yes     Partners: Male       History reviewed. No pertinent family history.    Review of Systems   Gastrointestinal:  Positive for abdominal pain, nausea and vomiting.       Vitals:    10/31/23 1407   BP:  102/52   Pulse: 72   Temp: 97.6 °F (36.4 °C)   SpO2: 98%       Physical Exam  Constitutional:       Appearance: She is well-developed.   Abdominal:      General: Bowel sounds are normal. There is no distension.      Palpations: Abdomen is soft. There is no mass.      Tenderness: There is no abdominal tenderness. There is no guarding.      Hernia: No hernia is present.   Skin:     General: Skin  is warm and dry.      Capillary Refill: Capillary refill takes less than 2 seconds.   Neurological:      Mental Status: She is alert and oriented to person, place, and time.   Psychiatric:         Behavior: Behavior normal.     Assessment    Diagnoses and all orders for this visit:    1. Pain of upper abdomen (Primary)  -     NM HIDA SCAN WITH PHARMACOLOGICAL INTERVENTION; Future    2. Nausea and vomiting, unspecified vomiting type  -     NM HIDA SCAN WITH PHARMACOLOGICAL INTERVENTION; Future    3. Elevated liver function tests  -     CBC (No Diff)  -     Comprehensive Metabolic Panel    Plan    Schedule HIDA scan  Labs today as above  Low-fat diet with antireflux dietary precautions  Patient would like to stay on Pepcid for now  Further recommendations pending imaging         SHIRLENE Burgess  [facility]  [address] [place] [address] - [place] [address]  Office: [phone]

## 2023-11-01 ENCOUNTER — TELEPHONE (OUTPATIENT)
Dept: GASTROENTEROLOGY | Facility: CLINIC | Age: 33
End: 2023-11-01
Payer: MEDICAID

## 2023-11-01 LAB
ALBUMIN SERPL-MCNC: 4.8 G/DL (ref 3.5–5.2)
ALBUMIN/GLOB SERPL: 2.2 G/DL
ALP SERPL-CCNC: 58 U/L (ref 39–117)
ALT SERPL-CCNC: 15 U/L (ref 1–33)
AST SERPL-CCNC: 18 U/L (ref 1–32)
BILIRUB SERPL-MCNC: 0.8 MG/DL (ref 0–1.2)
BUN SERPL-MCNC: 17 MG/DL (ref 6–20)
BUN/CREAT SERPL: 21.3 (ref 7–25)
CALCIUM SERPL-MCNC: 9.4 MG/DL (ref 8.6–10.5)
CHLORIDE SERPL-SCNC: 104 MMOL/L (ref 98–107)
CO2 SERPL-SCNC: 26.6 MMOL/L (ref 22–29)
CREAT SERPL-MCNC: 0.8 MG/DL (ref 0.57–1)
EGFRCR SERPLBLD CKD-EPI 2021: 99.3 ML/MIN/1.73
ERYTHROCYTE [DISTWIDTH] IN BLOOD BY AUTOMATED COUNT: 11.9 % (ref 12.3–15.4)
GLOBULIN SER CALC-MCNC: 2.2 GM/DL
GLUCOSE SERPL-MCNC: 91 MG/DL (ref 65–99)
HCT VFR BLD AUTO: 40.2 % (ref 34–46.6)
HGB BLD-MCNC: 13.9 G/DL (ref 12–15.9)
MCH RBC QN AUTO: 30.8 PG (ref 26.6–33)
MCHC RBC AUTO-ENTMCNC: 34.6 G/DL (ref 31.5–35.7)
MCV RBC AUTO: 89.1 FL (ref 79–97)
PLATELET # BLD AUTO: 274 10*3/MM3 (ref 140–450)
POTASSIUM SERPL-SCNC: 4.4 MMOL/L (ref 3.5–5.2)
PROT SERPL-MCNC: 7 G/DL (ref 6–8.5)
RBC # BLD AUTO: 4.51 10*6/MM3 (ref 3.77–5.28)
SODIUM SERPL-SCNC: 139 MMOL/L (ref 136–145)
WBC # BLD AUTO: 5.26 10*3/MM3 (ref 3.4–10.8)

## 2023-11-01 NOTE — TELEPHONE ENCOUNTER
----- Message from SHIRLENE Burgess sent at 11/1/2023  8:14 AM EDT -----  Please inform the patient lab work is normal.

## 2023-11-03 ENCOUNTER — HOSPITAL ENCOUNTER (OUTPATIENT)
Facility: HOSPITAL | Age: 33
Discharge: HOME | End: 2023-11-03
Admitting: NEUROLOGICAL SURGERY
Payer: MEDICAID

## 2023-11-03 DIAGNOSIS — G93.0 ARACHNOID CYST: ICD-10-CM

## 2023-11-03 DIAGNOSIS — I77.3 FIBROMUSCULAR DYSPLASIA OF CAROTID ARTERY: ICD-10-CM

## 2023-11-03 PROCEDURE — 0 GADOBENATE DIMEGLUMINE 529 MG/ML SOLUTION: Performed by: NEUROLOGICAL SURGERY

## 2023-11-03 PROCEDURE — A9577 INJ MULTIHANCE: HCPCS | Performed by: NEUROLOGICAL SURGERY

## 2023-11-03 PROCEDURE — 70553 MRI BRAIN STEM W/O & W/DYE: CPT

## 2023-11-03 RX ADMIN — GADOBENATE DIMEGLUMINE 10 ML: 529 INJECTION, SOLUTION INTRAVENOUS at 14:58

## 2023-11-16 ENCOUNTER — HOSPITAL ENCOUNTER (OUTPATIENT)
Dept: NUCLEAR MEDICINE | Facility: HOSPITAL | Age: 33
Discharge: HOME | End: 2023-11-16
Payer: MEDICAID

## 2023-11-16 DIAGNOSIS — R94.8 ABNORMAL BILIARY HIDA SCAN: Primary | ICD-10-CM

## 2023-11-16 DIAGNOSIS — R10.10 PAIN OF UPPER ABDOMEN: ICD-10-CM

## 2023-11-16 DIAGNOSIS — R11.2 NAUSEA AND VOMITING, UNSPECIFIED VOMITING TYPE: ICD-10-CM

## 2023-11-16 PROCEDURE — 78227 HEPATOBIL SYST IMAGE W/DRUG: CPT

## 2023-11-16 PROCEDURE — 25010000002 SINCALIDE PER 5 MCG: Performed by: NURSE PRACTITIONER

## 2023-11-16 PROCEDURE — A9537 TC99M MEBROFENIN: HCPCS | Performed by: NURSE PRACTITIONER

## 2023-11-16 PROCEDURE — 0 TECHNETIUM TC 99M MEBROFENIN KIT: Performed by: NURSE PRACTITIONER

## 2023-11-16 RX ORDER — KIT FOR THE PREPARATION OF TECHNETIUM TC 99M MEBROFENIN 45 MG/10ML
1 INJECTION, POWDER, LYOPHILIZED, FOR SOLUTION INTRAVENOUS
Status: COMPLETED | OUTPATIENT
Start: 2023-11-16 | End: 2023-11-16

## 2023-11-16 RX ADMIN — MEBROFENIN 1 DOSE: 45 INJECTION, POWDER, LYOPHILIZED, FOR SOLUTION INTRAVENOUS at 12:34

## 2023-11-16 RX ADMIN — SINCALIDE 1.1 MCG: 5 INJECTION, POWDER, LYOPHILIZED, FOR SOLUTION INTRAVENOUS at 13:15

## 2023-11-16 NOTE — PROGRESS NOTES
Please inform the patient HIDA scan is abnormal with decreased ejection fraction of the gallbladder.  I want her to see Dr. Donovan with surgical services to get his opinion on her symptoms and imaging.  Referral has been placed.

## 2023-11-17 ENCOUNTER — TELEPHONE (OUTPATIENT)
Dept: GASTROENTEROLOGY | Facility: CLINIC | Age: 33
End: 2023-11-17
Payer: MEDICAID

## 2023-11-17 NOTE — TELEPHONE ENCOUNTER
----- Message from SHIRLENE Burgess sent at 11/16/2023  3:23 PM EST -----  Please inform the patient HIDA scan is abnormal with decreased ejection fraction of the gallbladder.  I want her to see Dr. Donovan with surgical services to get his opinion on her symptoms and imaging.  Referral has been placed.

## 2023-11-30 ENCOUNTER — OFFICE VISIT (OUTPATIENT)
Dept: SURGERY | Facility: CLINIC | Age: 33
End: 2023-11-30
Payer: MEDICAID

## 2023-11-30 VITALS
HEIGHT: 61 IN | BODY MASS INDEX: 22.36 KG/M2 | DIASTOLIC BLOOD PRESSURE: 68 MMHG | WEIGHT: 118.4 LBS | SYSTOLIC BLOOD PRESSURE: 105 MMHG

## 2023-11-30 DIAGNOSIS — K82.8 BILIARY DYSKINESIA: Primary | ICD-10-CM

## 2023-11-30 PROCEDURE — 99203 OFFICE O/P NEW LOW 30 MIN: CPT | Performed by: SURGERY

## 2023-11-30 PROCEDURE — 1159F MED LIST DOCD IN RCRD: CPT | Performed by: SURGERY

## 2023-11-30 PROCEDURE — 1160F RVW MEDS BY RX/DR IN RCRD: CPT | Performed by: SURGERY

## 2023-11-30 NOTE — LETTER
December 4, 2023     Kayce Wade APRN     Patient: Esme Herrera   YOB: 1990   Date of Visit: 11/30/2023     Dear SHIRLENE Armenta:       Thank you for referring Esme Herrera to me for evaluation. Below are the relevant portions of my assessment and plan of care.    If you have questions, please do not hesitate to call me. I look forward to following Esme along with you.         Sincerely,        Donnell Donovan Jr., MD        CC:   No Recipients    Donnell Donovan Jr., MD  12/04/23 1644  Sign when Signing Visit  Subjective  Esme Herrera is a 33 y.o. female who presents to the office in surgical consultation from Kayce Wade APRN and SHIRLENE Burgess for biliary dyskinesia.    History of Present Illness     The patient had frequent symptoms of bloating especially after eating about a year ago.  She began to introduce intermittent fasting and those symptoms significantly improved.  She did not have any abdominal pain nor any nausea or vomiting.    She had a previous history of elevated liver function test, with mild elevation of the transaminases.  Those have normalized.  She was seen in the gastroenterology office and was complaining of postprandial symptoms.  A right upper quadrant ultrasound was performed that was normal except for sludge.  She had a HIDA scan that showed a decreased gallbladder ejection fraction of 29%.    She presents to our office for biliary dyskinesia and currently has no complaints of pain.  She states that her postprandial abdominal pain has completely resolved and she has no nausea.  She is eating a healthy diet and employing intermittent fasting and feels that that has improved her symptoms.    Review of Systems   Constitutional:  Negative for fatigue and fever.   Respiratory:  Negative for chest tightness and shortness of breath.    Cardiovascular:  Negative for chest pain and palpitations.   Gastrointestinal:  Negative for abdominal pain, blood  in stool, constipation, diarrhea, nausea and vomiting.     Past Medical History:   Diagnosis Date   • GERD (gastroesophageal reflux disease)    • Hypotension      Past Surgical History:   Procedure Laterality Date   • CARDIAC CATHETERIZATION N/A 10/15/2022    Procedure: Left Heart Cath;  Surgeon: Deandra Dumas MD;  Location: [facility] CATH INVASIVE LOCATION;  Service: Cardiology;  Laterality: N/A;   • CARDIAC CATHETERIZATION N/A 10/15/2022    Procedure: Coronary angiography;  Surgeon: Deandra Dumas MD;  Location: [facility] CATH INVASIVE LOCATION;  Service: Cardiology;  Laterality: N/A;   • CARDIAC CATHETERIZATION N/A 10/15/2022    Procedure: Left ventriculography;  Surgeon: Deandra Dumas MD;  Location: [facility] CATH INVASIVE LOCATION;  Service: Cardiology;  Laterality: N/A;   • CARDIAC CATHETERIZATION N/A 6/22/2023    Procedure: Coronary angiography;  Surgeon: Deandra Dumas MD;  Location: [facility] CATH INVASIVE LOCATION;  Service: Cardiology;  Laterality: N/A;   • CARDIAC CATHETERIZATION N/A 6/22/2023    Procedure: Left Heart Cath;  Surgeon: Deandra Dumas MD;  Location: [facility] CATH INVASIVE LOCATION;  Service: Cardiology;  Laterality: N/A;   • CARDIAC CATHETERIZATION N/A 6/22/2023    Procedure: Left ventriculography;  Surgeon: Deandra Dumas MD;  Location: [facility] CATH INVASIVE LOCATION;  Service: Cardiology;  Laterality: N/A;   • CORONARY ARTERY BYPASS GRAFT N/A 10/15/2022    Procedure: VERONICA, STERNOTOMY, CORONARY ARTERY BYPASS TIMES 3 WITH INTERNAL MAMMARY ARTERY GRAFT AND LEFT GREATER SAPHENOUS VEIN HARVEST, REPAIR OF CORONARY ARTERY DISSECTION,PRP;  Surgeon: Lakshmi Teixeira MD;  Location: [facility] CVOR;  Service: Cardiothoracic;  Laterality: N/A;   • ENDOSCOPY N/A 1/12/2023    Procedure: ESOPHAGOGASTRODUODENOSCOPY with bxs;  Surgeon: Nish Quiroz MD;  Location: [facility] ENDOSCOPY;  Service: Gastroenterology;  Laterality: N/A;  PRE: Epigastric Pain  POST: Normal     History reviewed. No pertinent family  history.  Social History     Socioeconomic History   • Marital status:    Tobacco Use   • Smoking status: Never   • Smokeless tobacco: Never   Vaping Use   • Vaping Use: Never used   Substance and Sexual Activity   • Alcohol use: Never   • Drug use: Never   • Sexual activity: Yes     Partners: Male       Objective  Physical Exam  Constitutional:       Appearance: Normal appearance. She is well-developed. She is not toxic-appearing.   Eyes:      General: No scleral icterus.  Pulmonary:      Effort: Pulmonary effort is normal. No respiratory distress.   Abdominal:      Palpations: Abdomen is soft.      Tenderness: There is no abdominal tenderness.   Skin:     General: Skin is warm and dry.   Neurological:      Mental Status: She is alert and oriented to person, place, and time.   Psychiatric:         Behavior: Behavior normal.         Thought Content: Thought content normal.         Judgment: Judgment normal.         Assessment & Plan    1.  Biliary dyskinesia: The patient was seen in the gastroenterology office about a month ago with postprandial symptoms that were suggestive of gallbladder disease.  She had a previous right upper quadrant ultrasound that was basically unremarkable.  A HIDA scan showed a decreased gallbladder ejection fraction of 29%.  At the time of her office visit today, she denies any abdominal pain and states that her postprandial symptoms have completely resolved.  She is not interested in a cholecystectomy if it can be avoided.  Based on the improvement in her symptoms and the lack of gallstones, a cholecystectomy is not mandatory.  This was discussed with her.  Dietary changes were also discussed with her.  She will follow her symptoms and return to our office if they recur.

## 2023-11-30 NOTE — PROGRESS NOTES
Subjective   Esme Herrera is a 33 y.o. female who presents to the office in surgical consultation from Kayce Wade APRN and SHIRLENE Burgess for biliary dyskinesia.    History of Present Illness     The patient had frequent symptoms of bloating especially after eating about a year ago.  She began to introduce intermittent fasting and those symptoms significantly improved.  She did not have any abdominal pain nor any nausea or vomiting.    She had a previous history of elevated liver function test, with mild elevation of the transaminases.  Those have normalized.  She was seen in the gastroenterology office and was complaining of postprandial symptoms.  A right upper quadrant ultrasound was performed that was normal except for sludge.  She had a HIDA scan that showed a decreased gallbladder ejection fraction of 29%.    She presents to our office for biliary dyskinesia and currently has no complaints of pain.  She states that her postprandial abdominal pain has completely resolved and she has no nausea.  She is eating a healthy diet and employing intermittent fasting and feels that that has improved her symptoms.    Review of Systems   Constitutional:  Negative for fatigue and fever.   Respiratory:  Negative for chest tightness and shortness of breath.    Cardiovascular:  Negative for chest pain and palpitations.   Gastrointestinal:  Negative for abdominal pain, blood in stool, constipation, diarrhea, nausea and vomiting.     Past Medical History:   Diagnosis Date    GERD (gastroesophageal reflux disease)     Hypotension      Past Surgical History:   Procedure Laterality Date    CARDIAC CATHETERIZATION N/A 10/15/2022    Procedure: Left Heart Cath;  Surgeon: Deandra Dumas MD;  Location: [facility] INVASIVE LOCATION;  Service: Cardiology;  Laterality: N/A;    CARDIAC CATHETERIZATION N/A 10/15/2022    Procedure: Coronary angiography;  Surgeon: Deandra Dumas MD;  Location: [facility] INVASIVE LOCATION;   Service: Cardiology;  Laterality: N/A;    CARDIAC CATHETERIZATION N/A 10/15/2022    Procedure: Left ventriculography;  Surgeon: Deandra Dumas MD;  Location: [facility] CATH INVASIVE LOCATION;  Service: Cardiology;  Laterality: N/A;    CARDIAC CATHETERIZATION N/A 6/22/2023    Procedure: Coronary angiography;  Surgeon: Deandra Dumas MD;  Location: [facility] CATH INVASIVE LOCATION;  Service: Cardiology;  Laterality: N/A;    CARDIAC CATHETERIZATION N/A 6/22/2023    Procedure: Left Heart Cath;  Surgeon: Deandra Dumas MD;  Location: [facility] CATH INVASIVE LOCATION;  Service: Cardiology;  Laterality: N/A;    CARDIAC CATHETERIZATION N/A 6/22/2023    Procedure: Left ventriculography;  Surgeon: Deandra Dumas MD;  Location: [facility] CATH INVASIVE LOCATION;  Service: Cardiology;  Laterality: N/A;    CORONARY ARTERY BYPASS GRAFT N/A 10/15/2022    Procedure: VERONICA, STERNOTOMY, CORONARY ARTERY BYPASS TIMES 3 WITH INTERNAL MAMMARY ARTERY GRAFT AND LEFT GREATER SAPHENOUS VEIN HARVEST, REPAIR OF CORONARY ARTERY DISSECTION,PRP;  Surgeon: Lakshmi Teixeira MD;  Location: [facility] CVOR;  Service: Cardiothoracic;  Laterality: N/A;    ENDOSCOPY N/A 1/12/2023    Procedure: ESOPHAGOGASTRODUODENOSCOPY with bxs;  Surgeon: Nish Quiroz MD;  Location: [facility] ENDOSCOPY;  Service: Gastroenterology;  Laterality: N/A;  PRE: Epigastric Pain  POST: Normal     History reviewed. No pertinent family history.  Social History     Socioeconomic History    Marital status:    Tobacco Use    Smoking status: Never    Smokeless tobacco: Never   Vaping Use    Vaping Use: Never used   Substance and Sexual Activity    Alcohol use: Never    Drug use: Never    Sexual activity: Yes     Partners: Male       Objective   Physical Exam  Constitutional:       Appearance: Normal appearance. She is well-developed. She is not toxic-appearing.   Eyes:      General: No scleral icterus.  Pulmonary:      Effort: Pulmonary effort is normal. No respiratory distress.    Abdominal:      Palpations: Abdomen is soft.      Tenderness: There is no abdominal tenderness.   Skin:     General: Skin is warm and dry.   Neurological:      Mental Status: She is alert and oriented to person, place, and time.   Psychiatric:         Behavior: Behavior normal.         Thought Content: Thought content normal.         Judgment: Judgment normal.         Assessment & Plan     1.  Biliary dyskinesia: The patient was seen in the gastroenterology office about a month ago with postprandial symptoms that were suggestive of gallbladder disease.  She had a previous right upper quadrant ultrasound that was basically unremarkable.  A HIDA scan showed a decreased gallbladder ejection fraction of 29%.  At the time of her office visit today, she denies any abdominal pain and states that her postprandial symptoms have completely resolved.  She is not interested in a cholecystectomy if it can be avoided.  Based on the improvement in her symptoms and the lack of gallstones, a cholecystectomy is not mandatory.  This was discussed with her.  Dietary changes were also discussed with her.  She will follow her symptoms and return to our office if they recur.

## 2023-12-07 NOTE — PROGRESS NOTES
Subjective   History of Present Illness: Esme Herrera is a 33 y.o. female is here today for follow-up on arachnoid cyst. MRI Brain was completed on 11/3/23.  She is doing well today.  No new complaints.  Denies any changes in the frequency or severity of her headaches.  Denies any changes in vision.  Denies any strokelike episodes.  Denies any changes in strength or sensation.      History of Present Illness    The following portions of the patient's history were reviewed and updated as appropriate: allergies, current medications, past family history, past medical history, past social history, past surgical history, and problem list.    Past Medical History:   Diagnosis Date    GERD (gastroesophageal reflux disease)     Hypotension         Past Surgical History:   Procedure Laterality Date    CARDIAC CATHETERIZATION N/A 10/15/2022    Procedure: Left Heart Cath;  Surgeon: Deandra Dumas MD;  Location: [facility] CATH INVASIVE LOCATION;  Service: Cardiology;  Laterality: N/A;    CARDIAC CATHETERIZATION N/A 10/15/2022    Procedure: Coronary angiography;  Surgeon: Deandra Dumas MD;  Location: [facility] CATH INVASIVE LOCATION;  Service: Cardiology;  Laterality: N/A;    CARDIAC CATHETERIZATION N/A 10/15/2022    Procedure: Left ventriculography;  Surgeon: Deandra Dumas MD;  Location: [facility] CATH INVASIVE LOCATION;  Service: Cardiology;  Laterality: N/A;    CARDIAC CATHETERIZATION N/A 6/22/2023    Procedure: Coronary angiography;  Surgeon: Deandra Dumas MD;  Location: [facility] CATH INVASIVE LOCATION;  Service: Cardiology;  Laterality: N/A;    CARDIAC CATHETERIZATION N/A 6/22/2023    Procedure: Left Heart Cath;  Surgeon: Deandra Dumas MD;  Location: [facility] CATH INVASIVE LOCATION;  Service: Cardiology;  Laterality: N/A;    CARDIAC CATHETERIZATION N/A 6/22/2023    Procedure: Left ventriculography;  Surgeon: Deandra Dumas MD;  Location: [facility] CATH INVASIVE LOCATION;  Service: Cardiology;  Laterality: N/A;     CORONARY ARTERY BYPASS GRAFT N/A 10/15/2022    Procedure: VERONICA, STERNOTOMY, CORONARY ARTERY BYPASS TIMES 3 WITH INTERNAL MAMMARY ARTERY GRAFT AND LEFT GREATER SAPHENOUS VEIN HARVEST, REPAIR OF CORONARY ARTERY DISSECTION,PRP;  Surgeon: Lakshmi Teixeira MD;  Location: [facility] CVOR;  Service: Cardiothoracic;  Laterality: N/A;    ENDOSCOPY N/A 1/12/2023    Procedure: ESOPHAGOGASTRODUODENOSCOPY with bxs;  Surgeon: Nish Quiroz MD;  Location: [facility] ENDOSCOPY;  Service: Gastroenterology;  Laterality: N/A;  PRE: Epigastric Pain  POST: Normal          Current Outpatient Medications:     acetaminophen (TYLENOL) 500 MG tablet, Take 1 tablet by mouth Every 4 (Four) Hours As Needed for Mild Pain., Disp: 30 tablet, Rfl: 0    aspirin 81 MG EC tablet, Take 1 tablet by mouth Daily., Disp: 30 tablet, Rfl: 11    Calcium Citrate-Vitamin D3 (CITRACAL) 315-6.25 MG-MCG tablet tablet, Take  by mouth Daily., Disp: , Rfl:     cetirizine-pseudoephedrine (ZyrTEC-D) 5-120 MG per 12 hr tablet, Take 1 tablet by mouth 2 (Two) Times a Day., Disp: 14 tablet, Rfl: 0    Diclofenac Sodium (VOLTAREN) 1 % gel gel, , Disp: , Rfl:     erythromycin (ROMYCIN) 5 MG/GM ophthalmic ointment, , Disp: , Rfl:     famotidine (PEPCID) 20 MG tablet, Take 1 tablet by mouth., Disp: , Rfl:     levothyroxine (SYNTHROID, LEVOTHROID) 50 MCG tablet, TAKE 1 TABLET BY MOUTH EVERY MORNING BEFORE BREAKFAST WITH WATER THEN WAIT 1 HOUR BEFORE EATING OR DRINKING ANYTHING, Disp: , Rfl:     magnesium oxide (MAG-OX) 400 MG tablet, Take 1 tablet by mouth Daily for 90 days., Disp: 90 tablet, Rfl: 0    pantoprazole (PROTONIX) 40 MG EC tablet, Take 1 tablet by mouth Daily., Disp: 30 tablet, Rfl: 12    Restasis 0.05 % ophthalmic emulsion, Inject 1 drop into the eye., Disp: , Rfl:     thiamine (VITAMIN B-1) 100 MG tablet  tablet, Take 1 tablet by mouth Daily., Disp: , Rfl:     Xiidra 5 % ophthalmic solution, , Disp: , Rfl:     ubrogepant (Ubrelvy) 100 MG tablet, Take 1 tablet by mouth  "As Needed (migraine. max dose 2 doses in 24 hours) for up to 180 days. (Patient not taking: Reported on 2023), Disp: 10 tablet, Rfl: 5     No Known Allergies     Social History     Socioeconomic History    Marital status:    Tobacco Use    Smoking status: Never    Smokeless tobacco: Never   Vaping Use    Vaping Use: Never used   Substance and Sexual Activity    Alcohol use: Never    Drug use: Never    Sexual activity: Yes     Partners: Male        History reviewed. No pertinent family history.     Review of Systems   Eyes:  Negative for visual disturbance.   Gastrointestinal:  Negative for nausea and vomiting.   Musculoskeletal:  Negative for gait problem.   Neurological:  Positive for dizziness and headaches. Negative for light-headedness and numbness.       Objective     Vitals:    23 1532   BP: 106/60   Pulse: 80   Temp: 98 °F (36.7 °C)   SpO2: 99%   Weight: 54.3 kg (119 lb 12.8 oz)   Height: 154.9 cm (61\")     Body mass index is 22.64 kg/m².      Physical Exam  Neurologic Exam  Awake, alert, oriented x3  Pupils equal round reactive to light  Extraocular muscles intact  Face symmetric  Speech is fluent and clear  No pronator drift  Motor exam  Bilateral deltoids 5/5, bilateral biceps 5/5, bilateral triceps 5/5, bilateral wrist extension 5/5 bilateral hand  5/5  Bilateral hip flexion 5/5, bilateral knee extension 5/5, bilateral DF/PF 5/5  No clonus  No Beltran's reflex  Steady normal gait  Able to detect  light touch in all 4 extremities          Assessment & Plan   Independent Review of Radiographic Studies:      I personally reviewed the images from the following studies.  MRI brain with and without contrast from November 3, 2023 and CTA head from 2023  The follow-up MRI images show no growth of the left anterior temporal arachnoid cyst.  There is no significant mass effect.  No solid components.    Medical Decision Makin-year-old female with an incidentally found " arachnoid cyst in the left middle cranial fossa and mild bilateral FMD within the carotid arteries  -I will plan to have her follow-up in 1 year with a repeat CTA head and neck to evaluate for any changes to the arachnoid cyst or FMD  -There is no surgical intervention needed at this time  Diagnoses and all orders for this visit:    1. Arachnoid cyst (Primary)  -     CT Angiogram Head With Contrast; Future  -     CT Angiogram Carotids; Future    2. Fibromuscular dysplasia of carotid artery  -     CT Angiogram Head With Contrast; Future  -     CT Angiogram Carotids; Future      Return in about 1 year (around 12/8/2024).    I spent 30 minutes reviewing the medical record, reviewing the MRI images, discussing the management of arachnoid cyst, discussing the natural history of arachnoid cyst

## 2023-12-08 ENCOUNTER — OFFICE VISIT (OUTPATIENT)
Dept: NEUROSURGERY | Facility: CLINIC | Age: 33
End: 2023-12-08
Payer: MEDICAID

## 2023-12-08 VITALS
DIASTOLIC BLOOD PRESSURE: 60 MMHG | HEIGHT: 61 IN | HEART RATE: 80 BPM | WEIGHT: 119.8 LBS | OXYGEN SATURATION: 99 % | SYSTOLIC BLOOD PRESSURE: 106 MMHG | BODY MASS INDEX: 22.62 KG/M2 | TEMPERATURE: 98 F

## 2023-12-08 DIAGNOSIS — G93.0 ARACHNOID CYST: Primary | ICD-10-CM

## 2023-12-08 DIAGNOSIS — I77.3 FIBROMUSCULAR DYSPLASIA OF CAROTID ARTERY: ICD-10-CM

## 2023-12-08 PROCEDURE — 99214 OFFICE O/P EST MOD 30 MIN: CPT | Performed by: NEUROLOGICAL SURGERY

## 2023-12-08 PROCEDURE — 1159F MED LIST DOCD IN RCRD: CPT | Performed by: NEUROLOGICAL SURGERY

## 2023-12-08 PROCEDURE — 1160F RVW MEDS BY RX/DR IN RCRD: CPT | Performed by: NEUROLOGICAL SURGERY

## 2023-12-11 NOTE — PROGRESS NOTES
Subjective:     Encounter Date:12/12/2023      Patient ID: Esme Herrera is a 33 y.o. female.    Chief Complaint:  History of Present Illness    This is a 33-year-old with spontaneous coronary artery dissection involving her distal left main, LAD, ramus intermedius, and left circumflex artery requiring CABG x 3, ischemic cardiomyopathy, arachnoid cyst, fibromuscular dysplasia of the bilateral carotid arteries and dominant left vertebral artery, who presents for follow-up.     In 6/2023 she appeared to be doing well and I did not make any changes to her management.  I did recommend proceeding with a CT angiogram of the head and neck and a renal artery ultrasound to look for further evidence of fibromuscular dysplasia as an underlying cause of her spontaneous coronary artery dissection.     Soon after that patient's office visit she ended up returning to the hospital on 6/22/2023 with complaints of chest pain.  She began having episodes of back pain associated with chest tightness and shortness of breath that would last several hours at a time and were occurring on a daily basis.  The episodes started to become longer at which point she opted to come to the emergency room.  She did feel like the symptoms are somewhat similar to her presentation in 10/2022.  Her work-up was unremarkable including normal troponins and stable EKG findings.  She was incidentally noted to have mildly elevated liver enzymes and a low TSH.       Due to her symptoms I opted to proceed with a cardiac catheterization which showed healing of her spontaneous coronary artery dissection with no evidence of any residual dissection, atretic LIMA to LAD, patent saphenous vein graft to the ramus intermedius, and a patent saphenous vein graft to the first obtuse marginal branch.  She was also noted to have chronic occlusion of a very small caliber distal LAD that was filled by collaterals from her diagonal branch.  LV function appeared to be  preserved with stable appearing apical wall motion abnormalities.       Work-up of her elevated liver enzymes were unremarkable including a liver ultrasound that did show evidence of gallbladder sludge but no cholecystitis or gallstones.  Felt that her chest pain was likely due to a gastrointestinal issue and recommended follow-up with Dr. Quiroz who she saw in the past.       She did undergo CT angiogram of the head and neck and renal artery ultrasound in 7/2023.  Her CT showed evidence of mild irregularity involving the cervical bilateral internal carotid arteries which appears more prominent on the right.  There is also evidence of minimal irregularity involving the dominant left vertebral artery.  These findings appear to be consistent with fibromuscular dysplasia.  Incidentally she was also noted to have a large arachnoid cyst involving the left middle cranial fossa and measuring 4.5 x 3.7 x 2.6 cm in size.   Renal artery ultrasound was normal.     Following her last office visit with me in 7/2023 she was referred to neurosurgery regarding her intracranial arachnoid cyst.  Recommended carotid artery ultrasound for further evaluation of her carotid arteries.    Following that office visit she was evaluated by neurosurgery in 9/2023.  Recommended MRI for follow-up to evaluate for any solid components on evidence of mass effect.  Carotid ultrasound was also recommended.  She underwent the MRI in 11/2023 and it showed stable appearing arachnoid cyst with no evidence of mass effect.  She was seen in follow-up by Dr. Sheppard on 12/8/2023.  He recommended repeat CTA of the head and neck peripheral follow-up of the arachnoid cyst and her FMD.    She was evaluated by neurology in 10/2023 in regards to her headaches.  She was diagnosed with migraine and started on therapy for this.    She is also since been evaluated by gastroenterology for ongoing postprandial upper abdominal pain.  She underwent a HIDA scan that showed  decreased ejection fraction of the gallbladder.  She was referred to Dr. Donnell Donovan in 11/2023 by which point her symptoms had resolved.  He did not recommend any further intervention at that time.    She presents today for routine follow-up.  She informs me today that she recently discovered that she is pregnant.  This was determined by home pregnancy test.  She has not seen an obstetrician yet and has an appointment scheduled with Dr. Geetha Fraser on 1/5/2024.  She admits that this pregnancy was a surprise and she was not planning on getting pregnant especially after I advised her against it in light of her history.    Otherwise from a cardiac standpoint she is doing well.  She denies any chest pain, shortness of breath, palpitations, orthopnea, near-syncope or syncope or lower extremity edema.  She reports some lightheadedness but nothing severe.     Prior History:  I initially saw the patient after she presented to the hospital on 10/14/2022 with complaints of severe substernal chest pain which radiated to her back.  Symptoms started at rest and occurred abruptly.  The pain lasted for a while before resolving on its own.  She ended up going to the emergency room due to the severity of her symptoms.  By the time she got to the emergency room her symptoms had resolved shortly after.  She reported that she had had a similar episode about a day prior that was not as severe but did last longer.  Episodes mainly occurred at rest and were not associated with any diaphoresis or nausea.  She did report associated shortness of breath.  Her initial EKG following her arrival showed mild ST elevation in 1 and aVL and inferior ST depression.  Repeat EKG in the ER showed resolution of her ST changes.  However her initial troponin was elevated at 1.180.  D-dimer was elevated at 2.  proBNP was mildly elevated.  A CT angiogram of the chest showed no evidence of pulmonary embolism.     Following her admission she was started on  heparin infusion.  She remained pain-free even with exertion.  Repeat troponins were trending down.  Repeat EKG showed development of anterior T wave changes.  She denies any prior medical history, tobacco or drug use, or any family history of heart disease.  She denied any increased recent stressors.     The patient underwent cardiac catheterization on 10/15/2022.  This showed spontaneous coronary artery dissection involving the distal left main, left circumflex, left anterior descending, and ramus intermedius branches along with thrombus.  There appeared to be at most SHERLEY I flow distally.  Her right coronary artery appeared to be completely normal.  Left ventriculogram showed akinesis of the mid to distal anterior wall and apex with an EF of about 30 to 35%.  Due to the extent of her spontaneous coronary artery dissection and poor distal flow cardiothoracic surgery was consulted since PCI was not a good option.  She was evaluated by Dr. Teixeira and subsequently taken emergently for CABG x3 with LIMA to LAD, saphenous vein graft to an OM1, and saphenous vein graft to the ramus intermedius.  Postoperatively she progressed slowly.  She had issues with persistent hypotension that eventually resolved.  We were unable to start her on any guideline directed management for her scad or cardiomyopathy outside of aspirin.  An echocardiogram performed on 10/16/2022 showed mildly dilated left ventricle with an EF of 26 to 30% and anterior and apical wall motion abnormalities.  A repeat echocardiogram on 10/19/2022 was performed due to persistent hypotension which showed an EF of 30 to 35% again anterior and apical wall motion abnormalities and swirling of contrast in the apex but no evidence of thrombus.     The patient was finally discharged on 11/2/2022 on aspirin and clopidogrel for 1 month.     In follow-up on 11/8/2022 the patient reported that she was slowly improving.  Blood pressure is little bit better so I tried to  start her on low-dose of metoprolol succinate 12.5 mg daily.  I recommended that she complete her month-long course of clopidogrel and then discontinue it.  I also recommended proceeding with a repeat echocardiogram to ensure that she had not developed any evidence of left ventricular apical thrombus and to see if there is any improvement in her LV function.       She underwent a repeat echocardiogram on 11/16/2022 which showed improvement of her left ventricular systolic function to an EF of 52.3% although she continued to have akinesis of her apex.  Otherwise she had normal diastolic function, normal valvular function, and normal right ventricular systolic pressure.     Shortly after that she was admitted to the observation unit on 11/19/2022 with chest pain and lightheadedness.  Felt that the pain was atypical.  Troponins and were normal and EKG was unchanged.  Her metoprolol succinate was discontinued since she was not tolerating it.  No other changes were made to her management.     She underwent genetic testing as further work-up for her history of SCAD by another provider.  Results of this are unknown.         Review of Systems   Constitutional: Negative for malaise/fatigue.   HENT:  Negative for hearing loss, hoarse voice, nosebleeds and sore throat.    Eyes:  Negative for pain.   Cardiovascular:  Negative for chest pain, claudication, cyanosis, dyspnea on exertion, irregular heartbeat, leg swelling, near-syncope, orthopnea, palpitations, paroxysmal nocturnal dyspnea and syncope.   Respiratory:  Negative for shortness of breath and snoring.    Endocrine: Negative for cold intolerance, heat intolerance, polydipsia, polyphagia and polyuria.   Skin:  Negative for itching and rash.   Musculoskeletal:  Negative for arthritis, falls, joint pain, joint swelling, muscle cramps, muscle weakness and myalgias.   Gastrointestinal:  Negative for constipation, diarrhea, dysphagia, heartburn, hematemesis, hematochezia,  melena, nausea and vomiting.   Genitourinary:  Negative for frequency, hematuria and hesitancy.   Neurological:  Positive for headaches and light-headedness. Negative for excessive daytime sleepiness, dizziness, numbness and weakness.   Psychiatric/Behavioral:  Negative for depression. The patient is not nervous/anxious.          Current Outpatient Medications:     acetaminophen (TYLENOL) 500 MG tablet, Take 1 tablet by mouth Every 4 (Four) Hours As Needed for Mild Pain., Disp: 30 tablet, Rfl: 0    aspirin 81 MG EC tablet, Take 1 tablet by mouth Daily., Disp: 30 tablet, Rfl: 11    Calcium Citrate-Vitamin D3 (CITRACAL) 315-6.25 MG-MCG tablet tablet, Take  by mouth Daily., Disp: , Rfl:     cetirizine-pseudoephedrine (ZyrTEC-D) 5-120 MG per 12 hr tablet, Take 1 tablet by mouth 2 (Two) Times a Day., Disp: 14 tablet, Rfl: 0    Diclofenac Sodium (VOLTAREN) 1 % gel gel, , Disp: , Rfl:     erythromycin (ROMYCIN) 5 MG/GM ophthalmic ointment, , Disp: , Rfl:     famotidine (PEPCID) 20 MG tablet, Take 1 tablet by mouth., Disp: , Rfl:     levothyroxine (SYNTHROID, LEVOTHROID) 50 MCG tablet, TAKE 1 TABLET BY MOUTH EVERY MORNING BEFORE BREAKFAST WITH WATER THEN WAIT 1 HOUR BEFORE EATING OR DRINKING ANYTHING, Disp: , Rfl:     magnesium oxide (MAG-OX) 400 MG tablet, Take 1 tablet by mouth Daily for 90 days., Disp: 90 tablet, Rfl: 0    pantoprazole (PROTONIX) 40 MG EC tablet, Take 1 tablet by mouth Daily., Disp: 30 tablet, Rfl: 12    Restasis 0.05 % ophthalmic emulsion, Inject 1 drop into the eye., Disp: , Rfl:     thiamine (VITAMIN B-1) 100 MG tablet  tablet, Take 1 tablet by mouth Daily., Disp: , Rfl:     ubrogepant (Ubrelvy) 100 MG tablet, Take 1 tablet by mouth As Needed (migraine. max dose 2 doses in 24 hours) for up to 180 days., Disp: 10 tablet, Rfl: 5    Xiidra 5 % ophthalmic solution, , Disp: , Rfl:     Past Medical History:   Diagnosis Date    GERD (gastroesophageal reflux disease)     Hypotension        Past Surgical  History:   Procedure Laterality Date    CARDIAC CATHETERIZATION N/A 10/15/2022    Procedure: Left Heart Cath;  Surgeon: Deandra Dumas MD;  Location: [facility] CATH INVASIVE LOCATION;  Service: Cardiology;  Laterality: N/A;    CARDIAC CATHETERIZATION N/A 10/15/2022    Procedure: Coronary angiography;  Surgeon: Deandra Dumas MD;  [facility]ASIVE LOC[facility];  Service: Cardiology;  Laterality: N/A;    CARDIAC CATHETERIZATION N/A 10/15/2022    Procedure: Left ventriculography;  Surgeon: Deandra Dumas MD;  [facility];  Service: Cardiology;  Laterality: N/A;    CARDIAC CATHETERIZATION N/A 6/22/2023    Procedure: Coronary angiography;  Surgeon: Deandra Dumas MD;  [facility] C[facility];  Service: Cardiology;  Laterality: N/A;    CARDIAC CATHETERIZATION N/A 6/22/2023    Procedure: Left Heart Cath;  Surgeon: Deandra Dumas MD;  Location: [facility] CATH INVASIVE LOCATION;  Service: Cardiology;  Laterality: N/A;    CARDIAC CATHETERIZATION N/A 6/22/2023    Procedure: Left ventriculography;  Surgeon: Deandra Dumas MD;  Location: [facility] CATH INVASIVE LOCATION;  Service: Cardiology;  Laterality: N/A;    CORONARY ARTERY BYPASS GRAFT N/A 10/15/2022    Procedure: VERONICA, STERNOTOMY, CORONARY ARTERY BYPASS TIMES 3 WITH INTERNAL MAMMARY ARTERY GRAFT AND LEFT GREATER SAPHENOUS VEIN HARVEST, REPAIR OF CORONARY ARTERY DISSECTION,PRP;  Surgeon: Lakshmi Teixeira MD;  Location: [facility] CVOR;  Service: Cardiothoracic;  Laterality: N/A;    ENDOSCOPY N/A 1/12/2023    Procedure: ESOPHAGOGASTRODUODENOSCOPY with bxs;  Surgeon: Nish Quiroz MD;  Location: [facility] ENDOSCOPY;  Service: Gastroenterology;  Laterality: N/A;  PRE: Epigastric Pain  POST: Normal       History reviewed. No pertinent family history.    Social History     Tobacco Use    Smoking status: Never    Smokeless tobacco: Never   Vaping Use    Vaping Use: Never used   Substance Use Topics    Alcohol use: Never     "Drug use: Never         ECG 12 Lead    Date/Time: 12/12/2023 3:18 PM  Performed by: Deandra Dumas MD    Authorized by: Deandra Dumas MD  Comparison: compared with previous ECG   Similar to previous ECG  Rhythm: sinus rhythm  T inversion: V2, V3 and V4             Objective:     Visit Vitals  /70 (BP Location: Right arm, Patient Position: Sitting, Cuff Size: Adult)   Pulse 90   Ht 154.9 cm (61\")   Wt 52.2 kg (115 lb)   SpO2 100%   BMI 21.73 kg/m²         Constitutional:       Appearance: Normal appearance. Well-developed.   Eyes:      General: Lids are normal.      Conjunctiva/sclera: Conjunctivae normal.      Pupils: Pupils are equal, round, and reactive to light.   HENT:      Head: Normocephalic and atraumatic.   Neck:      Vascular: No carotid bruit or JVD.      Lymphadenopathy: No cervical adenopathy.   Pulmonary:      Effort: Pulmonary effort is normal.      Breath sounds: Normal breath sounds.   Cardiovascular:      Normal rate. Regular rhythm.      No gallop.    Pulses:     Radial: 2+ bilaterally.  Edema:     Peripheral edema absent.   Abdominal:      Palpations: Abdomen is soft.   Musculoskeletal:      Cervical back: Full passive range of motion without pain, normal range of motion and neck supple. Skin:     General: Skin is warm and dry.   Neurological:      Mental Status: Alert and oriented to person, place, and time.             Assessment:          Diagnosis Plan   1. Spontaneous dissection of coronary artery        2. Ischemic cardiomyopathy        3. S/P CABG x 3        4. Fibromuscular dysplasia of carotid artery        5. Intracranial arachnoid cyst               Plan:       1.  Pregnancy.   Previously advised her against pregnancy because she is high risk.  This pregnancy is unplanned.  She is high risk in light of history of spontaneous coronary artery dissection just a little over a year ago that was unrelated to pregnancy at that time.  She also has evidence of FMD involving her " carotid arteries.  Will reach out to Dr. Fraser about moving up the patient's appointment and regarding a referral to [facility].  2.  Spontaneous coronary artery dissection.  Diagnosed in 10/2022 involving her left main artery and extending into the LAD and left circumflex artery requiring CABG x 3.  She underwent a repeat cardiac catheterization 6/2023 which showed that dissections have essentially healed.  LIMA to LAD is atretic but she still has patent saphenous vein graft to the OM and saphenous vein graft to the ramus intermedius.  She does have distal LAD occlusion with very small caliber vessel which I suspect is related to placement of her bypass.  However distal LAD is filled by left to left collaterals.  She continues to have anteroapical T wave changes and apical akinesis on echocardiograms but this is all stable.  Overall left ventricular function has normalized.   3.  History of ischemic cardiomyopathy.  Continues to have apical wall motion abnormalities but her left ventricular function has normalized.  4.  Fibromuscular dysplasia.  Involving bilateral carotid arteries and possibly involving her dominant left vertebral artery.  Plan for repeat CTA of the head and neck in a year per neurosurgery.  This also complicates her pregnancy.  5.  Intracranial arachnoid cyst.  Noted incidentally.  Felt to be congenital.  Plan for repeat CT head by neurosurgery in a year.    Will tentatively have the patient scheduled follow-up in 3 months.  In the meanwhile we will reach out to Dr. Fraser about moving up the patient's initial appointment.

## 2023-12-12 ENCOUNTER — OFFICE VISIT (OUTPATIENT)
Age: 33
End: 2023-12-12
Payer: MEDICAID

## 2023-12-12 VITALS
HEART RATE: 90 BPM | HEIGHT: 61 IN | WEIGHT: 115 LBS | DIASTOLIC BLOOD PRESSURE: 70 MMHG | OXYGEN SATURATION: 100 % | BODY MASS INDEX: 21.71 KG/M2 | SYSTOLIC BLOOD PRESSURE: 110 MMHG

## 2023-12-12 DIAGNOSIS — Z95.1 S/P CABG X 3: ICD-10-CM

## 2023-12-12 DIAGNOSIS — I77.3 FIBROMUSCULAR DYSPLASIA OF CAROTID ARTERY: ICD-10-CM

## 2023-12-12 DIAGNOSIS — I25.5 ISCHEMIC CARDIOMYOPATHY: ICD-10-CM

## 2023-12-12 DIAGNOSIS — G93.0 INTRACRANIAL ARACHNOID CYST: ICD-10-CM

## 2023-12-12 DIAGNOSIS — I25.42 SPONTANEOUS DISSECTION OF CORONARY ARTERY: Primary | ICD-10-CM

## 2023-12-12 PROCEDURE — 1159F MED LIST DOCD IN RCRD: CPT | Performed by: INTERNAL MEDICINE

## 2023-12-12 PROCEDURE — 99214 OFFICE O/P EST MOD 30 MIN: CPT | Performed by: INTERNAL MEDICINE

## 2023-12-12 PROCEDURE — 93000 ELECTROCARDIOGRAM COMPLETE: CPT | Performed by: INTERNAL MEDICINE

## 2023-12-12 PROCEDURE — 1160F RVW MEDS BY RX/DR IN RCRD: CPT | Performed by: INTERNAL MEDICINE

## 2023-12-13 ENCOUNTER — TELEPHONE (OUTPATIENT)
Dept: OBSTETRICS AND GYNECOLOGY | Facility: CLINIC | Age: 33
End: 2023-12-13
Payer: MEDICAID

## 2023-12-13 NOTE — TELEPHONE ENCOUNTER
Rescheduled new ob to next Friday, 12/22/23 at 11:15.  Is day/time that ok?    Thanks,   Tiki

## 2023-12-13 NOTE — TELEPHONE ENCOUNTER
----- Message from Geetha Fraser MD sent at 12/12/2023  4:01 PM EST -----  Can you get her scheduled in any open spot, and if nothing available in the next week, please let me know? Thanks!  ----- Message -----  From: Deandra Dumas MD  Sent: 12/12/2023   2:08 PM EST  To: Geetha Fraser MD    I believe you are seeing this patient as a new patient visit on 1/5/2024.  She is a patient of mine that presented in 10/2023 with spontaneous coronary artery dissection involving her left main, left anterior descending and left circumflex arteries.  Due to the extent of her dissection and poor flow she actually required emergent CABG x 3.  She has done well from a cardiac standpoint since then.  Her coronary artery dissections look essentially healed on repeat cardiac catheterization in 9/2023.    Since her initial presentation she also was found to have evidence of fibromuscular dysplasia involving her carotid arteries.      However during her office visit today she tells me she is pregnant (last period was 10/17).   I had advised her against getting pregnant and she reports this pregnancy was unplanned.  She has 2 children already and her recent presentation with SCAD was completely unrelated to any pregnancy.  However she is now high risk in light of her diagnosis and the above events over the last year.      I am reaching out to see if she could be seen sooner than her scheduled appointment on 1/5 and about a referral to [facility].      Please let me know if you have any further questions or if I can help in any other way.      Deandra Dumas

## 2023-12-15 ENCOUNTER — OFFICE VISIT (OUTPATIENT)
Dept: NEUROLOGY | Facility: CLINIC | Age: 33
End: 2023-12-15
Payer: MEDICAID

## 2023-12-15 VITALS
HEIGHT: 61 IN | BODY MASS INDEX: 21.52 KG/M2 | SYSTOLIC BLOOD PRESSURE: 112 MMHG | OXYGEN SATURATION: 99 % | WEIGHT: 114 LBS | DIASTOLIC BLOOD PRESSURE: 62 MMHG | HEART RATE: 71 BPM

## 2023-12-15 DIAGNOSIS — G43.009 MIGRAINE WITHOUT AURA AND WITHOUT STATUS MIGRAINOSUS, NOT INTRACTABLE: Primary | ICD-10-CM

## 2023-12-15 DIAGNOSIS — I77.3 FIBROMUSCULAR DYSPLASIA OF CAROTID ARTERY: ICD-10-CM

## 2023-12-15 DIAGNOSIS — G93.0 INTRACRANIAL ARACHNOID CYST: ICD-10-CM

## 2023-12-15 RX ORDER — MAGNESIUM OXIDE 400 MG/1
400 TABLET ORAL DAILY
Qty: 90 TABLET | Refills: 1 | Status: SHIPPED | OUTPATIENT
Start: 2023-12-15 | End: 2024-06-12

## 2023-12-16 NOTE — PROGRESS NOTES
[facility] NEUROLOGY         Date of Visit: 12/15/2023    Name: Esme Herrera    :  1990    PCP: Kayce Wade APRN    Visit Type: follow-up         Subjective     Patient ID: Esme is a 33 y.o. female.         History of Present Illness  I had the pleasure of seeing your patient today.  As you may know she is a 33-year-old female here today for follow up for complaints of headaches.  She was referred by neurosurgery Dr. Sheppard.    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    Patient states that she has had headaches for many years now.  She states headaches worsened over the last year or so and became more severe and frequent.  She states that when she underwent surgery for a spontaneous coronary artery dissection which led to a heart attack they had been doing some additional vessel studies that she was diagnosed with fibromuscular dysplasia.  During her CTA of her head and neck which showed no other aneurysmal like outpouchings patient was found to have a arachnoid cyst in the left middle cranial fossa measuring approximately 4.5 x 3.7 x 2.6 cm.  She was then sent to neurosurgery for additional evaluation.    Patient did consult with Dr. Sheppard who is going to monitor growth of arachnoid cyst with repeat MRI in 2 months.  He does not believe the cyst is likely causing any of the patient's headache symptoms.    Patient states that she does have a family history of headaches with her brother experiencing migraine headache.  She denies any previous head trauma.    Current:    At last appointment patient was experiencing approximately 12 days of headache per 30 days with at least 6 of them being migrainous in nature.  We did end up starting her on magnesium for headache prophylaxis and Ubrelvy for abortive treatment.  Patient states she has been taking magnesium daily  for headache prophylaxis.  She has noticed that has seemed to decrease her headache days overall by approximately 2 or 3 days/month as well as her migraine days by approximately half she is only had approximately 2-3 migraine days this last month.  She states the Ubrelvy was significantly helpful in aborting the headaches when she did have them.  She denies any side effects to either medication.  She states that she feels overall that this is a good spot in regards to her headaches.  She has not wanting to pursue any additional medication changes or management.  She has had difficulty filling Ubrelvy at the pharmacy though and would like us to follow-up on this.  No other new neurological complaints at today's visit.  See headache questionnaire dated 12/15/2023 for additional information.    Migraine      The following portions of the patient's history were reviewed and updated as appropriate: allergies, current medications, past family history, past medical history, past social history, past surgical history, and problem list.                 Review of Systems   Constitutional:  Negative for activity change, appetite change, fatigue and unexpected weight change.   HENT:  Negative for hearing loss, tinnitus and trouble swallowing.         Phonophobia   Eyes:  Positive for visual disturbance. Negative for photophobia and pain.   Respiratory:  Negative for chest tightness and shortness of breath.    Cardiovascular:  Negative for palpitations.   Gastrointestinal:  Positive for nausea. Negative for vomiting.   Musculoskeletal:  Positive for neck pain. Negative for gait problem.   Neurological:  Positive for dizziness and headaches. Negative for tremors, seizures, syncope, facial asymmetry, speech difficulty, weakness, light-headedness and numbness.   Psychiatric/Behavioral:  Negative for confusion and sleep disturbance.             Current Medications:    Current Outpatient Medications   Medication Instructions     "Acetaminophen Extra Strength 500 mg, Oral, Every 4 Hours PRN    Aspirin Low Dose 81 mg, Oral, Daily    Calcium Citrate-Vitamin D3 (CITRACAL) 315-6.25 MG-MCG tablet tablet Oral, Daily    cetirizine-pseudoephedrine (ZyrTEC-D) 5-120 MG per 12 hr tablet 1 tablet, Oral, 2 Times Daily    Diclofenac Sodium (VOLTAREN) 1 % gel gel     erythromycin (ROMYCIN) 5 MG/GM ophthalmic ointment     famotidine (PEPCID) 20 mg, Oral    levothyroxine (SYNTHROID, LEVOTHROID) 50 MCG tablet TAKE 1 TABLET BY MOUTH EVERY MORNING BEFORE BREAKFAST WITH WATER THEN WAIT 1 HOUR BEFORE EATING OR DRINKING ANYTHING    magnesium oxide (MAG-OX) 400 mg, Oral, Daily    pantoprazole (PROTONIX) 40 mg, Oral, Daily    Restasis 0.05 % ophthalmic emulsion 1 drop, Intraocular    thiamine (VITAMIN B-1) 100 mg, Oral, Daily    ubrogepant (UBRELVY) 100 mg, Oral, As Needed    Xiidra 5 % ophthalmic solution No dose, route, or frequency recorded.          /62   Pulse 71   Ht 154.9 cm (61\")   Wt 51.7 kg (114 lb)   SpO2 99%   BMI 21.54 kg/m²                Objective     Neurological Exam  Mental Status  Awake, alert and oriented to person, place and time. Speech is normal. Language is fluent with no aphasia.    Cranial Nerves  CN II: Visual fields full to confrontation.  CN III, IV, VI: Extraocular movements intact bilaterally. Normal lids and orbits bilaterally. Pupils equal round and reactive to light bilaterally.  CN V: Facial sensation is normal.  CN VII: Full and symmetric facial movement.  CN IX, X: Palate elevates symmetrically  CN XI: Shoulder shrug strength is normal.  CN XII: Tongue midline without atrophy or fasciculations.    Motor  Normal muscle bulk throughout. Normal muscle tone. No abnormal involuntary movements. Strength is 5/5 throughout all four extremities.    Sensory  Sensation is intact to light touch, pinprick, vibration and proprioception in all four extremities.    Reflexes  Deep tendon reflexes are 2+ and symmetric in all four " extremities.    Coordination    Finger-to-nose, rapid alternating movements and heel-to-shin normal bilaterally without dysmetria.    Gait  Normal casual, toe, heel and tandem gait.      Physical Exam  Constitutional:       Appearance: Normal appearance. She is normal weight.   Eyes:      General: Lids are normal.      Extraocular Movements: Extraocular movements intact.      Pupils: Pupils are equal, round, and reactive to light.   Pulmonary:      Effort: Pulmonary effort is normal.   Skin:     General: Skin is warm.   Neurological:      Motor: Motor strength is normal.     Coordination: Coordination is intact.      Deep Tendon Reflexes: Reflexes are normal and symmetric.   Psychiatric:         Mood and Affect: Mood normal.         Speech: Speech normal.         Behavior: Behavior normal.                     Assessment & Plan     Diagnoses and all orders for this visit:    1. Migraine without aura and without status migrainosus, not intractable (Primary)  -     magnesium oxide (MAG-OX) 400 MG tablet; Take 1 tablet by mouth Daily for 180 days.  Dispense: 90 tablet; Refill: 1  -     ubrogepant (Ubrelvy) 100 MG tablet; Take 1 tablet by mouth As Needed (migraine. max dose 2 doses in 24 hours) for up to 180 days.  Dispense: 10 tablet; Refill: 5    2. Intracranial arachnoid cyst    3. Fibromuscular dysplasia of carotid artery         At this time we will continue patient on magnesium for headache prophylaxis and Ubrelvy for abortive treatment.    We will go ahead and check I feel Ubrelvy likely needs PA and that is the hold-up on getting the medication filled for her.    Follow-up in 6 months or sooner if needed.            Danna GARBER    Neurology    [facility]    Phone: [phone]    12/15/2023 , 21:03 EST

## 2023-12-20 ENCOUNTER — TRANSCRIBE ORDERS (OUTPATIENT)
Dept: ULTRASOUND IMAGING | Facility: HOSPITAL | Age: 33
End: 2023-12-20
Payer: MEDICAID

## 2023-12-20 DIAGNOSIS — Z95.1 S/P CABG X 3: Primary | ICD-10-CM

## 2023-12-22 ENCOUNTER — TELEPHONE (OUTPATIENT)
Dept: OBSTETRICS AND GYNECOLOGY | Facility: CLINIC | Age: 33
End: 2023-12-22

## 2023-12-22 NOTE — TELEPHONE ENCOUNTER
Caller: Esme Herrera    Relationship: Self    Best call back number: [phone]    What is the best time to reach you:     ANY    Who are you requesting to speak with (clinical staff, provider,  specific staff member):     DR MOSLEY OR       What was the call regarding:     PT NEEDS TO CANCEL 12/22/2023 APPT - DUE TO BEING OUT OF STATE    SHE WOULD LIKE TO R/S HUB UNABLE TO FIND A SOONER KEVIN THEN 1/26/2024    PLEASE ADVISE

## 2024-01-04 ENCOUNTER — TELEPHONE (OUTPATIENT)
Dept: NEUROLOGY | Facility: CLINIC | Age: 34
End: 2024-01-04
Payer: MEDICAID

## 2024-01-04 NOTE — TELEPHONE ENCOUNTER
Approved today  The request has been approved. The authorization is effective from 01/04/2024 to 01/03/2025, as long as the member is enrolled in their current health plan. The request was approved as submitted. A written notification letter will follow with additional details.

## 2024-01-05 ENCOUNTER — INITIAL PRENATAL (OUTPATIENT)
Dept: OBSTETRICS AND GYNECOLOGY | Facility: CLINIC | Age: 34
End: 2024-01-05
Payer: MEDICAID

## 2024-01-05 VITALS — BODY MASS INDEX: 21.54 KG/M2 | DIASTOLIC BLOOD PRESSURE: 61 MMHG | SYSTOLIC BLOOD PRESSURE: 94 MMHG | WEIGHT: 114 LBS

## 2024-01-05 DIAGNOSIS — Z95.1 S/P CABG X 3: ICD-10-CM

## 2024-01-05 DIAGNOSIS — I77.3 FIBROMUSCULAR DYSPLASIA OF CAROTID ARTERY: ICD-10-CM

## 2024-01-05 DIAGNOSIS — I25.5 ISCHEMIC CARDIOMYOPATHY: ICD-10-CM

## 2024-01-05 DIAGNOSIS — E03.9 HYPOTHYROIDISM (ACQUIRED): ICD-10-CM

## 2024-01-05 DIAGNOSIS — G93.0 INTRACRANIAL ARACHNOID CYST: ICD-10-CM

## 2024-01-05 DIAGNOSIS — Z32.01 POSITIVE PREGNANCY TEST: Primary | ICD-10-CM

## 2024-01-05 LAB
B-HCG UR QL: POSITIVE
EXPIRATION DATE: ABNORMAL
INTERNAL NEGATIVE CONTROL: NEGATIVE
INTERNAL POSITIVE CONTROL: POSITIVE
Lab: ABNORMAL

## 2024-01-05 RX ORDER — LEVOTHYROXINE SODIUM 0.05 MG/1
50 TABLET ORAL
COMMUNITY

## 2024-01-05 RX ORDER — PRENATAL WITH FERROUS FUM AND FOLIC ACID 3080; 920; 120; 400; 22; 1.84; 3; 20; 10; 1; 12; 200; 27; 25; 2 [IU]/1; [IU]/1; MG/1; [IU]/1; MG/1; MG/1; MG/1; MG/1; MG/1; MG/1; UG/1; MG/1; MG/1; MG/1; MG/1
1 TABLET ORAL DAILY
Qty: 90 TABLET | Refills: 4 | Status: SHIPPED | OUTPATIENT
Start: 2024-01-05

## 2024-01-05 RX ORDER — LANOLIN ALCOHOL/MO/W.PET/CERES
CREAM (GRAM) TOPICAL
COMMUNITY
Start: 2023-12-15

## 2024-01-05 NOTE — PROGRESS NOTES
Initial OB Visit    Chief Complaint   Patient presents with    Initial Prenatal Visit     11w3d by period, US after appt today.   No issues        Esme Herrera is being seen today for her first obstetrical visit.  She is a 33 y.o.  11w3d gestation by LMP  FOB: Sherine, here with her today.  This is not a planned pregnancy.   OB History    Para Term  AB Living   4 2 2   1 2   SAB IAB Ectopic Molar Multiple Live Births   1         2      # Outcome Date GA Lbr Jv/2nd Weight Sex Delivery Anes PTL Lv   4 Current            3 Term 19   3175 g (7 lb) M Vag-Spont   GRACE   2 Term 15   3175 g (7 lb) M Vag-Spont   GRACE   1 SAB                Current obstetric complaints: nausea, denies vomiting.   Prior obstetric issues, potential pregnancy concerns: 2 prior SVDs without issues. The cardiac issues arose in   Family history of genetic issues (includes FOB): denies; pt's mom has ALS  Prior infections concerning in pregnancy (Rash, fever since LMP): denies  Varicella Hx:immune by vaccination  Prior genetic testing: denies  History of abnormal pap smears: denies  History of STIs: denies  History of HSV in self or partner? denies  Prepregnancy weight 114lb    Past Medical History:   Diagnosis Date    GERD (gastroesophageal reflux disease)     Hypotension        Past Surgical History:   Procedure Laterality Date    CARDIAC CATHETERIZATION N/A 10/15/2022    Procedure: Left Heart Cath;  Surgeon: Deandra Dumas MD;  Location: [facility] INVASIVE LOCATION;  Service: Cardiology;  Laterality: N/A;    CARDIAC CATHETERIZATION N/A 10/15/2022    Procedure: Coronary angiography;  Surgeon: Deandra Dumas MD;  Location[facility] C[facility]VASIVE LOCATION;  Service: Cardiology;  Laterality: N/A;    CARDIAC CATHETERIZATION N/A 10/15/2022    Procedure: Left ventriculography;  Surgeon: Deandra Dumas MD;  Location: [facility] CATH INVASIVE LOCATION;  Service: Cardiology;  Laterality: N/A;    CARDIAC  CATHETERIZATION N/A 6/22/2023    Procedure: Coronary angiography;  Surgeon: Deandra Dumas MD;  Location: [facility] CATH INVASIVE LOCATION;  Service: Cardiology;  Laterality: N/A;    CARDIAC CATHETERIZATION N/A 6/22/2023    Procedure: Left Heart Cath;  Surgeon: Deandra Dumas MD;  Location: [facility] CATH INVASIVE LOCATION;  Service: Cardiology;  Laterality: N/A;    CARDIAC CATHETERIZATION N/A 6/22/2023    Procedure: Left ventriculography;  Surgeon: Deandra Dumas MD;  Location: [facility] CATH INVASIVE LOCATION;  Service: Cardiology;  Laterality: N/A;    CORONARY ARTERY BYPASS GRAFT N/A 10/15/2022    Procedure: VERONICA, STERNOTOMY, CORONARY ARTERY BYPASS TIMES 3 WITH INTERNAL MAMMARY ARTERY GRAFT AND LEFT GREATER SAPHENOUS VEIN HARVEST, REPAIR OF CORONARY ARTERY DISSECTION,PRP;  Surgeon: Lakshmi Teixeira MD;  Location: [facility] CVOR;  Service: Cardiothoracic;  Laterality: N/A;    ENDOSCOPY N/A 1/12/2023    Procedure: ESOPHAGOGASTRODUODENOSCOPY with bxs;  Surgeon: Nish Quiroz MD;  Location: [facility] ENDOSCOPY;  Service: Gastroenterology;  Laterality: N/A;  PRE: Epigastric Pain  POST: Normal         Current Outpatient Medications:     acetaminophen (TYLENOL) 500 MG tablet, Take 1 tablet by mouth Every 4 (Four) Hours As Needed for Mild Pain., Disp: 30 tablet, Rfl: 0    Calcium Citrate-Vitamin D3 (CITRACAL) 315-6.25 MG-MCG tablet tablet, Take  by mouth Daily., Disp: , Rfl:     Diclofenac Sodium (VOLTAREN) 1 % gel gel, , Disp: , Rfl:     magnesium oxide (MAG-OX) 400 MG tablet, Take 1 tablet by mouth Daily for 180 days., Disp: 90 tablet, Rfl: 1    Magnesium Oxide -Mg Supplement 400 (240 Mg) MG tablet, , Disp: , Rfl:     Xiidra 5 % ophthalmic solution, , Disp: , Rfl:     levothyroxine (SYNTHROID, LEVOTHROID) 50 MCG tablet, Take 1 tablet by mouth Daily (Monday-Friday)., Disp: , Rfl:     Prenatal Vit-Fe Fumarate-FA (Prenatal 27-1) 27-1 MG tablet tablet, Take 1 tablet by mouth Daily., Disp: 90 tablet, Rfl: 4    No Known  Allergies    Social History     Socioeconomic History    Marital status:    Tobacco Use    Smoking status: Never    Smokeless tobacco: Never   Vaping Use    Vaping Use: Never used   Substance and Sexual Activity    Alcohol use: Never    Drug use: Never    Sexual activity: Yes     Partners: Male       Family History   Family history unknown: Yes       Review of systems   See HPI         Objective    BP 94/61   Wt 51.7 kg (114 lb)   LMP 10/17/2023   BMI 21.54 kg/m²       General Appearance:    Alert, cooperative, in no acute distress, habitus normal   Head:    Normocephalic, without obvious abnormality, atraumatic   Eyes:            Lids and lashes normal, conjunctivae and sclerae normal, no   icterus, no pallor, corneas clear   Ears:    Ears appear intact with no abnormalities noted       Neck:   No adenopathy, supple, trachea midline, no thyromegaly   Back:     No kyphosis present, no scoliosis present,                       Lungs:     Clear to auscultation,respirations regular, even and                   unlabored    Heart:    Regular rhythm and normal rate, normal S1 and S2, no            murmur, no gallop, no rub, no click   Breast Exam:    No masses, No nipple discharge   Abdomen:     Normal bowel sounds, no masses, no organomegaly, soft        non-tender, non-distended, no guarding, no rebound                 tenderness   Genitalia:    Vulva - BUS-WNL, NEFG    Vagina - No discharge, No bleeding    Cervix - No Lesions, closed     Uterus - Consistent with 10 weeks    Adnexa - No masses, NT    Pelvimetry - clinically adequate, gynecoid pelvis     Extremities:   Moves all extremities well, no edema, no cyanosis, no              redness   Pulses:   Pulses palpable and equal bilaterally   Skin:   No bleeding, bruising or rash   Lymph nodes:   No palpable adenopathy   Neurologic:   Sensation intact, A&O times 3      Assessment  (Z32.01) Positive pregnancy test - Plan: Urine Culture - Urine, Urine, Clean  Catch, POC Pregnancy, Urine, Drug Profile Urine - 9 Drugs - Urine, Clean Catch, IGP, Apt HPV,rfx 16 / 18,45, Chlamydia trachomatis, Neisseria gonorrhoeae, Trichomonas vaginalis, PCR - Swab, Vagina, OB Panel With HIV    (Z95.1) S/P CABG x 3    (I77.3) Fibromuscular dysplasia of carotid artery    (G93.0) Intracranial arachnoid cyst    (I25.5) Ischemic cardiomyopathy    (E03.9) Hypothyroidism (acquired) - Plan: TSH Rfx On Abnormal To Free T4    Plan    Initial labs drawn, GC/CHL screen done  Patient will start on Prenatal vitamins  Problem list reviewed and updated.  Reviewed routine prenatal care with the office to include but not limited to:   Questions regarding specific pregnancy activities.  Reviewed nature of practice and hospital.  Reviewed recommended follow up, importance of compliance with care. We reviewed testing in pregnancy including HIV testing and urine drug screen.    Reviewed aneuploidy screening and carrier genetic screening.  We reviewed limitations of testing, possibility of false positive/negative results, possible need for other tests as indicated.  Patient has upcoming appointment with [facility].  We reviewed these options and are happy to draw them once she decides how she wishes to proceed.  Counseled on limitations of ultrasound in pregnancy in detecting aneuploidy/fetal anomalies  She is aware that this is a very high risk pregnancy given the cardiac events that have arisen since her last delivery.    All questions answered.   Return in about 4 weeks (around 2/2/2024).      Geetha Fraser MD

## 2024-01-06 LAB
AMPHETAMINES UR QL SCN: NEGATIVE NG/ML
BARBITURATES UR QL SCN: NEGATIVE NG/ML
BENZODIAZ UR QL: NEGATIVE NG/ML
BZE UR QL: NEGATIVE NG/ML
CANNABINOIDS UR QL SCN: NEGATIVE NG/ML
METHADONE UR QL SCN: NEGATIVE NG/ML
OPIATES UR QL: NEGATIVE NG/ML
PCP UR QL SCN: NEGATIVE NG/ML
PROPOXYPH UR QL SCN: NEGATIVE NG/ML

## 2024-01-07 LAB
BACTERIA UR CULT: NO GROWTH
BACTERIA UR CULT: NORMAL

## 2024-01-09 LAB
CYTOLOGIST CVX/VAG CYTO: NORMAL
CYTOLOGY CVX/VAG DOC CYTO: NORMAL
CYTOLOGY CVX/VAG DOC THIN PREP: NORMAL
DX ICD CODE: NORMAL
HIV 1 & 2 AB SER-IMP: NORMAL
HPV I/H RISK 4 DNA CVX QL PROBE+SIG AMP: NEGATIVE
OTHER STN SPEC: NORMAL
STAT OF ADQ CVX/VAG CYTO-IMP: NORMAL

## 2024-01-10 LAB
C TRACH RRNA SPEC QL NAA+PROBE: NEGATIVE
N GONORRHOEA RRNA SPEC QL NAA+PROBE: NEGATIVE
T VAGINALIS RRNA SPEC QL NAA+PROBE: NEGATIVE

## 2024-01-10 NOTE — PROGRESS NOTES
MATERNAL FETAL MEDICINE Consult Note    Dear Dr Geetha Fraser MD:    Thank you for your kind referral of Esme Herrera.  As you know, she is a 33 y.o.   at  9 6/7 weeks gestation (Estimated Date of Delivery: 24). This is a consult.       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 No vaginal bleeding, no contractions.     Review of History:  Past Medical History:   Diagnosis Date    Coronary artery dissection 10/14/2022    Disease of thyroid gland     Hypothyroid    GERD (gastroesophageal reflux disease)     Hypotension      Past Surgical History:   Procedure Laterality Date    CARDIAC CATHETERIZATION N/A 10/15/2022    Procedure: Left Heart Cath;  Surgeon: Deandra Dumas MD;  Location: [facility] CATH [facility]VASIVE LOCATION;  Service: Cardiology;  Laterality: N/A;    CARDIAC CATHETERIZATION N/A 10/15/2022    Procedure: Coronary angiography;  Surgeon: Deandra Dumas MD;  Location:  [facility] CATH INVASIVE LOCATION;  Service: Cardiology;  Laterality: N/A;    CARDIAC CATHETERIZATION N/A 10/15/2022    Procedure: Left ventriculography;  Surgeon: Deandra Dumas MD;  Location:  [facility] CATH INVASIVE LOCATION;  Service: Cardiology;  Laterality: N/A;    CARDIAC CATHETERIZATION N/A 2023    Procedure: Coronary angiography;  Surgeon: Deandra Dumas MD;  Location: [facility] CATH [facility] LOC[facility];  Service: Cardiology;  Laterality: N/A;    CARDIAC CATHETERIZATION N/A 2023    Procedure: Left Heart Cath;  Surgeon: Deandra Dumas MD;  Location:  [facility] CATH INVASIVE LOCATION;  Service: Cardiology;  Laterality: N/A;    CARDIAC CATHETERIZATION N/A 2023    Procedure: Left ventriculography;  Surgeon: Deandra Dumas MD;  Location:  [facility] CATH INVASIVE LOCATION;  Service: Cardiology;  Laterality: N/A;    CORONARY ARTERY BYPASS  GRAFT N/A 10/15/2022    Procedure: VERONICA, STERNOTOMY, CORONARY ARTERY BYPASS TIMES 3 WITH INTERNAL MAMMARY ARTERY GRAFT AND LEFT GREATER SAPHENOUS VEIN HARVEST, REPAIR OF CORONARY ARTERY DISSECTION,PRP;  Surgeon: Lakshmi Teixeira MD;  Location: [facility] CVOR;  Service: Cardiothoracic;  Laterality: N/A;    ENDOSCOPY N/A 1/12/2023    Procedure: ESOPHAGOGASTRODUODENOSCOPY with bxs;  Surgeon: Nish Quiroz MD;  Location: [facility] ENDOSCOPY;  Service: Gastroenterology;  Laterality: N/A;  PRE: Epigastric Pain  POST: Normal     Social History     Socioeconomic History    Marital status:    Tobacco Use    Smoking status: Never    Smokeless tobacco: Never   Vaping Use    Vaping Use: Never used   Substance and Sexual Activity    Alcohol use: Never    Drug use: Never    Sexual activity: Yes     Partners: Male     Family History   Family history unknown: Yes      No Known Allergies   Current Outpatient Medications on File Prior to Visit   Medication Sig Dispense Refill    acetaminophen (TYLENOL) 500 MG tablet Take 1 tablet by mouth Every 4 (Four) Hours As Needed for Mild Pain. 30 tablet 0    Calcium Citrate-Vitamin D3 (CITRACAL) 315-6.25 MG-MCG tablet tablet Take  by mouth Daily.      levothyroxine (SYNTHROID, LEVOTHROID) 50 MCG tablet Take 1 tablet by mouth Daily (Monday-Friday).      magnesium oxide (MAG-OX) 400 MG tablet Take 1 tablet by mouth Daily for 180 days. 90 tablet 1    Magnesium Oxide -Mg Supplement 400 (240 Mg) MG tablet       Prenatal Vit-Fe Fumarate-FA (Prenatal 27-1) 27-1 MG tablet tablet Take 1 tablet by mouth Daily. 90 tablet 4    Xiidra 5 % ophthalmic solution       Diclofenac Sodium (VOLTAREN) 1 % gel gel        No current facility-administered medications on file prior to visit.        Past obstetric, gynecological, medical, surgical, family and social history reviewed.  Relevant lab work and imaging reviewed.    Review of systems  Constitutional:  denies fever, chills, malaise.   ENT/Mouth:  denies  sore throat, tinnitus  Eyes: denies vision changes/pain  CV:  denies chest pain  Respiratory:  denies cough/SOB  GI:  denies N/V, diarrhea, abdominal pain.    :   denies dysuria  Skin:  denies lesions or pruritus   Neuro:  denies weakness, focal neurologic symptoms    Vitals:    24 1217   BP: 108/59   BP Location: Right arm   Patient Position: Sitting   Pulse: 89   Temp: 98.6 °F (37 °C)   TempSrc: Temporal   Weight: 52 kg (114 lb 9.6 oz)       PHYSICAL EXAM   GENERAL: Not in acute distress, AAOx3, pleasant  CARDIO: regular rate and rhythm  PULM: symmetric chest rise, speaking in complete sentences without difficulty  NEURO: awake, alert and oriented to person, place, and time  ABDOMINAL: No fundal tenderness, no rebound or guarding, gravid  EXTREMITIES: no bilateral lower extremity edema/tenderness  SKIN: Warm, well-perfused      ULTRASOUND   Please view full ultrasound note on Imaging tab in ViewPoint.      ASSESSMENT/COUNSELIN y.o.   at  9 6/7 weeks gestation (Estimated Date of Delivery: 24).     -Pregnancy  [ X ] stable  [   ] improving [  ] worsening    Diagnoses and all orders for this visit:    1. S/P CABG x 3 (Primary)  -     ECG 12 Lead; Future  -     BNP; Future  -     Comprehensive Metabolic Panel; Future  -     CBC & Differential; Future  -     Uric Acid; Future  -     Lactate Dehydrogenase; Future    2. Fibromuscular dysplasia of carotid artery  -     ECG 12 Lead; Future  -     BNP; Future  -     Comprehensive Metabolic Panel; Future  -     CBC & Differential; Future  -     Uric Acid; Future  -     Lactate Dehydrogenase; Future    3. Intracranial arachnoid cyst  -     ECG 12 Lead; Future  -     BNP; Future  -     Comprehensive Metabolic Panel; Future  -     CBC & Differential; Future  -     Uric Acid; Future  -     Lactate Dehydrogenase; Future    4. Hypothyroidism (acquired)  -     ECG 12 Lead; Future  -     BNP; Future  -     Comprehensive Metabolic Panel; Future  -     CBC &  Differential; Future  -     Uric Acid; Future  -     Lactate Dehydrogenase; Future    Other orders  -     aspirin 81 MG EC tablet; Take 1 tablet by mouth Daily.  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 Dmuas with cardiology--appreciate her care.  She has had a history of coronary artery dissection in fall of 2022.  Her dissection involved the left main coronary artery and extended into the LAD and left circumflex requiring CABG x 3.  Cardiac cath 6/2023 showed dissections had healed.  She continues to have anteroapical T wave changes and apical akinesis on echocardiograms but this is all stable.  Overall left ventricular function has normalized. She has fibromuscular dysplasia, stable and will have a CTA of head and neck in year per neurosurgery.  I did tell her in no uncertain terms that pregnancy is not recommended with her history.  She declined to discuss termination, so we discussed management of her pregnancy and ways to optimize both her and baby's outcomes.      Because the vast majority of patients affected by SCAD are women and SCAD constitutes an important cause of pregnancy-associated MI.  It's mechanism if uncertain but estrogen/progesterone have been shown to possibly play a role.   SCAD symptoms often onset after intense physical activity (at least 1/3 of the time), so we discussed lifting precautions and no intense exertional activity this pregnancy (no heavy lifting, etc., but normal activity/walking okay).  She should avoid straining or prolonged valsalva.  We also discussed starting a baby ASA.     Rates of recurrent SCAD are variably reported (10% to 30%), probably closer to 10% as there are some errors in study design in differentiating new vs recurrent SCAD in some of those studies.  Rates of recurrence are similar in pregnancy.  Many women experience increase in angina as pregnancy  progresses.  She has no chest pain or SOB at this time, but we spent a lot of time discussing we would have a VERY low threshold to get a new ECHO/admit/workup if she has any of these symptoms.  We will get a baseline BNP, EKG at her convenience.  The data suggest that the majority of SCAD patients who subsequently conceive experience uncomplicated pregnancies, but SCAD can be unpredictable.      She will need to follow closely with cardiology.  Beta blockers, copidogrel and low dose aspirin have a reasonable safety profile in pregnancy if needed--we will go ahead and start low dose aspirin for both preE prevention and hx of SCAD.  We discussed with any mom with cardiac history it is important to watch baby's growth and I recommend at least weekly (maybe twice weekly) ANFS at 32 weeks.  We will plan to deliver at 37th week due to increasing cardiac output as pt goes further in pregnancy to minimize maternal complications.     We did briefly discuss possible genetic link to SCAD/fibromuscular dysplasia--she reports she has been tested and is negative.     She will need an anesthesia consult 32-36 weeks.      During delivery, we will plan:  -early epidural to minimize tachycardia/cardiac strain  -telemetry if feasible vs EKG before and after  -euvolemia/strict I's and O's  -Avoidance of terbutaline, methergine  -Laboring down/delayed valsalva vs consider assisted 2nd stage (pt says she goes quickly so do not suspect this will be necesssary)  -Consider immediate BTL vs LARC after delivery  - okay as long as no contraindication from Neurosurgery or cardiology teams--apreciate their care.        Intracranial arachnoid cyst:  Incidentally noted.  Follows with neurosurgery.  Plan is for CT head in 1 year.  Pt reports she was told this would not impact her ability to have vaginal delivery--will confirm.       Advanced Maternal Age  Pt to get NIPT next OB visit.  Serial growths and ANFS at 32 weeks    Summary of  Plan  -Serial growth ultrasounds every 4 weeks (MFM)   -Starting at 32 weeks: 1-2x Weekly fetal  surveillance until delivery   -Started on baby ASA  -Cardiac care plan as above  -Will discuss at fetal/maternal care conference  -Anatomy at MFM    Follow-up: 4-6 weeks then monthly    Thank you for the consult and opportunity to care for this patient.  Please feel free to reach out with any questions or concerns.      I spent 40 minutes caring for this patient on this date of service. This time includes time spent by me in the following activities: preparing for the visit, reviewing tests, obtaining and/or reviewing a separately obtained history, performing a medically appropriate examination and/or evaluation, counseling and educating the patient/family/caregiver and independently interpreting results and communicating that information with the patient/family/caregiver with greater than 50% spent in counseling and coordination of care.       I spent 3 minutes on the separately reported service of US imaging not included in the time used to support the E/M service also reported today.      Ashley Tejeda MD [facility] [place]  Maternal Fetal Medicine-[facility]  Office: [phone]  [email or web address]

## 2024-01-11 ENCOUNTER — OFFICE VISIT (OUTPATIENT)
Dept: OBSTETRICS AND GYNECOLOGY | Facility: CLINIC | Age: 34
End: 2024-01-11
Payer: MEDICAID

## 2024-01-11 ENCOUNTER — HOSPITAL ENCOUNTER (OUTPATIENT)
Dept: ULTRASOUND IMAGING | Facility: HOSPITAL | Age: 34
Discharge: HOME | End: 2024-01-11
Admitting: OBSTETRICS & GYNECOLOGY
Payer: MEDICAID

## 2024-01-11 VITALS
TEMPERATURE: 98.6 F | BODY MASS INDEX: 21.65 KG/M2 | SYSTOLIC BLOOD PRESSURE: 108 MMHG | DIASTOLIC BLOOD PRESSURE: 59 MMHG | HEART RATE: 89 BPM | WEIGHT: 114.6 LBS

## 2024-01-11 DIAGNOSIS — Z95.1 S/P CABG X 3: Primary | ICD-10-CM

## 2024-01-11 DIAGNOSIS — Z95.1 S/P CABG X 3: ICD-10-CM

## 2024-01-11 DIAGNOSIS — E03.9 HYPOTHYROIDISM (ACQUIRED): ICD-10-CM

## 2024-01-11 DIAGNOSIS — I77.3 FIBROMUSCULAR DYSPLASIA OF CAROTID ARTERY: ICD-10-CM

## 2024-01-11 DIAGNOSIS — G93.0 INTRACRANIAL ARACHNOID CYST: ICD-10-CM

## 2024-01-11 PROCEDURE — 76801 OB US < 14 WKS SINGLE FETUS: CPT

## 2024-01-11 RX ORDER — ASPIRIN 81 MG/1
81 TABLET ORAL DAILY
Qty: 90 TABLET | Refills: 6 | Status: SHIPPED | OUTPATIENT
Start: 2024-01-11

## 2024-01-11 NOTE — PROGRESS NOTES
Pt reports that she is doing well and denies vaginal bleeding, cramping, contractions or LOF at this time. Reviewed when to call OB office or present to L&D for evaluation with symptoms such as vaginal bleeding, LOF or ctxs. Pt verbalized understanding. Denies HA, visual changes or epigastric pain. Reports daily nausea with pregnancy. Reports she has a follow up appointment with Cardiology on 3/14. Next OB appointment scheduled for 2/2.    Vitals:    01/11/24 1217   Temp: 98.6 °F (37 °C)

## 2024-01-11 NOTE — LETTER
2024     Geetha Fraser MD  [address]  [address]  [facility] [address]    Patient: Esme Herrera   YOB: 1990   Date of Visit: 2024       Dear Geetha Fraser MD,    Thank you for referring Esme Herrera to me for evaluation. Below is a copy of my consult note.    If you have questions, please do not hesitate to call me. I look forward to following Esme along with you.         Sincerely,        Ashley Tejeda MD      MATERNAL FETAL MEDICINE Consult Note    Dear Dr Geetha Fraser MD:    Thank you for your kind referral of Esme Herrera.  As you know, she is a 33 y.o.   at  9 6/7 weeks gestation (Estimated Date of Delivery: 24). This is a consult.       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 No vaginal bleeding, no contractions.     Review of History:  Past Medical History:   Diagnosis Date   • Coronary artery dissection 10/14/2022   • Disease of thyroid gland     Hypothyroid   • GERD (gastroesophageal reflux disease)    • Hypotension      Past Surgical History:   Procedure Laterality Date   • CARDIAC CATHETERIZATION N/A 10/15/2022    Procedure: Left Heart Cath;  Surgeon: Deandra Dumas MD;  Location: [facility] INVASIVE LOCATION;  Service: Cardiology;  Laterality: N/A;   • CARDIAC CATHETERIZATION N/A 10/15/2022    Procedure: Coronary angiography;  Surgeon: Deandra Dumas MD;  Location: [facility] CATH INVASIVE LOCATION;  Service: Cardiology;  Laterality: N/A;   • CARDIAC CATHETERIZATION N/A 10/15/2022    Procedure: Left ventriculography;  Surgeon: Deandra Dumas MD;  Location: [facility] CATH INVASIVE LOCATION;  Service: Cardiology;  Laterality: N/A;   • CARDIAC CATHETERIZATION N/A 2023    Procedure: Coronary angiography;  Surgeon: Deandra Dumas MD;  Location:   [facility] CATH INVASIVE LOCATION;  Service: Cardiology;  Laterality: N/A;   • CARDIAC CATHETERIZATION N/A 6/22/2023    Procedure: Left Heart Cath;  Surgeon: Deandra Dumas MD;  Location: [facility] CATH INVASIVE LOC[facility];  Service: Cardiology;  Laterality: N/A;   • CARDIAC CATHETERIZATION N/A 6/22/2023    Procedure: Left ventriculography;  Surgeon: Deandra Dumas MD;  Location: [facility] CATH INVASIVE LOCATION;  Service: Cardiology;  Laterality: N/A;   • CORONARY ARTERY BYPASS GRAFT N/A 10/15/2022    Procedure: VERONICA, STERNOTOMY, CORONARY ARTERY BYPASS TIMES 3 WITH INTERNAL MAMMARY ARTERY GRAFT AND LEFT GREATER SAPHENOUS VEIN HARVEST, REPAIR OF CORONARY ARTERY DISSECTION,PRP;  Surgeon: Lakshmi Teixeira MD;  Location: [facility] CVOR;  Service: Cardiothoracic;  Laterality: N/A;   • ENDOSCOPY N/A 1/12/2023    Procedure: ESOPHAGOGASTRODUODENOSCOPY with bxs;  Surgeon: Nish Quiroz MD;  Location: [facility] ENDOSCOPY;  Service: Gastroenterology;  Laterality: N/A;  PRE: Epigastric Pain  POST: Normal     Social History     Socioeconomic History   • Marital status:    Tobacco Use   • Smoking status: Never   • Smokeless tobacco: Never   Vaping Use   • Vaping Use: Never used   Substance and Sexual Activity   • Alcohol use: Never   • Drug use: Never   • Sexual activity: Yes     Partners: Male     Family History   Family history unknown: Yes      No Known Allergies   Current Outpatient Medications on File Prior to Visit   Medication Sig Dispense Refill   • acetaminophen (TYLENOL) 500 MG tablet Take 1 tablet by mouth Every 4 (Four) Hours As Needed for Mild Pain. 30 tablet 0   • Calcium Citrate-Vitamin D3 (CITRACAL) 315-6.25 MG-MCG tablet tablet Take  by mouth Daily.     • levothyroxine (SYNTHROID, LEVOTHROID) 50 MCG tablet Take 1 tablet by mouth Daily (Monday-Friday).     • magnesium oxide (MAG-OX) 400 MG tablet Take 1 tablet by mouth Daily for 180 days. 90 tablet 1   • Magnesium Oxide -Mg Supplement 400 (240 Mg) MG tablet      •  Prenatal Vit-Fe Fumarate-FA (Prenatal 27-) 27-1 MG tablet tablet Take 1 tablet by mouth Daily. 90 tablet 4   • Xiidra 5 % ophthalmic solution      • Diclofenac Sodium (VOLTAREN) 1 % gel gel        No current facility-administered medications on file prior to visit.        Past obstetric, gynecological, medical, surgical, family and social history reviewed.  Relevant lab work and imaging reviewed.    Review of systems  Constitutional:  denies fever, chills, malaise.   ENT/Mouth:  denies sore throat, tinnitus  Eyes: denies vision changes/pain  CV:  denies chest pain  Respiratory:  denies cough/SOB  GI:  denies N/V, diarrhea, abdominal pain.    :   denies dysuria  Skin:  denies lesions or pruritus   Neuro:  denies weakness, focal neurologic symptoms    Vitals:    24 1217   BP: 108/59   BP Location: Right arm   Patient Position: Sitting   Pulse: 89   Temp: 98.6 °F (37 °C)   TempSrc: Temporal   Weight: 52 kg (114 lb 9.6 oz)       PHYSICAL EXAM   GENERAL: Not in acute distress, AAOx3, pleasant  CARDIO: regular rate and rhythm  PULM: symmetric chest rise, speaking in complete sentences without difficulty  NEURO: awake, alert and oriented to person, place, and time  ABDOMINAL: No fundal tenderness, no rebound or guarding, gravid  EXTREMITIES: no bilateral lower extremity edema/tenderness  SKIN: Warm, well-perfused      ULTRASOUND   Please view full ultrasound note on Imaging tab in ViewPoint.      ASSESSMENT/COUNSELIN y.o.   at  9 6/7 weeks gestation (Estimated Date of Delivery: 24).     -Pregnancy  [ X ] stable  [   ] improving [  ] worsening    Diagnoses and all orders for this visit:    1. S/P CABG x 3 (Primary)  -     ECG 12 Lead; Future  -     BNP; Future  -     Comprehensive Metabolic Panel; Future  -     CBC & Differential; Future  -     Uric Acid; Future  -     Lactate Dehydrogenase; Future    2. Fibromuscular dysplasia of carotid artery  -     ECG 12 Lead; Future  -     BNP; Future  -      Comprehensive Metabolic Panel; Future  -     CBC & Differential; Future  -     Uric Acid; Future  -     Lactate Dehydrogenase; Future    3. Intracranial arachnoid cyst  -     ECG 12 Lead; Future  -     BNP; Future  -     Comprehensive Metabolic Panel; Future  -     CBC & Differential; Future  -     Uric Acid; Future  -     Lactate Dehydrogenase; Future    4. Hypothyroidism (acquired)  -     ECG 12 Lead; Future  -     BNP; Future  -     Comprehensive Metabolic Panel; Future  -     CBC & Differential; Future  -     Uric Acid; Future  -     Lactate Dehydrogenase; Future    Other orders  -     aspirin 81 MG EC tablet; Take 1 tablet by mouth Daily.  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 Dumas with cardiology--appreciate her care.  She has had a history of coronary artery dissection in fall of 2022.  Her dissection involved the left main coronary artery and extended into the LAD and left circumflex requiring CABG x 3.  Cardiac cath 6/2023 showed dissections had healed.  She continues to have anteroapical T wave changes and apical akinesis on echocardiograms but this is all stable.  Overall left ventricular function has normalized. She has fibromuscular dysplasia, stable and will have a CTA of head and neck in year per neurosurgery.  I did tell her in no uncertain terms that pregnancy is not recommended with her history.  She declined to discuss termination, so we discussed management of her pregnancy and ways to optimize both her and baby's outcomes.      Because the vast majority of patients affected by SCAD are women and SCAD constitutes an important cause of pregnancy-associated MI.  It's mechanism if uncertain but estrogen/progesterone have been shown to possibly play a role.   SCAD symptoms often onset after intense physical activity (at least 1/3 of the time), so we discussed lifting precautions and no  intense exertional activity this pregnancy (no heavy lifting, etc., but normal activity/walking okay).  She should avoid straining or prolonged valsalva.  We also discussed starting a baby ASA.     Rates of recurrent SCAD are variably reported (10% to 30%), probably closer to 10% as there are some errors in study design in differentiating new vs recurrent SCAD in some of those studies.  Rates of recurrence are similar in pregnancy.  Many women experience increase in angina as pregnancy progresses.  She has no chest pain or SOB at this time, but we spent a lot of time discussing we would have a VERY low threshold to get a new ECHO/admit/workup if she has any of these symptoms.  We will get a baseline BNP, EKG at her convenience.  The data suggest that the majority of SCAD patients who subsequently conceive experience uncomplicated pregnancies, but SCAD can be unpredictable.      She will need to follow closely with cardiology.  Beta blockers, copidogrel and low dose aspirin have a reasonable safety profile in pregnancy if needed--we will go ahead and start low dose aspirin for both preE prevention and hx of SCAD.  We discussed with any mom with cardiac history it is important to watch baby's growth and I recommend at least weekly (maybe twice weekly) ANFS at 32 weeks.  We will plan to deliver at 37th week due to increasing cardiac output as pt goes further in pregnancy to minimize maternal complications.     We did briefly discuss possible genetic link to SCAD/fibromuscular dysplasia--she reports she has been tested and is negative.     She will need an anesthesia consult 32-36 weeks.      During delivery, we will plan:  -early epidural to minimize tachycardia/cardiac strain  -telemetry if feasible vs EKG before and after  -euvolemia/strict I's and O's  -Avoidance of terbutaline, methergine  -Laboring down/delayed valsalva vs consider assisted 2nd stage (pt says she goes quickly so do not suspect this will be  necesssary)  -Consider immediate BTL vs LARC after delivery  - okay as long as no contraindication from Neurosurgery or cardiology teams--apreciate their care.        Intracranial arachnoid cyst:  Incidentally noted.  Follows with neurosurgery.  Plan is for CT head in 1 year.  Pt reports she was told this would not impact her ability to have vaginal delivery--will confirm.       Advanced Maternal Age  Pt to get NIPT next OB visit.  Serial growths and ANFS at 32 weeks    Summary of Plan  -Serial growth ultrasounds every 4 weeks (MFM)   -Starting at 32 weeks: 1-2x Weekly fetal  surveillance until delivery   -Started on baby ASA  -Cardiac care plan as above  -Will discuss at fetal/maternal care conference  -Anatomy at MFM    Follow-up: 4-6 weeks then monthly    Thank you for the consult and opportunity to care for this patient.  Please feel free to reach out with any questions or concerns.      I spent 40 minutes caring for this patient on this date of service. This time includes time spent by me in the following activities: preparing for the visit, reviewing tests, obtaining and/or reviewing a separately obtained history, performing a medically appropriate examination and/or evaluation, counseling and educating the patient/family/caregiver and independently interpreting results and communicating that information with the patient/family/caregiver with greater than 50% spent in counseling and coordination of care.       I spent 3 minutes on the separately reported service of US imaging not included in the time used to support the E/M service also reported today.      Ashley Tejeda MD FACOG  Maternal Fetal Medicine-[facility]  Office: [phone]  [email or web address]

## 2024-01-12 ENCOUNTER — PATIENT ROUNDING (BHMG ONLY) (OUTPATIENT)
Dept: OBSTETRICS AND GYNECOLOGY | Facility: CLINIC | Age: 34
End: 2024-01-12
Payer: MEDICAID

## 2024-02-02 ENCOUNTER — ROUTINE PRENATAL (OUTPATIENT)
Dept: OBSTETRICS AND GYNECOLOGY | Facility: CLINIC | Age: 34
End: 2024-02-02
Payer: MEDICAID

## 2024-02-02 VITALS — BODY MASS INDEX: 22.3 KG/M2 | SYSTOLIC BLOOD PRESSURE: 96 MMHG | WEIGHT: 118 LBS | DIASTOLIC BLOOD PRESSURE: 64 MMHG

## 2024-02-02 DIAGNOSIS — M54.50 LOW BACK PAIN DURING PREGNANCY, FIRST TRIMESTER: ICD-10-CM

## 2024-02-02 DIAGNOSIS — Z3A.13 13 WEEKS GESTATION OF PREGNANCY: Primary | ICD-10-CM

## 2024-02-02 DIAGNOSIS — I77.3 FIBROMUSCULAR DYSPLASIA OF CAROTID ARTERY: ICD-10-CM

## 2024-02-02 DIAGNOSIS — O26.891 LOW BACK PAIN DURING PREGNANCY, FIRST TRIMESTER: ICD-10-CM

## 2024-02-02 DIAGNOSIS — Z95.1 S/P CABG X 3: ICD-10-CM

## 2024-02-02 DIAGNOSIS — E03.9 HYPOTHYROIDISM (ACQUIRED): ICD-10-CM

## 2024-02-02 DIAGNOSIS — G93.0 INTRACRANIAL ARACHNOID CYST: ICD-10-CM

## 2024-02-02 DIAGNOSIS — Z01.89 ROUTINE LAB DRAW: Primary | ICD-10-CM

## 2024-02-02 NOTE — PATIENT INSTRUCTIONS
"[facility] (scroll to the bottom for link to classes and tours):[facility]     [employer] (the glory you can download for the majority of  education): [employer]     Fetal Kick Counts: This is the time in your pregnancy to really start paying attention to fetal movement. Check out this Count the Kicks website and click on the \"Start Counting\" button to download the glory and monitor your baby's well-being each day! Count the Kicks     Birth Plan: See attachment for the [facility] birth plan, you can fill out and review with your provider. Birth Plan     Kangaroo Care: See attachment, this skin to skin care after delivery is standard at [facility]. Kangaroo Care     Breast pump website: Free Breast Pump Through Insurance in 3 Easy Steps - [employer]    "

## 2024-02-02 NOTE — PROGRESS NOTES
Chief Complaint   Patient presents with    Routine Prenatal Visit      Esme Herrera is a 33 y.o.  at 13w0d  here for routine OB visit  Reports low back pain in the midline. Tried heating pad but did not help. Usually uses voltaren but has not done this due to unsure if we use it in pregnancy  No chest pain/SOA  BP 96/64   Wt 53.5 kg (118 lb)   LMP 10/17/2023   BMI 22.30 kg/m²    Fetal Heart Rate: 160   Gen: NAD, well appearing  Abd: nontender    See OB Flowsheet    ASSESSMENT/PLAN:  (Z3A.13) 13 weeks gestation of pregnancy - Plan: [id number] PLUS Core+SCA - Blood,    (Z95.1) S/P CABG x 3    (G93.0) Intracranial arachnoid cyst    (O26.891,  M54.50) Low back pain during pregnancy, first trimester - Plan: Ambulatory Referral to Physical Therapy Evaluate and treat    Reviewed with patient medications for low back pain. Okay to use topical gels such as topical lidocaine. Voltaren is topical NSAID so data in pregnancy is limited. She will defer using this for now. Is interested in PT and referral made.  She is interested in cell free DNA. Aware of limitations of testing, possibility of need for additional testing (such as amniocentesis), possibility of out of pocket expense, possibility of false positive/false negative results.   Will get labs from [facility] and last OB visit  Routine counseling pertinent to this stage of pregnancy was reviewed including maternity resources at Located within [facility]  Continue follow up with MFM, Neuro, Cardiology as scheduled  Return in about 4 weeks (around 3/1/2024).

## 2024-02-03 LAB
ABO GROUP BLD: NORMAL
ALBUMIN SERPL-MCNC: 4.2 G/DL (ref 3.9–4.9)
ALBUMIN/GLOB SERPL: 1.7 {RATIO} (ref 1.2–2.2)
ALP SERPL-CCNC: 46 IU/L (ref 44–121)
ALT SERPL-CCNC: 12 IU/L (ref 0–32)
AST SERPL-CCNC: 21 IU/L (ref 0–40)
BASOPHILS # BLD AUTO: 0 X10E3/UL (ref 0–0.2)
BASOPHILS NFR BLD AUTO: 0 %
BILIRUB SERPL-MCNC: 0.6 MG/DL (ref 0–1.2)
BLD GP AB SCN SERPL QL: NEGATIVE
BUN SERPL-MCNC: 8 MG/DL (ref 6–20)
BUN/CREAT SERPL: 14 (ref 9–23)
CALCIUM SERPL-MCNC: 9.6 MG/DL (ref 8.7–10.2)
CHLORIDE SERPL-SCNC: 102 MMOL/L (ref 96–106)
CO2 SERPL-SCNC: 20 MMOL/L (ref 20–29)
CREAT SERPL-MCNC: 0.59 MG/DL (ref 0.57–1)
EGFRCR SERPLBLD CKD-EPI 2021: 121 ML/MIN/1.73
EOSINOPHIL # BLD AUTO: 0 X10E3/UL (ref 0–0.4)
EOSINOPHIL NFR BLD AUTO: 0 %
ERYTHROCYTE [DISTWIDTH] IN BLOOD BY AUTOMATED COUNT: 12.6 % (ref 11.7–15.4)
GLOBULIN SER CALC-MCNC: 2.5 G/DL (ref 1.5–4.5)
GLUCOSE SERPL-MCNC: 80 MG/DL (ref 70–99)
HBV SURFACE AG SERPL QL IA: NEGATIVE
HCT VFR BLD AUTO: 37.2 % (ref 34–46.6)
HCV IGG SERPL QL IA: NON REACTIVE
HGB BLD-MCNC: 12.5 G/DL (ref 11.1–15.9)
HIV 1+2 AB+HIV1 P24 AG SERPL QL IA: NON REACTIVE
IMM GRANULOCYTES # BLD AUTO: 0 X10E3/UL (ref 0–0.1)
IMM GRANULOCYTES NFR BLD AUTO: 0 %
LYMPHOCYTES # BLD AUTO: 1.3 X10E3/UL (ref 0.7–3.1)
LYMPHOCYTES NFR BLD AUTO: 21 %
MCH RBC QN AUTO: 30.6 PG (ref 26.6–33)
MCHC RBC AUTO-ENTMCNC: 33.6 G/DL (ref 31.5–35.7)
MCV RBC AUTO: 91 FL (ref 79–97)
MONOCYTES # BLD AUTO: 0.4 X10E3/UL (ref 0.1–0.9)
MONOCYTES NFR BLD AUTO: 7 %
NEUTROPHILS # BLD AUTO: 4.4 X10E3/UL (ref 1.4–7)
NEUTROPHILS NFR BLD AUTO: 72 %
PLATELET # BLD AUTO: 235 X10E3/UL (ref 150–450)
POTASSIUM SERPL-SCNC: 4.4 MMOL/L (ref 3.5–5.2)
PROT SERPL-MCNC: 6.7 G/DL (ref 6–8.5)
RBC # BLD AUTO: 4.08 X10E6/UL (ref 3.77–5.28)
RH BLD: POSITIVE
RPR SER QL: NON REACTIVE
RUBV IGG SERPL IA-ACNC: 26.2 INDEX
SODIUM SERPL-SCNC: 138 MMOL/L (ref 134–144)
TSH SERPL DL<=0.005 MIU/L-ACNC: 3.68 UIU/ML (ref 0.45–4.5)
URATE SERPL-MCNC: 1.9 MG/DL (ref 2.6–6.2)
WBC # BLD AUTO: 6.1 X10E3/UL (ref 3.4–10.8)

## 2024-02-07 ENCOUNTER — HOSPITAL ENCOUNTER (OUTPATIENT)
Dept: PHYSICAL THERAPY | Facility: HOSPITAL | Age: 34
Setting detail: THERAPIES SERIES
Discharge: HOME | End: 2024-02-07
Payer: MEDICAID

## 2024-02-07 DIAGNOSIS — O26.899 LOW BACK PAIN DURING PREGNANCY, ANTEPARTUM: Primary | ICD-10-CM

## 2024-02-07 DIAGNOSIS — M62.81 MUSCLE WEAKNESS: ICD-10-CM

## 2024-02-07 DIAGNOSIS — M54.50 LOW BACK PAIN DURING PREGNANCY, ANTEPARTUM: Primary | ICD-10-CM

## 2024-02-07 PROCEDURE — 97110 THERAPEUTIC EXERCISES: CPT

## 2024-02-07 PROCEDURE — 97162 PT EVAL MOD COMPLEX 30 MIN: CPT

## 2024-02-07 NOTE — THERAPY EVALUATION
Outpatient Physical Therapy Pelvic Health Initial Evaluation  [facility]     Patient Name: Esme Herrera  : 1990  MRN: [id number]  Today's Date: 2024        Visit Date: 2024    Patient Active Problem List   Diagnosis    Spontaneous dissection of coronary artery    S/P CABG x 3    Ischemic cardiomyopathy    Epigastric pain    Fibromuscular dysplasia of carotid artery    Intracranial arachnoid cyst    Migraine without aura and without status migrainosus, not intractable        Past Medical History:   Diagnosis Date    Coronary artery dissection 10/14/2022    Disease of thyroid gland     Hypothyroid    GERD (gastroesophageal reflux disease)     Hypotension         Past Surgical History:   Procedure Laterality Date    CARDIAC CATHETERIZATION N/A 10/15/2022    Procedure: Left Heart Cath;  Surgeon: Deandra Dumas MD;  Location: [facility] CATH INVASIVE LOCATION;  Service: Cardiology;  Laterality: N/A;    CARDIAC CATHETERIZATION N/A 10/15/2022    Procedure: Coronary angiography;  Surgeon: Deandra Dumas MD;  [facility];  Service: Cardiology;  Laterality: N/A;    CARDIAC CATHETERIZATION N/A 10/15/2022    Procedure: Left ventriculography;  Surgeon: Deandra Dumas MD;  [facility];  Service: Cardiology;  Laterality: N/A;    CARDIAC CATHETERIZATION N/A 2023    Procedure: Coronary angiography;  Surgeon: Deandra Dumas MD;  [facility];  Service: Cardiology;  Laterality: N/A;    CARDIAC CATHETERIZATION N/A 2023    Procedure: Left Heart Cath;  Surgeon: Deandra Dumas MD;  [facility];  Service: Cardiology;  Laterality: N/A;    CARDIAC CATHETERIZATION N/A 2023    Procedure: Left ventriculography;  Surgeon: Deandra Dumas MD;  [facility];  Service: Cardiology;  Laterality: N/A;    CORONARY ARTERY BYPASS GRAFT N/A 10/15/2022    Procedure: VERONICA,  STERNOTOMY, CORONARY ARTERY BYPASS TIMES 3 WITH INTERNAL MAMMARY ARTERY GRAFT AND LEFT GREATER SAPHENOUS VEIN HARVEST, REPAIR OF CORONARY ARTERY DISSECTION,PRP;  Surgeon: Lakshmi Teixeira MD;  Location: [facility] CVOR;  Service: Cardiothoracic;  Laterality: N/A;    ENDOSCOPY N/A 1/12/2023    Procedure: ESOPHAGOGASTRODUODENOSCOPY with bxs;  Surgeon: Nish Quiroz MD;  Location: [facility] ENDOSCOPY;  Service: Gastroenterology;  Laterality: N/A;  PRE: Epigastric Pain  POST: Normal         Visit Dx:    ICD-10-CM ICD-9-CM   1. Low back pain during pregnancy, antepartum  O26.899 646.83    M54.50 724.2   2. Muscle weakness  M62.81 728.87        Patient History       Row Name 02/07/24 1100             Fall Risk Assessment    Any falls in the past year: No  -CC         Daily Activities    Primary Language English  -CC      Teaching needs identified Home Exercise Program  -CC      Patient is concerned about/has problems with Walking  -CC      Does patient have problems with the following? None  -CC      Barriers to learning None  -CC      Pt Participated in POC and Goals Yes  -CC                User Key  (r) = Recorded By, (t) = Taken By, (c) = Cosigned By      Initials Name Provider Type    Maria L Timmons PT Physical Therapist                     Pelvic Health       Row Name 02/07/24 1100             Subjective    Patient Reason for Visit Prenatal Care;Back Pain  -CC      Brief Description of Chief Complaint Esme Herrera is a 33 y.o. female who presents today with low back pain during pregnancy. Pt is 13w5d GA. Pt arrives at 10:57 for 10:45 eval. Reports onset of pain about 2 weeks ago. Pt states she bent down to tie son's shoe, then noticed back pain later than morning. Had difficulty walking. Esme Herrera reports difficulty/increased pain with walking, sitting for long periods of time, sit to stand transfers, lifting legs up in chair (demos hip flexion). Reports her left leg hurts more than right. Pain  localized to left side low back and left posterior hip. Pain relieving factors include icy hot, Tylenol. Feels neither of these interventions have helped. Has questions if she can take steroids during pregnancy, I encouraged her to reach out to her OB for all medication-related questions during pregnancy. Pt got a massage yesterday - says the therapist stretched her into spinal extension which made pain worse. Has tried DKTC, SKTC at home but no relief. PMH includes , CABG x 3 for Spontaneous coronary artery dissection (SCAD) - 2022, Arachnoid cyst in brain. Reports onset of UUI around the onset of the back pain.  -CC      Patient Participated in POC and Goals Yes  -CC         Fall Risk Assessment    Any falls in the past year: No  -CC         Daily Activities    Primary Language English  -CC      Teaching needs identified Home Exercise Program  -CC      Patient is concerned about/has problems with Walking  -CC      Does patient have problems with the following? None  -CC      Barriers to learning None  -CC      Pt Participated in POC and Goals Yes  -CC         Urinary/Bowel History    Urgency yes  -CC      Leaks secondary to urge (times per day) every time she pees  -CC      Daytime frequency (hours) Every 2-3 hours  -CC      Fluid Intake 1.5 - 2 liters  -CC         Pregnancy/Sexual History    Number of Pregnancies 4  -CC      Number of Miscarriages 1  -CC      Number of Children 2  -CC      How many weeks pregnant are you? 13 weeks  -CC      Type of Previous Deliveries Vaginal  possible grade 3-4 tear with first? pt is unsure  -CC      Due Date 24  -CC      Do you have radicular pain or numbness? No  -CC      Are you currently exercising? Variable  tries to stretch, but difficult to exercise with the pain. Prior to injury in early pregnancy was walking 20 min a day and doing yoga  -CC         Pain Assessment    Pain Score 8  -CC         Lumbar/SI Special Tests    Standing Flexion Test (SI  Dysfunction) Unable to test  -CC      Stork Test (SI Dysfunction) Unable to test  -CC      Trendelenburg Test (Gluteus Medius Weakness) Unable to test  -CC      Slump Test (Neural Tension) Bilateral:;Positive  -CC      TIMOTHY (hip vs. SI Dysfunction) Unable to test  -CC      Lumbar/SI Special Tests Comments limited special testing due to high levels of pain and unable to acheive special testing positions  -CC         Lumbosacral Palpation    Erector Spinae (Paraspinals) Left:;Tender;Guarded/taut  -CC      Lumbosacral Palpation Comments supine HS 90/90 test - unable to assess secondary to pain/guarding response/empty end feel  -CC         General ROM    Head/Neck/Trunk Trunk Flexion;Trunk Extension;Trunk Lt Rotation;Trunk Rt Rotation  -CC      RT Lower Ext Rt Hip External Rotation;Rt Hip Internal Rotation  -CC      LT Lower Ext Lt Hip External Rotation;Lt Hip Internal Rotation  -CC         Head/Neck/Trunk    Trunk Extension AROM unable due to pain  -CC      Trunk Flexion AROM <25% ROM, limited by pain  -CC      Trunk Lt Rotation AROM unable due to pain  -CC      Trunk Rt Rotation AROM unable due to pain  -CC         Right Lower Ext    Rt Hip External Rotation PROM severely limited  -CC      Rt Hip Internal Rotation PROM WFL  -CC         Left Lower Ext    Lt Hip External Rotation PROM mildly limited  -CC      Lt Hip Internal Rotation PROM WFL  -CC         Education Provided On:    Education Points HEP  -CC      Method of Delivery Verbal;Demonstration;Written  -CC      Education Provided To Patient  -CC      Level of Understanding Teach back education performed;Verbalized;Demonstrated  -CC      HEP Comments Access Code [email or web address]  -CC         MMT (Manual Muscle Testing)    Rt Lower Ext Rt Hip Flexion;Rt Knee Extension;Rt Ankle Dorsiflexion  -CC      Lt Lower Ext Lt Hip Flexion;Lt Knee Extension;Lt Ankle Dorsiflexion  -CC         MMT Right Lower Ext    Rt Hip Flexion MMT, Gross Movement (3/5) fair  -CC      Rt Knee  Extension MMT, Gross Movement (3/5) fair  -CC      Rt Ankle Dorsiflexion MMT, Gross Movement (5/5) normal  -CC      Rt Lower Extremity Comments  testing limited by pain  -CC         MMT Left Lower Ext    Lt Hip Flexion MMT, Gross Movement (3/5) fair  -CC      Lt Knee Extension MMT, Gross Movement (3/5) fair  -CC      Lt Ankle Dorsiflexion MMT, Gross Movement (5/5) normal  -CC      Lt Lower Extremity Comments  testing limited by pain  -CC                User Key  (r) = Recorded By, (t) = Taken By, (c) = Cosigned By      Initials Name Provider Type    CC Maria L Vasquez PT Physical Therapist                   PT Ortho       Row Name 24 1100       Transfers    Comment, (Transfers) guarded, compensated, leans to right and scoots out of chair  -CC              User Key  (r) = Recorded By, (t) = Taken By, (c) = Cosigned By      Initials Name Provider Type    Maria L Timmons PT Physical Therapist                                PT Assessment/Plan       Row Name 24 1200          PT Assessment    Functional Limitations Limitation in home management;Limitations in community activities;Limitations in functional capacity and performance;Performance in leisure activities;Performance in self-care ADL  -CC     Impairments Range of motion;Posture;Poor body mechanics;Pain;Muscle strength;Joint mobility;Impaired flexibility  -CC     Assessment Comments Pt is a 33 y.o. female referred to physical therapy for low back pain during first trimester of pregnancy. Pt is 13w5d GA, per MD high risk pregnancy secondary to recent hx of cardiac events in . Pt presents with an evolving clinical presentation, along with comorbidities of cardiac issues and  personal factors of hx of CABG x 3 for Spontaneous coronary artery dissection in ,  that may impact the pts progress in the plan of care. Pt presents today with acute/severe low back pain (rates 8/10), limited/antalgic gait, guarded transfers, TTP L  paraspinals, at least 3/5  B LE MMT but all limited by pain, decreased mariel hip ER PROM, (+) mariel slump test, and extremely limited lumbar AROM secondary to pain. Pt also reports recent onset of UUI around the time of her low back pain. Pts signs and symptoms are consistent with referring diagnosis. The previous impairments limit the pts ability to perform ADLs, walk, sit for long periods of time, or perform sit to stand transfers. Pt will benefit from skilled PT to address the previous impairments and return to PLOF.  -CC     Please refer to paper survey for additional self-reported information Yes  -CC     Rehab Potential Good  -CC     Patient/caregiver participated in establishment of treatment plan and goals Yes  -CC     Patient would benefit from skilled therapy intervention Yes  -CC        PT Plan    PT Frequency 2x/week;1x/week  -CC     Predicted Duration of Therapy Intervention (PT) 10-14 visits  -CC     Planned CPT's? PT EVAL LOW COMPLEXITY: 40262;PT RE-EVAL: 74278;PT THER PROC EA 15 MIN: 13053;PT THER ACT EA 15 MIN: 04290;PT MANUAL THERAPY EA 15 MIN: 74345;PT NEUROMUSC RE-EDUCATION EA 15 MIN: 93792;PT GAIT TRAINING EA 15 MIN: 36338;PT SELF CARE/HOME MGMT/TRAIN EA 15: 84183;PT HOT OR COLD PACK TREAT MCARE  -CC     PT Plan Comments Next visit: response to HEP, manual to L paraspinals, add piriformis str, SB roll out 3 way, PPT, diaphragmatic breathing, HL hip abd and add. 2x per week initially to improve funtion and address acute pain, as pain improved decrease to 1x per week, may address UUI in future and labor prep.  -CC               User Key  (r) = Recorded By, (t) = Taken By, (c) = Cosigned By      Initials Name Provider Type    CC Maria L Vasquez, PT Physical Therapist                       OP Exercises       Row Name 02/07/24 1200 02/07/24 1100          Subjective    Patient Reason for Visit -- Prenatal Care;Back Pain  -CC     Brief Description of Chief Complaint -- Esme Herrera is a 33 y.o.  female who presents today with low back pain during pregnancy. Pt is 13w5d GA. Pt arrives at 10:57 for 10:45 eval. Reports onset of pain about 2 weeks ago. Pt states she bent down to tie son's shoe, then noticed back pain later than morning. Had difficulty walking. Esme Herrera reports difficulty/increased pain with walking, sitting for long periods of time, sit to stand transfers, lifting legs up in chair (demos hip flexion). Reports her left leg hurts more than right. Pain localized to left side low back and left posterior hip. Pain relieving factors include icy hot, Tylenol. Feels neither of these interventions have helped. Has questions if she can take steroids during pregnancy, I encouraged her to reach out to her OB for all medication-related questions during pregnancy. Pt got a massage yesterday - says the therapist stretched her into spinal extension which made pain worse. Has tried DKTC, SKTC at home but no relief. PMH includes , CABG x 3 for Spontaneous coronary artery dissection (SCAD) - 2022, Arachnoid cyst in brain. Reports onset of UUI around the onset of the back pain.  -CC     Patient Participated in POC and Goals -- Yes  -CC        Total Minutes    51501 - PT Therapeutic Exercise Minutes 10  -CC --        Exercise 1    Exercise Name 1 LTR  -CC --     Cueing 1 Verbal  -CC --     Reps 1 10 ea way  -CC --     Time 1 3s  -CC --        Exercise 2    Exercise Name 2 fig 4 str  -CC --     Cueing 2 Verbal  -CC --     Reps 2 3 ea leg  -CC --     Time 2 20 s  -CC --     Additional Comments pain getting leg into position, but relieved with stretch  -CC --        Exercise 3    Exercise Name 3 demo of log roll  -CC --     Cueing 3 Verbal;Demo  -CC --               User Key  (r) = Recorded By, (t) = Taken By, (c) = Cosigned By      Initials Name Provider Type    CC Maria L Vasquez, PT Physical Therapist                                 PT OP Goals       Row Name 24 1200          PT  Short Term Goals    STG Date to Achieve 03/08/24  -CC     STG 1 Patient will be independent with education for symptom management and initial HEP to decrease LBP pain.  -CC     STG 1 Progress New  -CC     STG 2 Pt will report at least 50% improvement in s/s to improve participation in ADLs.  -CC     STG 2 Progress New  -CC     STG 3 Patient will improve ability to sleep through the night without sleep disturbances related to back pain to improve overall sleep quality and quality of life  -CC     STG 3 Progress New  -CC        Long Term Goals    LTG Date to Achieve 04/07/24  -CC     LTG 1 Patient will be independent with education for symptom management and advanced HEP to decrease LBP pain.  -CC     LTG 1 Progress New  -CC     LTG 2 Patient will reduce level of percieved disability as measured by the Modified Oswestry to </= 30% disability in order to improve quality of life.  -CC     LTG 2 Progress New  -CC     LTG 3 Patient will improve walking tolerance to >30 min with </=3/10 LBP to improve participation in community activities.  -CC     LTG 3 Progress New  -CC     LTG 4 Pt will demo at least 4/5 mariel hip strength.  -CC     LTG 4 Progress New  -CC     LTG 5 Pt will report at least 50% improvement in UUI symptoms.  -CC     LTG 5 Progress New  -CC     LTG 6 Pt will verbalize good understanding of labor prep techniques.  -CC     LTG 6 Progress New  -        Time Calculation    PT Goal Re-Cert Due Date 05/07/24  -               User Key  (r) = Recorded By, (t) = Taken By, (c) = Cosigned By      Initials Name Provider Type    Maria L Timmons, PT Physical Therapist                          Outcome Measure Options: Modified Oswestry  Modified Oswestry  Modified Oswestry Score/Comments: pt left incomplete      Time Calculation:   Start Time: 1100  Stop Time: 1130  Time Calculation (min): 30 min  Total Timed Code Minutes- PT: 10 minute(s)  Timed Charges  40202 - PT Therapeutic Exercise Minutes: 10  Untimed  Charges  PT Eval/Re-eval Minutes: 20  Total Minutes  Timed Charges Total Minutes: 10  Untimed Charges Total Minutes: 20   Total Minutes: 30  Therapy Charges for Today       Code Description Service Date Service Provider Modifiers Qty    21758078912 HC PT THER PROC EA 15 MIN 2/7/2024 Maria L Vasquez, PT GP 1    66503251616 HC PT EVAL MOD COMPLEXITY 1 2/7/2024 Maria L Vasquez, PT GP 1            PT G-Codes  Outcome Measure Options: Modified Oswestry  Modified Oswestry Score/Comments: pt left incomplete       Maria L Vasquez, PT  2/7/2024

## 2024-02-08 ENCOUNTER — TELEPHONE (OUTPATIENT)
Dept: OBSTETRICS AND GYNECOLOGY | Facility: CLINIC | Age: 34
End: 2024-02-08
Payer: MEDICAID

## 2024-02-08 LAB
CFDNA.FET/CFDNA.TOTAL SFR FETUS: NORMAL %
CITATION REF LAB TEST: NORMAL
FET 13+18+21+X+Y ANEUP PLAS.CFDNA: NEGATIVE
FET CHR 21 TS PLAS.CFDNA QL: NEGATIVE
FET MS X RISK WBC.DNA+CFDNA QL: NOT DETECTED
FET SEX PLAS.CFDNA DOSAGE CFDNA: NORMAL
FET TS 13 RISK PLAS.CFDNA QL: NEGATIVE
FET TS 18 RISK WBC.DNA+CFDNA QL: NEGATIVE
FET X + Y ANEUP RISK PLAS.CFDNA SEQ-IMP: NOT DETECTED
GA EST FROM CONCEPTION DATE: NORMAL D
GESTATIONAL AGE > 9:: YES
LAB DIRECTOR NAME PROVIDER: NORMAL
LAB DIRECTOR NAME PROVIDER: NORMAL
LABORATORY COMMENT REPORT: NORMAL
LIMITATIONS OF THE TEST: NORMAL
NEGATIVE PREDICTIVE VALUE: NORMAL
NOTE: NORMAL
PERFORMANCE CHARACTERISTICS: NORMAL
POSITIVE PREDICTIVE VALUE: NORMAL
REF LAB TEST METHOD: NORMAL
TEST PERFORMANCE INFO SPEC: NORMAL

## 2024-02-08 NOTE — TELEPHONE ENCOUNTER
----- Message from Geetha Fraser MD sent at 2/8/2024  8:59 AM EST -----  Please let patient know that aneuploidy screening was negative for trisomy 21/18/13.  If she wishes to know sex, testing shows consistent with male.     After you COLONOSCOPY instructions    DIET:  · Resume your usual diet.    ACTIVITY:  · Rest quietly at home for the remainder of the day. You may resume normal activities tomorrow.  · Do not do anything that requires coordination the day of the procedure, such as climbing ladders, using a sharp knife, operating machinery, driving.                   WHAT TO EXPECT:  · Mild abdominal discomfort, bloating and gas pains.  · A scant amount of rectal bleeding following a biopsy, polypectomy or if you have hemorrhoids is normal.  · Return of your usual bowel movements in 1-2 days.  · A tired feeling after the procedure due to the medications given to help you relax.                  PROBLEMS TO WATCH FOR - NOTIFY YOUR SURGEON IF YOU HAVE ANY OF THESE SYMPTOMS:  · Severe abdominal pain or bloating.        · Chills or temperature over 101 degrees.  · Large amounts of bright red rectal bleeding, blood clots or black colored stool.  · Vomiting blood or black colored liquid.    FOLLOW -UP:  · If a specimen was taken, please allow at least 7-10  business days for pathology results.  · Someone will either call or send a letter with your pathology results.  If you have not received a letter or phone call, please call [phone] Dr. Montemayor      Understanding Hemorrhoids    Hemorrhoid tissues are “cushions” of blood vessels that swell slightly during bowel movements. Too much pressure on the anal canal can make these tissues remain enlarged, become inflamed, and cause symptoms. This can happen both inside and outside the anal canal.  Parts of the anal canal  The parts of the anal canal are:  · Internal hemorrhoid tissue is in the upper area of the anal canal.  · The rectum is the last several inches of the colon. This is where stool is stored prior to bowel movements.  · Anal sphincters are ring-shaped muscles that expand and contract to control the anal opening.  · External hemorrhoid tissue lies under the anal skin.  · The anus is  the passage between the rectum and the outside of the body.  Normal hemorrhoid tissue  Hemorrhoid tissues play an important role in helping your body eliminate waste. Food passes from the stomach through the intestines. The waste (stool) then travels through the colon to the rectum. It is stored in the rectum until it’s ready to be passed from the anus. During bowel movements, hemorrhoids swell with blood and become slightly larger. This swelling helps protect and cushion the anal canal as stool passes from the body. Once the stool has passed, the tissues stop swelling and return to normal.  Problem hemorrhoids  Pressure due to straining or other factors can cause hemorrhoid tissues to remain swollen. When this happens to the hemorrhoid tissues in the anal canal they’re called internal hemorrhoids. Swollen tissues around the anal opening are called external hemorrhoids. Depending on the location, your symptoms can differ.  · Internal hemorrhoids often happen in clusters around the wall of the anal canal. They are usually painless. But they may prolapse (protrude out of the anus) due to straining or pressure from hard stool. After the bowel movement is over, they may then reduce (return inside the body). Internal hemorrhoids often bleed. They can also discharge mucus.  ·   External hemorrhoids are located at the anal opening, just beneath the skin. These tissues rarely cause problems unless they thrombose (form a blood clot). When this happens, a hard, bluish lump may appear. A thrombosed hemorrhoid also causes sudden, severe pain. In time, the clot may go away on its own. This sometimes leaves a “skin tag” of tissue stretched by the clot.  Hemorrhoid symptoms  Hemorrhoid symptoms may include:  · Pain or a burning sensation  · Bleeding during bowel movements  · Protrusion of tissue from the anus  · Itching around the anus  Causes of hemorrhoids  There’s no single cause of hemorrhoids. Most often, though, they are  caused by too much pressure on the anal canal. This can be due to:  · Chronic (ongoing) constipation  · Straining during bowel movements  · Sitting too long on the toilet  · Strenuous exercise or heavy lifting  · Pregnancy and childbirth  · Aging  · Diarrhea  Date Last Reviewed: 7/1/2016  © 7834-8975 [employer]. [address] [place] [address]. All rights reserved. This information is not intended as a substitute for professional medical care. Always follow your healthcare professional's instructions.      High-Fiber Diet  Fiber is in fruits, vegetables, cereals, and grains. Fiber passes through your body undigested. A high-fiber diet helps food move through your intestinal tract. The added bulk can help prevent constipation. In people with small pouches in the colon (diverticulosis), fiber helps clean out the pouches along the colon wall. It also prevents new pouches from forming. A high-fiber diet reduces the risk of colon cancer. It also lowers blood cholesterol and prevents high blood sugar in people with diabetes.     The high-fiber foods that are listed below should be part of your diet. If you are not used to eating high-fiber foods, start with 1 or 2 foods from this list. Every 3 to 4 days add a new food to your diet. Do this until you are eating 4 high-fiber foods per day. This should give you 20 to 35 grams of fiber a day. You need to drink a lot of water when you are on this diet. You should have 6 to 8 glasses of water a day. Water makes the fiber swell and increases the benefit.   Foods high in fiber  These foods are high in fiber:  · Breads. Breads made with 100% whole-wheat flour; rory, wheat, or rye crackers; whole-grain tortillas, bran muffins  · Cereals. Whole-grain and bran cereals with bran (shredded wheat, wheat flakes, raisin bran, corn bran); oatmeal, rolled oats, granola, and brown rice  · Fruits. Fresh fruits and their edible skins (pears, prunes, raisins, berries,  apples, and apricots); bananas, citrus fruit, mangoes, pineapple; and prune juice  · Nuts and seeds. Any nuts and seeds, such as walnuts, peanuts, almonds, and pumpkin seeds  · Vegetables. This includes all vegetables. They are best served raw or lightly cooked. Those higher in fiber include green peas, celery, eggplant, potatoes, spinach, broccoli, [place] sprouts, winter squash, carrots, cauliflower, soybeans, lentils, and fresh and dried beans of all kinds.  · Other. Popcorn; any spices  If you have diverticulosis  There aren't any specific foods to avoid if you have diverticulosis. But each person is different. There may be some foods that make your symptoms worse. Keep track and don’t eat foods that make you feel worse.   Jin last reviewed this educational content on 2/1/2020  © 2259-9936 [employer]. All rights reserved. This information is not intended as a substitute for professional medical care. Always follow your healthcare professional's instructions.

## 2024-02-08 NOTE — TELEPHONE ENCOUNTER
Pt says she missed call. Wasn't sure if you tried to call her, but she is aware of [id number] results. Did not want to know gender

## 2024-02-09 ENCOUNTER — TRANSCRIBE ORDERS (OUTPATIENT)
Dept: ULTRASOUND IMAGING | Facility: HOSPITAL | Age: 34
End: 2024-02-09
Payer: MEDICAID

## 2024-02-09 DIAGNOSIS — Z95.1 S/P CABG X 3: Primary | ICD-10-CM

## 2024-02-13 ENCOUNTER — HOSPITAL ENCOUNTER (OUTPATIENT)
Dept: PHYSICAL THERAPY | Facility: HOSPITAL | Age: 34
Setting detail: THERAPIES SERIES
Discharge: HOME | End: 2024-02-13
Payer: MEDICAID

## 2024-02-13 DIAGNOSIS — O26.899 LOW BACK PAIN DURING PREGNANCY, ANTEPARTUM: Primary | ICD-10-CM

## 2024-02-13 DIAGNOSIS — M62.81 MUSCLE WEAKNESS: ICD-10-CM

## 2024-02-13 DIAGNOSIS — M54.50 LOW BACK PAIN DURING PREGNANCY, ANTEPARTUM: Primary | ICD-10-CM

## 2024-02-13 PROCEDURE — 97140 MANUAL THERAPY 1/> REGIONS: CPT

## 2024-02-13 PROCEDURE — 97110 THERAPEUTIC EXERCISES: CPT

## 2024-02-15 ENCOUNTER — HOSPITAL ENCOUNTER (OUTPATIENT)
Dept: PHYSICAL THERAPY | Facility: HOSPITAL | Age: 34
Setting detail: THERAPIES SERIES
Discharge: HOME | End: 2024-02-15
Payer: MEDICAID

## 2024-02-15 ENCOUNTER — TELEPHONE (OUTPATIENT)
Dept: NEUROSURGERY | Facility: CLINIC | Age: 34
End: 2024-02-15
Payer: MEDICAID

## 2024-02-15 ENCOUNTER — HOSPITAL ENCOUNTER (OUTPATIENT)
Dept: ULTRASOUND IMAGING | Facility: HOSPITAL | Age: 34
Discharge: HOME | End: 2024-02-15
Admitting: OBSTETRICS & GYNECOLOGY
Payer: MEDICAID

## 2024-02-15 ENCOUNTER — OFFICE VISIT (OUTPATIENT)
Dept: OBSTETRICS AND GYNECOLOGY | Facility: CLINIC | Age: 34
End: 2024-02-15
Payer: MEDICAID

## 2024-02-15 VITALS
HEART RATE: 75 BPM | SYSTOLIC BLOOD PRESSURE: 101 MMHG | TEMPERATURE: 97.8 F | DIASTOLIC BLOOD PRESSURE: 52 MMHG | WEIGHT: 117.4 LBS | BODY MASS INDEX: 22.18 KG/M2

## 2024-02-15 DIAGNOSIS — G93.0 INTRACRANIAL ARACHNOID CYST: ICD-10-CM

## 2024-02-15 DIAGNOSIS — O26.899 LOW BACK PAIN DURING PREGNANCY, ANTEPARTUM: Primary | ICD-10-CM

## 2024-02-15 DIAGNOSIS — M62.81 MUSCLE WEAKNESS: ICD-10-CM

## 2024-02-15 DIAGNOSIS — O09.529 ANTEPARTUM MULTIGRAVIDA OF ADVANCED MATERNAL AGE: ICD-10-CM

## 2024-02-15 DIAGNOSIS — M54.50 LOW BACK PAIN DURING PREGNANCY, ANTEPARTUM: Primary | ICD-10-CM

## 2024-02-15 DIAGNOSIS — Z95.1 S/P CABG X 3: ICD-10-CM

## 2024-02-15 DIAGNOSIS — Z95.1 S/P CABG X 3: Primary | ICD-10-CM

## 2024-02-15 DIAGNOSIS — I77.3 FIBROMUSCULAR DYSPLASIA OF CAROTID ARTERY: ICD-10-CM

## 2024-02-15 PROCEDURE — 97110 THERAPEUTIC EXERCISES: CPT

## 2024-02-15 PROCEDURE — 97140 MANUAL THERAPY 1/> REGIONS: CPT

## 2024-02-15 PROCEDURE — 76805 OB US >/= 14 WKS SNGL FETUS: CPT

## 2024-02-15 NOTE — TELEPHONE ENCOUNTER
Spoke to Ron with [facility] and she wants to check with  to ask if the patient is okay to have a vaginal delivery in his opinion being that he sees this patient as well, please call and advise, if Ron is not available you can speak with Indira regarding the patient, thanks!

## 2024-02-16 ENCOUNTER — TELEPHONE (OUTPATIENT)
Dept: OBSTETRICS AND GYNECOLOGY | Facility: CLINIC | Age: 34
End: 2024-02-16
Payer: MEDICAID

## 2024-02-20 ENCOUNTER — APPOINTMENT (OUTPATIENT)
Dept: PHYSICAL THERAPY | Facility: HOSPITAL | Age: 34
End: 2024-02-20
Payer: MEDICAID

## 2024-02-22 ENCOUNTER — TELEPHONE (OUTPATIENT)
Dept: PHYSICAL THERAPY | Facility: HOSPITAL | Age: 34
End: 2024-02-22
Payer: MEDICAID

## 2024-02-22 NOTE — TELEPHONE ENCOUNTER
Spoke with patient regarding no show for today's appointment. Reminded pt of next appointment date and time, as well as reminded of 24 hour cancellation policy. Requested pt call back if unable to make next appointment.

## 2024-02-23 NOTE — TELEPHONE ENCOUNTER
Per Dr. Valarie meeks for patient to have a safe vaginal delivery from Neurosurgery Standpoint. I called and spoke to patient and Ron at [facility].

## 2024-02-27 ENCOUNTER — HOSPITAL ENCOUNTER (OUTPATIENT)
Dept: PHYSICAL THERAPY | Facility: HOSPITAL | Age: 34
Setting detail: THERAPIES SERIES
Discharge: HOME | End: 2024-02-27
Payer: MEDICAID

## 2024-02-27 DIAGNOSIS — O26.899 LOW BACK PAIN DURING PREGNANCY, ANTEPARTUM: Primary | ICD-10-CM

## 2024-02-27 DIAGNOSIS — M62.81 MUSCLE WEAKNESS: ICD-10-CM

## 2024-02-27 DIAGNOSIS — M54.50 LOW BACK PAIN DURING PREGNANCY, ANTEPARTUM: Primary | ICD-10-CM

## 2024-02-27 PROCEDURE — 97110 THERAPEUTIC EXERCISES: CPT

## 2024-02-27 PROCEDURE — 97140 MANUAL THERAPY 1/> REGIONS: CPT

## 2024-02-27 NOTE — THERAPY TREATMENT NOTE
Outpatient Physical Therapy Pelvic Health Treatment Note  [facility]     Patient Name: Esme Herrera  : 1990  MRN: [id number]  Today's Date: 2024        Visit Date: 2024    Visit Dx:    ICD-10-CM ICD-9-CM   1. Low back pain during pregnancy, antepartum  O26.899 646.83    M54.50 724.2   2. Muscle weakness  M62.81 728.87       Patient Active Problem List   Diagnosis    Spontaneous dissection of coronary artery    S/P CABG x 3    Ischemic cardiomyopathy    Epigastric pain    Fibromuscular dysplasia of carotid artery    Intracranial arachnoid cyst    Migraine without aura and without status migrainosus, not intractable                          PT Assessment/Plan       Row Name 24 1300          PT Assessment    Assessment Comments Pt reports significant improvement in back pain since starting PT. Focused on progression of core and hip strengthening challenges this date, and  time spent with manual therapies. Per chart review pt was cleared for a vaginal delivery per neurosurgery stand point regarding arachnoid cyst. Will plan to discuss labor/birth prep towards end of PT sessions.  -CC        PT Plan    PT Plan Comments Wean from manual if pain remains well managed, consider adding alt tband shdlr ext, monster walk  -CC               User Key  (r) = Recorded By, (t) = Taken By, (c) = Cosigned By      Initials Name Provider Type    Maria L Timmons, PT Physical Therapist                       OP Exercises       Row Name 24 1300             Subjective    Subjective Comments Pt reports improvements in back pain since starting therapy.  -CC         Subjective Pain    Able to rate subjective pain? yes  -CC      Pre-Treatment Pain Level 0  -CC      Subjective Pain Comment Arrives at 1:21 for 1:15 appt  -CC         Total Minutes    45404 - PT Therapeutic Exercise Minutes 30  -CC      94156 - PT Manual Therapy Minutes 8  -CC         Exercise 1    Exercise Name 1 LTR  -CC       Cueing 1 Verbal  -CC      Reps 1 10 ea way  -CC      Time 1 3s  -CC         Exercise 2    Exercise Name 2 piriformis stretch  -CC      Cueing 2 Verbal  -CC      Reps 2 3 ea leg  -CC      Time 2 20 s  -CC         Exercise 3    Exercise Name 3 small bridge w/ PPT  -CC      Cueing 3 Verbal;Demo  -CC      Sets 3 2  -CC      Reps 3 10  -CC         Exercise 4    Exercise Name 4 AR Press with TA  -CC      Cueing 4 Verbal;Demo  -CC      Sets 4 2 ea side  -CC      Reps 4 10  -CC      Time 4 RTB  -CC         Exercise 5    Exercise Name 5 hip AD ball  -CC      Cueing 5 Verbal;Demo  -CC      Reps 5 10  -CC      Time 5 10  -CC         Exercise 6    Exercise Name 6 s/l clam  -CC      Cueing 6 Verbal  -CC      Sets 6 2 ea leg  -CC      Reps 6 10  -CC      Time 6 YTB  -CC         Exercise 7    Exercise Name 7 seated ball roll out  -CC      Cueing 7 Verbal;Demo  -CC      Sets 7 3  -CC      Reps 7 5  -CC      Time 7 5  -CC         Exercise 8    Exercise Name 8 sidelying thoracic rotation  -CC      Cueing 8 Verbal;Demo  -CC      Reps 8 10ea  -CC      Time 8 5-10s  -CC      Additional Comments cueing for form/head follow  -CC         Exercise 9    Exercise Name 9 STS exhale with effort  -CC      Cueing 9 Verbal;Demo  -CC      Sets 9 2  -CC      Reps 9 10  -CC         Exercise 10    Exercise Name 10 side steps  -CC      Cueing 10 Verbal  -CC      Reps 10 4 laps  -CC      Time 10 YTB  -CC      Additional Comments knee slightly unlocked  -CC                User Key  (r) = Recorded By, (t) = Taken By, (c) = Cosigned By      Initials Name Provider Type    Maria L Timmons, PT Physical Therapist                  Manual Rx (last 36 hours)       Manual Treatments       Row Name 02/27/24 1300             Total Minutes    97366 - PT Manual Therapy Minutes 8  -CC         Manual Rx 1    Manual Rx 1 Location B lumbar ES STM in B sidelying  -CC                User Key  (r) = Recorded By, (t) = Taken By, (c) = Cosigned By      Initials Name  Provider Type    CC Maria L Vasquez, PT Physical Therapist                                                  Time Calculation:   Start Time: 1321  Stop Time: 1359  Time Calculation (min): 38 min  Total Timed Code Minutes- PT: 38 minute(s)  Timed Charges  21292 - PT Therapeutic Exercise Minutes: 30  16355 - PT Manual Therapy Minutes: 8  Total Minutes  Timed Charges Total Minutes: 38   Total Minutes: 38  Therapy Charges for Today       Code Description Service Date Service Provider Modifiers Qty    20924882031 HC PT THER PROC EA 15 MIN 2/27/2024 Maria L Vasquez, PT GP 2    23401069014 HC PT MANUAL THERAPY EA 15 MIN 2/27/2024 Maria L Vasquez, PT GP 1                      Maria L Vasquez PT  2/27/2024

## 2024-02-29 ENCOUNTER — HOSPITAL ENCOUNTER (OUTPATIENT)
Dept: PHYSICAL THERAPY | Facility: HOSPITAL | Age: 34
Setting detail: THERAPIES SERIES
Discharge: HOME | End: 2024-02-29
Payer: MEDICAID

## 2024-02-29 DIAGNOSIS — M62.81 MUSCLE WEAKNESS: ICD-10-CM

## 2024-02-29 DIAGNOSIS — O26.899 LOW BACK PAIN DURING PREGNANCY, ANTEPARTUM: Primary | ICD-10-CM

## 2024-02-29 DIAGNOSIS — M54.50 LOW BACK PAIN DURING PREGNANCY, ANTEPARTUM: Primary | ICD-10-CM

## 2024-02-29 PROCEDURE — 97110 THERAPEUTIC EXERCISES: CPT

## 2024-02-29 PROCEDURE — 97140 MANUAL THERAPY 1/> REGIONS: CPT

## 2024-02-29 PROCEDURE — 97530 THERAPEUTIC ACTIVITIES: CPT

## 2024-02-29 NOTE — THERAPY TREATMENT NOTE
Outpatient Physical Therapy Ortho Treatment Note  [facility]     Patient Name: Esme Herrera  : 1990  MRN: [id number]  Today's Date: 2024      Visit Date: 2024    Visit Dx:    ICD-10-CM ICD-9-CM   1. Low back pain during pregnancy, antepartum  O26.899 646.83    M54.50 724.2   2. Muscle weakness  M62.81 728.87       Patient Active Problem List   Diagnosis    Spontaneous dissection of coronary artery    S/P CABG x 3    Ischemic cardiomyopathy    Epigastric pain    Fibromuscular dysplasia of carotid artery    Intracranial arachnoid cyst    Migraine without aura and without status migrainosus, not intractable        Past Medical History:   Diagnosis Date    Coronary artery dissection 10/14/2022    Disease of thyroid gland     Hypothyroid    GERD (gastroesophageal reflux disease)     Hypotension         Past Surgical History:   Procedure Laterality Date    CARDIAC CATHETERIZATION N/A 10/15/2022    Procedure: Left Heart Cath;  Surgeon: Deandra Dumas MD;  Location: [facility] CATH INVASIVE LOCATION;  Service: Cardiology;  Laterality: N/A;    CARDIAC CATHETERIZATION N/A 10/15/2022    Procedure: Coronary angiography;  Surgeon: Deandra Dumas MD;  Location: [facility] CATH INVASIVE LOCATION;  Service: Cardiology;  Laterality: N/A;    CARDIAC CATHETERIZATION N/A 10/15/2022    Procedure: Left ventriculography;  Surgeon: Deandra Dumas MD;  Location: [facility] CATH INVASIVE LOCATION;  Service: Cardiology;  Laterality: N/A;    CARDIAC CATHETERIZATION N/A 2023    Procedure: Coronary angiography;  Surgeon: Deandra Dumas MD;  Location: [facility] CATH INVASIVE LOCATION;  Service: Cardiology;  Laterality: N/A;    CARDIAC CATHETERIZATION N/A 2023    Procedure: Left Heart Cath;  Surgeon: Deandra Dumas MD;  Location: [facility] CATH INVASIVE LOCATION;  Service: Cardiology;  Laterality: N/A;    CARDIAC CATHETERIZATION N/A 2023    Procedure: Left ventriculography;  Surgeon: Deandra Dumas MD;  Location:  [facility] CATH INVASIVE LOCATION;  Service: Cardiology;  Laterality: N/A;    CORONARY ARTERY BYPASS GRAFT N/A 10/15/2022    Procedure: VERONICA, STERNOTOMY, CORONARY ARTERY BYPASS TIMES 3 WITH INTERNAL MAMMARY ARTERY GRAFT AND LEFT GREATER SAPHENOUS VEIN HARVEST, REPAIR OF CORONARY ARTERY DISSECTION,PRP;  Surgeon: Lakshmi Teixeira MD;  Location: [facility] CVOR;  Service: Cardiothoracic;  Laterality: N/A;    ENDOSCOPY N/A 1/12/2023    Procedure: ESOPHAGOGASTRODUODENOSCOPY with bxs;  Surgeon: Nish Quiroz MD;  Location: [facility] ENDOSCOPY;  Service: Gastroenterology;  Laterality: N/A;  PRE: Epigastric Pain  POST: Normal                        PT Assessment/Plan       Row Name 02/29/24 1300          PT Assessment    Assessment Comments Pt reports mild increase in pain due to activity the previous date however continues to note marked improvement in pain overall since starting PT. Continued with current program focused on improved lumbopelvic stability, increased resistance during clamshells and side steps and initiated monster walk and shoulder extension all with good tolerance and no reports of increased pain. She reports fair to good compliance with HEP and remains appropriate for skilled PT.  -RS        PT Plan    PT Plan Comments May consider reducing to 1x per week and focusing on exercise and labor prep  -RS               User Key  (r) = Recorded By, (t) = Taken By, (c) = Cosigned By      Initials Name Provider Type    RS Radha Rivera, PT Physical Therapist                       OP Exercises       Row Name 02/29/24 1300             Subjective    Subjective Comments Pt reports soreness today because she did  a bit yesterday  -RS         Subjective Pain    Subjective Pain Comment arrival 1323 appt 1315  -RS         Total Minutes    14488 - PT Therapeutic Exercise Minutes 15  -RS      65636 - PT Therapeutic Activity Minutes 15  -RS      15890 - PT Manual Therapy Minutes 8  -RS         Exercise 1    Exercise Name 1 --   -RS      Cueing 1 --  -RS      Reps 1 --  -RS      Time 1 --  -RS         Exercise 2    Exercise Name 2 piriformis stretch  -RS      Cueing 2 Verbal  -RS      Reps 2 3 ea leg  -RS      Time 2 20 s  -RS         Exercise 3    Exercise Name 3 brige with AD  -RS      Cueing 3 Verbal;Demo  -RS      Sets 3 2  -RS      Reps 3 10  -RS      Time 3 3  -RS      Additional Comments ball  -RS         Exercise 4    Exercise Name 4 AR Press with TA  -RS      Cueing 4 Verbal;Demo  -RS      Sets 4 2 ea side  -RS      Reps 4 10  -RS      Time 4 RTB  -RS         Exercise 5    Exercise Name 5 shoulder ext with TA  -RS      Cueing 5 Verbal;Demo  -RS      Sets 5 2  -RS      Reps 5 10  -RS      Time 5 RTB  -RS      Additional Comments cues for technique, avoid [place] compensation  -RS         Exercise 6    Exercise Name 6 s/l clam  -RS      Cueing 6 Verbal  -RS      Sets 6 2 ea leg  -RS      Reps 6 10  -RS      Time 6 RTB  -RS         Exercise 7    Exercise Name 7 seated ball roll out  -RS      Cueing 7 Verbal;Demo  -RS      Sets 7 3  -RS      Reps 7 5  -RS      Time 7 5  -RS         Exercise 8    Exercise Name 8 sidelying thoracic rotation  -RS      Cueing 8 Verbal;Demo  -RS      Reps 8 10ea  -RS      Time 8 5-10s  -RS         Exercise 9    Exercise Name 9 STS exhale with effort  -RS      Cueing 9 Verbal;Demo  -RS      Sets 9 2  -RS      Reps 9 10  -RS      Additional Comments with L3 med ball  -RS         Exercise 10    Exercise Name 10 side steps  -RS      Cueing 10 Verbal  -RS      Reps 10 3 laps  -RS      Time 10 RTB  -RS      Additional Comments knee slightly unlocked  -RS         Exercise 11    Exercise Name 11 monster walk  -RS      Cueing 11 Verbal;Demo  -RS      Reps 11 3 laps  -RS      Time 11 RTB  -RS      Additional Comments knee slightly unlocked  -RS                User Key  (r) = Recorded By, (t) = Taken By, (c) = Cosigned By      Initials Name Provider Type    RS Radha Rivera PT Physical Therapist                              Manual Rx (last 36 hours)       Manual Treatments       Row Name 02/29/24 1300             Total Minutes    04968 - PT Manual Therapy Minutes 8  -RS         Manual Rx 1    Manual Rx 1 Location B lumbar ES STM in B sidelying  -RS                User Key  (r) = Recorded By, (t) = Taken By, (c) = Cosigned By      Initials Name Provider Type    RS Radha Rivera, PT Physical Therapist                     PT OP Goals       Row Name 02/29/24 1300          PT Short Term Goals    STG Date to Achieve 03/08/24  -RS     STG 1 Patient will be independent with education for symptom management and initial HEP to decrease LBP pain.  -RS     STG 1 Progress Met  -RS     STG 2 Pt will report at least 50% improvement in s/s to improve participation in ADLs.  -RS     STG 2 Progress Met  -RS     STG 3 Patient will improve ability to sleep through the night without sleep disturbances related to back pain to improve overall sleep quality and quality of life  -RS     STG 3 Progress Met  -RS        Long Term Goals    LTG Date to Achieve 04/07/24  -RS     LTG 1 Patient will be independent with education for symptom management and advanced HEP to decrease LBP pain.  -RS     LTG 1 Progress Ongoing  -RS     LTG 2 Patient will reduce level of percieved disability as measured by the Modified Oswestry to </= 30% disability in order to improve quality of life.  -RS     LTG 2 Progress Ongoing  -RS     LTG 3 Patient will improve walking tolerance to >30 min with </=3/10 LBP to improve participation in community activities.  -RS     LTG 3 Progress Ongoing  -RS     LTG 4 Pt will demo at least 4/5 mariel hip strength.  -RS     LTG 4 Progress Ongoing  -RS     LTG 5 Pt will report at least 50% improvement in UUI symptoms.  -RS     LTG 5 Progress Ongoing  -RS     LTG 6 Pt will verbalize good understanding of labor prep techniques.  -RS     LTG 6 Progress Ongoing  -RS               User Key  (r) = Recorded By, (t) = Taken By, (c) = Cosigned  By      Initials Name Provider Type    RS Radha Rivera, PT Physical Therapist                    Therapy Education  Given: HEP  Program: Reinforced  How Provided: Verbal, Demonstration  Provided to: Patient  Level of Understanding: Verbalized, Demonstrated              Time Calculation:   Start Time: 1323  Stop Time: 1401  Time Calculation (min): 38 min  Timed Charges  81213 - PT Therapeutic Exercise Minutes: 15  22427 - PT Manual Therapy Minutes: 8  57455 - PT Therapeutic Activity Minutes: 15  Total Minutes  Timed Charges Total Minutes: 38   Total Minutes: 38  Therapy Charges for Today       Code Description Service Date Service Provider Modifiers Qty    96709429192 HC PT THER PROC EA 15 MIN 2/29/2024 Radha Rivera, PT GP 1    76826753684 HC PT THERAPEUTIC ACT EA 15 MIN 2/29/2024 Radha Rivera, PT GP 1    89368613948 HC PT MANUAL THERAPY EA 15 MIN 2/29/2024 Radha Rivera, PT GP 1                      Radha Rivera PT  2/29/2024

## 2024-03-05 ENCOUNTER — HOSPITAL ENCOUNTER (OUTPATIENT)
Dept: PHYSICAL THERAPY | Facility: HOSPITAL | Age: 34
Setting detail: THERAPIES SERIES
Discharge: HOME | End: 2024-03-05
Payer: MEDICAID

## 2024-03-05 DIAGNOSIS — O26.899 LOW BACK PAIN DURING PREGNANCY, ANTEPARTUM: Primary | ICD-10-CM

## 2024-03-05 DIAGNOSIS — M62.81 MUSCLE WEAKNESS: ICD-10-CM

## 2024-03-05 DIAGNOSIS — M54.50 LOW BACK PAIN DURING PREGNANCY, ANTEPARTUM: Primary | ICD-10-CM

## 2024-03-05 PROCEDURE — 97110 THERAPEUTIC EXERCISES: CPT

## 2024-03-05 NOTE — THERAPY TREATMENT NOTE
Outpatient Physical Therapy Pelvic Health Treatment Note  [facility]     Patient Name: Esme Herrera  : 1990  MRN: [id number]  Today's Date: 3/5/2024        Visit Date: 2024    Visit Dx:    ICD-10-CM ICD-9-CM   1. Low back pain during pregnancy, antepartum  O26.899 646.83    M54.50 724.2   2. Muscle weakness  M62.81 728.87       Patient Active Problem List   Diagnosis    Spontaneous dissection of coronary artery    S/P CABG x 3    Ischemic cardiomyopathy    Epigastric pain    Fibromuscular dysplasia of carotid artery    Intracranial arachnoid cyst    Migraine without aura and without status migrainosus, not intractable                          PT Assessment/Plan       Row Name 24 1300          PT Assessment    Assessment Comments Pt reports good improvements in back pain since starting therapy, and no pain today. Contined with focus on core/hip strength challenges with in time constraints due to patient's late arrival to appointment. Anticipate transition to indep management in near future due to pain improvements since starting PT.  -CC        PT Plan    PT Plan Comments Consider reviewing labor prep recs, updated HEP, and discuss possible d/c to indep management  -CC               User Key  (r) = Recorded By, (t) = Taken By, (c) = Cosigned By      Initials Name Provider Type    Maria L Timmons, PT Physical Therapist                       OP Exercises       Row Name 24 1300             Subjective    Subjective Comments Pt states she was stuck in traffic. Reports back is feeling good  -CC         Subjective Pain    Able to rate subjective pain? yes  -CC      Pre-Treatment Pain Level 0  -CC      Subjective Pain Comment Arrival time 1330 for 1315 appt.  -CC         Total Minutes    86360 - PT Therapeutic Exercise Minutes 23  -CC         Exercise 1    Exercise Name 1 LTR  -CC      Cueing 1 Verbal  -CC      Reps 1 10 ea way  -CC      Time 1 3s  -CC         Exercise 2    Exercise  Name 2 piriformis stretch  -CC      Cueing 2 Verbal  -CC      Reps 2 3 ea leg  -CC      Time 2 20 s  -CC         Exercise 3    Exercise Name 3 stir the pot  -CC      Cueing 3 Verbal  -CC      Sets 3 1 set ea side  -CC      Reps 3 10 cw, 10ccw  -CC      Time 3 RTB doubled  -CC         Exercise 4    Exercise Name 4 AR Press with TA  -CC      Cueing 4 Verbal;Demo  -CC      Sets 4 2 ea side  -CC      Reps 4 10  -CC      Time 4 RTB  -CC         Exercise 5    Exercise Name 5 shoulder ext with TA  -CC      Cueing 5 Verbal;Demo  -CC      Sets 5 2 alt UE  -CC      Reps 5 10  -CC      Time 5 RTB  -CC         Exercise 6    Exercise Name 6 s/l clam  -CC      Cueing 6 Verbal  -CC      Sets 6 2 ea leg  -CC      Reps 6 10  -CC      Time 6 RTB  -CC         Exercise 8    Exercise Name 8 standing diagonal pulls with TA  -CC      Cueing 8 Verbal  -CC      Sets 8 2  -CC      Reps 8 10  -CC      Time 8 RTB  -CC         Exercise 9    Exercise Name 9 STS exhale with effort  -CC      Cueing 9 Verbal;Demo  -CC      Sets 9 2  -CC      Reps 9 10  -CC      Time 9 with L3 med ball chest press  -CC         Exercise 10    Exercise Name 10 side steps  -CC      Cueing 10 Verbal  -CC      Sets 10 with L3 ball chest press  -CC      Reps 10 3 laps  -CC      Time 10 RTB  -CC      Additional Comments knee slightly unlocked  -CC         Exercise 11    Exercise Name 11 monster walk  -CC      Cueing 11 Verbal;Demo  -CC      Reps 11 3 laps  -CC      Time 11 RTB  -CC                User Key  (r) = Recorded By, (t) = Taken By, (c) = Cosigned By      Initials Name Provider Type    Maria L Timmons, PT Physical Therapist                                 PT OP Goals       Row Name 03/05/24 1300          PT Short Term Goals    STG Date to Achieve 03/08/24  -CC     STG 1 Patient will be independent with education for symptom management and initial HEP to decrease LBP pain.  -CC     STG 1 Progress Met  -CC     STG 2 Pt will report at least 50% improvement in  s/s to improve participation in ADLs.  -CC     STG 2 Progress Met  -     STG 3 Patient will improve ability to sleep through the night without sleep disturbances related to back pain to improve overall sleep quality and quality of life  -     STG 3 Progress Met  -        Long Term Goals    LTG Date to Achieve 04/07/24  -     LTG 1 Patient will be independent with education for symptom management and advanced HEP to decrease LBP pain.  -     LTG 1 Progress Ongoing  -CC     LTG 2 Patient will reduce level of percieved disability as measured by the Modified Oswestry to </= 30% disability in order to improve quality of life.  -CC     LTG 2 Progress Ongoing  -CC     LTG 3 Patient will improve walking tolerance to >30 min with </=3/10 LBP to improve participation in community activities.  -     LTG 3 Progress Met  -     LTG 4 Pt will demo at least 4/5 mariel hip strength.  -CC     LTG 4 Progress Ongoing  -CC     LTG 5 Pt will report at least 50% improvement in UUI symptoms.  -     LTG 5 Progress Ongoing  -CC     LTG 6 Pt will verbalize good understanding of labor prep techniques.  -     LTG 6 Progress Ongoing  -CC               User Key  (r) = Recorded By, (t) = Taken By, (c) = Cosigned By      Initials Name Provider Type    CC Maria L Vasquez, PT Physical Therapist                                    Time Calculation:   Start Time: 1332  Stop Time: 1355  Time Calculation (min): 23 min  Total Timed Code Minutes- PT: 23 minute(s)  Timed Charges  82658 - PT Therapeutic Exercise Minutes: 23  Total Minutes  Timed Charges Total Minutes: 23   Total Minutes: 23  Therapy Charges for Today       Code Description Service Date Service Provider Modifiers Qty    19464264734  PT THER PROC EA 15 MIN 3/5/2024 Maria L Vasquez, PT GP 2                      Maria L Vasquez PT  3/5/2024

## 2024-03-06 ENCOUNTER — ROUTINE PRENATAL (OUTPATIENT)
Dept: OBSTETRICS AND GYNECOLOGY | Facility: CLINIC | Age: 34
End: 2024-03-06
Payer: MEDICAID

## 2024-03-06 VITALS — SYSTOLIC BLOOD PRESSURE: 94 MMHG | DIASTOLIC BLOOD PRESSURE: 61 MMHG | BODY MASS INDEX: 22.82 KG/M2 | WEIGHT: 120.8 LBS

## 2024-03-06 DIAGNOSIS — O26.891 LOW BACK PAIN DURING PREGNANCY, FIRST TRIMESTER: ICD-10-CM

## 2024-03-06 DIAGNOSIS — M54.50 LOW BACK PAIN DURING PREGNANCY, FIRST TRIMESTER: ICD-10-CM

## 2024-03-06 DIAGNOSIS — Z95.1 S/P CABG X 3: ICD-10-CM

## 2024-03-06 DIAGNOSIS — G93.0 INTRACRANIAL ARACHNOID CYST: ICD-10-CM

## 2024-03-06 DIAGNOSIS — Z3A.17 17 WEEKS GESTATION OF PREGNANCY: Primary | ICD-10-CM

## 2024-03-06 LAB
GLUCOSE UR STRIP-MCNC: NEGATIVE MG/DL
PROT UR STRIP-MCNC: NEGATIVE MG/DL

## 2024-03-06 RX ORDER — CYCLOSPORINE 0.5 MG/ML
EMULSION OPHTHALMIC
COMMUNITY
Start: 2024-01-06

## 2024-03-06 NOTE — PROGRESS NOTES
Chief Complaint   Patient presents with    Routine Prenatal Visit      Esme Herrera is a 33 y.o.  at 17w5d  here for routine OB visit  Reports she is taking ASA  Received call from Neurosurgery that okay for  from their standpoint  No chest pain or SOA  Has not yet felt fetal movements  Back pain improved with PT    BP 94/61   Wt 54.8 kg (120 lb 12.8 oz)   LMP 10/17/2023   BMI 22.82 kg/m²        Gen: NAD, well appearing  Abd: nontender      See OB Flowsheet    ASSESSMENT/PLAN:  Diagnoses and all orders for this visit:    1. 17 weeks gestation of pregnancy (Primary)  -     POC Urinalysis Dipstick  -     BNP ([facility] Only)  -     CBC (No Diff)  -     Comprehensive Metabolic Panel  -     Lactate Dehydrogenase    2. S/P CABG x 3  -     BNP ([facility] Only)  -     CBC (No Diff)  -     Comprehensive Metabolic Panel  -     Lactate Dehydrogenase    3. Intracranial arachnoid cyst    4. Low back pain during pregnancy, first trimester    Reviewed labs from last visit, does not wish to know gender  Will obtain labs today including baseline BNP  Has follow up with Cardiology next week and MFM For anatomy US  Continue aspirin. Discussed DHA and folic acid supplementation  Routine counseling pertinent to this stage of pregnancy was reviewed including second trimester precautions  Return in about 4 weeks (around 4/3/2024).

## 2024-03-07 ENCOUNTER — HOSPITAL ENCOUNTER (OUTPATIENT)
Dept: PHYSICAL THERAPY | Facility: HOSPITAL | Age: 34
Setting detail: THERAPIES SERIES
Discharge: HOME | End: 2024-03-07
Payer: MEDICAID

## 2024-03-07 DIAGNOSIS — M54.50 LOW BACK PAIN DURING PREGNANCY, ANTEPARTUM: Primary | ICD-10-CM

## 2024-03-07 DIAGNOSIS — M62.81 MUSCLE WEAKNESS: ICD-10-CM

## 2024-03-07 DIAGNOSIS — O26.899 LOW BACK PAIN DURING PREGNANCY, ANTEPARTUM: Primary | ICD-10-CM

## 2024-03-07 LAB
ALBUMIN SERPL-MCNC: 4.1 G/DL (ref 3.9–4.9)
ALBUMIN/GLOB SERPL: 1.8 {RATIO} (ref 1.2–2.2)
ALP SERPL-CCNC: 43 IU/L (ref 44–121)
ALT SERPL-CCNC: 9 IU/L (ref 0–32)
AST SERPL-CCNC: 17 IU/L (ref 0–40)
BILIRUB SERPL-MCNC: 0.5 MG/DL (ref 0–1.2)
BNP SERPL-MCNC: 49.6 PG/ML (ref 0–100)
BUN SERPL-MCNC: 8 MG/DL (ref 6–20)
BUN/CREAT SERPL: 13 (ref 9–23)
CALCIUM SERPL-MCNC: 9.2 MG/DL (ref 8.7–10.2)
CHLORIDE SERPL-SCNC: 101 MMOL/L (ref 96–106)
CO2 SERPL-SCNC: 21 MMOL/L (ref 20–29)
CREAT SERPL-MCNC: 0.63 MG/DL (ref 0.57–1)
EGFRCR SERPLBLD CKD-EPI 2021: 119 ML/MIN/1.73
ERYTHROCYTE [DISTWIDTH] IN BLOOD BY AUTOMATED COUNT: 13 % (ref 11.7–15.4)
GLOBULIN SER CALC-MCNC: 2.3 G/DL (ref 1.5–4.5)
GLUCOSE SERPL-MCNC: 76 MG/DL (ref 70–99)
HCT VFR BLD AUTO: 35.4 % (ref 34–46.6)
HGB BLD-MCNC: 12.3 G/DL (ref 11.1–15.9)
LDH SERPL L TO P-CCNC: 158 IU/L (ref 119–226)
MCH RBC QN AUTO: 31.8 PG (ref 26.6–33)
MCHC RBC AUTO-ENTMCNC: 34.7 G/DL (ref 31.5–35.7)
MCV RBC AUTO: 92 FL (ref 79–97)
PLATELET # BLD AUTO: 226 X10E3/UL (ref 150–450)
POTASSIUM SERPL-SCNC: 4.3 MMOL/L (ref 3.5–5.2)
PROT SERPL-MCNC: 6.4 G/DL (ref 6–8.5)
RBC # BLD AUTO: 3.87 X10E6/UL (ref 3.77–5.28)
SODIUM SERPL-SCNC: 135 MMOL/L (ref 134–144)
WBC # BLD AUTO: 5.9 X10E3/UL (ref 3.4–10.8)

## 2024-03-07 PROCEDURE — 97110 THERAPEUTIC EXERCISES: CPT

## 2024-03-07 PROCEDURE — 97530 THERAPEUTIC ACTIVITIES: CPT

## 2024-03-07 NOTE — THERAPY PROGRESS REPORT/RE-CERT
Outpatient Physical Therapy Ortho Progress Note  [facility]     Patient Name: Esme Herrera  : 1990  MRN: [id number]  Today's Date: 3/7/2024      Visit Date: 2024    Visit Dx:    ICD-10-CM ICD-9-CM   1. Low back pain during pregnancy, antepartum  O26.899 646.83    M54.50 724.2   2. Muscle weakness  M62.81 728.87       Patient Active Problem List   Diagnosis    Spontaneous dissection of coronary artery    S/P CABG x 3    Ischemic cardiomyopathy    Epigastric pain    Fibromuscular dysplasia of carotid artery    Intracranial arachnoid cyst    Migraine without aura and without status migrainosus, not intractable        Past Medical History:   Diagnosis Date    Coronary artery dissection 10/14/2022    Disease of thyroid gland     Hypothyroid    GERD (gastroesophageal reflux disease)     Hypotension         Past Surgical History:   Procedure Laterality Date    CARDIAC CATHETERIZATION N/A 10/15/2022    Procedure: Left Heart Cath;  Surgeon: Deandra Dumas MD;  Location: [facility] CATH INVASIVE LOCATION;  Service: Cardiology;  Laterality: N/A;    CARDIAC CATHETERIZATION N/A 10/15/2022    Procedure: Coronary angiography;  Surgeon: Deandra Dumas MD;  Location: [facility] CATH INVASIVE LOCATION;  Service: Cardiology;  Laterality: N/A;    CARDIAC CATHETERIZATION N/A 10/15/2022    Procedure: Left ventriculography;  Surgeon: Deandra Dumas MD;  Location: [facility]VASIVE LOC[facility];  Service: Cardiology;  Laterality: N/A;    CARDIAC CATHETERIZATION N/A 2023    Procedure: Coronary angiography;  Surgeon: Deandra Dumas MD;  [facility];  Service: Cardiology;  Laterality: N/A;    CARDIAC CATHETERIZATION N/A 2023    Procedure: Left Heart Cath;  Surgeon: Deandra Dumas MD;  [facility];  Service: Cardiology;  Laterality: N/A;    CARDIAC CATHETERIZATION N/A 2023    Procedure: Left ventriculography;  Surgeon: Deandra Dumas MD;  Location:  [facility] CATH INVASIVE LOCATION;  Service: Cardiology;  Laterality: N/A;    CORONARY ARTERY BYPASS GRAFT N/A 10/15/2022    Procedure: VERONICA, STERNOTOMY, CORONARY ARTERY BYPASS TIMES 3 WITH INTERNAL MAMMARY ARTERY GRAFT AND LEFT GREATER SAPHENOUS VEIN HARVEST, REPAIR OF CORONARY ARTERY DISSECTION,PRP;  Surgeon: Lakshmi Teixeira MD;  Location: [facility] CVOR;  Service: Cardiothoracic;  Laterality: N/A;    ENDOSCOPY N/A 1/12/2023    Procedure: ESOPHAGOGASTRODUODENOSCOPY with bxs;  Surgeon: Nish Quiroz MD;  Location: [facility] ENDOSCOPY;  Service: Gastroenterology;  Laterality: N/A;  PRE: Epigastric Pain  POST: Normal        PT Ortho       Row Name 03/07/24 1300       MMT (Manual Muscle Testing)    Rt Lower Ext Rt Hip ABduction  -RS    Lt Lower Ext Lt Hip ABduction  -RS       MMT Right Lower Ext    Rt Hip Flexion MMT, Gross Movement (4+/5) good plus  -RS    Rt Hip ABduction MMT, Gross Movement (4/5) good  -RS    Rt Knee Extension MMT, Gross Movement (4+/5) good plus  -RS    Rt Ankle Dorsiflexion MMT, Gross Movement (5/5) normal  -RS       MMT Left Lower Ext    Lt Hip Flexion MMT, Gross Movement (4+/5) good plus  -RS    Lt Hip ABduction MMT, Gross Movement (4/5) good  -RS    Lt Knee Extension MMT, Gross Movement (4/5) good  -RS    Lt Ankle Dorsiflexion MMT, Gross Movement (4+/5) good plus  -RS              User Key  (r) = Recorded By, (t) = Taken By, (c) = Cosigned By      Initials Name Provider Type    RS Radha Rivera PT Physical Therapist                                 PT Assessment/Plan       Row Name 03/07/24 1300          PT Assessment    Functional Limitations Limitation in home management;Limitations in community activities;Limitations in functional capacity and performance;Performance in leisure activities;Performance in self-care ADL  -RS     Impairments Range of motion;Posture;Poor body mechanics;Pain;Muscle strength;Joint mobility;Impaired flexibility  -RS     Assessment Comments The pt has been seen by PT  for 7 total sessions focused on pregnancy related low back pain. Treatment has included therex, NMR, pt educationShe has met or partially met 3/3 STG and 4/6 LTG. She demonstrates improvement in hip and LE strength compared to start of treatment as well as improved ease of transitional position performance. Updated HEP this date and reviewed with pt who reports understanding. At this time, plan to hold on PT for approximately 3 months, pt will return during third trimester to discuss alteration to HEP and labor preparation education. Pt to return sooner if pain returns, pt reports understanding of POC and agreement.  -RS        PT Plan    PT Plan Comments Return in third trimester for labor prep  -RS               User Key  (r) = Recorded By, (t) = Taken By, (c) = Cosigned By      Initials Name Provider Type    RS Radha Rivera, PT Physical Therapist                       OP Exercises       Row Name 03/07/24 1300             Subjective    Subjective Comments Pt reports she feels like her back pain has completely resolved, there are still times when it feels sore when she does too much but overall it is back to baseline  -RS         Subjective Pain    Able to rate subjective pain? yes  -RS      Pre-Treatment Pain Level 0  -RS         Total Minutes    71912 - PT Therapeutic Exercise Minutes 23  -RS      05432 - PT Therapeutic Activity Minutes 15  -RS         Exercise 1    Exercise Name 1 LTR  -RS      Cueing 1 Verbal  -RS      Reps 1 10 ea way  -RS      Time 1 3s  -RS         Exercise 2    Exercise Name 2 piriformis stretch  -RS      Cueing 2 Verbal  -RS      Reps 2 3 ea leg  -RS      Time 2 20 s  -RS         Exercise 3    Exercise Name 3 update of objective measures, goals, HEP  -RS      Cueing 3 --  -RS      Sets 3 --  -RS      Reps 3 --  -RS      Time 3 --  -RS         Exercise 4    Exercise Name 4 AR Press with TA  -RS      Cueing 4 Verbal;Demo  -RS      Sets 4 2 ea side  -RS      Reps 4 10  -RS      Time 4  RTB  -RS         Exercise 5    Exercise Name 5 shoulder ext with TA  -RS      Cueing 5 Verbal;Demo  -RS      Sets 5 2 alt UE  -RS      Reps 5 10  -RS      Time 5 GTB  -RS         Exercise 6    Exercise Name 6 s/l clam  -RS      Cueing 6 Verbal  -RS      Sets 6 2 ea leg  -RS      Reps 6 10  -RS      Time 6 GTB  -RS         Exercise 7    Exercise Name 7 discussion of POC, returning later for HEP update and labor prep, return sooner if pain returns  -RS         Exercise 8    Exercise Name 8 --  -RS      Cueing 8 --  -RS      Sets 8 --  -RS      Reps 8 --  -RS      Time 8 --  -RS         Exercise 9    Exercise Name 9 STS exhale with effort  -RS      Cueing 9 Verbal;Demo  -RS      Sets 9 2  -RS      Reps 9 10  -RS      Time 9 --  -RS         Exercise 10    Exercise Name 10 side steps  -RS      Cueing 10 Verbal  -RS      Sets 10 with L3 ball chest press  -RS      Reps 10 3 laps  -RS      Time 10 GTB  -RS         Exercise 11    Exercise Name 11 monster walk  -RS      Cueing 11 Verbal;Demo  -RS      Reps 11 3 laps  -RS      Time 11 GTB  -RS                User Key  (r) = Recorded By, (t) = Taken By, (c) = Cosigned By      Initials Name Provider Type    RS Radha Rivera, PT Physical Therapist                                  PT OP Goals       Row Name 03/07/24 1300          PT Short Term Goals    STG Date to Achieve 03/08/24  -RS     STG 1 Patient will be independent with education for symptom management and initial HEP to decrease LBP pain.  -RS     STG 1 Progress Met  -RS     STG 2 Pt will report at least 50% improvement in s/s to improve participation in ADLs.  -RS     STG 2 Progress Met  -RS     STG 3 Patient will improve ability to sleep through the night without sleep disturbances related to back pain to improve overall sleep quality and quality of life  -RS     STG 3 Progress Met  -RS        Long Term Goals    LTG Date to Achieve 04/07/24  -RS     LTG 1 Patient will be independent with education for symptom  management and advanced HEP to decrease LBP pain.  -RS     LTG 1 Progress Progressing  -RS     LTG 1 Progress Comments pt reports she performs most days  -RS     LTG 2 Patient will reduce level of percieved disability as measured by the Modified Oswestry to </= 30% disability in order to improve quality of life.  -RS     LTG 2 Progress Met  -RS     LTG 2 Progress Comments 16%  -RS     LTG 3 Patient will improve walking tolerance to >30 min with </=3/10 LBP to improve participation in community activities.  -RS     LTG 3 Progress Met  -RS     LTG 4 Pt will demo at least 4/5 mariel hip strength.  -RS     LTG 4 Progress Met  -RS     LTG 4 Progress Comments see ortho section  -RS     LTG 5 Pt will report at least 50% improvement in UUI symptoms.  -RS     LTG 5 Progress Met  -RS     LTG 5 Progress Comments pt reports has resolved  -RS     LTG 6 Pt will verbalize good understanding of labor prep techniques.  -RS     LTG 6 Progress Progressing  -RS               User Key  (r) = Recorded By, (t) = Taken By, (c) = Cosigned By      Initials Name Provider Type    Radha Hoffmann PT Physical Therapist                    Therapy Education  Education Details: see ex column  Given: HEP  Program: Reinforced  How Provided: Verbal, Demonstration  Provided to: Patient  Level of Understanding: Verbalized, Demonstrated    Outcome Measure Options: Modified Oswestry  Modified Oswestry  Modified Oswestry Score/Comments: 16%      Time Calculation:   Start Time: 1316  Stop Time: 1354  Time Calculation (min): 38 min  Timed Charges  78773 - PT Therapeutic Exercise Minutes: 23  78083 - PT Therapeutic Activity Minutes: 15  Total Minutes  Timed Charges Total Minutes: 38   Total Minutes: 38  Therapy Charges for Today       Code Description Service Date Service Provider Modifiers Qty    15999291639 HC PT THER PROC EA 15 MIN 3/7/2024 Radha Rivera PT GP 2    71799509712 HC PT THERAPEUTIC ACT EA 15 MIN 3/7/2024 Radha Rivera PT GP 1             PT G-Codes  Outcome Measure Options: Modified Oswestry  Modified Oswestry Score/Comments: 16%         Radha Rivera, PT  3/7/2024

## 2024-03-13 ENCOUNTER — OFFICE VISIT (OUTPATIENT)
Dept: CARDIOLOGY | Facility: CLINIC | Age: 34
End: 2024-03-13
Payer: MEDICAID

## 2024-03-13 VITALS
HEART RATE: 77 BPM | BODY MASS INDEX: 23.22 KG/M2 | WEIGHT: 123 LBS | DIASTOLIC BLOOD PRESSURE: 70 MMHG | HEIGHT: 61 IN | SYSTOLIC BLOOD PRESSURE: 104 MMHG

## 2024-03-13 DIAGNOSIS — Z95.1 S/P CABG X 3: ICD-10-CM

## 2024-03-13 DIAGNOSIS — I25.5 ISCHEMIC CARDIOMYOPATHY: ICD-10-CM

## 2024-03-13 DIAGNOSIS — I25.42 SPONTANEOUS DISSECTION OF CORONARY ARTERY: Primary | ICD-10-CM

## 2024-03-13 NOTE — PROGRESS NOTES
Subjective:     Encounter Date:03/13/2024      Patient ID: Esme Herrera is a 33 y.o. female.    Chief Complaint:follow SCAD  History of Present Illness  This is a 32 y/o female who follows with Dr. Dumas and is new to me today. She has a pmhx of spontaneous coronary artery dissection involving her distal left main, LAD, ramus intermedius and left circumflex requiring CABG x 3, ischemic cardiomyopathy, arachnoid cyst, fibromuscular dysplasia of the bilateral carotid arteries and dominant left vertebral artery.    She is here today for a follow up visit. She has been feeling well with no chest pain, shortness of breath, palpitations, dizziness or syncope. She denies swelling in her lower extremities, orthopnea or PND. She is currently 19 weeks pregnant with her third child. She has been feeling good with her pregnancy.    Prior history:  Dr. Dumas began seeing the patient in October 2022 when she presented to the ED with severe substernal chest pain. EKG revealed mild ST elevation in 1 and aVL and inferior ST depression. Initial troponin was elevated and d-dimer was elevated as well. A CTA of the chest showed no PE. Repeat EKG showed development of anterior T wave changes. She underwent a cardiac catheterization that showed spontaneous coronary artery dissection involving the distal left main, left circumflex, left anterior descending artery and ramus intermedius branch along with thrombus. LV gram showed akinesis of the mid to distal anterior wall and apex with an EF of 30-35%.     Due to the extent of her spontaneous coronary artery dissection and poor distal flow cardiothoracic surgery was consulted since PCI was not a good option.  She was evaluated by Dr. Teixeira and subsequently taken emergently for CABG x3 with LIMA to LAD, saphenous vein graft to an OM1, and saphenous vein graft to the ramus intermedius.  Postoperatively she progressed slowly.  She had issues with persistent hypotension that eventually  resolved.  We were unable to start her on any guideline directed management for her scad or cardiomyopathy outside of aspirin.  An echocardiogram performed on 10/16/2022 showed mildly dilated left ventricle with an EF of 26 to 30% and anterior and apical wall motion abnormalities.  A repeat echocardiogram on 10/19/2022 was performed due to persistent hypotension which showed an EF of 30 to 35% again anterior and apical wall motion abnormalities and swirling of contrast in the apex but no evidence of thrombus. She was discharged on aspirin and clopidogrel for 1 month.    In follow up, she was started on low-dose Toprol XL. Repeat echo showed improvement of her left ventricular systolic function to an EF of 52.3% although she continued to have akinesis of her apex.  Otherwise she had normal diastolic function, normal valvular function, and normal right ventricular systolic pressure.     Shortly after that she was admitted to the observation unit on 11/19/2022 with chest pain and lightheadedness.  Felt that the pain was atypical.  Troponins and were normal and EKG was unchanged.  Her metoprolol succinate was discontinued since she was not tolerating it.  No other changes were made to her management.     She underwent genetic testing as further work-up for her history of SCAD by another provider.  Results of this are unknown.      She saw Dr. Dumas in June 2023 and a CTA of the head and neck as well as a renal artery ultrasound was recommended to look for further evidence of FMD. Soon after this visit, she ended up returning to the hospital with chest pain. Work-up was unremarkable. She underwent a cardiac catheterization which showed healing of her spontaneous coronary artery dissection with no evidence of any residual dissection, atretic LIMA to LAD, patent saphenous vein graft to the ramus intermedius, and a patent saphenous vein graft to the first obtuse marginal branch.  She was also noted to have chronic occlusion  of a very small caliber distal LAD that was filled by collaterals from her diagonal branch.  LV function appeared to be preserved with stable appearing apical wall motion abnormalities.       She did undergo CT angiogram of the head and neck and renal artery ultrasound in 7/2023.  Her CT showed evidence of mild irregularity involving the cervical bilateral internal carotid arteries which appears more prominent on the right.  There is also evidence of minimal irregularity involving the dominant left vertebral artery.  These findings appear to be consistent with fibromuscular dysplasia.  Incidentally she was also noted to have a large arachnoid cyst involving the left middle cranial fossa and measuring 4.5 x 3.7 x 2.6 cm in size.   Renal artery ultrasound was normal.     Following that office visit she was evaluated by neurosurgery in 9/2023.  Recommended MRI for follow-up to evaluate for any solid components on evidence of mass effect.  Carotid ultrasound was also recommended.  She underwent the MRI in 11/2023 and it showed stable appearing arachnoid cyst with no evidence of mass effect.  She was seen in follow-up by Dr. Sheppard on 12/8/2023.  He recommended repeat CTA of the head and neck peripheral follow-up of the arachnoid cyst and her FMD.     I have reviewed and updated as appropriate allergies, current medications, past family history, past medical history, past surgical history and problem list.    Review of Systems   Constitutional: Negative for fever, malaise/fatigue, weight gain and weight loss.   HENT:  Negative for congestion, hoarse voice and sore throat.    Eyes:  Negative for blurred vision and double vision.   Cardiovascular:  Negative for chest pain, dyspnea on exertion, leg swelling, orthopnea, palpitations and syncope.   Respiratory:  Negative for cough, shortness of breath and wheezing.    Gastrointestinal:  Negative for abdominal pain, hematemesis, hematochezia and melena.   Genitourinary:   Negative for dysuria and hematuria.   Neurological:  Negative for dizziness, headaches, light-headedness and numbness.   Psychiatric/Behavioral:  Negative for depression. The patient is not nervous/anxious.          Current Outpatient Medications:     aspirin 81 MG EC tablet, Take 1 tablet by mouth Daily., Disp: 90 tablet, Rfl: 6    Prenatal Vit-Fe Fumarate-FA (Prenatal 27-1) 27-1 MG tablet tablet, Take 1 tablet by mouth Daily., Disp: 90 tablet, Rfl: 4    Restasis 0.05 % ophthalmic emulsion, INSTILL 1 DROP BOTH EYES TWICE DAILY, Disp: , Rfl:     acetaminophen (TYLENOL) 500 MG tablet, Take 1 tablet by mouth Every 4 (Four) Hours As Needed for Mild Pain. (Patient not taking: Reported on 3/6/2024), Disp: 30 tablet, Rfl: 0    Calcium Citrate-Vitamin D3 (CITRACAL) 315-6.25 MG-MCG tablet tablet, Take  by mouth Daily. (Patient not taking: Reported on 2/15/2024), Disp: , Rfl:     Diclofenac Sodium (VOLTAREN) 1 % gel gel, , Disp: , Rfl:     levothyroxine (SYNTHROID, LEVOTHROID) 50 MCG tablet, Take 1 tablet by mouth Daily (Monday-Friday)., Disp: , Rfl:     magnesium oxide (MAG-OX) 400 MG tablet, Take 1 tablet by mouth Daily for 180 days. (Patient not taking: Reported on 3/6/2024), Disp: 90 tablet, Rfl: 1    Magnesium Oxide -Mg Supplement 400 (240 Mg) MG tablet, , Disp: , Rfl:     Past Medical History:   Diagnosis Date    Coronary artery dissection 10/14/2022    Disease of thyroid gland     Hypothyroid    GERD (gastroesophageal reflux disease)     Hypotension        Past Surgical History:   Procedure Laterality Date    CARDIAC CATHETERIZATION N/A 10/15/2022    Procedure: Left Heart Cath;  Surgeon: Deandra Dumas MD;  Location: [facility] CATH INVASIVE LOCATION;  Service: Cardiology;  Laterality: N/A;    CARDIAC CATHETERIZATION N/A 10/15/2022    Procedure: Coronary angiography;  Surgeon: Deandra Dumas MD;  Location: [facility] CATH INVASIVE LOCATION;  Service: Cardiology;  Laterality: N/A;    CARDIAC CATHETERIZATION N/A 10/15/2022  "   Procedure: Left ventriculography;  Surgeon: Deandra Dumas MD;  Location: [facility] CATH INVASIVE LOCATION;  Service: Cardiology;  Laterality: N/A;    CARDIAC CATHETERIZATION N/A 6/22/2023    Procedure: Coronary angiography;  Surgeon: Deandra Dumas MD;  Location: [facility] CATH INVASIVE LOCATION;  Service: Cardiology;  Laterality: N/A;    CARDIAC CATHETERIZATION N/A 6/22/2023    Procedure: Left Heart Cath;  Surgeon: Deandra Dumas MD;  Location: [facility] CATH INVASIVE LOCATION;  Service: Cardiology;  Laterality: N/A;    CARDIAC CATHETERIZATION N/A 6/22/2023    Procedure: Left ventriculography;  Surgeon: Deandra Dumas MD;  Location: [facility] CATH INVASIVE LOCATION;  Service: Cardiology;  Laterality: N/A;    CORONARY ARTERY BYPASS GRAFT N/A 10/15/2022    Procedure: VERONICA, STERNOTOMY, CORONARY ARTERY BYPASS TIMES 3 WITH INTERNAL MAMMARY ARTERY GRAFT AND LEFT GREATER SAPHENOUS VEIN HARVEST, REPAIR OF CORONARY ARTERY DISSECTION,PRP;  Surgeon: Lakshmi Teixeira MD;  Location: [facility] CVOR;  Service: Cardiothoracic;  Laterality: N/A;    ENDOSCOPY N/A 1/12/2023    Procedure: ESOPHAGOGASTRODUODENOSCOPY with bxs;  Surgeon: Nish Quiroz MD;  Location: [facility] ENDOSCOPY;  Service: Gastroenterology;  Laterality: N/A;  PRE: Epigastric Pain  POST: Normal       Family History   Family history unknown: Yes       Social History     Tobacco Use    Smoking status: Never    Smokeless tobacco: Never   Vaping Use    Vaping status: Never Used   Substance Use Topics    Alcohol use: Never    Drug use: Never         ECG 12 Lead    Date/Time: 3/13/2024 4:06 PM  Performed by: Roxanna Gutierrez APRN    Authorized by: Roxanna Gutierrez APRN  Comparison: compared with previous ECG from 12/12/2023  Similar to previous ECG  Rhythm: sinus rhythm             Objective:     Visit Vitals  /70   Pulse 77   Ht 154.9 cm (61\")   Wt 55.8 kg (123 lb)   LMP 10/17/2023   BMI 23.24 kg/m²             Physical Exam  Constitutional:       Appearance: Normal " appearance. She is normal weight.   HENT:      Head: Normocephalic.   Neck:      Vascular: No carotid bruit.   Cardiovascular:      Rate and Rhythm: Normal rate and regular rhythm.      Chest Wall: PMI is not displaced.      Pulses: Normal pulses.           Radial pulses are 2+ on the right side and 2+ on the left side.        Posterior tibial pulses are 2+ on the right side and 2+ on the left side.      Heart sounds: Normal heart sounds. No murmur heard.     No friction rub. No gallop.   Pulmonary:      Effort: Pulmonary effort is normal.      Breath sounds: Normal breath sounds.   Abdominal:      General: Bowel sounds are normal. There is no distension.      Palpations: Abdomen is soft.   Musculoskeletal:      Right lower leg: No edema.      Left lower leg: No edema.   Skin:     General: Skin is warm and dry.      Capillary Refill: Capillary refill takes less than 2 seconds.   Neurological:      Mental Status: She is alert and oriented to person, place, and time.   Psychiatric:         Mood and Affect: Mood normal.         Behavior: Behavior normal.         Thought Content: Thought content normal.          Lab Review:         Cardiac Procedures:       Assessment:         Diagnoses and all orders for this visit:    1. Spontaneous dissection of coronary artery (Primary)  -     ECG 12 Lead    2. Ischemic cardiomyopathy    3. S/P CABG x 3            Plan:       Spontaneous coronary artery dissection: in October 2022 s/p CABG x 3. Repeat cardiac catheterization showed that dissections have healed. LIMA to LAD is atretic but she still has patent SVG to OM and SVG to ramus intermedius. Distal LDL filled by left to left collaterals. No recent chest pain or symptoms similar to SCAD  History of ICM:She continues to have apical akinesis on echocardiograms but this is all stable. Overall left ventricular function has normalized. Appears compensated on exam and no symptoms concerning for decline in heart function.  FMD:  involving bilateral carotid arteries and possibly involving her dominant left vertebral artery. Follows with neurosurgery.   Intracranial arachnoid cyst: felt to be congenital. Also followed by neurosurgery  Pregnancy: high risk pregnancy in light of SCAD.     Thank you for allowing me to participate in this patient's care. Please call with any questions or concerns. Ms. Herrera will follow up with Dr. Dumas in 6 months.          Your medication list            Accurate as of March 13, 2024 11:59 PM. If you have any questions, ask your nurse or doctor.                CONTINUE taking these medications        Instructions Last Dose Given Next Dose Due   Acetaminophen Extra Strength 500 MG tablet      Take 1 tablet by mouth Every 4 (Four) Hours As Needed for Mild Pain.       aspirin 81 MG EC tablet      Take 1 tablet by mouth Daily.       Calcium Citrate-Vitamin D3 315-6.25 MG-MCG tablet tablet  Commonly known as: CITRACAL      Take  by mouth Daily.       Diclofenac Sodium 1 % gel gel  Commonly known as: VOLTAREN           levothyroxine 50 MCG tablet  Commonly known as: SYNTHROID, LEVOTHROID      Take 1 tablet by mouth Daily (Monday-Friday).       Magnesium Oxide -Mg Supplement 400 (240 Mg) MG tablet           magnesium oxide 400 MG tablet  Commonly known as: MAG-OX      Take 1 tablet by mouth Daily for 180 days.       Prenatal 27-1 27-1 MG tablet tablet      Take 1 tablet by mouth Daily.       Restasis 0.05 % ophthalmic emulsion  Generic drug: cycloSPORINE      INSTILL 1 DROP BOTH EYES TWICE DAILY                  SHIRLENE Dolan  03/14/24  1:19 PM EDT

## 2024-03-14 ENCOUNTER — HOSPITAL ENCOUNTER (OUTPATIENT)
Dept: ULTRASOUND IMAGING | Facility: HOSPITAL | Age: 34
Discharge: HOME | End: 2024-03-14
Admitting: OBSTETRICS & GYNECOLOGY
Payer: MEDICAID

## 2024-03-14 ENCOUNTER — TELEPHONE (OUTPATIENT)
Dept: OBSTETRICS AND GYNECOLOGY | Facility: CLINIC | Age: 34
End: 2024-03-14

## 2024-03-14 ENCOUNTER — OFFICE VISIT (OUTPATIENT)
Dept: OBSTETRICS AND GYNECOLOGY | Facility: CLINIC | Age: 34
End: 2024-03-14
Payer: MEDICAID

## 2024-03-14 VITALS
BODY MASS INDEX: 23.32 KG/M2 | TEMPERATURE: 97.5 F | WEIGHT: 123.4 LBS | HEART RATE: 78 BPM | SYSTOLIC BLOOD PRESSURE: 96 MMHG | DIASTOLIC BLOOD PRESSURE: 52 MMHG

## 2024-03-14 DIAGNOSIS — G93.0 INTRACRANIAL ARACHNOID CYST: ICD-10-CM

## 2024-03-14 DIAGNOSIS — Z95.1 S/P CABG X 3: ICD-10-CM

## 2024-03-14 DIAGNOSIS — I77.3 FIBROMUSCULAR DYSPLASIA OF CAROTID ARTERY: ICD-10-CM

## 2024-03-14 DIAGNOSIS — Q62.5 DUPLICATED RENAL COLLECTING SYSTEM: ICD-10-CM

## 2024-03-14 DIAGNOSIS — Z95.1 S/P CABG X 3: Primary | ICD-10-CM

## 2024-03-14 DIAGNOSIS — O09.529 ANTEPARTUM MULTIGRAVIDA OF ADVANCED MATERNAL AGE: ICD-10-CM

## 2024-03-14 PROCEDURE — 76811 OB US DETAILED SNGL FETUS: CPT

## 2024-03-14 PROCEDURE — 99213 OFFICE O/P EST LOW 20 MIN: CPT | Performed by: OBSTETRICS & GYNECOLOGY

## 2024-03-14 NOTE — PROGRESS NOTES
MATERNAL FETAL MEDICINE Consult Note    Dear Dr Geetha Fraser MD:    Thank you for your kind referral of Esme Herrera.  As you know, she is a 33 y.o.   at  18 6/7 weeks gestation (Estimated Date of Delivery: 24). This is a consult.       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 No vaginal bleeding, no contractions.     Review of History:  Past Medical History:   Diagnosis Date    Coronary artery dissection 10/14/2022    Disease of thyroid gland     Hypothyroid    GERD (gastroesophageal reflux disease)     Hypotension      Past Surgical History:   Procedure Laterality Date    CARDIAC CATHETERIZATION N/A 10/15/2022    Procedure: Left Heart Cath;  Surgeon: Deandra Dumas MD;  Location: [facility] CATH INVASIVE LOCATION;  Service: Cardiology;  Laterality: N/A;    CARDIAC CATHETERIZATION N/A 10/15/2022    Procedure: Coronary angiography;  Surgeon: Deandra Dumas MD;  Location: [facility] CATH INVASIVE LOCATION;  Service: Cardiology;  Laterality: N/A;    CARDIAC CATHETERIZATION N/A 10/15/2022    Procedure: Left ventriculography;  Surgeon: Deandra Dumas MD;  [facility];  Service: Cardiology;  Laterality: N/A;    CARDIAC CATHETERIZATION N/A 2023    Procedure: Coronary angiography;  Surgeon: Deandra Dumas MD;  [facility];  Service: Cardiology;  Laterality: N/A;    CARDIAC CATHETERIZATION N/A 2023    Procedure: Left Heart Cath;  Surgeon: Deandra Dumas MD;  [facility];  Service: Cardiology;  Laterality: N/A;    CARDIAC CATHETERIZATION N/A 2023    Procedure: Left ventriculography;  Surgeon: Deandra Dumas MD;  [facility]ATH [facility];   Service: Cardiology;  Laterality: N/A;    CORONARY ARTERY BYPASS GRAFT N/A 10/15/2022    Procedure: VERONICA, STERNOTOMY, CORONARY ARTERY BYPASS TIMES 3 WITH INTERNAL MAMMARY ARTERY GRAFT AND LEFT GREATER SAPHENOUS VEIN HARVEST, REPAIR OF CORONARY ARTERY DISSECTION,PRP;  Surgeon: Lakshmi Teixeira MD;  Location: [facility] CVOR;  Service: Cardiothoracic;  Laterality: N/A;    ENDOSCOPY N/A 1/12/2023    Procedure: ESOPHAGOGASTRODUODENOSCOPY with bxs;  Surgeon: Nish Quiroz MD;  Location: [facility] ENDOSCOPY;  Service: Gastroenterology;  Laterality: N/A;  PRE: Epigastric Pain  POST: Normal     Social History     Socioeconomic History    Marital status:    Tobacco Use    Smoking status: Never    Smokeless tobacco: Never   Vaping Use    Vaping status: Never Used   Substance and Sexual Activity    Alcohol use: Never    Drug use: Never    Sexual activity: Yes     Partners: Male     Family History   Family history unknown: Yes      No Known Allergies   Current Outpatient Medications on File Prior to Visit   Medication Sig Dispense Refill    aspirin 81 MG EC tablet Take 1 tablet by mouth Daily. 90 tablet 6    levothyroxine (SYNTHROID, LEVOTHROID) 50 MCG tablet Take 1 tablet by mouth Daily (Monday-Friday).      Prenatal Vit-Fe Fumarate-FA (Prenatal 27-1) 27-1 MG tablet tablet Take 1 tablet by mouth Daily. 90 tablet 4    acetaminophen (TYLENOL) 500 MG tablet Take 1 tablet by mouth Every 4 (Four) Hours As Needed for Mild Pain. (Patient not taking: Reported on 3/6/2024) 30 tablet 0    Calcium Citrate-Vitamin D3 (CITRACAL) 315-6.25 MG-MCG tablet tablet Take  by mouth Daily. (Patient not taking: Reported on 2/15/2024)      Diclofenac Sodium (VOLTAREN) 1 % gel gel  (Patient not taking: Reported on 3/6/2024)      magnesium oxide (MAG-OX) 400 MG tablet Take 1 tablet by mouth Daily for 180 days. (Patient not taking: Reported on 3/6/2024) 90 tablet 1    Magnesium Oxide -Mg Supplement 400 (240 Mg) MG tablet  (Patient not taking:  Reported on 3/6/2024)      Restasis 0.05 % ophthalmic emulsion INSTILL 1 DROP BOTH EYES TWICE DAILY       No current facility-administered medications on file prior to visit.        Past obstetric, gynecological, medical, surgical, family and social history reviewed.  Relevant lab work and imaging reviewed.    Review of systems  Constitutional:  denies fever, chills, malaise.   ENT/Mouth:  denies sore throat, tinnitus  Eyes: denies vision changes/pain  CV:  denies chest pain  Respiratory:  denies cough/SOB  GI:  denies N/V, diarrhea, abdominal pain.    :   denies dysuria  Skin:  denies lesions or pruritus   Neuro:  denies weakness, focal neurologic symptoms    Vitals:    24 1431 24 1433   BP: 94/68 96/52   BP Location: Right arm Left arm   Patient Position: Sitting Sitting   Pulse: 77 78   Temp: 97.5 °F (36.4 °C)    TempSrc: Temporal    Weight: 56 kg (123 lb 6.4 oz)        PHYSICAL EXAM   GENERAL: Not in acute distress, AAOx3, pleasant  CARDIO: regular rate and rhythm  PULM: symmetric chest rise, speaking in complete sentences without difficulty  NEURO: awake, alert and oriented to person, place, and time  ABDOMINAL: No fundal tenderness, no rebound or guarding, gravid  EXTREMITIES: no bilateral lower extremity edema/tenderness  SKIN: Warm, well-perfused      ULTRASOUND   Please view full ultrasound note on Imaging tab in ViewPoint.  Breech presentation.  Posterior placenta.  MVP 5.4 cm, which is normal.    g (AC 75%)  Anatomy appears normal except suspected L duplicated renal collecting system.   Transabdominal CL >3.5 cm, which is normal.    ASSESSMENT/COUNSELIN y.o.   at  18 6/7 weeks gestation (Estimated Date of Delivery: 24).     -Pregnancy  [ X ] stable  [   ] improving [  ] worsening    There are no diagnoses linked to this encounter.    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 Dumas with cardiology--appreciate her care.  She has had a history of coronary artery dissection in fall of 2022.  Her dissection involved the left main coronary artery and extended into the LAD and left circumflex requiring CABG x 3.  Cardiac cath 6/2023 showed dissections had healed.  She continues to have anteroapical T wave changes and apical akinesis on echocardiograms but this is all stable.  Overall, left ventricular function has normalized. She has fibromuscular dysplasia, stable and will have a CTA of head and neck in year per neurosurgery.      Because the vast majority of patients affected by SCAD are women and SCAD constitutes an important cause of pregnancy-associated MI, its mechanism is uncertain but estrogen/progesterone have been shown to possibly play a role.   SCAD symptoms often onset after intense physical activity (at least 1/3 of the time), so we discussed lifting precautions and no intense exertional activity this pregnancy (no heavy lifting, etc., but normal activity/walking okay).  She should avoid straining or prolonged valsalva.  We also discussed starting a baby ASA.     Rates of recurrent SCAD are variably reported (10% to 30%), probably closer to 10% as there are some errors in study design in differentiating new vs recurrent SCAD in some of those studies.  Rates of recurrence are similar in pregnancy.  Many women experience increase in angina as pregnancy progresses.  She has no chest pain or SOB at this time--baseline BNP 49.   The data suggest that the majority of SCAD patients who subsequently conceive experience uncomplicated pregnancies, but SCAD can be unpredictable.      She will need to follow closely with cardiology.  Beta blockers, copidogrel and low dose aspirin have a reasonable safety profile in pregnancy if needed--we will go ahead and start low dose aspirin for both preE prevention and hx of SCAD.  We discussed that with any mom with cardiac history it is  important to watch baby's growth and I recommend at least weekly (maybe twice weekly) ANFS at 32 weeks.  We will plan to deliver at 37th week due to increasing cardiac output as pt goes further in pregnancy to minimize maternal complications.     I would like her to have a follow up ECHO with cardiology around 32 weeks.      She will need an anesthesia consult 32-36 weeks.      During delivery, we will plan:  -early epidural to minimize tachycardia/cardiac strain  -telemetry if feasible vs EKG before and after  -euvolemia/strict I's and O's  -Needs 32 week maternal ECHO  -Avoidance of terbutaline, methergine  -Laboring down/delayed valsalva vs consider assisted 2nd stage (pt says she goes quickly so do not suspect this will be necesssary)  -Consider immediate BTL vs LARC after delivery  - okay as long as no contraindication from Neurosurgery or cardiology teams--appreciate their care.      Intracranial arachnoid cyst:  Incidentally noted.  Follows with neurosurgery.  Plan is for CT head in 1 year.  Pt reports she was told this would not impact her ability to have vaginal delivery--has seen Dr. Sheppard who agrees.       Advanced Maternal Age  NIPT low risk.  Serial growths and ANFS at 32 weeks    Duplicated renal collecting system, left side  Duplicated renal collecting system represents a division of the kidney and upper urinary tract into two separate upper and lower pole moieties. This may be partial or complete. There is bilateral duplication in 10-20% of cases. I can see two renal pelvises on left side, but unclear if duplicated ureters as well. Sometimes these can become obstructed, but thankfully we are not seeing this today.  Severe obstruction may result in dysplastic changes. This is a common congenital malformation of urinary tract. It is usually incidental finding. However the upper pole does make the  prone to infection due to urinary stasis. Prenatal diagnosis may allow early intervention in    life decreasing risk of urosepsis and renal damage. Severe obstruction and/or hydronephrosis from reflux could lead to dysplastic changes and irreversible kidney damage. The renal parenchyma today appears normal today.   In utero treatment is not necessary typically but a complete urological work up should be completed after birth which includes a urology consult, ultrasound of kidney and bladder, VCUG and MRI to evaluate for other pelvic abnormalities.  We will send her prenatal urology visit in 3rd trimester--will send next visit.         Summary of Plan  -Serial growth ultrasounds every 4 weeks (MFM)   -Starting at 32 weeks: 1-2x Weekly fetal  surveillance until delivery   -Continue baby ASA  -Cardiac care plan as above  -Will discuss at fetal/maternal care conference  -Maternal ECHO 32 weeks  -Needs peds uro consult 3rd trimester-will order next visit.    -Delivery 37-39 weeks depending on maternal symptoms/cardiology input.  She and I had discussed 37th week which I think will likely be the best option.    Follow-up: monthly    Thank you for the consult and opportunity to care for this patient.  Please feel free to reach out with any questions or concerns.      I spent 18 minutes caring for this patient on this date of service. This time includes time spent by me in the following activities: preparing for the visit, reviewing tests, obtaining and/or reviewing a separately obtained history, performing a medically appropriate examination and/or evaluation, counseling and educating the patient/family/caregiver and independently interpreting results and communicating that information with the patient/family/caregiver with greater than 50% spent in counseling and coordination of care.       I spent 3 minutes on the separately reported service of US imaging not included in the time used to support the E/M service also reported today.      Ashley Tejeda MD FACOG  Maternal Fetal Medicine-[facility]  [facility]  Office: [phone]  [email or web address]

## 2024-03-14 NOTE — PROGRESS NOTES
Pt reports that she is doing well and denies cramping, contractions or LOF at this time. Reports brown spotting noted on kike pad x2 days, two days ago. Denies any spotting/bleeding since. Reports starting to feel flutters at this point in pregnancy. Reviewed when to call OB office or present to L&D for evaluation with symptoms such as decreased fetal movement, vaginal bleeding, LOF or ctxs. Pt verbalized understanding. Denies HA, visual changes or epigastric pain. Denies any additional complaints at time of appointment. Next OB appointment scheduled for 4/3.    Vitals:    03/14/24 1433   BP: 96/52   Pulse: 78   Temp:

## 2024-03-14 NOTE — TELEPHONE ENCOUNTER
"Received call from Esme after leaving [facility] appointment. Pt reports she is currently having bright red vaginal bleeding that \"filled up\" a kike pad. Pt called to note symptoms and to ask if this was normal after her US. Pt did not have a transvaginal US done in office today only an abdominal US. Pt encouraged to present to L&D for further evaluation of the vaginal bleeding. Dr. Tejeda made aware.   "

## 2024-03-14 NOTE — LETTER
2024     Geetha Fraser MD  [address]  [address]  [facility] [address]    Patient: Esme Herrera   YOB: 1990   Date of Visit: 3/14/2024       Dear Geetha Fraser MD,    Thank you for referring Esme Herrera to me for evaluation. Below is a copy of my consult note.    If you have questions, please do not hesitate to call me. I look forward to following Esme along with you.         Sincerely,        Ashley Tejeda MD    MATERNAL FETAL MEDICINE Consult Note    Dear Dr Geetha Fraser MD:    Thank you for your kind referral of Esme Herrera.  As you know, she is a 33 y.o.   at  18 6/7 weeks gestation (Estimated Date of Delivery: 24). This is a consult.       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 No vaginal bleeding, no contractions.     Review of History:  Past Medical History:   Diagnosis Date   • Coronary artery dissection 10/14/2022   • Disease of thyroid gland     Hypothyroid   • GERD (gastroesophageal reflux disease)    • Hypotension      Past Surgical History:   Procedure Laterality Date   • CARDIAC CATHETERIZATION N/A 10/15/2022    Procedure: Left Heart Cath;  Surgeon: Deandra Dumas MD;  Location: [facility] CATH INVASIVE LOCATION;  Service: Cardiology;  Laterality: N/A;   • CARDIAC CATHETERIZATION N/A 10/15/2022    Procedure: Coronary angiography;  Surgeon: Deandra Dumas MD;  Location:  [facility] CATH INVASIVE LOCATION;  Service: Cardiology;  Laterality: N/A;   • CARDIAC CATHETERIZATION N/A 10/15/2022    Procedure: Left ventriculography;  Surgeon: Deandra Dumas MD;  Location: [facility] CATH INVASIVE LOCATION;  Service: Cardiology;  Laterality: N/A;   • CARDIAC CATHETERIZATION N/A 2023    Procedure:  Coronary angiography;  Surgeon: Deandra Dumas MD;  Location: [facility] CATH INVASIVE LOCATION;  Service: Cardiology;  Laterality: N/A;   • CARDIAC CATHETERIZATION N/A 6/22/2023    Procedure: Left Heart Cath;  Surgeon: Deandra Dumas MD;  Location: [facility] CATH INVASIVE LOCATION;  Service: Cardiology;  Laterality: N/A;   • CARDIAC CATHETERIZATION N/A 6/22/2023    Procedure: Left ventriculography;  Surgeon: Deandra Dumas MD;  Location: [facility] CATH INVASIVE LOCATION;  Service: Cardiology;  Laterality: N/A;   • CORONARY ARTERY BYPASS GRAFT N/A 10/15/2022    Procedure: VERONICA, STERNOTOMY, CORONARY ARTERY BYPASS TIMES 3 WITH INTERNAL MAMMARY ARTERY GRAFT AND LEFT GREATER SAPHENOUS VEIN HARVEST, REPAIR OF CORONARY ARTERY DISSECTION,PRP;  Surgeon: Lakshmi Teixeira MD;  Location: [facility] CVOR;  Service: Cardiothoracic;  Laterality: N/A;   • ENDOSCOPY N/A 1/12/2023    Procedure: ESOPHAGOGASTRODUODENOSCOPY with bxs;  Surgeon: Nish Quiroz MD;  Location: [facility] ENDOSCOPY;  Service: Gastroenterology;  Laterality: N/A;  PRE: Epigastric Pain  POST: Normal     Social History     Socioeconomic History   • Marital status:    Tobacco Use   • Smoking status: Never   • Smokeless tobacco: Never   Vaping Use   • Vaping status: Never Used   Substance and Sexual Activity   • Alcohol use: Never   • Drug use: Never   • Sexual activity: Yes     Partners: Male     Family History   Family history unknown: Yes      No Known Allergies   Current Outpatient Medications on File Prior to Visit   Medication Sig Dispense Refill   • aspirin 81 MG EC tablet Take 1 tablet by mouth Daily. 90 tablet 6   • levothyroxine (SYNTHROID, LEVOTHROID) 50 MCG tablet Take 1 tablet by mouth Daily (Monday-Friday).     • Prenatal Vit-Fe Fumarate-FA (Prenatal 27-1) 27-1 MG tablet tablet Take 1 tablet by mouth Daily. 90 tablet 4   • acetaminophen (TYLENOL) 500 MG tablet Take 1 tablet by mouth Every 4 (Four) Hours As Needed for Mild Pain. (Patient not taking:  Reported on 3/6/2024) 30 tablet 0   • Calcium Citrate-Vitamin D3 (CITRACAL) 315-6.25 MG-MCG tablet tablet Take  by mouth Daily. (Patient not taking: Reported on 2/15/2024)     • Diclofenac Sodium (VOLTAREN) 1 % gel gel  (Patient not taking: Reported on 3/6/2024)     • magnesium oxide (MAG-OX) 400 MG tablet Take 1 tablet by mouth Daily for 180 days. (Patient not taking: Reported on 3/6/2024) 90 tablet 1   • Magnesium Oxide -Mg Supplement 400 (240 Mg) MG tablet  (Patient not taking: Reported on 3/6/2024)     • Restasis 0.05 % ophthalmic emulsion INSTILL 1 DROP BOTH EYES TWICE DAILY       No current facility-administered medications on file prior to visit.        Past obstetric, gynecological, medical, surgical, family and social history reviewed.  Relevant lab work and imaging reviewed.    Review of systems  Constitutional:  denies fever, chills, malaise.   ENT/Mouth:  denies sore throat, tinnitus  Eyes: denies vision changes/pain  CV:  denies chest pain  Respiratory:  denies cough/SOB  GI:  denies N/V, diarrhea, abdominal pain.    :   denies dysuria  Skin:  denies lesions or pruritus   Neuro:  denies weakness, focal neurologic symptoms    Vitals:    03/14/24 1431 03/14/24 1433   BP: 94/68 96/52   BP Location: Right arm Left arm   Patient Position: Sitting Sitting   Pulse: 77 78   Temp: 97.5 °F (36.4 °C)    TempSrc: Temporal    Weight: 56 kg (123 lb 6.4 oz)        PHYSICAL EXAM   GENERAL: Not in acute distress, AAOx3, pleasant  CARDIO: regular rate and rhythm  PULM: symmetric chest rise, speaking in complete sentences without difficulty  NEURO: awake, alert and oriented to person, place, and time  ABDOMINAL: No fundal tenderness, no rebound or guarding, gravid  EXTREMITIES: no bilateral lower extremity edema/tenderness  SKIN: Warm, well-perfused      ULTRASOUND   Please view full ultrasound note on Imaging tab in ViewPoint.  Breech presentation.  Posterior placenta.  MVP 5.4 cm, which is normal.    g (AC  75%)  Anatomy appears normal except suspected L duplicated renal collecting system.   Transabdominal CL >3.5 cm, which is normal.    ASSESSMENT/COUNSELIN y.o.   at  18 6/7 weeks gestation (Estimated Date of Delivery: 24).     -Pregnancy  [ X ] stable  [   ] improving [  ] worsening    There are no diagnoses linked to this encounter.    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 Dumas with cardiology--appreciate her care.  She has had a history of coronary artery dissection in fall of .  Her dissection involved the left main coronary artery and extended into the LAD and left circumflex requiring CABG x 3.  Cardiac cath 2023 showed dissections had healed.  She continues to have anteroapical T wave changes and apical akinesis on echocardiograms but this is all stable.  Overall, left ventricular function has normalized. She has fibromuscular dysplasia, stable and will have a CTA of head and neck in year per neurosurgery.      Because the vast majority of patients affected by SCAD are women and SCAD constitutes an important cause of pregnancy-associated MI, its mechanism is uncertain but estrogen/progesterone have been shown to possibly play a role.   SCAD symptoms often onset after intense physical activity (at least 1/3 of the time), so we discussed lifting precautions and no intense exertional activity this pregnancy (no heavy lifting, etc., but normal activity/walking okay).  She should avoid straining or prolonged valsalva.  We also discussed starting a baby ASA.     Rates of recurrent SCAD are variably reported (10% to 30%), probably closer to 10% as there are some errors in study design in differentiating new vs recurrent SCAD in some of those studies.  Rates of recurrence are similar in pregnancy.  Many women experience increase in angina as pregnancy progresses.  She has no chest pain or  SOB at this time--baseline BNP 49.   The data suggest that the majority of SCAD patients who subsequently conceive experience uncomplicated pregnancies, but SCAD can be unpredictable.      She will need to follow closely with cardiology.  Beta blockers, copidogrel and low dose aspirin have a reasonable safety profile in pregnancy if needed--we will go ahead and start low dose aspirin for both preE prevention and hx of SCAD.  We discussed that with any mom with cardiac history it is important to watch baby's growth and I recommend at least weekly (maybe twice weekly) ANFS at 32 weeks.  We will plan to deliver at 37th week due to increasing cardiac output as pt goes further in pregnancy to minimize maternal complications.     I would like her to have a follow up ECHO with cardiology around 32 weeks.      She will need an anesthesia consult 32-36 weeks.      During delivery, we will plan:  -early epidural to minimize tachycardia/cardiac strain  -telemetry if feasible vs EKG before and after  -euvolemia/strict I's and O's  -Needs 32 week maternal ECHO  -Avoidance of terbutaline, methergine  -Laboring down/delayed valsalva vs consider assisted 2nd stage (pt says she goes quickly so do not suspect this will be necesssary)  -Consider immediate BTL vs LARC after delivery  - okay as long as no contraindication from Neurosurgery or cardiology teams--appreciate their care.      Intracranial arachnoid cyst:  Incidentally noted.  Follows with neurosurgery.  Plan is for CT head in 1 year.  Pt reports she was told this would not impact her ability to have vaginal delivery--has seen Dr. Sheppard who agrees.       Advanced Maternal Age  NIPT low risk.  Serial growths and ANFS at 32 weeks    Duplicated renal collecting system, left side  Duplicated renal collecting system represents a division of the kidney and upper urinary tract into two separate upper and lower pole moieties. This may be partial or complete. There is bilateral  duplication in 10-20% of cases. I can see two renal pelvises on left side, but unclear if duplicated ureters as well. Sometimes these can become obstructed, but thankfully we are not seeing this today.  Severe obstruction may result in dysplastic changes. This is a common congenital malformation of urinary tract. It is usually incidental finding. However the upper pole does make the  prone to infection due to urinary stasis. Prenatal diagnosis may allow early intervention in th  life decreasing risk of urosepsis and renal damage. Severe obstruction and/or hydronephrosis from reflux could lead to dysplastic changes and irreversible kidney damage. The renal parenchyma today appears normal today.   In utero treatment is not necessary typically but a complete urological work up should be completed after birth which includes a urology consult, ultrasound of kidney and bladder, VCUG and MRI to evaluate for other pelvic abnormalities.  We will send her prenatal urology visit in 3rd trimester--will send next visit.         Summary of Plan  -Serial growth ultrasounds every 4 weeks (MFM)   -Starting at 32 weeks: 1-2x Weekly fetal  surveillance until delivery   -Continue baby ASA  -Cardiac care plan as above  -Will discuss at fetal/maternal care conference  -Maternal ECHO 32 weeks  -Needs peds uro consult 3rd trimester-will order next visit.    -Delivery 37-39 weeks depending on maternal symptoms/cardiology input.  She and I had discussed 37th week which I think will likely be the best option.    Follow-up: monthly    Thank you for the consult and opportunity to care for this patient.  Please feel free to reach out with any questions or concerns.      I spent 18 minutes caring for this patient on this date of service. This time includes time spent by me in the following activities: preparing for the visit, reviewing tests, obtaining and/or reviewing a separately obtained history, performing a medically  appropriate examination and/or evaluation, counseling and educating the patient/family/caregiver and independently interpreting results and communicating that information with the patient/family/caregiver with greater than 50% spent in counseling and coordination of care.       I spent 3 minutes on the separately reported service of US imaging not included in the time used to support the E/M service also reported today.      Ashley Tejeda MD [facility]  Maternal Fetal Medicine-[facility]  Office: [phone]  [email or web address]

## 2024-03-18 ENCOUNTER — TELEPHONE (OUTPATIENT)
Dept: OBSTETRICS AND GYNECOLOGY | Facility: CLINIC | Age: 34
End: 2024-03-18
Payer: MEDICAID

## 2024-03-18 NOTE — TELEPHONE ENCOUNTER
Call placed to scheduling in regards to maternal echo needed around 32 weeks. Scheduling notified to schedule the pt the week of June 10th or June 17th.

## 2024-04-03 ENCOUNTER — ROUTINE PRENATAL (OUTPATIENT)
Dept: OBSTETRICS AND GYNECOLOGY | Facility: CLINIC | Age: 34
End: 2024-04-03
Payer: MEDICAID

## 2024-04-03 VITALS — BODY MASS INDEX: 24.19 KG/M2 | DIASTOLIC BLOOD PRESSURE: 56 MMHG | SYSTOLIC BLOOD PRESSURE: 90 MMHG | WEIGHT: 128 LBS

## 2024-04-03 DIAGNOSIS — Z95.1 S/P CABG X 3: ICD-10-CM

## 2024-04-03 DIAGNOSIS — O09.529 ANTEPARTUM MULTIGRAVIDA OF ADVANCED MATERNAL AGE: Primary | ICD-10-CM

## 2024-04-03 DIAGNOSIS — Q62.5 DUPLICATED RENAL COLLECTING SYSTEM: ICD-10-CM

## 2024-04-03 DIAGNOSIS — G93.0 INTRACRANIAL ARACHNOID CYST: ICD-10-CM

## 2024-04-03 DIAGNOSIS — Z13.89 SCREENING FOR BLOOD OR PROTEIN IN URINE: ICD-10-CM

## 2024-04-03 DIAGNOSIS — O09.40 ENCOUNTER FOR SUPERVISION OF HIGH RISK PREGNANCY WITH GRAND MULTIPARITY, ANTEPARTUM: ICD-10-CM

## 2024-04-03 LAB
GLUCOSE UR STRIP-MCNC: NEGATIVE MG/DL
PROT UR STRIP-MCNC: NEGATIVE MG/DL

## 2024-04-03 NOTE — PROGRESS NOTES
Chief Complaint   Patient presents with    Routine Prenatal Visit      Esme Herrera is a 33 y.o.  at 21w5d  here for routine OB visit  Reports doing well, no chest pain or SOA    BP 90/56   Wt 58.1 kg (128 lb)   LMP 10/17/2023   BMI 24.19 kg/m²    Fetal Heart Rate: 130  Movement: Present   Gen: NAD, well appearing  Abd: nontender    See OB Flowsheet    ASSESSMENT/PLAN:  Diagnoses and all orders for this visit:    1. Antepartum multigravida of advanced maternal age (Primary)  -     CBC (No Diff)  -     Gestational Screen 1 Hr ([facility])  -     T Pallidum Antibody w/ reflex RPR  -     TSH Rfx On Abnormal To Free T4    2. S/P CABG x 3    3. Intracranial arachnoid cyst    4. Encounter for supervision of high risk pregnancy with grand multiparity, antepartum    5. Duplicated renal collecting system    Reviewed last M note  Plan to meet with Peds Uro in third trimester, repeat Echo at 32 weeks, anesthesia consult around 32 weeks.  Delivery around 37 weeks.  Pt aware.    GCT, CBC, Trep Ab next visit as well as TSH  Routine counseling pertinent to this stage of pregnancy was reviewed including second trimester precautions  Return in about 4 weeks (around 2024) for GCT, OB visit.

## 2024-04-10 ENCOUNTER — TELEPHONE (OUTPATIENT)
Dept: OBSTETRICS AND GYNECOLOGY | Facility: CLINIC | Age: 34
End: 2024-04-10
Payer: MEDICAID

## 2024-04-10 NOTE — TELEPHONE ENCOUNTER
Call placed to cardiology scheduling regarding Esme's echo needed around 32 weeks. Pt has not been scheduled yet. Scheduling to call pt today. Requested to be scheduled week of June 10th or June 17th as pt will be around 32 weeks pregnant at that time.

## 2024-04-11 ENCOUNTER — OFFICE VISIT (OUTPATIENT)
Dept: OBSTETRICS AND GYNECOLOGY | Facility: CLINIC | Age: 34
End: 2024-04-11
Payer: MEDICAID

## 2024-04-11 ENCOUNTER — HOSPITAL ENCOUNTER (OUTPATIENT)
Dept: ULTRASOUND IMAGING | Facility: HOSPITAL | Age: 34
Discharge: HOME | End: 2024-04-11
Payer: MEDICAID

## 2024-04-11 VITALS
HEIGHT: 61 IN | BODY MASS INDEX: 24.35 KG/M2 | DIASTOLIC BLOOD PRESSURE: 53 MMHG | SYSTOLIC BLOOD PRESSURE: 104 MMHG | HEART RATE: 85 BPM | WEIGHT: 129 LBS | TEMPERATURE: 98.2 F

## 2024-04-11 DIAGNOSIS — Q62.5 DUPLICATED RENAL COLLECTING SYSTEM: ICD-10-CM

## 2024-04-11 DIAGNOSIS — Z95.1 S/P CABG X 3: ICD-10-CM

## 2024-04-11 DIAGNOSIS — G93.0 INTRACRANIAL ARACHNOID CYST: ICD-10-CM

## 2024-04-11 DIAGNOSIS — Z95.1 S/P CABG X 3: Primary | ICD-10-CM

## 2024-04-11 DIAGNOSIS — O09.529 ANTEPARTUM MULTIGRAVIDA OF ADVANCED MATERNAL AGE: ICD-10-CM

## 2024-04-11 PROBLEM — Z82.0 FAMILY HX OF ALS (AMYOTROPHIC LATERAL SCLEROSIS): Status: ACTIVE | Noted: 2023-02-27

## 2024-04-11 PROBLEM — R00.2 INTERMITTENT PALPITATIONS: Status: RESOLVED | Noted: 2023-07-06 | Resolved: 2024-04-11

## 2024-04-11 PROBLEM — K21.9 CHRONIC GERD: Status: RESOLVED | Noted: 2023-07-06 | Resolved: 2024-04-11

## 2024-04-11 PROBLEM — Z15.89: Status: ACTIVE | Noted: 2023-06-15

## 2024-04-11 PROBLEM — E06.3 HYPOTHYROIDISM DUE TO HASHIMOTO'S THYROIDITIS: Status: ACTIVE | Noted: 2023-07-06

## 2024-04-11 PROBLEM — Z86.19 HISTORY OF HELICOBACTER PYLORI INFECTION: Status: ACTIVE | Noted: 2023-02-27

## 2024-04-11 PROBLEM — E03.8 HYPOTHYROIDISM DUE TO HASHIMOTO'S THYROIDITIS: Status: ACTIVE | Noted: 2023-07-06

## 2024-04-11 PROCEDURE — 76816 OB US FOLLOW-UP PER FETUS: CPT

## 2024-04-11 NOTE — PROGRESS NOTES
MATERNAL FETAL MEDICINE Consult Note    Dear Dr Geetha Fraser MD:    Thank you for your kind referral of Esme Herrera.  As you know, she is a 33 y.o.   at  18 6/7 weeks gestation (Estimated Date of Delivery: 24). This is a consult.       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 No vaginal bleeding, no contractions.     Review of History:  Past Medical History:   Diagnosis Date    Chronic GERD 2023    Congenital syphilis, unspecified 2016    Coronary artery dissection 10/14/2022    Disease of thyroid gland     Hypothyroid    GERD (gastroesophageal reflux disease)     Hypotension     Intermittent palpitations 2023     Past Surgical History:   Procedure Laterality Date    CARDIAC CATHETERIZATION N/A 10/15/2022    Procedure: Left Heart Cath;  Surgeon: Deandra Dumas MD;  Location:  [facility];  Service: Cardiology;  Laterality: N/A;    CARDIAC CATHETERIZATION N/A 10/15/2022    Procedure: Coronary angiography;  Surgeon: Deandra Dumas MD;  Location:  [facility];  Service: Cardiology;  Laterality: N/A;    CARDIAC CATHETERIZATION N/A 10/15/2022    Procedure: Left ventriculography;  Surgeon: Deandra Dumas MD;  Location:  [facility];  Service: Cardiology;  Laterality: N/A;    CARDIAC CATHETERIZATION N/A 2023    Procedure: Coronary angiography;  Surgeon: Deandra Dumas MD;  Location:  [facility];  Service: Cardiology;  Laterality: N/A;    CARDIAC CATHETERIZATION N/A 2023    Procedure: Left Heart Cath;  Surgeon: Deandra Dumas MD;  Location:  [facility];  Service: Cardiology;  Laterality: N/A;    CARDIAC CATHETERIZATION N/A  6/22/2023    Procedure: Left ventriculography;  Surgeon: Deandra Dumas MD;  Location: [facility] CATH INVASIVE LOCATION;  Service: Cardiology;  Laterality: N/A;    CORONARY ARTERY BYPASS GRAFT N/A 10/15/2022    Procedure: VERONICA, STERNOTOMY, CORONARY ARTERY BYPASS TIMES 3 WITH INTERNAL MAMMARY ARTERY GRAFT AND LEFT GREATER SAPHENOUS VEIN HARVEST, REPAIR OF CORONARY ARTERY DISSECTION,PRP;  Surgeon: Lakshmi Teixeira MD;  Location: [facility] CVOR;  Service: Cardiothoracic;  Laterality: N/A;    ENDOSCOPY N/A 1/12/2023    Procedure: ESOPHAGOGASTRODUODENOSCOPY with bxs;  Surgeon: Nish Quiroz MD;  Location: [facility] ENDOSCOPY;  Service: Gastroenterology;  Laterality: N/A;  PRE: Epigastric Pain  POST: Normal     Social History     Socioeconomic History    Marital status:    Tobacco Use    Smoking status: Never    Smokeless tobacco: Never   Vaping Use    Vaping status: Never Used   Substance and Sexual Activity    Alcohol use: Never    Drug use: Never    Sexual activity: Yes     Partners: Male     Family History   Family history unknown: Yes      No Known Allergies   Current Outpatient Medications on File Prior to Visit   Medication Sig Dispense Refill    acetaminophen (TYLENOL) 500 MG tablet Take 1 tablet by mouth Every 4 (Four) Hours As Needed for Mild Pain. 30 tablet 0    aspirin 81 MG EC tablet Take 1 tablet by mouth Daily. 90 tablet 6    Calcium Citrate-Vitamin D3 (CITRACAL) 315-6.25 MG-MCG tablet tablet Take  by mouth Daily.      levothyroxine (SYNTHROID, LEVOTHROID) 50 MCG tablet Take 1 tablet by mouth Daily (Monday-Friday).      Prenatal Vit-Fe Fumarate-FA (Prenatal 27-1) 27-1 MG tablet tablet Take 1 tablet by mouth Daily. 90 tablet 4    Restasis 0.05 % ophthalmic emulsion INSTILL 1 DROP BOTH EYES TWICE DAILY       No current facility-administered medications on file prior to visit.        Past obstetric, gynecological, medical, surgical, family and social history reviewed.  Relevant lab work and imaging  "reviewed.    Review of systems  Constitutional:  denies fever, chills, malaise.   ENT/Mouth:  denies sore throat, tinnitus  Eyes: denies vision changes/pain  CV:  denies chest pain  Respiratory:  denies cough/SOB  GI:  denies N/V, diarrhea, abdominal pain.    :   denies dysuria  Skin:  denies lesions or pruritus   Neuro:  denies weakness, focal neurologic symptoms    Vitals:    24 1300   BP: 104/53   BP Location: Left arm   Patient Position: Sitting   Pulse: 85   Temp: 98.2 °F (36.8 °C)   Weight: 58.5 kg (129 lb)   Height: 154.9 cm (61\")       PHYSICAL EXAM   GENERAL: Not in acute distress, AAOx3, pleasant  CARDIO: regular rate and rhythm  PULM: symmetric chest rise, speaking in complete sentences without difficulty  NEURO: awake, alert and oriented to person, place, and time  ABDOMINAL: No fundal tenderness, no rebound or guarding, gravid  EXTREMITIES: no bilateral lower extremity edema/tenderness  SKIN: Warm, well-perfused      ULTRASOUND   Please view full ultrasound note on Imaging tab in View[employer].  Breech presentation.  Posterior placenta.  MVP 5.4 cm, which is normal.    g (AC 75%)  Anatomy appears normal except suspected L duplicated renal collecting system.   Transabdominal CL >3.5 cm, which is normal.    ASSESSMENT/COUNSELIN y.o.   at  18 6/7 weeks gestation (Estimated Date of Delivery: 24).     -Pregnancy  [ X ] stable  [   ] improving [  ] worsening    Diagnoses and all orders for this visit:    1. S/P CABG x 3 (Primary)    2. Antepartum multigravida of advanced maternal age    3. Intracranial arachnoid cyst    4. 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 Dumas with cardiology--appreciate her care.  She has had a history of coronary artery dissection in fall of .  Her dissection " involved the left main coronary artery and extended into the LAD and left circumflex requiring CABG x 3.  Cardiac cath 6/2023 showed dissections had healed.  She continues to have anteroapical T wave changes and apical akinesis on echocardiograms but this is all stable.  Overall, left ventricular function has normalized. She has fibromuscular dysplasia, stable and will have a CTA of head and neck in year per neurosurgery.      Because the vast majority of patients affected by SCAD are women and SCAD constitutes an important cause of pregnancy-associated MI, its mechanism is uncertain but estrogen/progesterone have been shown to possibly play a role.   SCAD symptoms often onset after intense physical activity (at least 1/3 of the time), so we discussed lifting precautions and no intense exertional activity this pregnancy (no heavy lifting, etc., but normal activity/walking okay).  She should avoid straining or prolonged valsalva.  We also discussed starting a baby ASA.     Rates of recurrent SCAD are variably reported (10% to 30%), probably closer to 10% as there are some errors in study design in differentiating new vs recurrent SCAD in some of those studies.  Rates of recurrence are similar in pregnancy.  Many women experience increase in angina as pregnancy progresses.  She has no chest pain or SOB at this time--baseline BNP 49.   The data suggest that the majority of SCAD patients who subsequently conceive experience uncomplicated pregnancies, but SCAD can be unpredictable.      She will need to follow closely with cardiology.  Beta blockers, copidogrel and low dose aspirin have a reasonable safety profile in pregnancy if needed--we will go ahead and start low dose aspirin for both preE prevention and hx of SCAD.  We discussed that with any mom with cardiac history it is important to watch baby's growth and I recommend at least weekly (maybe twice weekly) ANFS at 32 weeks.  We will plan to deliver at 37th week  due to increasing cardiac output as pt goes further in pregnancy to minimize maternal complications.     I would like her to have a follow up ECHO with cardiology around 32 weeks.      She will need an anesthesia consult 32-36 weeks.      During delivery, we will plan:  -early epidural to minimize tachycardia/cardiac strain  -telemetry if feasible vs EKG before and after  -euvolemia/strict I's and O's  -Needs 32 week maternal ECHO  -Avoidance of terbutaline, methergine  -Laboring down/delayed valsalva vs consider assisted 2nd stage (pt says she goes quickly so do not suspect this will be necesssary)  -Consider immediate BTL vs LARC after delivery  - okay as long as no contraindication from Neurosurgery or cardiology teams--appreciate their care.      Intracranial arachnoid cyst:  Previously counseled.   Incidentally noted.  Follows with neurosurgery.  Plan is for CT head in 1 year.  Pt reports she was told this would not impact her ability to have vaginal delivery--has seen Dr. Sheppard who agrees.       Advanced Maternal Age  Previously counseled.  NIPT low risk.  Serial growths and ANFS at 32 weeks    Duplicated renal collecting system, left side  Previously Counseled: Duplicated renal collecting system represents a division of the kidney and upper urinary tract into two separate upper and lower pole moieties. This may be partial or complete. There is bilateral duplication in 10-20% of cases. I can see two renal pelvises on left side, but unclear if duplicated ureters as well. Sometimes these can become obstructed, but thankfully we are not seeing this today.  Severe obstruction may result in dysplastic changes. This is a common congenital malformation of urinary tract. It is usually incidental finding. However the upper pole does make the  prone to infection due to urinary stasis. Prenatal diagnosis may allow early intervention in th  life decreasing risk of urosepsis and renal damage. Severe  obstruction and/or hydronephrosis from reflux could lead to dysplastic changes and irreversible kidney damage. The renal parenchyma today appears normal today.   In utero treatment is not necessary typically but a complete urological work up should be completed after birth which includes a urology consult, ultrasound of kidney and bladder, VCUG and MRI to evaluate for other pelvic abnormalities.  We will send her prenatal urology visit in 3rd trimester--will send next visit.         Summary of Plan  -Serial growth ultrasounds every 4 weeks (MFM)   -Starting at 32 weeks: 1-2x Weekly fetal  surveillance until delivery   -Continue baby ASA  -Cardiac care plan as above  -Will discuss at fetal/maternal care conference  -Maternal ECHO 32 weeks  -Ordered peds urology referral  -Delivery 37-39 weeks depending on maternal symptoms/cardiology input.  She and I had discussed 37th week which I think will likely be the best option.    Follow-up: monthly    Thank you for the consult and opportunity to care for this patient.  Please feel free to reach out with any questions or concerns.      I spent 24 minutes caring for this patient on this date of service. This time includes time spent by me in the following activities: preparing for the visit, reviewing tests, obtaining and/or reviewing a separately obtained history, performing a medically appropriate examination and/or evaluation, counseling and educating the patient/family/caregiver and independently interpreting results and communicating that information with the patient/family/caregiver with greater than 50% spent in counseling and coordination of care.     SHIRLENE Dougherty  Maternal Fetal Medicine-[facility]  Office: [phone]  [email or web address]

## 2024-04-11 NOTE — PROGRESS NOTES
Pt reports that she is doing well and denies vaginal bleeding, cramping, contractions or LOF at this time. Reports active fetal movement. Reviewed when to call OB office or present to L&D for evaluation with symptoms such as decreased fetal movement, vaginal bleeding, LOF or ctxs. Pt verbalized understanding. Denies HA, visual changes or epigastric pain. Denies any additional complaints at time of appointment. Next OB appointment scheduled for 05/01/2024    Vitals:    04/11/24 1300   BP: 104/53   Pulse: 85   Temp: 98.2 °F (36.8 °C)

## 2024-04-11 NOTE — LETTER
2024       No Recipients    Patient: Esme Herrera   YOB: 1990   Date of Visit: 2024       Dear Geetha Fraser MD    Esme Herrera was in my office today. Below is a copy of my note.    If you have questions, please do not hesitate to call me. I look forward to following Esme along with you.         Sincerely,        SHIRLENE Beckford        CC:   No Recipients                            MATERNAL FETAL MEDICINE Consult Note    Dear Dr Geetha Fraser MD:    Thank you for your kind referral of Esme Herrera.  As you know, she is a 33 y.o.   at  18 6/7 weeks gestation (Estimated Date of Delivery: 24). This is a consult.       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 No vaginal bleeding, no contractions.     Review of History:  Past Medical History:   Diagnosis Date   • Chronic GERD 2023   • Congenital syphilis, unspecified 2016   • Coronary artery dissection 10/14/2022   • Disease of thyroid gland     Hypothyroid   • GERD (gastroesophageal reflux disease)    • Hypotension    • Intermittent palpitations 2023     Past Surgical History:   Procedure Laterality Date   • CARDIAC CATHETERIZATION N/A 10/15/2022    Procedure: Left Heart Cath;  Surgeon: Deandra Dumas MD;  Location: [facility] CATH INVASIVE LOCATION;  Service: Cardiology;  Laterality: N/A;   • CARDIAC CATHETERIZATION N/A 10/15/2022    Procedure: Coronary angiography;  Surgeon: Deandra Dumas MD;  Location:  [facility] CATH INVASIVE LOCATION;  Service: Cardiology;  Laterality: N/A;   • CARDIAC CATHETERIZATION N/A 10/15/2022    Procedure: Left ventriculography;  Surgeon: Deandra Dumas MD;  Location:  [facility] CATH INVASIVE LOCATION;  Service:  Cardiology;  Laterality: N/A;   • CARDIAC CATHETERIZATION N/A 6/22/2023    Procedure: Coronary angiography;  Surgeon: Deandra Dumas MD;  Location: [facility] CATH INVASIVE LOCATION;  Service: Cardiology;  Laterality: N/A;   • CARDIAC CATHETERIZATION N/A 6/22/2023    Procedure: Left Heart Cath;  Surgeon: Deandra Dumas MD;  Location: [facility] CATH INVASIVE LOCATION;  Service: Cardiology;  Laterality: N/A;   • CARDIAC CATHETERIZATION N/A 6/22/2023    Procedure: Left ventriculography;  Surgeon: Deandra Dumas MD;  Location: [facility] CATH INVASIVE LOCATION;  Service: Cardiology;  Laterality: N/A;   • CORONARY ARTERY BYPASS GRAFT N/A 10/15/2022    Procedure: VERONICA, STERNOTOMY, CORONARY ARTERY BYPASS TIMES 3 WITH INTERNAL MAMMARY ARTERY GRAFT AND LEFT GREATER SAPHENOUS VEIN HARVEST, REPAIR OF CORONARY ARTERY DISSECTION,PRP;  Surgeon: Lakshmi Teixeira MD;  Location: [facility] CVOR;  Service: Cardiothoracic;  Laterality: N/A;   • ENDOSCOPY N/A 1/12/2023    Procedure: ESOPHAGOGASTRODUODENOSCOPY with bxs;  Surgeon: Nish Quiroz MD;  Location: [facility] ENDOSCOPY;  Service: Gastroenterology;  Laterality: N/A;  PRE: Epigastric Pain  POST: Normal     Social History     Socioeconomic History   • Marital status:    Tobacco Use   • Smoking status: Never   • Smokeless tobacco: Never   Vaping Use   • Vaping status: Never Used   Substance and Sexual Activity   • Alcohol use: Never   • Drug use: Never   • Sexual activity: Yes     Partners: Male     Family History   Family history unknown: Yes      No Known Allergies   Current Outpatient Medications on File Prior to Visit   Medication Sig Dispense Refill   • acetaminophen (TYLENOL) 500 MG tablet Take 1 tablet by mouth Every 4 (Four) Hours As Needed for Mild Pain. 30 tablet 0   • aspirin 81 MG EC tablet Take 1 tablet by mouth Daily. 90 tablet 6   • Calcium Citrate-Vitamin D3 (CITRACAL) 315-6.25 MG-MCG tablet tablet Take  by mouth Daily.     • levothyroxine (SYNTHROID, LEVOTHROID) 50  "MCG tablet Take 1 tablet by mouth Daily (Monday-Friday).     • Prenatal Vit-Fe Fumarate-FA (Prenatal 27-) 27-1 MG tablet tablet Take 1 tablet by mouth Daily. 90 tablet 4   • Restasis 0.05 % ophthalmic emulsion INSTILL 1 DROP BOTH EYES TWICE DAILY       No current facility-administered medications on file prior to visit.        Past obstetric, gynecological, medical, surgical, family and social history reviewed.  Relevant lab work and imaging reviewed.    Review of systems  Constitutional:  denies fever, chills, malaise.   ENT/Mouth:  denies sore throat, tinnitus  Eyes: denies vision changes/pain  CV:  denies chest pain  Respiratory:  denies cough/SOB  GI:  denies N/V, diarrhea, abdominal pain.    :   denies dysuria  Skin:  denies lesions or pruritus   Neuro:  denies weakness, focal neurologic symptoms    Vitals:    24 1300   BP: 104/53   BP Location: Left arm   Patient Position: Sitting   Pulse: 85   Temp: 98.2 °F (36.8 °C)   Weight: 58.5 kg (129 lb)   Height: 154.9 cm (61\")       PHYSICAL EXAM   GENERAL: Not in acute distress, AAOx3, pleasant  CARDIO: regular rate and rhythm  PULM: symmetric chest rise, speaking in complete sentences without difficulty  NEURO: awake, alert and oriented to person, place, and time  ABDOMINAL: No fundal tenderness, no rebound or guarding, gravid  EXTREMITIES: no bilateral lower extremity edema/tenderness  SKIN: Warm, well-perfused      ULTRASOUND   Please view full ultrasound note on Imaging tab in [employer].  Breech presentation.  Posterior placenta.  MVP 5.4 cm, which is normal.    g (AC 75%)  Anatomy appears normal except suspected L duplicated renal collecting system.   Transabdominal CL >3.5 cm, which is normal.    ASSESSMENT/COUNSELIN y.o.   at  18 6/7 weeks gestation (Estimated Date of Delivery: 24).     -Pregnancy  [ X ] stable  [   ] improving [  ] worsening    Diagnoses and all orders for this visit:    1. S/P CABG x 3 (Primary)    2. " Antepartum multigravida of advanced maternal age    3. Intracranial arachnoid cyst    4. 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 Dumas with cardiology--appreciate her care.  She has had a history of coronary artery dissection in fall of 2022.  Her dissection involved the left main coronary artery and extended into the LAD and left circumflex requiring CABG x 3.  Cardiac cath 6/2023 showed dissections had healed.  She continues to have anteroapical T wave changes and apical akinesis on echocardiograms but this is all stable.  Overall, left ventricular function has normalized. She has fibromuscular dysplasia, stable and will have a CTA of head and neck in year per neurosurgery.      Because the vast majority of patients affected by SCAD are women and SCAD constitutes an important cause of pregnancy-associated MI, its mechanism is uncertain but estrogen/progesterone have been shown to possibly play a role.   SCAD symptoms often onset after intense physical activity (at least 1/3 of the time), so we discussed lifting precautions and no intense exertional activity this pregnancy (no heavy lifting, etc., but normal activity/walking okay).  She should avoid straining or prolonged valsalva.  We also discussed starting a baby ASA.     Rates of recurrent SCAD are variably reported (10% to 30%), probably closer to 10% as there are some errors in study design in differentiating new vs recurrent SCAD in some of those studies.  Rates of recurrence are similar in pregnancy.  Many women experience increase in angina as pregnancy progresses.  She has no chest pain or SOB at this time--baseline BNP 49.   The data suggest that the majority of SCAD patients who subsequently conceive experience uncomplicated pregnancies, but SCAD can be  unpredictable.      She will need to follow closely with cardiology.  Beta blockers, copidogrel and low dose aspirin have a reasonable safety profile in pregnancy if needed--we will go ahead and start low dose aspirin for both preE prevention and hx of SCAD.  We discussed that with any mom with cardiac history it is important to watch baby's growth and I recommend at least weekly (maybe twice weekly) ANFS at 32 weeks.  We will plan to deliver at 37th week due to increasing cardiac output as pt goes further in pregnancy to minimize maternal complications.     I would like her to have a follow up ECHO with cardiology around 32 weeks.      She will need an anesthesia consult 32-36 weeks.      During delivery, we will plan:  -early epidural to minimize tachycardia/cardiac strain  -telemetry if feasible vs EKG before and after  -euvolemia/strict I's and O's  -Needs 32 week maternal ECHO  -Avoidance of terbutaline, methergine  -Laboring down/delayed valsalva vs consider assisted 2nd stage (pt says she goes quickly so do not suspect this will be necesssary)  -Consider immediate BTL vs LARC after delivery  - okay as long as no contraindication from Neurosurgery or cardiology teams--appreciate their care.      Intracranial arachnoid cyst:  Previously counseled.   Incidentally noted.  Follows with neurosurgery.  Plan is for CT head in 1 year.  Pt reports she was told this would not impact her ability to have vaginal delivery--has seen Dr. Sheppard who agrees.       Advanced Maternal Age  Previously counseled.  NIPT low risk.  Serial growths and ANFS at 32 weeks    Duplicated renal collecting system, left side  Previously Counseled: Duplicated renal collecting system represents a division of the kidney and upper urinary tract into two separate upper and lower pole moieties. This may be partial or complete. There is bilateral duplication in 10-20% of cases. I can see two renal pelvises on left side, but unclear if duplicated  ureters as well. Sometimes these can become obstructed, but thankfully we are not seeing this today.  Severe obstruction may result in dysplastic changes. This is a common congenital malformation of urinary tract. It is usually incidental finding. However the upper pole does make the  prone to infection due to urinary stasis. Prenatal diagnosis may allow early intervention in th  life decreasing risk of urosepsis and renal damage. Severe obstruction and/or hydronephrosis from reflux could lead to dysplastic changes and irreversible kidney damage. The renal parenchyma today appears normal today.   In utero treatment is not necessary typically but a complete urological work up should be completed after birth which includes a urology consult, ultrasound of kidney and bladder, VCUG and MRI to evaluate for other pelvic abnormalities.  We will send her prenatal urology visit in 3rd trimester--will send next visit.         Summary of Plan  -Serial growth ultrasounds every 4 weeks (MFM)   -Starting at 32 weeks: 1-2x Weekly fetal  surveillance until delivery   -Continue baby ASA  -Cardiac care plan as above  -Will discuss at fetal/maternal care conference  -Maternal ECHO 32 weeks  -Ordered peds urology referral  -Delivery 37-39 weeks depending on maternal symptoms/cardiology input.  She and I had discussed 37th week which I think will likely be the best option.    Follow-up: monthly    Thank you for the consult and opportunity to care for this patient.  Please feel free to reach out with any questions or concerns.      I spent 24 minutes caring for this patient on this date of service. This time includes time spent by me in the following activities: preparing for the visit, reviewing tests, obtaining and/or reviewing a separately obtained history, performing a medically appropriate examination and/or evaluation, counseling and educating the patient/family/caregiver and independently interpreting  results and communicating that information with the patient/family/caregiver with greater than 50% spent in counseling and coordination of care.     SHIRLENE Dougherty  Maternal Fetal Medicine-[facility]  Office: [phone]  [email or web address]      Pt reports that she is doing well and denies vaginal bleeding, cramping, contractions or LOF at this time. Reports active fetal movement. Reviewed when to call OB office or present to L&D for evaluation with symptoms such as decreased fetal movement, vaginal bleeding, LOF or ctxs. Pt verbalized understanding. Denies HA, visual changes or epigastric pain. Denies any additional complaints at time of appointment. Next OB appointment scheduled for 05/01/2024    Vitals:    04/11/24 1300   BP: 104/53   Pulse: 85   Temp: 98.2 °F (36.8 °C)

## 2024-05-01 ENCOUNTER — ROUTINE PRENATAL (OUTPATIENT)
Dept: OBSTETRICS AND GYNECOLOGY | Facility: CLINIC | Age: 34
End: 2024-05-01
Payer: MEDICAID

## 2024-05-01 VITALS — WEIGHT: 131 LBS | SYSTOLIC BLOOD PRESSURE: 93 MMHG | DIASTOLIC BLOOD PRESSURE: 59 MMHG | BODY MASS INDEX: 24.75 KG/M2

## 2024-05-01 DIAGNOSIS — I25.5 ISCHEMIC CARDIOMYOPATHY: ICD-10-CM

## 2024-05-01 DIAGNOSIS — Z3A.25 25 WEEKS GESTATION OF PREGNANCY: Primary | ICD-10-CM

## 2024-05-01 DIAGNOSIS — I77.3 FIBROMUSCULAR DYSPLASIA OF CAROTID ARTERY: ICD-10-CM

## 2024-05-01 LAB
GLUCOSE UR STRIP-MCNC: NEGATIVE MG/DL
PROT UR STRIP-MCNC: ABNORMAL MG/DL

## 2024-05-01 RX ORDER — SIMETHICONE 80 MG
80 TABLET,CHEWABLE ORAL EVERY 6 HOURS PRN
Qty: 30 TABLET | Refills: 0 | Status: SHIPPED | OUTPATIENT
Start: 2024-05-01

## 2024-05-01 NOTE — PROGRESS NOTES
Chief Complaint   Patient presents with    Routine Prenatal Visit     25 weeks       Esme Herrera is a 33 y.o.  at 25w5d  here for routine OB visit  Reports that she has had an occasional uterine cramp- like once per day. No vaginal bleeding or LOF.  Notes normal fetal movements.  C/o gas that is worse in the evening.  No diarrhea/constipation    BP 93/59   Wt 59.4 kg (131 lb)   LMP 10/17/2023   BMI 24.75 kg/m²    Fetal Heart Rate: 150  Movement: Present   Gen: NAD, well appearing  Abd: nontender    See OB Flowsheet    ASSESSMENT/PLAN:  Diagnoses and all orders for this visit:    1. 25 weeks gestation of pregnancy (Primary)  -     POC Urinalysis Dipstick    2. Fibromuscular dysplasia of carotid artery    3. Ischemic cardiomyopathy      Is having some leg cramps and taking a small dose of magnesium which has helped this  Doing GCT today- along with TSH, CBC, Trep Ab  Okay to use simethicone for gas  Serial growth US with MFM. Cardiology/echo scheduled for early   Reviewed contraception options - she is not interested in a tubal ligation at this time. Explained if she had interest we would need to sign federal sterilization papers at least one month prior to being able to do the procedure. Reviewed LARCs.   Routine counseling pertinent to this stage of pregnancy was reviewed including strict  labor precautions  Return in about 4 weeks (around 2024).

## 2024-05-02 LAB
ERYTHROCYTE [DISTWIDTH] IN BLOOD BY AUTOMATED COUNT: 12.3 % (ref 11.7–15.4)
GLUCOSE 1H P 50 G GLC PO SERPL-MCNC: 111 MG/DL (ref 70–139)
HCT VFR BLD AUTO: 37.4 % (ref 34–46.6)
HGB BLD-MCNC: 12.7 G/DL (ref 11.1–15.9)
MCH RBC QN AUTO: 31.9 PG (ref 26.6–33)
MCHC RBC AUTO-ENTMCNC: 34 G/DL (ref 31.5–35.7)
MCV RBC AUTO: 94 FL (ref 79–97)
PLATELET # BLD AUTO: 213 X10E3/UL (ref 150–450)
RBC # BLD AUTO: 3.98 X10E6/UL (ref 3.77–5.28)
T PALLIDUM AB SER QL IF: NON REACTIVE
TSH SERPL DL<=0.005 MIU/L-ACNC: 3.13 UIU/ML (ref 0.45–4.5)
WBC # BLD AUTO: 7.8 X10E3/UL (ref 3.4–10.8)

## 2024-05-08 ENCOUNTER — TELEPHONE (OUTPATIENT)
Dept: ULTRASOUND IMAGING | Facility: HOSPITAL | Age: 34
End: 2024-05-08
Payer: MEDICAID

## 2024-05-13 ENCOUNTER — OFFICE VISIT (OUTPATIENT)
Dept: OBSTETRICS AND GYNECOLOGY | Facility: CLINIC | Age: 34
End: 2024-05-13
Payer: MEDICAID

## 2024-05-13 ENCOUNTER — HOSPITAL ENCOUNTER (OUTPATIENT)
Dept: ULTRASOUND IMAGING | Facility: HOSPITAL | Age: 34
Discharge: HOME | End: 2024-05-13
Admitting: OBSTETRICS & GYNECOLOGY
Payer: MEDICAID

## 2024-05-13 VITALS
DIASTOLIC BLOOD PRESSURE: 51 MMHG | SYSTOLIC BLOOD PRESSURE: 111 MMHG | BODY MASS INDEX: 25.86 KG/M2 | HEIGHT: 61 IN | HEART RATE: 92 BPM | WEIGHT: 137 LBS | TEMPERATURE: 98.2 F

## 2024-05-13 DIAGNOSIS — I25.5 ISCHEMIC CARDIOMYOPATHY: ICD-10-CM

## 2024-05-13 DIAGNOSIS — Q62.5 DUPLICATED RENAL COLLECTING SYSTEM: ICD-10-CM

## 2024-05-13 DIAGNOSIS — I77.3 FIBROMUSCULAR DYSPLASIA OF CAROTID ARTERY: ICD-10-CM

## 2024-05-13 DIAGNOSIS — O09.529 ANTEPARTUM MULTIGRAVIDA OF ADVANCED MATERNAL AGE: ICD-10-CM

## 2024-05-13 DIAGNOSIS — Z95.1 S/P CABG X 3: ICD-10-CM

## 2024-05-13 DIAGNOSIS — Z95.1 S/P CABG X 3: Primary | ICD-10-CM

## 2024-05-13 DIAGNOSIS — G93.0 INTRACRANIAL ARACHNOID CYST: ICD-10-CM

## 2024-05-13 PROCEDURE — 99213 OFFICE O/P EST LOW 20 MIN: CPT | Performed by: OBSTETRICS & GYNECOLOGY

## 2024-05-13 PROCEDURE — 76819 FETAL BIOPHYS PROFIL W/O NST: CPT | Performed by: OBSTETRICS & GYNECOLOGY

## 2024-05-13 PROCEDURE — 76816 OB US FOLLOW-UP PER FETUS: CPT

## 2024-05-13 PROCEDURE — 76816 OB US FOLLOW-UP PER FETUS: CPT | Performed by: OBSTETRICS & GYNECOLOGY

## 2024-05-13 NOTE — PROGRESS NOTES
Pt reports that she is doing well and denies vaginal bleeding, contractions or LOF at this time. Patient states that she has been having about one cramp a day but that this symptoms is not consistent.Reports active fetal movement. Reviewed when to call OB office or present to L&D for evaluation with symptoms such as decreased fetal movement, vaginal bleeding, LOF or ctxs. Pt verbalized understanding. Denies HA, visual changes or epigastric pain. Denies any additional complaints at time of appointment. Next OB appointment scheduled for 05/29/2024    Vitals:    05/13/24 1100   BP: 111/51   Pulse: 92   Temp: 98.2 °F (36.8 °C)

## 2024-05-13 NOTE — LETTER
May 13, 2024       No Recipients    Patient: Esme Herrera   YOB: 1990   Date of Visit: 2024       Dear Geetha Fraser MD    Esme Herrera was in my office today. Below is a copy of my note.    If you have questions, please do not hesitate to call me. I look forward to following Esme along with you.         Sincerely,        Ashley Tejeda MD      MATERNAL FETAL MEDICINE Consult Note    Dear Dr Geetha Fraser MD:    Thank you for your kind referral of Esme Herrera.  As you know, she is a 33 y.o.   at  27 3/7 weeks gestation (Estimated Date of Delivery: 24). This is a consult.       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 No vaginal bleeding, no contractions.     Review of History:  Past Medical History:   Diagnosis Date   • Chronic GERD 2023   • Congenital syphilis, unspecified 2016   • Coronary artery dissection 10/14/2022   • Disease of thyroid gland     Hypothyroid   • GERD (gastroesophageal reflux disease)    • Hypotension    • Intermittent palpitations 2023     Past Surgical History:   Procedure Laterality Date   • CARDIAC CATHETERIZATION N/A 10/15/2022    Procedure: Left Heart Cath;  Surgeon: Deandra Dumas MD;  Location: [facility] CATH INVASIVE LOCATION;  Service: Cardiology;  Laterality: N/A;   • CARDIAC CATHETERIZATION N/A 10/15/2022    Procedure: Coronary angiography;  Surgeon: Deandra Dumas MD;  Location:  [facility] CATH INVASIVE LOCATION;  Service: Cardiology;  Laterality: N/A;   • CARDIAC CATHETERIZATION N/A 10/15/2022    Procedure: Left ventriculography;  Surgeon: Deandra Dumas MD;  Location:  [facility] CATH INVASIVE LOCATION;  Service: Cardiology;  Laterality: N/A;   • CARDIAC CATHETERIZATION  N/A 6/22/2023    Procedure: Coronary angiography;  Surgeon: Deandra Dumas MD;  Location: [facility] CATH INVASIVE LOCATION;  Service: Cardiology;  Laterality: N/A;   • CARDIAC CATHETERIZATION N/A 6/22/2023    Procedure: Left Heart Cath;  Surgeon: Deandra Dumas MD;  Location: [facility] CATH INVASIVE LOCATION;  Service: Cardiology;  Laterality: N/A;   • CARDIAC CATHETERIZATION N/A 6/22/2023    Procedure: Left ventriculography;  Surgeon: Deandra Dumas MD;  Location: [facility] CATH INVASIVE LOCATION;  Service: Cardiology;  Laterality: N/A;   • CORONARY ARTERY BYPASS GRAFT N/A 10/15/2022    Procedure: VERONICA, STERNOTOMY, CORONARY ARTERY BYPASS TIMES 3 WITH INTERNAL MAMMARY ARTERY GRAFT AND LEFT GREATER SAPHENOUS VEIN HARVEST, REPAIR OF CORONARY ARTERY DISSECTION,PRP;  Surgeon: Lakshmi Teixeira MD;  Location: [facility] CVOR;  Service: Cardiothoracic;  Laterality: N/A;   • ENDOSCOPY N/A 1/12/2023    Procedure: ESOPHAGOGASTRODUODENOSCOPY with bxs;  Surgeon: Nish Quiroz MD;  Location: [facility] ENDOSCOPY;  Service: Gastroenterology;  Laterality: N/A;  PRE: Epigastric Pain  POST: Normal     Social History     Socioeconomic History   • Marital status:    Tobacco Use   • Smoking status: Never   • Smokeless tobacco: Never   Vaping Use   • Vaping status: Never Used   Substance and Sexual Activity   • Alcohol use: Never   • Drug use: Never   • Sexual activity: Yes     Partners: Male     Family History   Family history unknown: Yes      No Known Allergies   Current Outpatient Medications on File Prior to Visit   Medication Sig Dispense Refill   • acetaminophen (TYLENOL) 500 MG tablet Take 1 tablet by mouth Every 4 (Four) Hours As Needed for Mild Pain. 30 tablet 0   • aspirin 81 MG EC tablet Take 1 tablet by mouth Daily. 90 tablet 6   • Calcium Citrate-Vitamin D3 (CITRACAL) 315-6.25 MG-MCG tablet tablet Take  by mouth Daily.     • levothyroxine (SYNTHROID, LEVOTHROID) 50 MCG tablet Take 1 tablet by mouth Daily (Monday-Friday).    "  • Prenatal Vit-Fe Fumarate-FA (Prenatal 27-) 27-1 MG tablet tablet Take 1 tablet by mouth Daily. 90 tablet 4   • Restasis 0.05 % ophthalmic emulsion INSTILL 1 DROP BOTH EYES TWICE DAILY     • simethicone (Gas-X) 80 MG chewable tablet Chew 1 tablet Every 6 (Six) Hours As Needed for Flatulence. 30 tablet 0     No current facility-administered medications on file prior to visit.        Past obstetric, gynecological, medical, surgical, family and social history reviewed.  Relevant lab work and imaging reviewed.    Review of systems  Constitutional:  denies fever, chills, malaise.   ENT/Mouth:  denies sore throat, tinnitus  Eyes: denies vision changes/pain  CV:  denies chest pain  Respiratory:  denies cough/SOB  GI:  denies N/V, diarrhea, abdominal pain.    :   denies dysuria  Skin:  denies lesions or pruritus   Neuro:  denies weakness, focal neurologic symptoms    Vitals:    24 1100   BP: 111/51   BP Location: Right arm   Patient Position: Sitting   Pulse: 92   Temp: 98.2 °F (36.8 °C)   TempSrc: Temporal   Weight: 62.1 kg (137 lb)   Height: 154.9 cm (61\")       PHYSICAL EXAM   GENERAL: Not in acute distress, AAOx3, pleasant  CARDIO: regular rate and rhythm  PULM: symmetric chest rise, speaking in complete sentences without difficulty  NEURO: awake, alert and oriented to person, place, and time  ABDOMINAL: No fundal tenderness, no rebound or guarding, gravid  EXTREMITIES: no bilateral lower extremity edema/tenderness  SKIN: Warm, well-perfused      ULTRASOUND   Please view full ultrasound note on Imaging tab in ViewPoint.  Cephalic presentation.  Posterior placenta.  KIMBERLY 12 cm, which is normal.   EFW 1077 g (51%, AC 53%)  Left renal duplicated collection system again seen without evidence of obstruction.    [facility]     ASSESSMENT/COUNSELIN y.o.   at  27 3/7 weeks gestation (Estimated Date of Delivery: 24).     -Pregnancy  [ X ] stable  [   ] improving [  ] worsening    Diagnoses and all " orders for this visit:    1. S/P CABG x 3 (Primary)    2. Fibromuscular dysplasia of carotid artery    3. Ischemic cardiomyopathy  Overview:  -early epidural to minimize tachycardia/cardiac strain  -telemetry if feasible vs EKG before and after  -euvolemia/strict I's and O's  -Avoidance of terbutaline, methergine  -Laboring down/delayed valsalva vs consider assisted 2nd stage (pt says she goes quickly so do not suspect this will be necesssary)  -Consider immediate BTL vs LARC after delivery  - okay as long as no contraindication from Neurosurgery or cardiology teams--apreciate their care.        4. Antepartum multigravida of advanced maternal age    5. Intracranial arachnoid cyst    6. 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 Dumas with cardiology--appreciate her care.  She has had a history of coronary artery dissection in fall of .  Her dissection involved the left main coronary artery and extended into the LAD and left circumflex requiring CABG x 3.  Cardiac cath 2023 showed dissections had healed.  She continues to have anteroapical T wave changes and apical akinesis on echocardiograms but this is all stable.  Overall, left ventricular function has normalized. She has fibromuscular dysplasia, stable and will have a CTA of head and neck in year per neurosurgery.      Because the vast majority of patients affected by SCAD are women and SCAD constitutes an important cause of pregnancy-associated MI, its mechanism is uncertain but estrogen/progesterone have been shown to possibly play a role.   SCAD symptoms often onset after intense physical activity (at least 1/3 of the time), so we discussed lifting precautions and no intense exertional activity this pregnancy (no heavy lifting, etc., but normal activity/walking okay).  She should avoid straining or prolonged  valsalva.  We also discussed starting a baby ASA.     Rates of recurrent SCAD are variably reported (10% to 30%), probably closer to 10% as there are some errors in study design in differentiating new vs recurrent SCAD in some of those studies.  Rates of recurrence are similar in pregnancy.  Many women experience increase in angina as pregnancy progresses.  She has no chest pain or SOB at this time--baseline BNP 49.   The data suggest that the majority of SCAD patients who subsequently conceive experience uncomplicated pregnancies, but SCAD can be unpredictable.      She will need to follow closely with cardiology.  Beta blockers, copidogrel and low dose aspirin have a reasonable safety profile in pregnancy if needed--we will go ahead and start low dose aspirin for both preE prevention and hx of SCAD.  We discussed that with any mom with cardiac history it is important to watch baby's growth and I recommend at least weekly (maybe twice weekly) ANFS at 32 weeks.  We will plan to deliver at 37th week due to increasing cardiac output as pt goes further in pregnancy to minimize maternal complications.     I would like her to have a follow up ECHO with cardiology around 32 weeks.      She will need an anesthesia consult 32-36 weeks.      During delivery, we will plan:  -early epidural to minimize tachycardia/cardiac strain  -telemetry if feasible vs EKG before and after  -euvolemia/strict I's and O's  -Needs 32 week maternal ECHO  -Avoidance of terbutaline, methergine  -Laboring down/delayed valsalva vs consider assisted 2nd stage (pt says she goes quickly so do not suspect this will be necesssary)  -Consider immediate BTL vs LARC after delivery  - okay as long as no contraindication from Neurosurgery or cardiology teams--appreciate their care.      Intracranial arachnoid cyst:  Previously counseled.   Incidentally noted.  Follows with neurosurgery.  Plan is for CT head in 1 year.  Pt reports she was told this would  not impact her ability to have vaginal delivery--has seen Dr. Sheppard who agrees.       Advanced Maternal Age  Previously counseled.  NIPT low risk.  Serial growths and ANFS at 32 weeks    Duplicated renal collecting system, left side  Previously Counseled: No evidence of obstruction today.  Sees peds uro later this week.  Needs renal US in hospital after delivery.         Summary of Plan  -Serial growth ultrasounds every 4 weeks (MFM)   -Starting at 32 weeks: weekly ANFS at primary OB--needs to be scheduled  -Continue baby ASA  -Cardiac care plan as above  -Will discuss at fetal/maternal care conference  -Maternal ECHO 32 weeks  -Ordered peds urology referral--later this week  -Baby needs renal US after delivery while in hospital.   -Delivery 37-39 weeks depending on maternal symptoms/cardiology input.  She and I had discussed 37th week which I think will likely be the best option.    Follow-up: monthly    Thank you for the consult and opportunity to care for this patient.  Please feel free to reach out with any questions or concerns.      I spent 22 minutes caring for this patient on this date of service. This time includes time spent by me in the following activities: preparing for the visit, reviewing tests, obtaining and/or reviewing a separately obtained history, performing a medically appropriate examination and/or evaluation, counseling and educating the patient/family/caregiver and independently interpreting results and communicating that information with the patient/family/caregiver with greater than 50% spent in counseling and coordination of care.     4 minutes reading US.      Ashley Tejeda MD FACOG  Maternal Fetal Medicine-[facility]  Office: [phone]  [email or web address]

## 2024-05-13 NOTE — PROGRESS NOTES
MATERNAL FETAL MEDICINE Consult Note    Dear Dr Geetha Fraser MD:    Thank you for your kind referral of Esme Herrera.  As you know, she is a 33 y.o.   at  27 3/7 weeks gestation (Estimated Date of Delivery: 24). This is a consult.       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 No vaginal bleeding, no contractions.     Review of History:  Past Medical History:   Diagnosis Date    Chronic GERD 2023    Congenital syphilis, unspecified 2016    Coronary artery dissection 10/14/2022    Disease of thyroid gland     Hypothyroid    GERD (gastroesophageal reflux disease)     Hypotension     Intermittent palpitations 2023     Past Surgical History:   Procedure Laterality Date    CARDIAC CATHETERIZATION N/A 10/15/2022    Procedure: Left Heart Cath;  Surgeon: Deandra Dumas MD;  Location:  [facility] CATH INVASIVE LOCATION;  Service: Cardiology;  Laterality: N/A;    CARDIAC CATHETERIZATION N/A 10/15/2022    Procedure: Coronary angiography;  Surgeon: Deandra Dumas MD;  Location:  [facility] CATH INVASIVE LOCATION;  Service: Cardiology;  Laterality: N/A;    CARDIAC CATHETERIZATION N/A 10/15/2022    Procedure: Left ventriculography;  Surgeon: Deandra Dumas MD;  Location:  [facility] CATH INVASIVE LOCATION;  Service: Cardiology;  Laterality: N/A;    CARDIAC CATHETERIZATION N/A 2023    Procedure: Coronary angiography;  Surgeon: Deandra Dumas MD;  Location:  [facility] CATH INVASIVE LOCATION;  Service: Cardiology;  Laterality: N/A;    CARDIAC CATHETERIZATION N/A 2023    Procedure: Left Heart Cath;  Surgeon: Deandra Dumas MD;  Location:  [facility] CATH INVASIVE LOCATION;  Service: Cardiology;  Laterality: N/A;    CARDIAC CATHETERIZATION N/A  6/22/2023    Procedure: Left ventriculography;  Surgeon: Deandra Dumas MD;  Location: [facility] CATH INVASIVE LOCATION;  Service: Cardiology;  Laterality: N/A;    CORONARY ARTERY BYPASS GRAFT N/A 10/15/2022    Procedure: VERONICA, STERNOTOMY, CORONARY ARTERY BYPASS TIMES 3 WITH INTERNAL MAMMARY ARTERY GRAFT AND LEFT GREATER SAPHENOUS VEIN HARVEST, REPAIR OF CORONARY ARTERY DISSECTION,PRP;  Surgeon: Lakshmi Teixeira MD;  Location: [facility] CVOR;  Service: Cardiothoracic;  Laterality: N/A;    ENDOSCOPY N/A 1/12/2023    Procedure: ESOPHAGOGASTRODUODENOSCOPY with bxs;  Surgeon: Nish Quiroz MD;  Location: [facility] ENDOSCOPY;  Service: Gastroenterology;  Laterality: N/A;  PRE: Epigastric Pain  POST: Normal     Social History     Socioeconomic History    Marital status:    Tobacco Use    Smoking status: Never    Smokeless tobacco: Never   Vaping Use    Vaping status: Never Used   Substance and Sexual Activity    Alcohol use: Never    Drug use: Never    Sexual activity: Yes     Partners: Male     Family History   Family history unknown: Yes      No Known Allergies   Current Outpatient Medications on File Prior to Visit   Medication Sig Dispense Refill    acetaminophen (TYLENOL) 500 MG tablet Take 1 tablet by mouth Every 4 (Four) Hours As Needed for Mild Pain. 30 tablet 0    aspirin 81 MG EC tablet Take 1 tablet by mouth Daily. 90 tablet 6    Calcium Citrate-Vitamin D3 (CITRACAL) 315-6.25 MG-MCG tablet tablet Take  by mouth Daily.      levothyroxine (SYNTHROID, LEVOTHROID) 50 MCG tablet Take 1 tablet by mouth Daily (Monday-Friday).      Prenatal Vit-Fe Fumarate-FA (Prenatal 27-1) 27-1 MG tablet tablet Take 1 tablet by mouth Daily. 90 tablet 4    Restasis 0.05 % ophthalmic emulsion INSTILL 1 DROP BOTH EYES TWICE DAILY      simethicone (Gas-X) 80 MG chewable tablet Chew 1 tablet Every 6 (Six) Hours As Needed for Flatulence. 30 tablet 0     No current facility-administered medications on file prior to visit.        Past  "obstetric, gynecological, medical, surgical, family and social history reviewed.  Relevant lab work and imaging reviewed.    Review of systems  Constitutional:  denies fever, chills, malaise.   ENT/Mouth:  denies sore throat, tinnitus  Eyes: denies vision changes/pain  CV:  denies chest pain  Respiratory:  denies cough/SOB  GI:  denies N/V, diarrhea, abdominal pain.    :   denies dysuria  Skin:  denies lesions or pruritus   Neuro:  denies weakness, focal neurologic symptoms    Vitals:    24 1100   BP: 111/51   BP Location: Right arm   Patient Position: Sitting   Pulse: 92   Temp: 98.2 °F (36.8 °C)   TempSrc: Temporal   Weight: 62.1 kg (137 lb)   Height: 154.9 cm (61\")       PHYSICAL EXAM   GENERAL: Not in acute distress, AAOx3, pleasant  CARDIO: regular rate and rhythm  PULM: symmetric chest rise, speaking in complete sentences without difficulty  NEURO: awake, alert and oriented to person, place, and time  ABDOMINAL: No fundal tenderness, no rebound or guarding, gravid  EXTREMITIES: no bilateral lower extremity edema/tenderness  SKIN: Warm, well-perfused      ULTRASOUND   Please view full ultrasound note on Imaging tab in ViewPoint.  Cephalic presentation.  Posterior placenta.  KIMBERLY 12 cm, which is normal.   EFW 1077 g (51%, AC 53%)  Left renal duplicated collection system again seen without evidence of obstruction.    BPP     ASSESSMENT/COUNSELIN y.o.   at  27 3/7 weeks gestation (Estimated Date of Delivery: 24).     -Pregnancy  [ X ] stable  [   ] improving [  ] worsening    Diagnoses and all orders for this visit:    1. S/P CABG x 3 (Primary)    2. Fibromuscular dysplasia of carotid artery    3. Ischemic cardiomyopathy  Overview:  -early epidural to minimize tachycardia/cardiac strain  -telemetry if feasible vs EKG before and after  -euvolemia/strict I's and O's  -Avoidance of terbutaline, methergine  -Laboring down/delayed valsalva vs consider assisted 2nd stage (pt says she goes " quickly so do not suspect this will be necesssary)  -Consider immediate BTL vs LARC after delivery  - okay as long as no contraindication from Neurosurgery or cardiology teams--apreciate their care.        4. Antepartum multigravida of advanced maternal age    5. Intracranial arachnoid cyst    6. 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 Dumas with cardiology--appreciate her care.  She has had a history of coronary artery dissection in fall of .  Her dissection involved the left main coronary artery and extended into the LAD and left circumflex requiring CABG x 3.  Cardiac cath 2023 showed dissections had healed.  She continues to have anteroapical T wave changes and apical akinesis on echocardiograms but this is all stable.  Overall, left ventricular function has normalized. She has fibromuscular dysplasia, stable and will have a CTA of head and neck in year per neurosurgery.      Because the vast majority of patients affected by SCAD are women and SCAD constitutes an important cause of pregnancy-associated MI, its mechanism is uncertain but estrogen/progesterone have been shown to possibly play a role.   SCAD symptoms often onset after intense physical activity (at least 1/3 of the time), so we discussed lifting precautions and no intense exertional activity this pregnancy (no heavy lifting, etc., but normal activity/walking okay).  She should avoid straining or prolonged valsalva.  We also discussed starting a baby ASA.     Rates of recurrent SCAD are variably reported (10% to 30%), probably closer to 10% as there are some errors in study design in differentiating new vs recurrent SCAD in some of those studies.  Rates of recurrence are similar in pregnancy.  Many women experience increase in angina as pregnancy progresses.  She has no chest pain or SOB at this  time--baseline BNP 49.   The data suggest that the majority of SCAD patients who subsequently conceive experience uncomplicated pregnancies, but SCAD can be unpredictable.      She will need to follow closely with cardiology.  Beta blockers, copidogrel and low dose aspirin have a reasonable safety profile in pregnancy if needed--we will go ahead and start low dose aspirin for both preE prevention and hx of SCAD.  We discussed that with any mom with cardiac history it is important to watch baby's growth and I recommend at least weekly (maybe twice weekly) ANFS at 32 weeks.  We will plan to deliver at 37th week due to increasing cardiac output as pt goes further in pregnancy to minimize maternal complications.     I would like her to have a follow up ECHO with cardiology around 32 weeks.      She will need an anesthesia consult 32-36 weeks.      During delivery, we will plan:  -early epidural to minimize tachycardia/cardiac strain  -telemetry if feasible vs EKG before and after  -euvolemia/strict I's and O's  -Needs 32 week maternal ECHO  -Avoidance of terbutaline, methergine  -Laboring down/delayed valsalva vs consider assisted 2nd stage (pt says she goes quickly so do not suspect this will be necesssary)  -Consider immediate BTL vs LARC after delivery  - okay as long as no contraindication from Neurosurgery or cardiology teams--appreciate their care.      Intracranial arachnoid cyst:  Previously counseled.   Incidentally noted.  Follows with neurosurgery.  Plan is for CT head in 1 year.  Pt reports she was told this would not impact her ability to have vaginal delivery--has seen Dr. Sheppard who agrees.       Advanced Maternal Age  Previously counseled.  NIPT low risk.  Serial growths and ANFS at 32 weeks    Duplicated renal collecting system, left side  Previously Counseled: No evidence of obstruction today.  Sees peds uro later this week.  Needs renal US in hospital after delivery.         Summary of  Plan  -Serial growth ultrasounds every 4 weeks (MFM)   -Starting at 32 weeks: weekly ANFS at primary OB--needs to be scheduled  -Continue baby ASA  -Cardiac care plan as above  -Will discuss at fetal/maternal care conference  -Maternal ECHO 32 weeks  -Ordered peds urology referral--later this week  -Baby needs renal US after delivery while in hospital.   -Delivery 37-39 weeks depending on maternal symptoms/cardiology input.  She and I had discussed 37th week which I think will likely be the best option.    Follow-up: monthly    Thank you for the consult and opportunity to care for this patient.  Please feel free to reach out with any questions or concerns.      I spent 22 minutes caring for this patient on this date of service. This time includes time spent by me in the following activities: preparing for the visit, reviewing tests, obtaining and/or reviewing a separately obtained history, performing a medically appropriate examination and/or evaluation, counseling and educating the patient/family/caregiver and independently interpreting results and communicating that information with the patient/family/caregiver with greater than 50% spent in counseling and coordination of care.     4 minutes reading US.      Ashley Tejeda MD FACOG  Maternal Fetal Medicine-[facility]  Office: [phone]  [email or web address]

## 2024-05-14 ENCOUNTER — TELEPHONE (OUTPATIENT)
Dept: OBSTETRICS AND GYNECOLOGY | Facility: CLINIC | Age: 34
End: 2024-05-14
Payer: MEDICAID

## 2024-05-14 DIAGNOSIS — Z95.1 S/P CABG X 3: Primary | ICD-10-CM

## 2024-05-14 DIAGNOSIS — O09.529 ANTEPARTUM MULTIGRAVIDA OF ADVANCED MATERNAL AGE: ICD-10-CM

## 2024-05-14 NOTE — TELEPHONE ENCOUNTER
Can you please call and set up weekly BPP with OB visit starting in 5 weeks (at 32 weeks)? Thanks!

## 2024-05-29 ENCOUNTER — ROUTINE PRENATAL (OUTPATIENT)
Dept: OBSTETRICS AND GYNECOLOGY | Facility: CLINIC | Age: 34
End: 2024-05-29
Payer: MEDICAID

## 2024-05-29 VITALS — SYSTOLIC BLOOD PRESSURE: 94 MMHG | DIASTOLIC BLOOD PRESSURE: 62 MMHG | WEIGHT: 141 LBS | BODY MASS INDEX: 26.64 KG/M2

## 2024-05-29 DIAGNOSIS — Z3A.29 29 WEEKS GESTATION OF PREGNANCY: Primary | ICD-10-CM

## 2024-05-29 DIAGNOSIS — I25.5 ISCHEMIC CARDIOMYOPATHY: ICD-10-CM

## 2024-05-29 DIAGNOSIS — I77.3 FIBROMUSCULAR DYSPLASIA OF CAROTID ARTERY: ICD-10-CM

## 2024-05-29 DIAGNOSIS — Z95.1 S/P CABG X 3: ICD-10-CM

## 2024-05-29 DIAGNOSIS — O09.529 ANTEPARTUM MULTIGRAVIDA OF ADVANCED MATERNAL AGE: ICD-10-CM

## 2024-05-29 DIAGNOSIS — Z23 NEED FOR TDAP VACCINATION: ICD-10-CM

## 2024-05-29 LAB
GLUCOSE UR STRIP-MCNC: NEGATIVE MG/DL
PROT UR STRIP-MCNC: ABNORMAL MG/DL

## 2024-05-29 NOTE — PROGRESS NOTES
Chief Complaint   Patient presents with    Routine Prenatal Visit     29 weeks      Esme Herrera is a 33 y.o.  at 29w5d  here for routine OB visit  Reports no chest pain or SOA.  She labored in last two pregnancies without an epidural; last labor was rapid.    BP 94/62   Wt 64 kg (141 lb)   LMP 10/17/2023   BMI 26.64 kg/m²        Gen: NAD, well appearing  Abd: nontender    See OB Flowsheet    ASSESSMENT/PLAN:  Diagnoses and all orders for this visit:    1. 29 weeks gestation of pregnancy (Primary)  -     POC Urinalysis Dipstick    2. Need for Tdap vaccination    3. Fibromuscular dysplasia of carotid artery    4. Ischemic cardiomyopathy    5. S/P CABG x 3    6. Antepartum multigravida of advanced maternal age    Other orders  -     Tdap Vaccine => 6yo IM (BOOSTRIX)    Has Echo scheduled.   Reviewed MFM recommendations, ANFS at 32 weeks  Tdap today  Routine counseling pertinent to this stage of pregnancy was reviewed including third trimester precautions  Return in about 2 weeks (around 2024).

## 2024-06-06 ENCOUNTER — HOSPITAL ENCOUNTER (OUTPATIENT)
Dept: ULTRASOUND IMAGING | Facility: HOSPITAL | Age: 34
Discharge: HOME | End: 2024-06-06
Payer: MEDICAID

## 2024-06-06 ENCOUNTER — OFFICE VISIT (OUTPATIENT)
Dept: OBSTETRICS AND GYNECOLOGY | Facility: CLINIC | Age: 34
End: 2024-06-06
Payer: MEDICAID

## 2024-06-06 VITALS
WEIGHT: 142.4 LBS | HEART RATE: 89 BPM | SYSTOLIC BLOOD PRESSURE: 103 MMHG | DIASTOLIC BLOOD PRESSURE: 59 MMHG | TEMPERATURE: 98 F | BODY MASS INDEX: 26.91 KG/M2

## 2024-06-06 DIAGNOSIS — O09.529 ANTEPARTUM MULTIGRAVIDA OF ADVANCED MATERNAL AGE: ICD-10-CM

## 2024-06-06 DIAGNOSIS — I77.3 FIBROMUSCULAR DYSPLASIA OF CAROTID ARTERY: ICD-10-CM

## 2024-06-06 DIAGNOSIS — Z95.1 S/P CABG X 3: Primary | ICD-10-CM

## 2024-06-06 DIAGNOSIS — Z95.1 S/P CABG X 3: ICD-10-CM

## 2024-06-06 DIAGNOSIS — Q62.5 DUPLICATED RENAL COLLECTING SYSTEM: ICD-10-CM

## 2024-06-06 DIAGNOSIS — G93.0 INTRACRANIAL ARACHNOID CYST: ICD-10-CM

## 2024-06-06 PROCEDURE — 76816 OB US FOLLOW-UP PER FETUS: CPT

## 2024-06-06 PROCEDURE — 76819 FETAL BIOPHYS PROFIL W/O NST: CPT

## 2024-06-06 NOTE — PROGRESS NOTES
Pt reports that she is doing well and denies vaginal bleeding or LOF at this time. Notes occasional cramping, denies consistency and reports cramping improves with rest. Reports active fetal movement. Reviewed when to call OB office or present to L&D for evaluation with symptoms such as decreased fetal movement, vaginal bleeding, LOF or ctxs. Pt verbalized understanding. Denies HA, visual changes or epigastric pain. Denies any additional complaints at time of appointment. Next OB appointment scheduled for 6/14. Follow up maternal ECHO scheduled 6/10.    Vitals:    06/06/24 1419   BP: 103/59   Pulse: 89   Temp: 98 °F (36.7 °C)

## 2024-06-06 NOTE — LETTER
2024     Geetha Fraser MD  [address]  [address]  [facility] [address]    Patient: Esme Herrera   YOB: 1990   Date of Visit: 2024       Dear Geetha Fraser MD    Esme Herrera was in my office today. Below is a copy of my note.    If you have questions, please do not hesitate to call me. I look forward to following Esme along with you.         Sincerely,        SHIRLENE Beckford        CC: No Recipients                        MATERNAL FETAL MEDICINE Consult Note    Dear Dr Geetha Fraser MD:    Thank you for your kind referral of Esme Herrera.  As you know, she is a 34 y.o.   at  30 6/7 weeks gestation (Estimated Date of Delivery: 24). This is a consult.       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 No vaginal bleeding, no contractions.     Review of History:  Past Medical History:   Diagnosis Date   • Chronic GERD 2023   • Congenital syphilis, unspecified 2016   • Coronary artery dissection 10/14/2022   • Disease of thyroid gland     Hypothyroid   • GERD (gastroesophageal reflux disease)    • Hypotension    • Intermittent palpitations 2023     Past Surgical History:   Procedure Laterality Date   • CARDIAC CATHETERIZATION N/A 10/15/2022    Procedure: Left Heart Cath;  Surgeon: Deandra Duams MD;  Location:  [facility] CATH INVASIVE [facility]ATION;  Service: Cardiology;  Laterality: N/A;   • CARDIAC CATHETERIZATION N/A 10/15/2022    Procedure: Coronary angiography;  Surgeon: Deandra Dumas MD;  Location:  [facility]VAS[facility];  Service: Cardiology;  Laterality: N/A;   • CARDIAC CATHETERIZATION N/A 10/15/2022    Procedure: Left ventriculography;  Surgeon: Deandra Dumas MD;   Location: [facility] CATH INVASIVE LOCATION;  Service: Cardiology;  Laterality: N/A;   • CARDIAC CATHETERIZATION N/A 6/22/2023    Procedure: Coronary angiography;  Surgeon: Deandra Dumas MD;  Location: [facility] CATH INVASIVE LOCATION;  Service: Cardiology;  Laterality: N/A;   • CARDIAC CATHETERIZATION N/A 6/22/2023    Procedure: Left Heart Cath;  Surgeon: Deandra Dumas MD;  Location: [facility] CATH INVASIVE LOCATION;  Service: Cardiology;  Laterality: N/A;   • CARDIAC CATHETERIZATION N/A 6/22/2023    Procedure: Left ventriculography;  Surgeon: Deandra Dumas MD;  Location: [facility] CATH INVASIVE LOCATION;  Service: Cardiology;  Laterality: N/A;   • CORONARY ARTERY BYPASS GRAFT N/A 10/15/2022    Procedure: VERONICA, STERNOTOMY, CORONARY ARTERY BYPASS TIMES 3 WITH INTERNAL MAMMARY ARTERY GRAFT AND LEFT GREATER SAPHENOUS VEIN HARVEST, REPAIR OF CORONARY ARTERY DISSECTION,PRP;  Surgeon: Lakshmi Teixeira MD;  Location: [facility] CVOR;  Service: Cardiothoracic;  Laterality: N/A;   • ENDOSCOPY N/A 1/12/2023    Procedure: ESOPHAGOGASTRODUODENOSCOPY with bxs;  Surgeon: Nish Quiroz MD;  Location: [facility] ENDOSCOPY;  Service: Gastroenterology;  Laterality: N/A;  PRE: Epigastric Pain  POST: Normal     Social History     Socioeconomic History   • Marital status:    Tobacco Use   • Smoking status: Never     Passive exposure: Never   • Smokeless tobacco: Never   Vaping Use   • Vaping status: Never Used   Substance and Sexual Activity   • Alcohol use: Never   • Drug use: Never   • Sexual activity: Yes     Partners: Male     Family History   Family history unknown: Yes      No Known Allergies   Current Outpatient Medications on File Prior to Visit   Medication Sig Dispense Refill   • aspirin 81 MG EC tablet Take 1 tablet by mouth Daily. 90 tablet 6   • levothyroxine (SYNTHROID, LEVOTHROID) 50 MCG tablet Take 1 tablet by mouth Daily (Monday-Friday).     • MAGNESIUM PO Take  by mouth.     • Prenatal Vit-Fe Fumarate-FA (Prenatal  27-1) 27-1 MG tablet tablet Take 1 tablet by mouth Daily. 90 tablet 4   • Restasis 0.05 % ophthalmic emulsion INSTILL 1 DROP BOTH EYES TWICE DAILY     • acetaminophen (TYLENOL) 500 MG tablet Take 1 tablet by mouth Every 4 (Four) Hours As Needed for Mild Pain. (Patient not taking: Reported on 6/6/2024) 30 tablet 0   • Calcium Citrate-Vitamin D3 (CITRACAL) 315-6.25 MG-MCG tablet tablet Take  by mouth Daily. (Patient not taking: Reported on 6/6/2024)     • simethicone (Gas-X) 80 MG chewable tablet Chew 1 tablet Every 6 (Six) Hours As Needed for Flatulence. (Patient not taking: Reported on 6/6/2024) 30 tablet 0     No current facility-administered medications on file prior to visit.        Past obstetric, gynecological, medical, surgical, family and social history reviewed.  Relevant lab work and imaging reviewed.    Review of systems  Constitutional:  denies fever, chills, malaise.   ENT/Mouth:  denies sore throat, tinnitus  Eyes: denies vision changes/pain  CV:  denies chest pain  Respiratory:  denies cough/SOB  GI:  denies N/V, diarrhea, abdominal pain.    :   denies dysuria  Skin:  denies lesions or pruritus   Neuro:  denies weakness, focal neurologic symptoms    Vitals:    06/06/24 1419   BP: 103/59   BP Location: Right arm   Patient Position: Sitting   Pulse: 89   Temp: 98 °F (36.7 °C)   TempSrc: Temporal   Weight: 64.6 kg (142 lb 6.4 oz)     PHYSICAL EXAM   GENERAL: Not in acute distress, AAOx3, pleasant  CARDIO: regular rate and rhythm  PULM: symmetric chest rise, speaking in complete sentences without difficulty  NEURO: awake, alert and oriented to person, place, and time  ABDOMINAL: No fundal tenderness, no rebound or guarding, gravid  EXTREMITIES: no bilateral lower extremity edema/tenderness  SKIN: Warm, well-perfused      ULTRASOUND   Please view full ultrasound note on Imaging tab in ViewPoint.  Cephalic presentation.  Posterior placenta.  KIMBERLY 13 cm, which is normal.   EFW 1688 g (52%, AC 56%)  Left  renal duplicated collection system again seen without evidence of obstruction.    BPP     ASSESSMENT/COUNSELIN y.o.   at  30 6/7 weeks gestation (Estimated Date of Delivery: 24).     -Pregnancy  [ X ] stable  [   ] improving [  ] worsening    Diagnoses and all orders for this visit:    1. S/P CABG x 3 (Primary)    2. Antepartum multigravida of advanced maternal age    3. Intracranial arachnoid cyst    4. Duplicated renal collecting system    5. Fibromuscular dysplasia of carotid artery      HX OF CABG X 3 VESSELS FOR SPONTANEOUS CORONARY ARTERY DISSECTION (SCAD)  Fibromuscular dysplasia  Previously counseled  She follows with Dr. Dumas with cardiology--appreciate her care.  She has had a history of coronary artery dissection in fall of .  Her dissection involved the left main coronary artery and extended into the LAD and left circumflex requiring CABG x 3.  Cardiac cath 2023 showed dissections had healed.  She continues to have anteroapical T wave changes and apical akinesis on echocardiograms but this is all stable.  Overall, left ventricular function has normalized. She has fibromuscular dysplasia, stable and will have a CTA of head and neck in year per neurosurgery.      Because the vast majority of patients affected by SCAD are women and SCAD constitutes an important cause of pregnancy-associated MI, its mechanism is uncertain but estrogen/progesterone have been shown to possibly play a role.   SCAD symptoms often onset after intense physical activity (at least 1/3 of the time), so we discussed lifting precautions and no intense exertional activity this pregnancy (no heavy lifting, etc., but normal activity/walking okay).  She should avoid straining or prolonged valsalva.  We also discussed starting a baby ASA.     Rates of recurrent SCAD are variably reported (10% to 30%), probably closer to 10% as there are some errors in study design in differentiating new vs recurrent SCAD in some  of those studies.  Rates of recurrence are similar in pregnancy.  Many women experience increase in angina as pregnancy progresses.  She has no chest pain or SOB at this time--baseline BNP 49.   The data suggest that the majority of SCAD patients who subsequently conceive experience uncomplicated pregnancies, but SCAD can be unpredictable.      She will need to follow closely with cardiology.  Beta blockers, copidogrel and low dose aspirin have a reasonable safety profile in pregnancy if needed--we will go ahead and start low dose aspirin for both preE prevention and hx of SCAD.  We discussed that with any mom with cardiac history it is important to watch baby's growth and I recommend at least weekly (maybe twice weekly) ANFS at 32 weeks.  We will plan to deliver at 37th week due to increasing cardiac output as pt goes further in pregnancy to minimize maternal complications.     I would like her to have a follow up ECHO with cardiology around 32 weeks - scheduled 06.10.24  She will need an anesthesia consult 32-36 weeks.      During delivery, we will plan:  -early epidural to minimize tachycardia/cardiac strain  -telemetry if feasible vs EKG before and after  -euvolemia/strict I's and O's  -Needs 32 week maternal ECHO  -Avoidance of terbutaline, methergine  -Laboring down/delayed valsalva vs consider assisted 2nd stage (pt says she goes quickly so do not suspect this will be necesssary)  -Consider immediate BTL vs LARC after delivery  - okay as long as no contraindication from Neurosurgery or cardiology teams--appreciate their care.      INTRACRANIAL ARACHNOID CYST  Previously counseled.   Incidentally noted.  Follows with neurosurgery.  Plan is for CT head in 1 year.  Pt reports she was told this would not impact her ability to have vaginal delivery--has seen Dr. Sheppard who agrees.       ADVANCED MATERNAL AGE  Previously counseled.  NIPT low risk.  Serial growths and ANFS at 32 weeks    DUPLICATED RENAL  COLLECTING SYSTEM, LEFT SIDE  Previously Counseled: No evidence of obstruction today.  Saw peds urology and will follow up with them after baby is born.  Needs renal US in hospital after delivery.         Summary of Plan  -Serial growth ultrasounds every 4 weeks (MFM)   -Starting at 32 weeks: weekly ANFS at primary OB--needs to be scheduled  -Continue baby ASA  -Cardiac care plan as above  -Will discuss at fetal/maternal care conference  -Maternal ECHO 32 weeks - scheduled 6/10/24  -S/P Peds urology referral  -Baby needs renal US after delivery while in hospital.   -Delivery at 37 weeks is recommended     Follow-up: monthly    Thank you for the consult and opportunity to care for this patient.  Please feel free to reach out with any questions or concerns.      I spent 22 minutes caring for this patient on this date of service. This time includes time spent by me in the following activities: preparing for the visit, reviewing tests, obtaining and/or reviewing a separately obtained history, performing a medically appropriate examination and/or evaluation, counseling and educating the patient/family/caregiver and independently interpreting results and communicating that information with the patient/family/caregiver with greater than 50% spent in counseling and coordination of care.     SHIRLENE oDugherty  Maternal Fetal Medicine-[facility]  Office: [phone]  [email or web address]

## 2024-06-06 NOTE — PROGRESS NOTES
MATERNAL FETAL MEDICINE Consult Note    Dear Dr Geetha Fraser MD:    Thank you for your kind referral of Esme Herrera.  As you know, she is a 34 y.o.   at  30 6/7 weeks gestation (Estimated Date of Delivery: 24). This is a consult.       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 No vaginal bleeding, no contractions.     Review of History:  Past Medical History:   Diagnosis Date    Chronic GERD 2023    Congenital syphilis, unspecified 2016    Coronary artery dissection 10/14/2022    Disease of thyroid gland     Hypothyroid    GERD (gastroesophageal reflux disease)     Hypotension     Intermittent palpitations 2023     Past Surgical History:   Procedure Laterality Date    CARDIAC CATHETERIZATION N/A 10/15/2022    Procedure: Left Heart Cath;  Surgeon: Deandra Dumas MD;  Location:  [facility]AS[facility];  Service: Cardiology;  Laterality: N/A;    CARDIAC CATHETERIZATION N/A 10/15/2022    Procedure: Coronary angiography;  Surgeon: Deandra Dumas MD;  Location:  [facility];  Service: Cardiology;  Laterality: N/A;    CARDIAC CATHETERIZATION N/A 10/15/2022    Procedure: Left ventriculography;  Surgeon: Deandra Dumas MD;  Location:  [facility];  Service: Cardiology;  Laterality: N/A;    CARDIAC CATHETERIZATION N/A 2023    Procedure: Coronary angiography;  Surgeon: Deandra Dumas MD;  Location:  [facility] CATH INVASIVE LOCATION;  Service: Cardiology;  Laterality: N/A;    CARDIAC CATHETERIZATION N/A 2023    Procedure: Left Heart Cath;  Surgeon: Deandra Dumas MD;  Location:  [facility]ATH INVAS[facility];  Service: Cardiology;  Laterality: N/A;    CARDIAC CATHETERIZATION N/A  6/22/2023    Procedure: Left ventriculography;  Surgeon: Deandra Dumas MD;  Location: [facility] CATH INVASIVE LOCATION;  Service: Cardiology;  Laterality: N/A;    CORONARY ARTERY BYPASS GRAFT N/A 10/15/2022    Procedure: VERONICA, STERNOTOMY, CORONARY ARTERY BYPASS TIMES 3 WITH INTERNAL MAMMARY ARTERY GRAFT AND LEFT GREATER SAPHENOUS VEIN HARVEST, REPAIR OF CORONARY ARTERY DISSECTION,PRP;  Surgeon: Lakshmi Teixeira MD;  Location: [facility] CVOR;  Service: Cardiothoracic;  Laterality: N/A;    ENDOSCOPY N/A 1/12/2023    Procedure: ESOPHAGOGASTRODUODENOSCOPY with bxs;  Surgeon: Nish Quiroz MD;  Location: [facility] ENDOSCOPY;  Service: Gastroenterology;  Laterality: N/A;  PRE: Epigastric Pain  POST: Normal     Social History     Socioeconomic History    Marital status:    Tobacco Use    Smoking status: Never     Passive exposure: Never    Smokeless tobacco: Never   Vaping Use    Vaping status: Never Used   Substance and Sexual Activity    Alcohol use: Never    Drug use: Never    Sexual activity: Yes     Partners: Male     Family History   Family history unknown: Yes      No Known Allergies   Current Outpatient Medications on File Prior to Visit   Medication Sig Dispense Refill    aspirin 81 MG EC tablet Take 1 tablet by mouth Daily. 90 tablet 6    levothyroxine (SYNTHROID, LEVOTHROID) 50 MCG tablet Take 1 tablet by mouth Daily (Monday-Friday).      MAGNESIUM PO Take  by mouth.      Prenatal Vit-Fe Fumarate-FA (Prenatal 27-1) 27-1 MG tablet tablet Take 1 tablet by mouth Daily. 90 tablet 4    Restasis 0.05 % ophthalmic emulsion INSTILL 1 DROP BOTH EYES TWICE DAILY      acetaminophen (TYLENOL) 500 MG tablet Take 1 tablet by mouth Every 4 (Four) Hours As Needed for Mild Pain. (Patient not taking: Reported on 6/6/2024) 30 tablet 0    Calcium Citrate-Vitamin D3 (CITRACAL) 315-6.25 MG-MCG tablet tablet Take  by mouth Daily. (Patient not taking: Reported on 6/6/2024)      simethicone (Gas-X) 80 MG chewable tablet Chew 1  tablet Every 6 (Six) Hours As Needed for Flatulence. (Patient not taking: Reported on 2024) 30 tablet 0     No current facility-administered medications on file prior to visit.        Past obstetric, gynecological, medical, surgical, family and social history reviewed.  Relevant lab work and imaging reviewed.    Review of systems  Constitutional:  denies fever, chills, malaise.   ENT/Mouth:  denies sore throat, tinnitus  Eyes: denies vision changes/pain  CV:  denies chest pain  Respiratory:  denies cough/SOB  GI:  denies N/V, diarrhea, abdominal pain.    :   denies dysuria  Skin:  denies lesions or pruritus   Neuro:  denies weakness, focal neurologic symptoms    Vitals:    24 1419   BP: 103/59   BP Location: Right arm   Patient Position: Sitting   Pulse: 89   Temp: 98 °F (36.7 °C)   TempSrc: Temporal   Weight: 64.6 kg (142 lb 6.4 oz)     PHYSICAL EXAM   GENERAL: Not in acute distress, AAOx3, pleasant  CARDIO: regular rate and rhythm  PULM: symmetric chest rise, speaking in complete sentences without difficulty  NEURO: awake, alert and oriented to person, place, and time  ABDOMINAL: No fundal tenderness, no rebound or guarding, gravid  EXTREMITIES: no bilateral lower extremity edema/tenderness  SKIN: Warm, well-perfused      ULTRASOUND   Please view full ultrasound note on Imaging tab in ViewPoint.  Cephalic presentation.  Posterior placenta.  KIMBERLY 13 cm, which is normal.   EFW 1688 g (52%, AC 56%)  Left renal duplicated collection system again seen without evidence of obstruction.    BP     ASSESSMENT/COUNSELIN y.o.   at  30 6/7 weeks gestation (Estimated Date of Delivery: 24).     -Pregnancy  [ X ] stable  [   ] improving [  ] worsening    Diagnoses and all orders for this visit:    1. S/P CABG x 3 (Primary)    2. Antepartum multigravida of advanced maternal age    3. Intracranial arachnoid cyst    4. Duplicated renal collecting system    5. Fibromuscular dysplasia of carotid  artery      HX OF CABG X 3 VESSELS FOR SPONTANEOUS CORONARY ARTERY DISSECTION (SCAD)  Fibromuscular dysplasia  Previously counseled  She follows with Dr. Dumas with cardiology--appreciate her care.  She has had a history of coronary artery dissection in fall of 2022.  Her dissection involved the left main coronary artery and extended into the LAD and left circumflex requiring CABG x 3.  Cardiac cath 6/2023 showed dissections had healed.  She continues to have anteroapical T wave changes and apical akinesis on echocardiograms but this is all stable.  Overall, left ventricular function has normalized. She has fibromuscular dysplasia, stable and will have a CTA of head and neck in year per neurosurgery.      Because the vast majority of patients affected by SCAD are women and SCAD constitutes an important cause of pregnancy-associated MI, its mechanism is uncertain but estrogen/progesterone have been shown to possibly play a role.   SCAD symptoms often onset after intense physical activity (at least 1/3 of the time), so we discussed lifting precautions and no intense exertional activity this pregnancy (no heavy lifting, etc., but normal activity/walking okay).  She should avoid straining or prolonged valsalva.  We also discussed starting a baby ASA.     Rates of recurrent SCAD are variably reported (10% to 30%), probably closer to 10% as there are some errors in study design in differentiating new vs recurrent SCAD in some of those studies.  Rates of recurrence are similar in pregnancy.  Many women experience increase in angina as pregnancy progresses.  She has no chest pain or SOB at this time--baseline BNP 49.   The data suggest that the majority of SCAD patients who subsequently conceive experience uncomplicated pregnancies, but SCAD can be unpredictable.      She will need to follow closely with cardiology.  Beta blockers, copidogrel and low dose aspirin have a reasonable safety profile in pregnancy if needed--we  will go ahead and start low dose aspirin for both preE prevention and hx of SCAD.  We discussed that with any mom with cardiac history it is important to watch baby's growth and I recommend at least weekly (maybe twice weekly) ANFS at 32 weeks.  We will plan to deliver at 37th week due to increasing cardiac output as pt goes further in pregnancy to minimize maternal complications.     I would like her to have a follow up ECHO with cardiology around 32 weeks - scheduled 06.10.24  She will need an anesthesia consult 32-36 weeks.      During delivery, we will plan:  -early epidural to minimize tachycardia/cardiac strain  -telemetry if feasible vs EKG before and after  -euvolemia/strict I's and O's  -Needs 32 week maternal ECHO  -Avoidance of terbutaline, methergine  -Laboring down/delayed valsalva vs consider assisted 2nd stage (pt says she goes quickly so do not suspect this will be necesssary)  -Consider immediate BTL vs LARC after delivery  - okay as long as no contraindication from Neurosurgery or cardiology teams--appreciate their care.      INTRACRANIAL ARACHNOID CYST  Previously counseled.   Incidentally noted.  Follows with neurosurgery.  Plan is for CT head in 1 year.  Pt reports she was told this would not impact her ability to have vaginal delivery--has seen Dr. Sheppard who agrees.       ADVANCED MATERNAL AGE  Previously counseled.  NIPT low risk.  Serial growths and ANFS at 32 weeks    DUPLICATED RENAL COLLECTING SYSTEM, LEFT SIDE  Previously Counseled: No evidence of obstruction today.  Saw peds urology and will follow up with them after baby is born.  Needs renal US in hospital after delivery.         Summary of Plan  -Serial growth ultrasounds every 4 weeks (MFM)   -Starting at 32 weeks: weekly ANFS at primary OB--needs to be scheduled  -Continue baby ASA  -Cardiac care plan as above  -Will discuss at fetal/maternal care conference  -Maternal ECHO 32 weeks - scheduled 6/10/24  -S/P Peds urology  referral  -Baby needs renal US after delivery while in hospital.   -Delivery at 37 weeks is recommended     Follow-up: monthly    Thank you for the consult and opportunity to care for this patient.  Please feel free to reach out with any questions or concerns.      I spent 22 minutes caring for this patient on this date of service. This time includes time spent by me in the following activities: preparing for the visit, reviewing tests, obtaining and/or reviewing a separately obtained history, performing a medically appropriate examination and/or evaluation, counseling and educating the patient/family/caregiver and independently interpreting results and communicating that information with the patient/family/caregiver with greater than 50% spent in counseling and coordination of care.     SHIRLENE Dougherty  Maternal Fetal Medicine-[facility]  Office: [phone]  [email or web address]

## 2024-06-14 ENCOUNTER — TELEPHONE (OUTPATIENT)
Dept: OBSTETRICS AND GYNECOLOGY | Facility: CLINIC | Age: 34
End: 2024-06-14
Payer: MEDICAID

## 2024-06-14 NOTE — TELEPHONE ENCOUNTER
Call placed to Esme to follow up on missed maternal echo. Esme reports she forgot about the appointment but called to reschedule today. Pt is now scheduled on 6/24.

## 2024-06-20 ENCOUNTER — HOSPITAL ENCOUNTER (OUTPATIENT)
Dept: PHYSICAL THERAPY | Facility: HOSPITAL | Age: 34
Setting detail: THERAPIES SERIES
Discharge: HOME | End: 2024-06-20
Payer: MEDICAID

## 2024-06-20 DIAGNOSIS — M62.81 MUSCLE WEAKNESS: ICD-10-CM

## 2024-06-20 DIAGNOSIS — O26.899 LOW BACK PAIN DURING PREGNANCY, ANTEPARTUM: Primary | ICD-10-CM

## 2024-06-20 DIAGNOSIS — M54.50 LOW BACK PAIN DURING PREGNANCY, ANTEPARTUM: Primary | ICD-10-CM

## 2024-06-20 PROCEDURE — 97530 THERAPEUTIC ACTIVITIES: CPT

## 2024-06-20 PROCEDURE — 97110 THERAPEUTIC EXERCISES: CPT

## 2024-06-20 NOTE — THERAPY PROGRESS REPORT/RE-CERT
Outpatient Physical Therapy Ortho Progress Note  [facility]     Patient Name: Esme Herrera  : 1990  MRN: [id number]  Today's Date: 2024      Visit Date: 2024    Visit Dx:    ICD-10-CM ICD-9-CM   1. Low back pain during pregnancy, antepartum  O26.899 646.83    M54.50 724.2   2. Muscle weakness  M62.81 728.87       Patient Active Problem List   Diagnosis    Spontaneous dissection of coronary artery    S/P CABG (coronary artery bypass graft)    Ischemic cardiomyopathy    Epigastric pain    Fibromuscular dysplasia of carotid artery    Intracranial arachnoid cyst    Migraine without aura and without status migrainosus, not intractable    Family hx of ALS (amyotrophic lateral sclerosis)    History of Helicobacter pylori infection    Hypothyroidism due to Hashimoto's thyroiditis    Monoallelic mutation of SOD1 gene        Past Medical History:   Diagnosis Date    Chronic GERD 2023    Congenital syphilis, unspecified 2016    Coronary artery dissection 10/14/2022    Disease of thyroid gland     Hypothyroid    GERD (gastroesophageal reflux disease)     Hypotension     Intermittent palpitations 2023        Past Surgical History:   Procedure Laterality Date    CARDIAC CATHETERIZATION N/A 10/15/2022    Procedure: Left Heart Cath;  Surgeon: Deandra Dumas MD;  Location: [facility] CATH INVASIVE LOCATION;  Service: Cardiology;  Laterality: N/A;    CARDIAC CATHETERIZATION N/A 10/15/2022    Procedure: Coronary angiography;  Surgeon: Deandra Dumas MD;  Location: [facility] CATH INVASIVE LOCATION;  Service: Cardiology;  Laterality: N/A;    CARDIAC CATHETERIZATION N/A 10/15/2022    Procedure: Left ventriculography;  Surgeon: Deandra Dumas MD;  [facility]VASIVE LOC[facility];  Service: Cardiology;  Laterality: N/A;    CARDIAC CATHETERIZATION N/A 2023    Procedure: Coronary angiography;  Surgeon: Deandra Dumas MD;  Location: [facility] CATH INVASIVE LOCATION;  Service: Cardiology;   Laterality: N/A;    CARDIAC CATHETERIZATION N/A 6/22/2023    Procedure: Left Heart Cath;  Surgeon: Deandra Dumas MD;  Location: [facility] CATH INVASIVE LOCATION;  Service: Cardiology;  Laterality: N/A;    CARDIAC CATHETERIZATION N/A 6/22/2023    Procedure: Left ventriculography;  Surgeon: Deandra Dumas MD;  Location: [facility] CATH INVASIVE LOCATION;  Service: Cardiology;  Laterality: N/A;    CORONARY ARTERY BYPASS GRAFT N/A 10/15/2022    Procedure: VERONICA, STERNOTOMY, CORONARY ARTERY BYPASS TIMES 3 WITH INTERNAL MAMMARY ARTERY GRAFT AND LEFT GREATER SAPHENOUS VEIN HARVEST, REPAIR OF CORONARY ARTERY DISSECTION,PRP;  Surgeon: Lakshmi Teixeira MD;  Location: [facility] CVOR;  Service: Cardiothoracic;  Laterality: N/A;    ENDOSCOPY N/A 1/12/2023    Procedure: ESOPHAGOGASTRODUODENOSCOPY with bxs;  Surgeon: Nish Quiroz MD;  Location: [facility] ENDOSCOPY;  Service: Gastroenterology;  Laterality: N/A;  PRE: Epigastric Pain  POST: Normal                        PT Assessment/Plan       Row Name 06/20/24 1400          PT Assessment    Functional Limitations Limitation in home management;Limitations in community activities;Limitations in functional capacity and performance;Performance in leisure activities;Performance in self-care ADL  -RS     Impairments Range of motion;Posture;Poor body mechanics;Pain;Muscle strength;Joint mobility;Impaired flexibility  -RS     Assessment Comments The pt returns after break in PT and is currently 32w6d days pregnant and reports relatively well controlled back pain. She is going to be induced at 37 weeks per her cardiac history therefore discussed labor preparation stretches and open vs closed glottis pushing, pt reports understanding. Initiated half kneeling hip flexor stretch, hip adductor stretch, seated ball roll out, seated piriformis stretch, and seated pelvic tilts/circles all with good tolerance. Updated HEP and discussed POC with next session reviewing labor positions. She has met 3/3  STG and 5/6 LTG and progressing well toward remaining unmet LTG.  -RS        PT Plan    PT Plan Comments review labor positions  -RS               User Key  (r) = Recorded By, (t) = Taken By, (c) = Cosigned By      Initials Name Provider Type    RS Radha Rivera, PT Physical Therapist                       OP Exercises       Row Name 06/20/24 1300             Subjective    Subjective Comments Pt reports her back is doing relatiely well and she is doing her HEP  -RS         Subjective Pain    Able to rate subjective pain? yes  -RS      Pre-Treatment Pain Level 0  -RS         Total Minutes    84276 - PT Therapeutic Exercise Minutes 15  -RS      94578 - PT Therapeutic Activity Minutes 23  -RS         Exercise 1    Exercise Name 1 open vs closed glottis pushing  -RS      Cueing 1 Verbal;Demo  -RS      Reps 1 --  -RS      Time 1 --  -RS         Exercise 2    Exercise Name 2 encouragement to move throughout labor, specifically if she has an epidural  -RS      Cueing 2 --  -RS      Reps 2 --  -RS      Time 2 --  -RS         Exercise 3    Exercise Name 3 discussion of goals and POC/progress  -RS         Exercise 4    Exercise Name 4 half kneeling hip flexor stretch  -RS      Cueing 4 Verbal;Demo  -RS      Sets 4 --  -RS      Reps 4 5  -RS      Time 4 10  -RS      Additional Comments with ball  -RS         Exercise 5    Exercise Name 5 seated hip adductor  -RS      Cueing 5 Verbal;Demo  -RS      Sets 5 --  -RS      Reps 5 3  -RS      Time 5 20s  -RS         Exercise 6    Exercise Name 6 seated PPT and pelvic circles  -RS      Cueing 6 Verbal;Demo  -RS      Sets 6 --  -RS      Reps 6 10ea  -RS      Time 6 --  -RS         Exercise 7    Exercise Name 7 seated piriformis stretch  -RS      Cueing 7 Verbal;Demo  -RS      Reps 7 3  -RS      Time 7 20  -RS         Exercise 8    Exercise Name 8 seated ball roll out  -RS      Cueing 8 Verbal;Demo  -RS      Reps 8 10ea  -RS      Time 8 3 ways  -RS         Exercise 9    Exercise  Name 9 --  -RS      Cueing 9 --  -RS      Sets 9 --  -RS      Reps 9 --  -RS         Exercise 10    Exercise Name 10 --  -RS      Cueing 10 --  -RS      Sets 10 --  -RS      Reps 10 --  -RS      Time 10 --  -RS         Exercise 11    Exercise Name 11 --  -RS      Cueing 11 --  -RS      Reps 11 --  -RS      Time 11 --  -RS                User Key  (r) = Recorded By, (t) = Taken By, (c) = Cosigned By      Initials Name Provider Type    RS Radha Rivera, PT Physical Therapist                                  PT OP Goals       Row Name 06/20/24 1300          PT Short Term Goals    STG Date to Achieve 03/08/24  -RS     STG 1 Patient will be independent with education for symptom management and initial HEP to decrease LBP pain.  -RS     STG 1 Progress Met  -RS     STG 2 Pt will report at least 50% improvement in s/s to improve participation in ADLs.  -RS     STG 2 Progress Met  -RS     STG 3 Patient will improve ability to sleep through the night without sleep disturbances related to back pain to improve overall sleep quality and quality of life  -RS     STG 3 Progress Met  -RS        Long Term Goals    LTG Date to Achieve 09/18/24  -RS     LTG 1 Patient will be independent with education for symptom management and advanced HEP to decrease LBP pain.  -RS     LTG 1 Progress Met  -RS     LTG 2 Patient will reduce level of percieved disability as measured by the Modified Oswestry to </= 30% disability in order to improve quality of life.  -RS     LTG 2 Progress Met  -RS     LTG 3 Patient will improve walking tolerance to >30 min with </=3/10 LBP to improve participation in community activities.  -RS     LTG 3 Progress Met  -RS     LTG 4 Pt will demo at least 4/5 mariel hip strength.  -RS     LTG 4 Progress Met  -RS     LTG 5 Pt will report at least 50% improvement in UUI symptoms.  -RS     LTG 5 Progress Met  -RS     LTG 6 Pt will verbalize good understanding of labor prep techniques.  -RS     LTG 6 Progress Progressing   -RS     LTG 6 Progress Comments initiaated this date  -RS        Time Calculation    PT Goal Re-Cert Due Date 09/18/24  -RS               User Key  (r) = Recorded By, (t) = Taken By, (c) = Cosigned By      Initials Name Provider Type    Radha Hoffmann, PT Physical Therapist                    Therapy Education  Education Details: labor prep stretches  Given: HEP  Program: Reinforced, Progressed  How Provided: Verbal, Demonstration, Written  Provided to: Patient  Level of Understanding: Verbalized, Demonstrated              Time Calculation:   Start Time: 1315  Stop Time: 1355  Time Calculation (min): 40 min  Timed Charges  62826 - PT Therapeutic Exercise Minutes: 15  00177 - PT Therapeutic Activity Minutes: 23  Total Minutes  Timed Charges Total Minutes: 38   Total Minutes: 38  Therapy Charges for Today       Code Description Service Date Service Provider Modifiers Qty    36888898507 HC PT THER PROC EA 15 MIN 6/20/2024 Radha Rivera, PT GP 1    58682174228 HC PT THERAPEUTIC ACT EA 15 MIN 6/20/2024 Radha Rivera, PT GP 2                      Radha Rivera PT  6/20/2024

## 2024-06-24 ENCOUNTER — HOSPITAL ENCOUNTER (OUTPATIENT)
Dept: CARDIOLOGY | Facility: HOSPITAL | Age: 34
Discharge: HOME | End: 2024-06-24
Admitting: OBSTETRICS & GYNECOLOGY
Payer: MEDICAID

## 2024-06-24 VITALS
WEIGHT: 142 LBS | BODY MASS INDEX: 26.81 KG/M2 | HEIGHT: 61 IN | SYSTOLIC BLOOD PRESSURE: 90 MMHG | DIASTOLIC BLOOD PRESSURE: 68 MMHG

## 2024-06-24 DIAGNOSIS — Z95.1 S/P CABG X 3: ICD-10-CM

## 2024-06-24 LAB
AORTIC ARCH: 2.2 CM
AORTIC DIMENSIONLESS INDEX: 0.7 (DI)
BH CV ECHO LEFT VENTRICLE GLOBAL LONGITUDINAL STRAIN: -19.6 %
BH CV ECHO MEAS - ACS: 1.81 CM
BH CV ECHO MEAS - AO MAX PG: 6.4 MMHG
BH CV ECHO MEAS - AO MEAN PG: 3.9 MMHG
BH CV ECHO MEAS - AO ROOT DIAM: 2.6 CM
BH CV ECHO MEAS - AO V2 MAX: 126.8 CM/SEC
BH CV ECHO MEAS - AO V2 VTI: 25.7 CM
BH CV ECHO MEAS - AVA(I,D): 1.87 CM2
BH CV ECHO MEAS - EDV(CUBED): 103.9 ML
BH CV ECHO MEAS - EDV(MOD-SP2): 94 ML
BH CV ECHO MEAS - EDV(MOD-SP4): 90 ML
BH CV ECHO MEAS - EF(MOD-BP): 50.3 %
BH CV ECHO MEAS - EF(MOD-SP2): 51.1 %
BH CV ECHO MEAS - EF(MOD-SP4): 50 %
BH CV ECHO MEAS - ESV(CUBED): 36.8 ML
BH CV ECHO MEAS - ESV(MOD-SP2): 46 ML
BH CV ECHO MEAS - ESV(MOD-SP4): 45 ML
BH CV ECHO MEAS - FS: 29.3 %
BH CV ECHO MEAS - IVS/LVPW: 1.01 CM
BH CV ECHO MEAS - IVSD: 0.77 CM
BH CV ECHO MEAS - LAT PEAK E' VEL: 12 CM/SEC
BH CV ECHO MEAS - LV DIASTOLIC VOL/BSA (35-75): 55.1 CM2
BH CV ECHO MEAS - LV MASS(C)D: 116 GRAMS
BH CV ECHO MEAS - LV MAX PG: 3.3 MMHG
BH CV ECHO MEAS - LV MEAN PG: 2 MMHG
BH CV ECHO MEAS - LV SYSTOLIC VOL/BSA (12-30): 27.6 CM2
BH CV ECHO MEAS - LV V1 MAX: 91.3 CM/SEC
BH CV ECHO MEAS - LV V1 VTI: 17.7 CM
BH CV ECHO MEAS - LVIDD: 4.7 CM
BH CV ECHO MEAS - LVIDS: 3.3 CM
BH CV ECHO MEAS - LVOT AREA: 2.7 CM2
BH CV ECHO MEAS - LVOT DIAM: 1.86 CM
BH CV ECHO MEAS - LVPWD: 0.76 CM
BH CV ECHO MEAS - MED PEAK E' VEL: 8.3 CM/SEC
BH CV ECHO MEAS - MR MAX PG: 38.6 MMHG
BH CV ECHO MEAS - MR MAX VEL: 310.7 CM/SEC
BH CV ECHO MEAS - MV A DUR: 0.12 SEC
BH CV ECHO MEAS - MV A MAX VEL: 51.4 CM/SEC
BH CV ECHO MEAS - MV DEC SLOPE: 644.8 CM/SEC2
BH CV ECHO MEAS - MV DEC TIME: 0.17 SEC
BH CV ECHO MEAS - MV E MAX VEL: 84.6 CM/SEC
BH CV ECHO MEAS - MV E/A: 1.65
BH CV ECHO MEAS - MV MAX PG: 4.1 MMHG
BH CV ECHO MEAS - MV MEAN PG: 1.33 MMHG
BH CV ECHO MEAS - MV P1/2T: 45.6 MSEC
BH CV ECHO MEAS - MV V2 VTI: 21.9 CM
BH CV ECHO MEAS - MVA(P1/2T): 4.8 CM2
BH CV ECHO MEAS - MVA(VTI): 2.19 CM2
BH CV ECHO MEAS - PA ACC TIME: 0.14 SEC
BH CV ECHO MEAS - PA V2 MAX: 112.9 CM/SEC
BH CV ECHO MEAS - PULM A REVS DUR: 0.13 SEC
BH CV ECHO MEAS - PULM A REVS VEL: 54.1 CM/SEC
BH CV ECHO MEAS - PULM DIAS VEL: 73.9 CM/SEC
BH CV ECHO MEAS - PULM S/D: 0.8
BH CV ECHO MEAS - PULM SYS VEL: 58.9 CM/SEC
BH CV ECHO MEAS - RAP SYSTOLE: 3 MMHG
BH CV ECHO MEAS - RV MAX PG: 2.06 MMHG
BH CV ECHO MEAS - RV V1 MAX: 71.7 CM/SEC
BH CV ECHO MEAS - RV V1 VTI: 15.7 CM
BH CV ECHO MEAS - RVSP: 23 MMHG
BH CV ECHO MEAS - SUP REN AO DIAM: 2 CM
BH CV ECHO MEAS - SV(LVOT): 48 ML
BH CV ECHO MEAS - SV(MOD-SP2): 48 ML
BH CV ECHO MEAS - SV(MOD-SP4): 45 ML
BH CV ECHO MEAS - SVI(LVOT): 29.4 ML/M2
BH CV ECHO MEAS - SVI(MOD-SP2): 29.4 ML/M2
BH CV ECHO MEAS - SVI(MOD-SP4): 27.6 ML/M2
BH CV ECHO MEAS - TAPSE (>1.6): 1.48 CM
BH CV ECHO MEAS - TR MAX PG: 20.1 MMHG
BH CV ECHO MEAS - TR MAX VEL: 224 CM/SEC
BH CV ECHO MEASUREMENTS AVERAGE E/E' RATIO: 8.33
BH CV XLRA - RV BASE: 3.5 CM
BH CV XLRA - RV LENGTH: 6 CM
BH CV XLRA - RV MID: 1.9 CM
BH CV XLRA - TDI S': 11.7 CM/SEC
LEFT ATRIUM VOLUME INDEX: 28.6 ML/M2
SINUS: 2.14 CM
STJ: 2.19 CM

## 2024-06-24 PROCEDURE — 93306 TTE W/DOPPLER COMPLETE: CPT | Performed by: INTERNAL MEDICINE

## 2024-06-24 PROCEDURE — 93356 MYOCRD STRAIN IMG SPCKL TRCK: CPT

## 2024-06-24 PROCEDURE — 93306 TTE W/DOPPLER COMPLETE: CPT

## 2024-06-24 PROCEDURE — 93356 MYOCRD STRAIN IMG SPCKL TRCK: CPT | Performed by: INTERNAL MEDICINE

## 2024-06-27 ENCOUNTER — APPOINTMENT (OUTPATIENT)
Dept: PHYSICAL THERAPY | Facility: HOSPITAL | Age: 34
End: 2024-06-27
Payer: MEDICAID

## 2024-06-28 ENCOUNTER — ROUTINE PRENATAL (OUTPATIENT)
Dept: OBSTETRICS AND GYNECOLOGY | Facility: CLINIC | Age: 34
End: 2024-06-28
Payer: MEDICAID

## 2024-06-28 VITALS — WEIGHT: 146 LBS | BODY MASS INDEX: 27.59 KG/M2 | DIASTOLIC BLOOD PRESSURE: 63 MMHG | SYSTOLIC BLOOD PRESSURE: 95 MMHG

## 2024-06-28 DIAGNOSIS — G93.0 INTRACRANIAL ARACHNOID CYST: ICD-10-CM

## 2024-06-28 DIAGNOSIS — Z95.1 S/P CABG X 3: ICD-10-CM

## 2024-06-28 DIAGNOSIS — Q62.5 DUPLICATED RENAL COLLECTING SYSTEM: ICD-10-CM

## 2024-06-28 DIAGNOSIS — O09.529 ANTEPARTUM MULTIGRAVIDA OF ADVANCED MATERNAL AGE: ICD-10-CM

## 2024-06-28 DIAGNOSIS — Z3A.34 34 WEEKS GESTATION OF PREGNANCY: Primary | ICD-10-CM

## 2024-06-28 LAB
GLUCOSE UR STRIP-MCNC: NEGATIVE MG/DL
PROT UR STRIP-MCNC: NEGATIVE MG/DL

## 2024-06-28 NOTE — PATIENT INSTRUCTIONS
"[facility] Mother [facility] Baby [facility] (scroll to the bottom for link to classes and tours):[facility]     [employer] (the glory you can download for the majority of  education): [employer]     Fetal Kick Counts: This is the time in your pregnancy to really start paying attention to fetal movement. Check out this Count the Kicks website and click on the \"Start Counting\" button to download the glory and monitor your baby's well-being each day! Count the Kicks     Birth Plan: See attachment for the [facility] birth plan, you can fill out and review with your provider. Birth Plan    - To explore options for pain relief, you can read this article and we can discuss the options that interest you.     Kangaroo Care: See attachment, this skin to skin care after delivery is standard at [facility]. Kangaroo Care     Breast pump website: Free Breast Pump Through Insurance in 3 Easy Steps - [employer] Xpress or [employer] Breastpumps     "

## 2024-07-02 ENCOUNTER — HOSPITAL ENCOUNTER (OUTPATIENT)
Dept: PHYSICAL THERAPY | Facility: HOSPITAL | Age: 34
Setting detail: THERAPIES SERIES
Discharge: HOME | End: 2024-07-02

## 2024-07-02 DIAGNOSIS — O26.899 LOW BACK PAIN DURING PREGNANCY, ANTEPARTUM: Primary | ICD-10-CM

## 2024-07-02 DIAGNOSIS — M62.81 MUSCLE WEAKNESS: ICD-10-CM

## 2024-07-02 DIAGNOSIS — M54.50 LOW BACK PAIN DURING PREGNANCY, ANTEPARTUM: Primary | ICD-10-CM

## 2024-07-02 PROCEDURE — 97530 THERAPEUTIC ACTIVITIES: CPT

## 2024-07-02 NOTE — THERAPY DISCHARGE NOTE
Outpatient Physical Therapy Ortho Treatment Note/Discharge Summary  [facility]     Patient Name: Esme Herrera  : 1990  MRN: [id number]  Today's Date: 2024      Visit Date: 2024    Visit Dx:    ICD-10-CM ICD-9-CM   1. Low back pain during pregnancy, antepartum  O26.899 646.83    M54.50 724.2   2. Muscle weakness  M62.81 728.87       Patient Active Problem List   Diagnosis    Spontaneous dissection of coronary artery    S/P CABG (coronary artery bypass graft)    Ischemic cardiomyopathy    Epigastric pain    Fibromuscular dysplasia of carotid artery    Intracranial arachnoid cyst    Migraine without aura and without status migrainosus, not intractable    Family hx of ALS (amyotrophic lateral sclerosis)    History of Helicobacter pylori infection    Hypothyroidism due to Hashimoto's thyroiditis    Monoallelic mutation of SOD1 gene        Past Medical History:   Diagnosis Date    Chronic GERD 2023    Congenital syphilis, unspecified 2016    Coronary artery dissection 10/14/2022    Disease of thyroid gland     Hypothyroid    GERD (gastroesophageal reflux disease)     Hypotension     Intermittent palpitations 2023        Past Surgical History:   Procedure Laterality Date    CARDIAC CATHETERIZATION N/A 10/15/2022    Procedure: Left Heart Cath;  Surgeon: Deandra Dumas MD;  Location: [facility] CATH INVASIVE LOCATION;  Service: Cardiology;  Laterality: N/A;    CARDIAC CATHETERIZATION N/A 10/15/2022    Procedure: Coronary angiography;  Surgeon: Deandra Dumas MD;  Location: [facility] CATH INVASIVE LOCATION;  Service: Cardiology;  Laterality: N/A;    CARDIAC CATHETERIZATION N/A 10/15/2022    Procedure: Left ventriculography;  Surgeon: Deandra Dumas MD;  [facility];  Service: Cardiology;  Laterality: N/A;    CARDIAC CATHETERIZATION N/A 2023    Procedure: Coronary angiography;  Surgeon: Deandra Dumas MD;  Location: [facility] CATH INVASIVE LOCATION;   Service: Cardiology;  Laterality: N/A;    CARDIAC CATHETERIZATION N/A 6/22/2023    Procedure: Left Heart Cath;  Surgeon: Deandra Dumas MD;  Location: [facility] CATH INVASIVE LOCATION;  Service: Cardiology;  Laterality: N/A;    CARDIAC CATHETERIZATION N/A 6/22/2023    Procedure: Left ventriculography;  Surgeon: Deandra Dumas MD;  Location: [facility] CATH INVASIVE LOCATION;  Service: Cardiology;  Laterality: N/A;    CORONARY ARTERY BYPASS GRAFT N/A 10/15/2022    Procedure: VERONICA, STERNOTOMY, CORONARY ARTERY BYPASS TIMES 3 WITH INTERNAL MAMMARY ARTERY GRAFT AND LEFT GREATER SAPHENOUS VEIN HARVEST, REPAIR OF CORONARY ARTERY DISSECTION,PRP;  Surgeon: Lakshmi Teixeira MD;  Location: [facility] CVOR;  Service: Cardiothoracic;  Laterality: N/A;    ENDOSCOPY N/A 1/12/2023    Procedure: ESOPHAGOGASTRODUODENOSCOPY with bxs;  Surgeon: Nish Quiroz MD;  Location: [facility] ENDOSCOPY;  Service: Gastroenterology;  Laterality: N/A;  PRE: Epigastric Pain  POST: Normal                        PT Assessment/Plan       Row Name 07/02/24 1300          PT Assessment    Assessment Comments The pt is currently 34w 4 d pregnant and plans for induction at 37 weeks due to cardiac hx. Focused this date on education regarding HEP alteration during final weeks of pregnancy, use of positioning to assist with pain management and hip mobility, perineal massage as pt with hx perineal stitches, and potential need for PFPT postpartum. Pt was provided with handout electronically and on paper outlining discussion. She has met all functional goals this date and is appropriate to DC to HEP with plan to return postpartum as needed, pt reports understanding of current plan.  -RS        PT Plan    PT Plan Comments DC as pt will be giving birth in 2 weeks  -RS               User Key  (r) = Recorded By, (t) = Taken By, (c) = Cosigned By      Initials Name Provider Type    Radha Hoffmann, PT Physical Therapist                         OP Exercises       Row  Name 07/02/24 1359 07/02/24 1300          Subjective    Subjective Comments -- pt reports low back pain after waking  -RS        Total Minutes    25732 - PT Therapeutic Activity Minutes 38  -RS --        Exercise 1    Exercise Name 1 -- labor position- demonstration with large and small ball  -RS        Exercise 2    Exercise Name 2 -- education- perineal massage  -RS        Exercise 3    Exercise Name 3 -- review of labor positions- early, mid, later, use of gravity assisted positions  -RS        Exercise 4    Exercise Name 4 -- review of open vs closed glottis pushing techniques  -RS        Exercise 5    Exercise Name 5 -- davina breahting for pain management  -RS        Exercise 6    Exercise Name 6 -- review of HEP/POC/DC plan  -RS        Exercise 7    Exercise Name 7 -- discussion of when to return to PT pp if needed  -RS               User Key  (r) = Recorded By, (t) = Taken By, (c) = Cosigned By      Initials Name Provider Type    RS Radha Rivera, PT Physical Therapist                                    PT OP Goals       Row Name 07/02/24 1300          PT Short Term Goals    STG Date to Achieve 03/08/24  -RS     STG 1 Patient will be independent with education for symptom management and initial HEP to decrease LBP pain.  -RS     STG 1 Progress Met  -RS     STG 2 Pt will report at least 50% improvement in s/s to improve participation in ADLs.  -RS     STG 2 Progress Met  -RS     STG 3 Patient will improve ability to sleep through the night without sleep disturbances related to back pain to improve overall sleep quality and quality of life  -RS     STG 3 Progress Met  -RS        Long Term Goals    LTG Date to Achieve 09/18/24  -RS     LTG 1 Patient will be independent with education for symptom management and advanced HEP to decrease LBP pain.  -RS     LTG 1 Progress Met  -RS     LTG 2 Patient will reduce level of percieved disability as measured by the Modified Oswestry to </= 30% disability in order to  improve quality of life.  -RS     LTG 2 Progress Met  -RS     LTG 3 Patient will improve walking tolerance to >30 min with </=3/10 LBP to improve participation in community activities.  -RS     LTG 3 Progress Met  -RS     LTG 4 Pt will demo at least 4/5 mariel hip strength.  -RS     LTG 4 Progress Met  -RS     LTG 5 Pt will report at least 50% improvement in UUI symptoms.  -RS     LTG 5 Progress Met  -RS     LTG 6 Pt will verbalize good understanding of labor prep techniques.  -RS     LTG 6 Progress Met  -RS               User Key  (r) = Recorded By, (t) = Taken By, (c) = Cosigned By      Initials Name Provider Type    Radha Hoffmann, PT Physical Therapist                    Therapy Education  Education Details: see ex log  Given: HEP  Program: Reinforced, Progressed  How Provided: Verbal, Demonstration, Written  Provided to: Patient  Level of Understanding: Verbalized, Demonstrated              Time Calculation:   Start Time: 1319  Stop Time: 1357  Time Calculation (min): 38 min  Timed Charges  51117 - PT Therapeutic Activity Minutes: 38  Total Minutes  Timed Charges Total Minutes: 38   Total Minutes: 38  Therapy Charges for Today       Code Description Service Date Service Provider Modifiers Qty    80084039637 HC PT THERAPEUTIC ACT EA 15 MIN 7/2/2024 Radha Rivera, PT GP 3                  OP PT Discharge Summary  Date of Discharge: 07/02/24  Reason for Discharge: All goals achieved  Outcomes Achieved: Able to achieve all goals within established timeline  Discharge Destination: Home with home program      Radha Rivera PT  7/2/2024

## 2024-07-05 ENCOUNTER — ROUTINE PRENATAL (OUTPATIENT)
Dept: OBSTETRICS AND GYNECOLOGY | Facility: CLINIC | Age: 34
End: 2024-07-05

## 2024-07-05 VITALS — WEIGHT: 150 LBS | BODY MASS INDEX: 28.34 KG/M2 | DIASTOLIC BLOOD PRESSURE: 65 MMHG | SYSTOLIC BLOOD PRESSURE: 99 MMHG

## 2024-07-05 DIAGNOSIS — I25.5 ISCHEMIC CARDIOMYOPATHY: ICD-10-CM

## 2024-07-05 DIAGNOSIS — I77.3 FIBROMUSCULAR DYSPLASIA OF CAROTID ARTERY: ICD-10-CM

## 2024-07-05 DIAGNOSIS — Q62.5 DUPLICATED RENAL COLLECTING SYSTEM: ICD-10-CM

## 2024-07-05 DIAGNOSIS — Z3A.35 35 WEEKS GESTATION OF PREGNANCY: Primary | ICD-10-CM

## 2024-07-05 DIAGNOSIS — G93.0 INTRACRANIAL ARACHNOID CYST: ICD-10-CM

## 2024-07-05 DIAGNOSIS — O09.529 ANTEPARTUM MULTIGRAVIDA OF ADVANCED MATERNAL AGE: ICD-10-CM

## 2024-07-05 LAB
GLUCOSE UR STRIP-MCNC: NEGATIVE MG/DL
PROT UR STRIP-MCNC: ABNORMAL MG/DL

## 2024-07-05 NOTE — Clinical Note
Can you please schedule her for an IOL at 37 weeks (prefer 7/24 in PM) for maternal cardiac disease? Thanks!

## 2024-07-05 NOTE — PROGRESS NOTES
Chief Complaint   Patient presents with    Routine Prenatal Visit     35 weeks       Esme Herrera is a 34 y.o.  at 35w0d  here for routine OB visit  Reports no major issues.  She denies SOA/chest pain    BP 99/65   Wt 68 kg (150 lb)   LMP 10/17/2023   BMI 28.34 kg/m²    Fetal Heart Rate: +  Movement: Present  Dilation: 1   Gen: NAD, well appearing  Abd: nontender  Pelvic: nl external genitalia    See OB Flowsheet    ASSESSMENT/PLAN:  Diagnoses and all orders for this visit:    1. 35 weeks gestation of pregnancy (Primary)  -     POC Urinalysis Dipstick  -     Strep B Screen - Swab, Vaginal/Rectum    2. Intracranial arachnoid cyst    3. Antepartum multigravida of advanced maternal age    4. Ischemic cardiomyopathy    5. Fibromuscular dysplasia of carotid artery    6. Duplicated renal collecting system          Plan for anesthesia consultation on  when she goes for next MFM appointment.    Reviewed IOL around 37 weeks. Will look at timing and discuss in detail next visit  GBS swab today  BPP8 /8  Routine counseling pertinent to this stage of pregnancy was reviewed including third trimester labor and fetal movement precautions  No follow-ups on file.

## 2024-07-07 LAB — GP B STREP DNA SPEC QL NAA+PROBE: NEGATIVE

## 2024-07-09 ENCOUNTER — OFFICE VISIT (OUTPATIENT)
Dept: OBSTETRICS AND GYNECOLOGY | Facility: CLINIC | Age: 34
End: 2024-07-09
Payer: MEDICAID

## 2024-07-09 ENCOUNTER — HOSPITAL ENCOUNTER (OUTPATIENT)
Dept: ULTRASOUND IMAGING | Facility: HOSPITAL | Age: 34
Discharge: HOME | End: 2024-07-09
Admitting: OBSTETRICS & GYNECOLOGY
Payer: MEDICAID

## 2024-07-09 VITALS
HEART RATE: 95 BPM | BODY MASS INDEX: 28.68 KG/M2 | TEMPERATURE: 97.8 F | SYSTOLIC BLOOD PRESSURE: 107 MMHG | WEIGHT: 151.8 LBS | DIASTOLIC BLOOD PRESSURE: 53 MMHG

## 2024-07-09 DIAGNOSIS — O09.529 ANTEPARTUM MULTIGRAVIDA OF ADVANCED MATERNAL AGE: ICD-10-CM

## 2024-07-09 DIAGNOSIS — Z95.1 S/P CABG X 3: Primary | ICD-10-CM

## 2024-07-09 DIAGNOSIS — Z95.1 S/P CABG X 3: ICD-10-CM

## 2024-07-09 DIAGNOSIS — I25.5 ISCHEMIC CARDIOMYOPATHY: ICD-10-CM

## 2024-07-09 DIAGNOSIS — G93.0 INTRACRANIAL ARACHNOID CYST: ICD-10-CM

## 2024-07-09 DIAGNOSIS — Q62.5 DUPLICATED RENAL COLLECTING SYSTEM: ICD-10-CM

## 2024-07-09 PROCEDURE — 76819 FETAL BIOPHYS PROFIL W/O NST: CPT

## 2024-07-09 PROCEDURE — 76816 OB US FOLLOW-UP PER FETUS: CPT

## 2024-07-09 NOTE — PROGRESS NOTES
MATERNAL FETAL MEDICINE Consult Note    Dear Dr Geetha Fraser MD:    Thank you for your kind referral of Esme Herrera.  As you know, she is a 34 y.o.   at  35 6/7 weeks gestation (Estimated Date of Delivery: 24). This is a consult.       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 No vaginal bleeding, no contractions.     Review of History:  Past Medical History:   Diagnosis Date    Chronic GERD 2023    Congenital syphilis, unspecified 2016    Coronary artery dissection 10/14/2022    Disease of thyroid gland     Hypothyroid    GERD (gastroesophageal reflux disease)     Hypotension     Intermittent palpitations 2023     Past Surgical History:   Procedure Laterality Date    CARDIAC CATHETERIZATION N/A 10/15/2022    Procedure: Left Heart Cath;  Surgeon: Deandra Dumas MD;  Location:  [facility];  Service: Cardiology;  Laterality: N/A;    CARDIAC CATHETERIZATION N/A 10/15/2022    Procedure: Coronary angiography;  Surgeon: Deandra Dumas MD;  Location:  [facility];  Service: Cardiology;  Laterality: N/A;    CARDIAC CATHETERIZATION N/A 10/15/2022    Procedure: Left ventriculography;  Surgeon: Deandra Dumas MD;  Location:  [facility];  Service: Cardiology;  Laterality: N/A;    CARDIAC CATHETERIZATION N/A 2023    Procedure: Coronary angiography;  Surgeon: Deandra Dumas MD;  Location:  [facility];  Service: Cardiology;  Laterality: N/A;    CARDIAC CATHETERIZATION N/A 2023    Procedure: Left Heart Cath;  Surgeon: Deandra Dumas MD;  Location:  [facility];  Service: Cardiology;  Laterality: N/A;    CARDIAC CATHETERIZATION N/A  6/22/2023    Procedure: Left ventriculography;  Surgeon: Deandra Dumas MD;  Location: [facility] CATH INVASIVE LOCATION;  Service: Cardiology;  Laterality: N/A;    CORONARY ARTERY BYPASS GRAFT N/A 10/15/2022    Procedure: VERONICA, STERNOTOMY, CORONARY ARTERY BYPASS TIMES 3 WITH INTERNAL MAMMARY ARTERY GRAFT AND LEFT GREATER SAPHENOUS VEIN HARVEST, REPAIR OF CORONARY ARTERY DISSECTION,PRP;  Surgeon: Lakshmi Teixeira MD;  Location: [facility] CVOR;  Service: Cardiothoracic;  Laterality: N/A;    ENDOSCOPY N/A 1/12/2023    Procedure: ESOPHAGOGASTRODUODENOSCOPY with bxs;  Surgeon: Nish Quiroz MD;  Location: [facility] ENDOSCOPY;  Service: Gastroenterology;  Laterality: N/A;  PRE: Epigastric Pain  POST: Normal     Social History     Socioeconomic History    Marital status:    Tobacco Use    Smoking status: Never     Passive exposure: Never    Smokeless tobacco: Never   Vaping Use    Vaping status: Never Used   Substance and Sexual Activity    Alcohol use: Never    Drug use: Never    Sexual activity: Yes     Partners: Male     Family History   Family history unknown: Yes      No Known Allergies   Current Outpatient Medications on File Prior to Visit   Medication Sig Dispense Refill    aspirin 81 MG EC tablet Take 1 tablet by mouth Daily. 90 tablet 6    levothyroxine (SYNTHROID, LEVOTHROID) 50 MCG tablet Take 1 tablet by mouth Daily (Monday-Friday).      MAGNESIUM PO Take  by mouth.      Prenatal Vit-Fe Fumarate-FA (Prenatal 27-1) 27-1 MG tablet tablet Take 1 tablet by mouth Daily. 90 tablet 4    Restasis 0.05 % ophthalmic emulsion INSTILL 1 DROP BOTH EYES TWICE DAILY      acetaminophen (TYLENOL) 500 MG tablet Take 1 tablet by mouth Every 4 (Four) Hours As Needed for Mild Pain. (Patient not taking: Reported on 6/6/2024) 30 tablet 0    Calcium Citrate-Vitamin D3 (CITRACAL) 315-6.25 MG-MCG tablet tablet Take  by mouth Daily. (Patient not taking: Reported on 6/6/2024)      simethicone (Gas-X) 80 MG chewable tablet Chew 1  tablet Every 6 (Six) Hours As Needed for Flatulence. (Patient not taking: Reported on 2024) 30 tablet 0     No current facility-administered medications on file prior to visit.        Past obstetric, gynecological, medical, surgical, family and social history reviewed.  Relevant lab work and imaging reviewed.    Review of systems  Constitutional:  denies fever, chills, malaise.   ENT/Mouth:  denies sore throat, tinnitus  Eyes: denies vision changes/pain  CV:  denies chest pain  Respiratory:  denies cough/SOB  GI:  denies N/V, diarrhea, abdominal pain.    :   denies dysuria  Skin:  denies lesions or pruritus   Neuro:  denies weakness, focal neurologic symptoms    Vitals:    24 1513   BP: 107/53   BP Location: Right arm   Patient Position: Sitting   Pulse: 95   Temp: 97.8 °F (36.6 °C)   TempSrc: Temporal   Weight: 68.9 kg (151 lb 12.8 oz)     PHYSICAL EXAM   GENERAL: Not in acute distress, AAOx3, pleasant  CARDIO: regular rate and rhythm  PULM: symmetric chest rise, speaking in complete sentences without difficulty  NEURO: awake, alert and oriented to person, place, and time  ABDOMINAL: No fundal tenderness, no rebound or guarding, gravid  EXTREMITIES: no bilateral lower extremity edema/tenderness  SKIN: Warm, well-perfused      ULTRASOUND   Please view full ultrasound note on Imaging tab in ViewPoint.  Cephalic presentation.  Posterior placenta.  KIMBERLY 10 cm, which is normal.   EFW 2450 g (27%, AC 48%)  BPP 8/8    ASSESSMENT/COUNSELIN y.o.   at  35 6/7 weeks gestation (Estimated Date of Delivery: 24).     -Pregnancy  [ X ] stable  [   ] improving [  ] worsening    Diagnoses and all orders for this visit:    1. S/P CABG x 3 (Primary)    2. Ischemic cardiomyopathy  Overview:  -early epidural to minimize tachycardia/cardiac strain  -telemetry if feasible vs EKG before and after  -euvolemia/strict I's and O's  -Avoidance of terbutaline, methergine  -Laboring down/delayed valsalva vs consider  assisted 2nd stage (pt says she goes quickly so do not suspect this will be necesssary)  -Consider immediate BTL vs LARC after delivery  - okay as long as no contraindication from Neurosurgery or cardiology teams--apreciate their care.        3. Antepartum multigravida of advanced maternal age    4. Duplicated renal collecting system    5. Intracranial arachnoid cyst        HX OF CABG X 3 VESSELS FOR SPONTANEOUS CORONARY ARTERY DISSECTION (SCAD)  Fibromuscular dysplasia  Previously counseled  She follows with Dr. Dumas with cardiology--appreciate her care.  She has had a history of coronary artery dissection in fall of .  Her dissection involved the left main coronary artery and extended into the LAD and left circumflex requiring CABG x 3.  Cardiac cath 2023 showed dissections had healed.  She continues to have anteroapical T wave changes and apical akinesis on echocardiograms but this is all stable.  Overall, left ventricular function has normalized. She has fibromuscular dysplasia, stable and will have a CTA of head and neck in year per neurosurgery.      Because the vast majority of patients affected by SCAD are women and SCAD constitutes an important cause of pregnancy-associated MI, its mechanism is uncertain but estrogen/progesterone have been shown to possibly play a role.   SCAD symptoms often onset after intense physical activity (at least 1/3 of the time), so we discussed lifting precautions and no intense exertional activity this pregnancy (no heavy lifting, etc., but normal activity/walking okay).  She should avoid straining or prolonged valsalva.  We also discussed starting a baby ASA.     Rates of recurrent SCAD are variably reported (10% to 30%), probably closer to 10% as there are some errors in study design in differentiating new vs recurrent SCAD in some of those studies.  Rates of recurrence are similar in pregnancy.  Many women experience increase in angina as pregnancy progresses.  She  has no chest pain or SOB at this time--baseline BNP 49.   The data suggest that the majority of SCAD patients who subsequently conceive experience uncomplicated pregnancies, but SCAD can be unpredictable.      She will need to follow closely with cardiology.  Beta blockers, copidogrel and low dose aspirin have a reasonable safety profile in pregnancy if needed--we will go ahead and start low dose aspirin for both preE prevention and hx of SCAD.  We discussed that with any mom with cardiac history it is important to watch baby's growth and I recommend at least weekly (maybe twice weekly) ANFS at 32 weeks.  We will plan to deliver at 37th week due to increasing cardiac output as pt goes further in pregnancy to minimize maternal complications.     Her 32 week ECHO was unchanged from prior--which is reassuring.    She will need an anesthesia consult 32-36 weeks.      During delivery, we will plan:  -early epidural to minimize tachycardia/cardiac strain  -telemetry if feasible vs EKG before and after  -euvolemia/strict I's and O's  -Avoidance of terbutaline, methergine  -Laboring down/delayed valsalva vs consider assisted 2nd stage (pt says she goes quickly so do not suspect this will be necesssary)  -Consider immediate BTL vs LARC after delivery  - okay as long as no contraindication from Neurosurgery or cardiology teams--appreciate their care.      INTRACRANIAL ARACHNOID CYST  Previously counseled.   Incidentally noted.  Follows with neurosurgery.  Plan is for CT head in 1 year.  Pt reports she was told this would not impact her ability to have vaginal delivery--has seen Dr. Sheppard who agrees.       ADVANCED MATERNAL AGE  Previously counseled.  NIPT low risk.  Serial growths and ANFS at 32 weeks    DUPLICATED RENAL COLLECTING SYSTEM, LEFT SIDE  Previously Counseled: No evidence of obstruction today.  Saw peds urology and will follow up with them after baby is born.  Needs renal US in hospital after delivery.          Summary of Plan  -Serial growth ultrasounds every 4 weeks (MFM)   -Weekly ANFS at Dr. Fraser's office  -Continue baby ASA  -Cardiac care plan as above  -S/P Peds urology visit  -Baby needs renal US after delivery while in hospital.   -Delivery at 37 weeks is recommended--can be in the 37th week, assuming pt does not develop new cardiac symptoms--pt has done very well.    Follow-up: No follow up scheduled    Thank you for the consult and opportunity to care for this patient.  Please feel free to reach out with any questions or concerns.      I spent 25 minutes caring for this patient on this date of service. This time includes time spent by me in the following activities: preparing for the visit, reviewing tests, obtaining and/or reviewing a separately obtained history, performing a medically appropriate examination and/or evaluation, counseling and educating the patient/family/caregiver and independently interpreting results and communicating that information with the patient/family/caregiver with greater than 50% spent in counseling and coordination of care.     4 minutes reading US.     Ashley Tejeda MD FACOG  Maternal Fetal Medicine-[facility]  Office: [phone]  [email or web address]

## 2024-07-09 NOTE — LETTER
2024     Geetha Fraser MD  [address]  [address]  [facility] [address]    Patient: Esme Herrera   YOB: 1990   Date of Visit: 2024       Dear Geetha Fraser MD    Esme Herrera was in my office today. Below is a copy of my note.    If you have questions, please do not hesitate to call me. I look forward to following Esme along with you.         Sincerely,        Ashley Tejeda MD      MATERNAL FETAL MEDICINE Consult Note    Dear Dr Geetha Fraser MD:    Thank you for your kind referral of Esme Herrera.  As you know, she is a 34 y.o.   at  35 6/7 weeks gestation (Estimated Date of Delivery: 24). This is a consult.       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 No vaginal bleeding, no contractions.     Review of History:  Past Medical History:   Diagnosis Date   • Chronic GERD 2023   • Congenital syphilis, unspecified 2016   • Coronary artery dissection 10/14/2022   • Disease of thyroid gland     Hypothyroid   • GERD (gastroesophageal reflux disease)    • Hypotension    • Intermittent palpitations 2023     Past Surgical History:   Procedure Laterality Date   • CARDIAC CATHETERIZATION N/A 10/15/2022    Procedure: Left Heart Cath;  Surgeon: Deandra Dumas MD;  Location:  [facility];  Service: Cardiology;  Laterality: N/A;   • CARDIAC CATHETERIZATION N/A 10/15/2022    Procedure: Coronary angiography;  Surgeon: Deandra Dumas MD;  Location:  [facility];  Service: Cardiology;  Laterality: N/A;   • CARDIAC CATHETERIZATION N/A 10/15/2022    Procedure: Left ventriculography;  Surgeon: Deandra Dumas MD;  Location:  [facility];  Service:  Cardiology;  Laterality: N/A;   • CARDIAC CATHETERIZATION N/A 6/22/2023    Procedure: Coronary angiography;  Surgeon: Deandra Dumas MD;  Location: [facility] CATH INVASIVE LOCATION;  Service: Cardiology;  Laterality: N/A;   • CARDIAC CATHETERIZATION N/A 6/22/2023    Procedure: Left Heart Cath;  Surgeon: Deandra Dumas MD;  Location: [facility] CATH INVASIVE LOCATION;  Service: Cardiology;  Laterality: N/A;   • CARDIAC CATHETERIZATION N/A 6/22/2023    Procedure: Left ventriculography;  Surgeon: Deandra Dumas MD;  Location: [facility] CATH INVASIVE LOCATION;  Service: Cardiology;  Laterality: N/A;   • CORONARY ARTERY BYPASS GRAFT N/A 10/15/2022    Procedure: VERONICA, STERNOTOMY, CORONARY ARTERY BYPASS TIMES 3 WITH INTERNAL MAMMARY ARTERY GRAFT AND LEFT GREATER SAPHENOUS VEIN HARVEST, REPAIR OF CORONARY ARTERY DISSECTION,PRP;  Surgeon: Lakshmi Teixeira MD;  Location: [facility] CVOR;  Service: Cardiothoracic;  Laterality: N/A;   • ENDOSCOPY N/A 1/12/2023    Procedure: ESOPHAGOGASTRODUODENOSCOPY with bxs;  Surgeon: Nish Quiroz MD;  Location: [facility] ENDOSCOPY;  Service: Gastroenterology;  Laterality: N/A;  PRE: Epigastric Pain  POST: Normal     Social History     Socioeconomic History   • Marital status:    Tobacco Use   • Smoking status: Never     Passive exposure: Never   • Smokeless tobacco: Never   Vaping Use   • Vaping status: Never Used   Substance and Sexual Activity   • Alcohol use: Never   • Drug use: Never   • Sexual activity: Yes     Partners: Male     Family History   Family history unknown: Yes      No Known Allergies   Current Outpatient Medications on File Prior to Visit   Medication Sig Dispense Refill   • aspirin 81 MG EC tablet Take 1 tablet by mouth Daily. 90 tablet 6   • levothyroxine (SYNTHROID, LEVOTHROID) 50 MCG tablet Take 1 tablet by mouth Daily (Monday-Friday).     • MAGNESIUM PO Take  by mouth.     • Prenatal Vit-Fe Fumarate-FA (Prenatal 27-1) 27-1 MG tablet tablet Take 1 tablet by mouth  Daily. 90 tablet 4   • Restasis 0.05 % ophthalmic emulsion INSTILL 1 DROP BOTH EYES TWICE DAILY     • acetaminophen (TYLENOL) 500 MG tablet Take 1 tablet by mouth Every 4 (Four) Hours As Needed for Mild Pain. (Patient not taking: Reported on 2024) 30 tablet 0   • Calcium Citrate-Vitamin D3 (CITRACAL) 315-6.25 MG-MCG tablet tablet Take  by mouth Daily. (Patient not taking: Reported on 2024)     • simethicone (Gas-X) 80 MG chewable tablet Chew 1 tablet Every 6 (Six) Hours As Needed for Flatulence. (Patient not taking: Reported on 2024) 30 tablet 0     No current facility-administered medications on file prior to visit.        Past obstetric, gynecological, medical, surgical, family and social history reviewed.  Relevant lab work and imaging reviewed.    Review of systems  Constitutional:  denies fever, chills, malaise.   ENT/Mouth:  denies sore throat, tinnitus  Eyes: denies vision changes/pain  CV:  denies chest pain  Respiratory:  denies cough/SOB  GI:  denies N/V, diarrhea, abdominal pain.    :   denies dysuria  Skin:  denies lesions or pruritus   Neuro:  denies weakness, focal neurologic symptoms    Vitals:    24 1513   BP: 107/53   BP Location: Right arm   Patient Position: Sitting   Pulse: 95   Temp: 97.8 °F (36.6 °C)   TempSrc: Temporal   Weight: 68.9 kg (151 lb 12.8 oz)     PHYSICAL EXAM   GENERAL: Not in acute distress, AAOx3, pleasant  CARDIO: regular rate and rhythm  PULM: symmetric chest rise, speaking in complete sentences without difficulty  NEURO: awake, alert and oriented to person, place, and time  ABDOMINAL: No fundal tenderness, no rebound or guarding, gravid  EXTREMITIES: no bilateral lower extremity edema/tenderness  SKIN: Warm, well-perfused      ULTRASOUND   Please view full ultrasound note on Imaging tab in ViewPoint.  Cephalic presentation.  Posterior placenta.  KIMBERLY 10 cm, which is normal.   EFW 2450 g (27%, AC 48%)  BPP     ASSESSMENT/COUNSELIN y.o.   at   35 6/7 weeks gestation (Estimated Date of Delivery: 24).     -Pregnancy  [ X ] stable  [   ] improving [  ] worsening    Diagnoses and all orders for this visit:    1. S/P CABG x 3 (Primary)    2. Ischemic cardiomyopathy  Overview:  -early epidural to minimize tachycardia/cardiac strain  -telemetry if feasible vs EKG before and after  -euvolemia/strict I's and O's  -Avoidance of terbutaline, methergine  -Laboring down/delayed valsalva vs consider assisted 2nd stage (pt says she goes quickly so do not suspect this will be necesssary)  -Consider immediate BTL vs LARC after delivery  - okay as long as no contraindication from Neurosurgery or cardiology teams--apreciate their care.        3. Antepartum multigravida of advanced maternal age    4. Duplicated renal collecting system    5. Intracranial arachnoid cyst        HX OF CABG X 3 VESSELS FOR SPONTANEOUS CORONARY ARTERY DISSECTION (SCAD)  Fibromuscular dysplasia  Previously counseled  She follows with Dr. Dumas with cardiology--appreciate her care.  She has had a history of coronary artery dissection in fall of .  Her dissection involved the left main coronary artery and extended into the LAD and left circumflex requiring CABG x 3.  Cardiac cath 2023 showed dissections had healed.  She continues to have anteroapical T wave changes and apical akinesis on echocardiograms but this is all stable.  Overall, left ventricular function has normalized. She has fibromuscular dysplasia, stable and will have a CTA of head and neck in year per neurosurgery.      Because the vast majority of patients affected by SCAD are women and SCAD constitutes an important cause of pregnancy-associated MI, its mechanism is uncertain but estrogen/progesterone have been shown to possibly play a role.   SCAD symptoms often onset after intense physical activity (at least 1/3 of the time), so we discussed lifting precautions and no intense exertional activity this pregnancy (no heavy  lifting, etc., but normal activity/walking okay).  She should avoid straining or prolonged valsalva.  We also discussed starting a baby ASA.     Rates of recurrent SCAD are variably reported (10% to 30%), probably closer to 10% as there are some errors in study design in differentiating new vs recurrent SCAD in some of those studies.  Rates of recurrence are similar in pregnancy.  Many women experience increase in angina as pregnancy progresses.  She has no chest pain or SOB at this time--baseline BNP 49.   The data suggest that the majority of SCAD patients who subsequently conceive experience uncomplicated pregnancies, but SCAD can be unpredictable.      She will need to follow closely with cardiology.  Beta blockers, copidogrel and low dose aspirin have a reasonable safety profile in pregnancy if needed--we will go ahead and start low dose aspirin for both preE prevention and hx of SCAD.  We discussed that with any mom with cardiac history it is important to watch baby's growth and I recommend at least weekly (maybe twice weekly) ANFS at 32 weeks.  We will plan to deliver at 37th week due to increasing cardiac output as pt goes further in pregnancy to minimize maternal complications.     Her 32 week ECHO was unchanged from prior--which is reassuring.    She will need an anesthesia consult 32-36 weeks.      During delivery, we will plan:  -early epidural to minimize tachycardia/cardiac strain  -telemetry if feasible vs EKG before and after  -euvolemia/strict I's and O's  -Avoidance of terbutaline, methergine  -Laboring down/delayed valsalva vs consider assisted 2nd stage (pt says she goes quickly so do not suspect this will be necesssary)  -Consider immediate BTL vs LARC after delivery  - okay as long as no contraindication from Neurosurgery or cardiology teams--appreciate their care.      INTRACRANIAL ARACHNOID CYST  Previously counseled.   Incidentally noted.  Follows with neurosurgery.  Plan is for CT head  in 1 year.  Pt reports she was told this would not impact her ability to have vaginal delivery--has seen Dr. Sheppard who agrees.       ADVANCED MATERNAL AGE  Previously counseled.  NIPT low risk.  Serial growths and ANFS at 32 weeks    DUPLICATED RENAL COLLECTING SYSTEM, LEFT SIDE  Previously Counseled: No evidence of obstruction today.  Saw peds urology and will follow up with them after baby is born.  Needs renal US in hospital after delivery.         Summary of Plan  -Serial growth ultrasounds every 4 weeks (MFM)   -Weekly ANFS at Dr. Fraser's office  -Continue baby ASA  -Cardiac care plan as above  -S/P Peds urology visit  -Baby needs renal US after delivery while in hospital.   -Delivery at 37 weeks is recommended--can be in the 37th week, assuming pt does not develop new cardiac symptoms--pt has done very well.    Follow-up: No follow up scheduled    Thank you for the consult and opportunity to care for this patient.  Please feel free to reach out with any questions or concerns.      I spent 25 minutes caring for this patient on this date of service. This time includes time spent by me in the following activities: preparing for the visit, reviewing tests, obtaining and/or reviewing a separately obtained history, performing a medically appropriate examination and/or evaluation, counseling and educating the patient/family/caregiver and independently interpreting results and communicating that information with the patient/family/caregiver with greater than 50% spent in counseling and coordination of care.     4 minutes reading US.     Ashley Tejeda MD FACOG  Maternal Fetal Medicine-[facility]  Office: [phone]  [email or web address]

## 2024-07-09 NOTE — PROGRESS NOTES
Pt reports that she is doing well and denies vaginal bleeding, contractions or LOF at this time. Notes occasional lower abdominal cramping, denies consistency. Reports active fetal movement. Reviewed when to call OB office or present to L&D for evaluation with symptoms such as decreased fetal movement, vaginal bleeding, LOF or ctxs. Pt verbalized understanding. Denies HA, visual changes or epigastric pain. Denies any additional complaints at time of appointment. Next OB appointment scheduled for 7/11.    Vitals:    07/09/24 1513   BP: 107/53   Pulse: 95   Temp: 97.8 °F (36.6 °C)

## 2024-07-11 ENCOUNTER — ROUTINE PRENATAL (OUTPATIENT)
Dept: OBSTETRICS AND GYNECOLOGY | Facility: CLINIC | Age: 34
End: 2024-07-11
Payer: MEDICAID

## 2024-07-11 VITALS — BODY MASS INDEX: 28.08 KG/M2 | SYSTOLIC BLOOD PRESSURE: 98 MMHG | WEIGHT: 148.6 LBS | DIASTOLIC BLOOD PRESSURE: 59 MMHG

## 2024-07-11 DIAGNOSIS — Q62.5 DUPLICATED RENAL COLLECTING SYSTEM: ICD-10-CM

## 2024-07-11 DIAGNOSIS — I25.5 ISCHEMIC CARDIOMYOPATHY: ICD-10-CM

## 2024-07-11 DIAGNOSIS — Z3A.35 35 WEEKS GESTATION OF PREGNANCY: Primary | ICD-10-CM

## 2024-07-11 LAB
GLUCOSE UR STRIP-MCNC: NEGATIVE MG/DL
PROT UR STRIP-MCNC: ABNORMAL MG/DL

## 2024-07-11 RX ORDER — NITROGLYCERIN 0.4 MG/1
0.4 TABLET SUBLINGUAL
OUTPATIENT
Start: 2024-07-11

## 2024-07-11 RX ORDER — ACETAMINOPHEN 325 MG/1
650 TABLET ORAL EVERY 4 HOURS PRN
OUTPATIENT
Start: 2024-07-11

## 2024-07-11 RX ORDER — SODIUM CHLORIDE 0.9 % (FLUSH) 0.9 %
10 SYRINGE (ML) INJECTION EVERY 12 HOURS SCHEDULED
OUTPATIENT
Start: 2024-07-11

## 2024-07-11 RX ORDER — MISOPROSTOL 100 UG/1
800 TABLET ORAL AS NEEDED
OUTPATIENT
Start: 2024-07-11

## 2024-07-11 RX ORDER — SODIUM CHLORIDE 9 MG/ML
40 INJECTION, SOLUTION INTRAVENOUS AS NEEDED
OUTPATIENT
Start: 2024-07-11

## 2024-07-11 RX ORDER — LIDOCAINE HYDROCHLORIDE 10 MG/ML
0.5 INJECTION, SOLUTION INFILTRATION; PERINEURAL ONCE AS NEEDED
OUTPATIENT
Start: 2024-07-11

## 2024-07-11 RX ORDER — OXYTOCIN/0.9 % SODIUM CHLORIDE 30/500 ML
999 PLASTIC BAG, INJECTION (ML) INTRAVENOUS ONCE
OUTPATIENT
Start: 2024-07-11 | End: 2024-07-11

## 2024-07-11 RX ORDER — CARBOPROST TROMETHAMINE 250 UG/ML
250 INJECTION, SOLUTION INTRAMUSCULAR AS NEEDED
OUTPATIENT
Start: 2024-07-11

## 2024-07-11 RX ORDER — SODIUM CHLORIDE, SODIUM LACTATE, POTASSIUM CHLORIDE, CALCIUM CHLORIDE 600; 310; 30; 20 MG/100ML; MG/100ML; MG/100ML; MG/100ML
125 INJECTION, SOLUTION INTRAVENOUS CONTINUOUS
OUTPATIENT
Start: 2024-07-11

## 2024-07-11 RX ORDER — METHYLERGONOVINE MALEATE 0.2 MG/ML
200 INJECTION INTRAVENOUS ONCE AS NEEDED
OUTPATIENT
Start: 2024-07-11

## 2024-07-11 RX ORDER — SODIUM CHLORIDE 0.9 % (FLUSH) 0.9 %
10 SYRINGE (ML) INJECTION AS NEEDED
OUTPATIENT
Start: 2024-07-11

## 2024-07-11 RX ORDER — MAGNESIUM CARB/ALUMINUM HYDROX 105-160MG
30 TABLET,CHEWABLE ORAL ONCE
OUTPATIENT
Start: 2024-07-11 | End: 2024-07-11

## 2024-07-11 RX ORDER — OXYTOCIN/0.9 % SODIUM CHLORIDE 30/500 ML
250 PLASTIC BAG, INJECTION (ML) INTRAVENOUS CONTINUOUS
OUTPATIENT
Start: 2024-07-11 | End: 2024-07-11

## 2024-07-11 NOTE — PROGRESS NOTES
Chief Complaint   Patient presents with    Routine Prenatal Visit     35 weeks       Emse Herrera is a 34 y.o.  at 35w6d  here for routine OB visit  Reports no major issues  She had some back pain last week that radiated to the front but this went away  Denies any SOA, chest pain  Last labor (4years ago) spontaneous, pushed for 20 min  BP 98/59   Wt 67.4 kg (148 lb 9.6 oz)   LMP 10/17/2023   BMI 28.08 kg/m²    Fetal Heart Rate: +  Movement: Present  Dilation: 2   Gen: NAD, well appearing  Abd: nontender  Pelvic: nl ext genitalia, normal vagina    See OB Flowsheet    ASSESSMENT/PLAN:  Diagnoses and all orders for this visit:    1. 35 weeks gestation of pregnancy (Primary)  -     POC Urinalysis Dipstick    2. Ischemic cardiomyopathy    3. Duplicated renal collecting system    Reviewed GBS negative  BPP   Reviewed labor precautions  She did not make it to anesthesia consultation, encouraged to go by for consultation prior to IOL  Reviewed tele at time of delivery  IOL set up for  at 5PM.  Aware of instructions to call at 3PM    Routine counseling pertinent to this stage of pregnancy was reviewed including labor precautions, visit in 1 week  Return in about 1 week (around 2024).

## 2024-07-18 ENCOUNTER — ROUTINE PRENATAL (OUTPATIENT)
Dept: OBSTETRICS AND GYNECOLOGY | Facility: CLINIC | Age: 34
End: 2024-07-18
Payer: MEDICAID

## 2024-07-18 VITALS — DIASTOLIC BLOOD PRESSURE: 57 MMHG | WEIGHT: 151.6 LBS | SYSTOLIC BLOOD PRESSURE: 93 MMHG | BODY MASS INDEX: 28.64 KG/M2

## 2024-07-18 DIAGNOSIS — I25.5 ISCHEMIC CARDIOMYOPATHY: ICD-10-CM

## 2024-07-18 DIAGNOSIS — O09.529 ANTEPARTUM MULTIGRAVIDA OF ADVANCED MATERNAL AGE: ICD-10-CM

## 2024-07-18 DIAGNOSIS — Z95.1 S/P CABG X 3: ICD-10-CM

## 2024-07-18 DIAGNOSIS — Z3A.36 36 WEEKS GESTATION OF PREGNANCY: Primary | ICD-10-CM

## 2024-07-18 LAB
GLUCOSE UR STRIP-MCNC: NEGATIVE MG/DL
PROT UR STRIP-MCNC: ABNORMAL MG/DL

## 2024-07-18 NOTE — PROGRESS NOTES
Chief Complaint   Patient presents with    Routine Prenatal Visit     36 weeks       Esme Herrera is a 34 y.o.  at 36w6d  here for routine OB visit  Reports no major issues, no chest pain/SOA    BP 93/57   Wt 68.8 kg (151 lb 9.6 oz)   LMP 10/17/2023   BMI 28.64 kg/m²    Dilation: 2   Gen: NAD, well appearing  Abd: nontender  Pelvic: nl ext genitalia, normal vagina    See OB Flowsheet    ASSESSMENT/PLAN:  Diagnoses and all orders for this visit:    1. 36 weeks gestation of pregnancy (Primary)  -     POC Urinalysis Dipstick    2. Antepartum multigravida of advanced maternal age    3. S/P CABG x 3    4. Ischemic cardiomyopathy    BPP 8/8  Cervical exam /-3, GBS negative  Plan for IOL next week, instructions reviewed.  Aware to call 2 hours prior to scheduled time and aware of possibility of delay.   For induction, aware of recommendation for telemetry, early anesthesia consultation  Routine counseling pertinent to this stage of pregnancy was reviewed including third trimester precautions

## 2024-07-23 ENCOUNTER — TELEPHONE (OUTPATIENT)
Dept: OBSTETRICS AND GYNECOLOGY | Facility: CLINIC | Age: 34
End: 2024-07-23
Payer: MEDICAID

## 2024-07-23 NOTE — TELEPHONE ENCOUNTER
Call placed to [facility] regarding Esme and her IOL tomorrow, 7/24. Cardiology notified pt has been followed by Dr. Dumas and has been cleared for delivery but will need early epidural, telemetry or EKG before and after delivery, etc. Cardiology reports seeing these patients inpatient is not necessarily standard of care during delivery, but if OB feels like she needs to be seen while in labor, we can order inpatient consults and call cardiology office and a provider will be happy to see them. Dr. Tejeda notified.

## 2024-07-23 NOTE — TELEPHONE ENCOUNTER
Pt is aware L&D has been trying to get a hold of her to let her know that her induction moved from 5 pm to 3 pm. Pt stated she already spoke with L&D and she is aware.     Marli Espinoza

## 2024-07-24 ENCOUNTER — HOSPITAL ENCOUNTER (OUTPATIENT)
Dept: LABOR AND DELIVERY | Facility: HOSPITAL | Age: 34
Discharge: HOME | End: 2024-07-24
Payer: MEDICAID

## 2024-07-24 ENCOUNTER — HOSPITAL ENCOUNTER (INPATIENT)
Facility: HOSPITAL | Age: 34
LOS: 3 days | Discharge: HOME | End: 2024-07-27
Attending: OBSTETRICS & GYNECOLOGY | Admitting: OBSTETRICS & GYNECOLOGY
Payer: MEDICAID

## 2024-07-24 ENCOUNTER — ANESTHESIA EVENT (OUTPATIENT)
Dept: LABOR AND DELIVERY | Facility: HOSPITAL | Age: 34
End: 2024-07-24
Payer: MEDICAID

## 2024-07-24 ENCOUNTER — ANESTHESIA (OUTPATIENT)
Dept: LABOR AND DELIVERY | Facility: HOSPITAL | Age: 34
End: 2024-07-24
Payer: MEDICAID

## 2024-07-24 DIAGNOSIS — Q62.5 DUPLICATED RENAL COLLECTING SYSTEM: ICD-10-CM

## 2024-07-24 DIAGNOSIS — I25.5 ISCHEMIC CARDIOMYOPATHY: ICD-10-CM

## 2024-07-24 PROBLEM — I42.9 CARDIOMYOPATHY: Status: ACTIVE | Noted: 2024-07-24

## 2024-07-24 LAB
ABO GROUP BLD: NORMAL
BLD GP AB SCN SERPL QL: NEGATIVE
DEPRECATED RDW RBC AUTO: 43.2 FL (ref 37–54)
ERYTHROCYTE [DISTWIDTH] IN BLOOD BY AUTOMATED COUNT: 13.4 % (ref 12.3–15.4)
HCT VFR BLD AUTO: 36.3 % (ref 34–46.6)
HGB BLD-MCNC: 12.4 G/DL (ref 12–15.9)
MCH RBC QN AUTO: 30.3 PG (ref 26.6–33)
MCHC RBC AUTO-ENTMCNC: 34.2 G/DL (ref 31.5–35.7)
MCV RBC AUTO: 88.8 FL (ref 79–97)
PLATELET # BLD AUTO: 256 10*3/MM3 (ref 140–450)
PMV BLD AUTO: 8.6 FL (ref 6–12)
QT INTERVAL: 426 MS
QTC INTERVAL: 507 MS
RBC # BLD AUTO: 4.09 10*6/MM3 (ref 3.77–5.28)
RH BLD: POSITIVE
T&S EXPIRATION DATE: NORMAL
TREPONEMA PALLIDUM IGG+IGM AB [PRESENCE] IN SERUM OR PLASMA BY IMMUNOASSAY: NORMAL
WBC NRBC COR # BLD AUTO: 8.15 10*3/MM3 (ref 3.4–10.8)

## 2024-07-24 PROCEDURE — 3E033VJ INTRODUCTION OF OTHER HORMONE INTO PERIPHERAL VEIN, PERCUTANEOUS APPROACH: ICD-10-PCS | Performed by: OBSTETRICS & GYNECOLOGY

## 2024-07-24 PROCEDURE — 93005 ELECTROCARDIOGRAM TRACING: CPT | Performed by: INTERNAL MEDICINE

## 2024-07-24 PROCEDURE — 86850 RBC ANTIBODY SCREEN: CPT | Performed by: OBSTETRICS & GYNECOLOGY

## 2024-07-24 PROCEDURE — 25810000003 SODIUM CHLORIDE 0.9 % SOLUTION: Performed by: OBSTETRICS & GYNECOLOGY

## 2024-07-24 PROCEDURE — 25010000002 OXYTOCIN PER 10 UNITS: Performed by: OBSTETRICS & GYNECOLOGY

## 2024-07-24 PROCEDURE — 99254 IP/OBS CNSLTJ NEW/EST MOD 60: CPT | Performed by: INTERNAL MEDICINE

## 2024-07-24 PROCEDURE — C1755 CATHETER, INTRASPINAL: HCPCS | Performed by: STUDENT IN AN ORGANIZED HEALTH CARE EDUCATION/TRAINING PROGRAM

## 2024-07-24 PROCEDURE — 86901 BLOOD TYPING SEROLOGIC RH(D): CPT | Performed by: OBSTETRICS & GYNECOLOGY

## 2024-07-24 PROCEDURE — 25810000003 LACTATED RINGERS PER 1000 ML: Performed by: OBSTETRICS & GYNECOLOGY

## 2024-07-24 PROCEDURE — 85027 COMPLETE CBC AUTOMATED: CPT | Performed by: OBSTETRICS & GYNECOLOGY

## 2024-07-24 PROCEDURE — 93010 ELECTROCARDIOGRAM REPORT: CPT | Performed by: INTERNAL MEDICINE

## 2024-07-24 PROCEDURE — 86780 TREPONEMA PALLIDUM: CPT | Performed by: OBSTETRICS & GYNECOLOGY

## 2024-07-24 PROCEDURE — 86900 BLOOD TYPING SEROLOGIC ABO: CPT | Performed by: OBSTETRICS & GYNECOLOGY

## 2024-07-24 RX ORDER — PHYTONADIONE 1 MG/.5ML
INJECTION, EMULSION INTRAMUSCULAR; INTRAVENOUS; SUBCUTANEOUS
Status: ACTIVE
Start: 2024-07-24 | End: 2024-07-25

## 2024-07-24 RX ORDER — SODIUM CHLORIDE 9 MG/ML
40 INJECTION, SOLUTION INTRAVENOUS AS NEEDED
Status: DISCONTINUED | OUTPATIENT
Start: 2024-07-24 | End: 2024-07-24

## 2024-07-24 RX ORDER — SODIUM CHLORIDE 0.9 % (FLUSH) 0.9 %
10 SYRINGE (ML) INJECTION AS NEEDED
Status: DISCONTINUED | OUTPATIENT
Start: 2024-07-24 | End: 2024-07-25

## 2024-07-24 RX ORDER — SODIUM CHLORIDE, SODIUM LACTATE, POTASSIUM CHLORIDE, CALCIUM CHLORIDE 600; 310; 30; 20 MG/100ML; MG/100ML; MG/100ML; MG/100ML
100 INJECTION, SOLUTION INTRAVENOUS CONTINUOUS
Status: DISCONTINUED | OUTPATIENT
Start: 2024-07-24 | End: 2024-07-25

## 2024-07-24 RX ORDER — EPHEDRINE SULFATE 50 MG/ML
5 INJECTION, SOLUTION INTRAVENOUS
Status: DISCONTINUED | OUTPATIENT
Start: 2024-07-24 | End: 2024-07-25

## 2024-07-24 RX ORDER — ACETAMINOPHEN 325 MG/1
650 TABLET ORAL EVERY 4 HOURS PRN
Status: DISCONTINUED | OUTPATIENT
Start: 2024-07-24 | End: 2024-07-25

## 2024-07-24 RX ORDER — MAGNESIUM CARB/ALUMINUM HYDROX 105-160MG
30 TABLET,CHEWABLE ORAL ONCE
Status: DISCONTINUED | OUTPATIENT
Start: 2024-07-24 | End: 2024-07-25

## 2024-07-24 RX ORDER — FAMOTIDINE 10 MG/ML
20 INJECTION, SOLUTION INTRAVENOUS ONCE AS NEEDED
Status: DISCONTINUED | OUTPATIENT
Start: 2024-07-24 | End: 2024-07-25

## 2024-07-24 RX ORDER — LIDOCAINE HYDROCHLORIDE 10 MG/ML
0.5 INJECTION, SOLUTION INFILTRATION; PERINEURAL ONCE AS NEEDED
Status: DISCONTINUED | OUTPATIENT
Start: 2024-07-24 | End: 2024-07-24

## 2024-07-24 RX ORDER — DIPHENHYDRAMINE HYDROCHLORIDE 50 MG/ML
12.5 INJECTION INTRAMUSCULAR; INTRAVENOUS EVERY 8 HOURS PRN
Status: DISCONTINUED | OUTPATIENT
Start: 2024-07-24 | End: 2024-07-25

## 2024-07-24 RX ORDER — ONDANSETRON 2 MG/ML
4 INJECTION INTRAMUSCULAR; INTRAVENOUS ONCE AS NEEDED
Status: DISCONTINUED | OUTPATIENT
Start: 2024-07-24 | End: 2024-07-25

## 2024-07-24 RX ORDER — OXYTOCIN/0.9 % SODIUM CHLORIDE 30/500 ML
2-20 PLASTIC BAG, INJECTION (ML) INTRAVENOUS
Status: DISCONTINUED | OUTPATIENT
Start: 2024-07-24 | End: 2024-07-25

## 2024-07-24 RX ORDER — NITROGLYCERIN 0.4 MG/1
0.4 TABLET SUBLINGUAL
Status: DISCONTINUED | OUTPATIENT
Start: 2024-07-24 | End: 2024-07-25

## 2024-07-24 RX ORDER — FENTANYL/ROPIVACAINE/NS/PF 2MCG/ML-.2
10 PLASTIC BAG, INJECTION (ML) INJECTION CONTINUOUS
Status: DISCONTINUED | OUTPATIENT
Start: 2024-07-24 | End: 2024-07-25

## 2024-07-24 RX ORDER — SODIUM CHLORIDE 0.9 % (FLUSH) 0.9 %
10 SYRINGE (ML) INJECTION EVERY 12 HOURS SCHEDULED
Status: DISCONTINUED | OUTPATIENT
Start: 2024-07-24 | End: 2024-07-24

## 2024-07-24 RX ORDER — ERYTHROMYCIN 5 MG/G
OINTMENT OPHTHALMIC
Status: ACTIVE
Start: 2024-07-24 | End: 2024-07-25

## 2024-07-24 RX ADMIN — SODIUM CHLORIDE, POTASSIUM CHLORIDE, SODIUM LACTATE AND CALCIUM CHLORIDE 125 ML/HR: 600; 310; 30; 20 INJECTION, SOLUTION INTRAVENOUS at 15:27

## 2024-07-24 RX ADMIN — Medication 8 ML/HR: at 20:33

## 2024-07-24 RX ADMIN — Medication 2 MILLI-UNITS/MIN: at 16:30

## 2024-07-24 NOTE — H&P
[facility]  Obstetric History and Physical    Chief Complaint   Patient presents with    Scheduled Induction     PT has hx of cardiac surgeries       Subjective     Patient is a 34 y.o. female  currently at 37w5d, who presents with plan for induction of labor secondary to maternal cardiac disease.  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  Last echo was 2024 with low normal LVEF, unchanged from prior visit.  She also has a history of an arachnoid cyst.      This pregnancy has been complicated by cardiac disease, arachnoid cyst.  Her previous obstetric/gynecological history is notable for 2 prior SVDs.  She did not have an epidural during either of those deliveries.        Prenatal Information:  Prenatal Results       Initial Prenatal Labs       Test Value Reference Range Date Time    Hemoglobin  12.3 g/dL 11.1 - 15.9 24 1116       12.5 g/dL 11.1 - 15.9 24 1210    Hematocrit  35.4 % 34.0 - 46.6 24 1116       37.2 % 34.0 - 46.6 24 1210    Platelets  226 x10E3/uL 150 - 450 24 1116       235 x10E3/uL 150 - 450 24 1210    Rubella IgG  26.20 index Immune >0.99 24 1210    Hepatitis B SAg  Negative  Negative 24 1210    Hepatitis C Ab  Non Reactive  Non Reactive 24 1210    RPR  Non Reactive  Non Reactive 24 1210    T. Pallidum Ab   Non-Reactive  Non-Reactive 24 1528    ABO  A   24 1528    Rh  Positive   24 1528    Antibody Screen  Negative  Negative 24 1210    HIV  Non Reactive  Non Reactive 24 1210    Urine Culture  Final report   24 1053    Gonorrhea  Negative  Negative 24 1116    Chlamydia  Negative  Negative 24 1116    TSH  3.130 uIU/mL 0.450 - 4.500 24 1112       3.680 uIU/mL 0.450 - 4.500 24 1210    HgB A1c         Varicella IgG        Hemoglobinopathy Fractionation        Hemoglobinopathy (genetic testing)        Cystic fibrosis                    Fetal testing        Test Value Reference Range Date Time    NIPT        MSAFP        AFP-4                  2nd and 3rd Trimester       Test Value Reference Range Date Time    Hemoglobin (repeated)  12.4 g/dL 12.0 - 15.9 07/24/24 1528       12.7 g/dL 11.1 - 15.9 05/01/24 1112    Hematocrit (repeated)  36.3 % 34.0 - 46.6 07/24/24 1528       37.4 % 34.0 - 46.6 05/01/24 1112    Platelets   256 10*3/mm3 140 - 450 07/24/24 1528       213 x10E3/uL 150 - 450 05/01/24 1112       226 x10E3/uL 150 - 450 03/06/24 1116       235 x10E3/uL 150 - 450 02/02/24 1210    1 hour GTT   111 mg/dL 70 - 139 05/01/24 1112    Antibody Screen (repeated)  Negative   07/24/24 1528    3rd TM syphilis scrn (repeated)  RPR         3rd TM syphilis scrn (repeated) TP-Ab  Non-Reactive  Non-Reactive 07/24/24 1528    3rd TM syphilis screen TB-Ab (FTA)  Non Reactive  Non Reactive 05/01/24 1112    Syphilis cascade test TP-Ab (EIA)        Syphilis cascade TPPA        GTT Fasting        GTT 1 Hr        GTT 2 Hr        GTT 3 Hr        Group B Strep  Negative  Negative 07/05/24 1445              Other testing        Test Value Reference Range Date Time    Parvo IgG         CMV IgG                   Drug Screening       Test Value Reference Range Date Time    Amphetamine Screen  Negative ng/mL Ovupli=1548 01/05/24 1053    Barbiturate Screen  Negative ng/mL Zldlpy=397 01/05/24 1053    Benzodiazepine Screen  Negative ng/mL Bribpg=903 01/05/24 1053    Methadone Screen  Negative ng/mL Oyexpb=089 01/05/24 1053    Phencyclidine Screen  Negative ng/mL Cutoff=25 01/05/24 1053    Opiates Screen  Negative ng/mL Ezkcli=530 01/05/24 1053    THC Screen  Negative ng/mL Cutoff=50 01/05/24 1053    Cocaine Screen  Negative ng/mL Ywyjyv=861 01/05/24 1053    Propoxyphene Screen  Negative ng/mL Wpqsxw=872 01/05/24 1053    Buprenorphine Screen        Methamphetamine Screen        Oxycodone Screen        Tricyclic Antidepressants Screen                  Legend    ^:  Historical                          External Prenatal Results       Pregnancy Outside Results - Transcribed From Office Records - See Scanned Records For Details       Test Value Date Time    ABO  A  07/24/24 1528    Rh  Positive  07/24/24 1528    Antibody Screen  Negative  07/24/24 1528       Negative  02/02/24 1210    Varicella IgG       Rubella  26.20 index 02/02/24 1210    Hgb  12.4 g/dL 07/24/24 1528       12.7 g/dL 05/01/24 1112       12.3 g/dL 03/06/24 1116       12.5 g/dL 02/02/24 1210    Hct  36.3 % 07/24/24 1528       37.4 % 05/01/24 1112       35.4 % 03/06/24 1116       37.2 % 02/02/24 1210    HgB A1c        1h GTT  111 mg/dL 05/01/24 1112    3h GTT Fasting       3h GTT 1 hour       3h GTT 2 hour       3h GTT 3 hour        Gonorrhea (discrete)  Negative  01/05/24 1116    Chlamydia (discrete)  Negative  01/05/24 1116    RPR  Non Reactive  02/02/24 1210    Syphils cascade: TP-Ab (FTA)  Non-Reactive  07/24/24 1528    TP-Ab  Non Reactive  05/01/24 1112    TP-Ab (EIA)       TPPA       HBsAg  Negative  02/02/24 1210    Herpes Simplex Virus PCR       Herpes Simplex VIrus Culture       HIV  Non Reactive  02/02/24 1210    Hep C RNA Quant PCR       Hep C Antibody  Non Reactive  02/02/24 1210    AFP       NIPT       Cystic Fibroisis        Group B Strep  Negative  07/05/24 1445    GBS Susceptibility to Clindamycin       GBS Susceptibility to Erythromycin       Fetal Fibronectin       Genetic Testing, Maternal Blood                 Drug Screening       Test Value Date Time    Urine Drug Screen       Amphetamine Screen  Negative ng/mL 01/05/24 1053    Barbiturate Screen  Negative ng/mL 01/05/24 1053    Benzodiazepine Screen  Negative ng/mL 01/05/24 1053    Methadone Screen  Negative ng/mL 01/05/24 1053    Phencyclidine Screen  Negative ng/mL 01/05/24 1053    Opiates Screen       THC Screen       Cocaine Screen       Propoxyphene Screen  Negative ng/mL 01/05/24 1053    Buprenorphine Screen       Methamphetamine Screen        Oxycodone Screen       Tricyclic Antidepressants Screen                 Legend    ^: Historical                             Past OB History:     OB History    Para Term  AB Living   4 2 2 0 1 2   SAB IAB Ectopic Molar Multiple Live Births   1 0 0 0 0 2      # Outcome Date GA Lbr Jv/2nd Weight Sex Type Anes PTL Lv   4 Current            3 Term 19   3175 g (7 lb) M Vag-Spont   GRACE   2 Term 16   3175 g (7 lb) M Vag-Spont   GRACE   1 SAB                Past Medical History: Past Medical History:   Diagnosis Date    Chronic GERD 2023    Congenital syphilis, unspecified 2016    Coronary artery dissection 10/14/2022    Disease of thyroid gland     Hypothyroid    GERD (gastroesophageal reflux disease)     Hypotension     Intermittent palpitations 2023      Past Surgical History Past Surgical History:   Procedure Laterality Date    CARDIAC CATHETERIZATION N/A 10/15/2022    Procedure: Left Heart Cath;  Surgeon: Deandra Dumas MD;  Location: [facility] CATH INVASIVE LOCATION;  Service: Cardiology;  Laterality: N/A;    CARDIAC CATHETERIZATION N/A 10/15/2022    Procedure: Coronary angiography;  Surgeon: Deandra Dumas MD;  Location: [facility] CATH INVASIVE LOCATION;  Service: Cardiology;  Laterality: N/A;    CARDIAC CATHETERIZATION N/A 10/15/2022    Procedure: Left ventriculography;  Surgeon: Deandra Dumas MD;  Location: [facility] CATH INVASIVE LOCATION;  Service: Cardiology;  Laterality: N/A;    CARDIAC CATHETERIZATION N/A 2023    Procedure: Coronary angiography;  Surgeon: Deandra Dumas MD;  Location: [facility] CATH INVASIVE LOCATION;  Service: Cardiology;  Laterality: N/A;    CARDIAC CATHETERIZATION N/A 2023    Procedure: Left Heart Cath;  Surgeon: Deandra Dumas MD;  Location: [facility] CATH INVASIVE LOCATION;  Service: Cardiology;  Laterality: N/A;    CARDIAC CATHETERIZATION N/A 2023    Procedure: Left ventriculography;  Surgeon: Deandra Dumas MD;  Location:  [facility] CATH INVASIVE LOCATION;  Service: Cardiology;  Laterality: N/A;    CORONARY ARTERY BYPASS GRAFT N/A 10/15/2022    Procedure: VERONICA, STERNOTOMY, CORONARY ARTERY BYPASS TIMES 3 WITH INTERNAL MAMMARY ARTERY GRAFT AND LEFT GREATER SAPHENOUS VEIN HARVEST, REPAIR OF CORONARY ARTERY DISSECTION,PRP;  Surgeon: Lakshmi Teixeira MD;  Location: [facility] CVOR;  Service: Cardiothoracic;  Laterality: N/A;    ENDOSCOPY N/A 1/12/2023    Procedure: ESOPHAGOGASTRODUODENOSCOPY with bxs;  Surgeon: Nish Quiroz MD;  Location: [facility] ENDOSCOPY;  Service: Gastroenterology;  Laterality: N/A;  PRE: Epigastric Pain  POST: Normal      Family History: Family History   Family history unknown: Yes      Social History:  reports that she has never smoked. She has never been exposed to tobacco smoke. She has never used smokeless tobacco.   reports no history of alcohol use.   reports no history of drug use.         Review of Systems - Negative except per HPI for 10 point review of systems including General, Psychological, Ophthalmic, ENT, Endocrine, Respiratory, Cardiovascular, Gastrointestinal, Genito-Urinary, Musculoskeletal, Neurological, Dermatological      Objective       Vital Signs Range for the last 24 hours  Temperature: Temp:  [98.5 °F (36.9 °C)] 98.5 °F (36.9 °C)   Temp Source: Temp src: Oral   BP: BP: (106)/(55) 106/55   Pulse: Heart Rate:  [88] 88   Respirations: Resp:  [16] 16   SPO2: SpO2:  [97 %] 97 %   O2 Amount (l/min):     O2 Devices Device (Oxygen Therapy): room air   Weight: Weight:  [68.9 kg (152 lb)-69 kg (152 lb 3.2 oz)] 68.9 kg (152 lb)     Physical Examination: General appearance - alert, well appearing, and in no distress  Mental status - alert, oriented to person, place, and time  Eyes - sclera anicteric, left eye normal, right eye normal  Chest - no tachypnea, retractions or cyanosis  Heart - normal rate and regular rhythm  Abdomen - soft, nontender, gravid  Pelvic - see exam  Back exam - full range of motion,  no tenderness, palpable spasm or pain on motion   Neurological - alert, oriented, normal speech, no focal findings or movement disorder noted   Musculoskeletal - no joint tenderness, deformity or swelling  Extremities - pedal edema trace  Skin - normal coloration and turgor, no rashes, no suspicious skin lesions noted    Presentation: vertex   Cervix: Exam by: Method: sterile exam per RN   Dilation: Cervical Dilation (cm): 2   Effacement: Cervical Effacement: 60%   Station: Fetal Station: 1-->-2       Fetal Heart Rate Assessment   Method:     Beats/min:     Baseline:     Varibility:     Accels:     Decels:     Tracing Category:       Uterine Assessment   Method:     Frequency (min):     Ctx Count in 10 min:     Duration:     Intensity:     Intensity by IUPC:     Resting Tone:     Resting Tone by IUPC:     [place] Units:       Lab Results   Component Value Date    WBC 8.15 07/24/2024    HGB 12.4 07/24/2024    HCT 36.3 07/24/2024    MCV 88.8 07/24/2024     07/24/2024      Lab Results   Component Value Date    GLUCOSE 76 03/06/2024    BUN 8 03/06/2024    CREATININE 0.63 03/06/2024    EGFRRESULT 119 03/06/2024    EGFR 117.8 06/22/2023    BCR 13 03/06/2024    K 4.3 03/06/2024    CO2 21 03/06/2024    CALCIUM 9.2 03/06/2024    PROTENTOTREF 6.4 03/06/2024    ALBUMIN 4.1 03/06/2024    BILITOT 0.5 03/06/2024    AST 17 03/06/2024    ALT 9 03/06/2024        US: EFW 2450g 27% AC 48% on 07/09    Assessment & Plan   Assessment:  1.  Intrauterine pregnancy at 37w5d weeks gestation with reactive, reassuring fetal status.    2.  Maternal cardiac disease: s/p CABG due to coronary artery dissection, appreciate cardiology consultation  3. Arachnoid cyst  4.  GBS status: Negative  5. 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 For induction, plan for:   - strict I/Os   - early epidural - counseled patient. She is in agreement with this but would like to wait a little longer as she is not currently feeling  contractions   - delayed valsalva   - avoidance of terbutaline/methergine   - telemetry    - on pitocin, currently at 4 mU/min   - reviewed possibility of AROM, she would like to hold for now.    2. Plan of care has been reviewed with patient and .  Risks, benefits of treatment plan have been discussed.  All questions have been answered.      Geetha Fraser MD  7/24/2024  16:47 EDT

## 2024-07-24 NOTE — CONSULTS
Date of Consultation: 24    Referral Provider: Dr. Fraser.    Reason for Consultation: History of spontaneous coronary artery dissection and CABG.    Encounter Provider: Jose L Rosas MD    Group of Service: [facility]     Patient Name: Esme Herrera    :1990    Chief complaint: Present for induction of labor.    History of Present Illness:      This is a very pleasant 34 year-old female who is normally followed by Dr. Dumas in our practice.  She has a history of spontaneous coronary artery dissection involving her distal left main, LAD, ramus, and left circumflex in 2022.  She presented with lateral ST depression and severe chest pain.  She was taken emergently for a three-vessel CABG with Dr. Teixeira at that time.  She underwent a LIMA to LAD, SVG to OM1, and SVG to ramus.  Her last heart catheterization was on 2023, at which time the LIMA to LAD was noted to be atretic, although the LAD tapered to a very small vessel distally.  The SVG to the ramus and the SVG to the OM were both patent.      She also has had a mild ischemic cardiomyopathy, with persistent apical wall motion abnormalities.  She had a recent echocardiogram on 2024 which showed apical akinesis and an ejection fraction of 45 to 50%.  She also has fibromuscular dysplasia involving both of her carotid arteries and possibly her dominant left vertebral artery.    The patient is currently 37 weeks pregnant, and was admitted electively for induction of labor.  Cardiology has been consulted because of her complex history.  On questioning, she has had no chest pain recently.  She has not had any shortness of breath.  She has clear lungs and does not appear to have any significant evidence of congestive heart failure on exam.    Past Medical History:   Diagnosis Date    Chronic GERD 2023    Congenital syphilis, unspecified 2016    Coronary artery dissection 10/14/2022    Disease of thyroid  gland     Hypothyroid    GERD (gastroesophageal reflux disease)     Hypotension     Intermittent palpitations 07/06/2023         Past Surgical History:   Procedure Laterality Date    CARDIAC CATHETERIZATION N/A 10/15/2022    Procedure: Left Heart Cath;  Surgeon: Deandra Dumas MD;  Location: [facility] CATH INVASIVE LOCATION;  Service: Cardiology;  Laterality: N/A;    CARDIAC CATHETERIZATION N/A 10/15/2022    Procedure: Coronary angiography;  Surgeon: Deandra Dumas MD;  Location: [facility] CATH INVASIVE LOCATION;  Service: Cardiology;  Laterality: N/A;    CARDIAC CATHETERIZATION N/A 10/15/2022    Procedure: Left ventriculography;  Surgeon: Deandra Dumas MD;  Location: [facility] CATH INVASIVE LOCATION;  Service: Cardiology;  Laterality: N/A;    CARDIAC CATHETERIZATION N/A 6/22/2023    Procedure: Coronary angiography;  Surgeon: Deandra Dumas MD;  Location: [facility] CATH INVASIVE LOCATION;  Service: Cardiology;  Laterality: N/A;    CARDIAC CATHETERIZATION N/A 6/22/2023    Procedure: Left Heart Cath;  Surgeon: Deandra Dumas MD;  Location: [facility] CATH INVASIVE LOCATION;  Service: Cardiology;  Laterality: N/A;    CARDIAC CATHETERIZATION N/A 6/22/2023    Procedure: Left ventriculography;  Surgeon: Deandra Dumas MD;  Location: [facility] CATH INVASIVE LOCATION;  Service: Cardiology;  Laterality: N/A;    CORONARY ARTERY BYPASS GRAFT N/A 10/15/2022    Procedure: VERONICA, STERNOTOMY, CORONARY ARTERY BYPASS TIMES 3 WITH INTERNAL MAMMARY ARTERY GRAFT AND LEFT GREATER SAPHENOUS VEIN HARVEST, REPAIR OF CORONARY ARTERY DISSECTION,PRP;  Surgeon: Lakshmi Teixeira MD;  Location: [facility] CVOR;  Service: Cardiothoracic;  Laterality: N/A;    ENDOSCOPY N/A 1/12/2023    Procedure: ESOPHAGOGASTRODUODENOSCOPY with bxs;  Surgeon: Nish Quiroz MD;  Location: [facility] ENDOSCOPY;  Service: Gastroenterology;  Laterality: N/A;  PRE: Epigastric Pain  POST: Normal         No Known Allergies      No current facility-administered medications on file  "prior to encounter.     Current Outpatient Medications on File Prior to Encounter   Medication Sig Dispense Refill    aspirin 81 MG EC tablet Take 1 tablet by mouth Daily. 90 tablet 6    levothyroxine (SYNTHROID, LEVOTHROID) 50 MCG tablet Take 1 tablet by mouth Daily (Monday-Friday).      MAGNESIUM PO Take  by mouth.      Prenatal Vit-Fe Fumarate-FA (Prenatal 27-1) 27-1 MG tablet tablet Take 1 tablet by mouth Daily. 90 tablet 4    Restasis 0.05 % ophthalmic emulsion INSTILL 1 DROP BOTH EYES TWICE DAILY      acetaminophen (TYLENOL) 500 MG tablet Take 1 tablet by mouth Every 4 (Four) Hours As Needed for Mild Pain. (Patient not taking: Reported on 6/6/2024) 30 tablet 0    Calcium Citrate-Vitamin D3 (CITRACAL) 315-6.25 MG-MCG tablet tablet Take  by mouth Daily. (Patient not taking: Reported on 6/6/2024)      simethicone (Gas-X) 80 MG chewable tablet Chew 1 tablet Every 6 (Six) Hours As Needed for Flatulence. (Patient not taking: Reported on 6/6/2024) 30 tablet 0         Social History     Socioeconomic History    Marital status:    Tobacco Use    Smoking status: Never     Passive exposure: Never    Smokeless tobacco: Never   Vaping Use    Vaping status: Never Used   Substance and Sexual Activity    Alcohol use: Never    Drug use: Never    Sexual activity: Yes     Partners: Male         Family History   Family history unknown: Yes       REVIEW OF SYSTEMS:   Pertinent positives are noted in the HPI above.  Otherwise, all other systems were reviewed, and are negative.     Objective:     Vitals:    07/24/24 1640 07/24/24 1645 07/24/24 1650 07/24/24 1655   BP:       BP Location:       Patient Position:       Pulse: 88 89 87 84   Resp:       Temp:       TempSrc:       SpO2: 100% 99% 99% 100%   Weight:       Height:         Body mass index is 28.72 kg/m².  Flowsheet Rows      Flowsheet Row First Filed Value   Admission Height 154.9 cm (61\") Documented at 07/24/2024 1521   Admission Weight 69 kg (152 lb 3.2 oz) " Documented at 07/24/2024 1300             General:    No acute distress, alert and oriented x4, pleasant                   Head:    Normocephalic, atraumatic.   Eyes:          Conjunctivae and sclerae normal, no icterus.   Throat:   No oral lesions, no thrush, oral mucosa moist.    Neck:   Supple, trachea midline.   Lungs:     Clear to auscultation bilaterally     Heart:    Regular rhythm and normal rate.  II/VI SM LLSB.   Abdomen:     Gravid, non-tender, positive bowel sounds.    Extremities:   No clubbing, cyanosis, or edema.     Pulses:   Pulses palpable and equal bilaterally.    Skin:   No bleeding or rash.   Neuro:   Non-focal.  Moves all extremities well.    Psychiatric:   Normal mood and affect.                 Lab Review:                        Results from last 7 days   Lab Units 07/24/24  1528   WBC 10*3/mm3 8.15   HEMOGLOBIN g/dL 12.4   HEMATOCRIT % 36.3   PLATELETS 10*3/mm3 256                           Assessment:   1.  37 weeks pregnancy, admitted for induction of labor  2.  History of spontaneous coronary artery dissection in October 2022.  Status post emergent three-vessel CABG at that time (LIMA to LAD, SVG to OM1, and SVG to ramus).  A subsequent heart catheterization in June 2023 showed HEART to LAD to be atretic, although the LAD tapered to a very small distal vessel.  Both vein grafts were patent.  3.  Ischemic cardiomyopathy with apical akinesis.  Last ejection fraction 45 to 50% on 6/24/2024  4.  Fibromuscular dysplasia involving the bilateral carotid arteries and possibly the left vertebral artery.  5.  Intracranial arachnoid cyst    Plan:       Again, she is completely asymptomatic from a cardiac standpoint.  She has no chest pain, shortness of breath, or evidence of congestive heart failure.  Her last ejection fraction was 45 to 50% approximately 1 month ago.  She has had persistent apical wall motion abnormalities since her spontaneous coronary artery dissection.    Obviously, pregnancy in  general makes her risk of another spontaneous coronary artery dissection much higher.  However, there is no contraindication to induction of labor at this point.  Her blood pressure typically runs on the lower side in general.  We will keep an eye out for any issues during this process.  I will go ahead and check a baseline EKG now in case there are any changes.    Cardiology will continue to follow with you.    Thank you very much for this consult.    Harvey Rosas MD

## 2024-07-24 NOTE — ANESTHESIA PREPROCEDURE EVALUATION
Anesthesia Evaluation     Patient summary reviewed and Nursing notes reviewed                Airway   Mallampati: II  TM distance: >3 FB  Neck ROM: full  Dental      Pulmonary - negative pulmonary ROS   Cardiovascular     ECG reviewed    (+) CABG >6 Months,  carotid artery disease    ROS comment: ·  Left ventricular systolic function is low normal. Calculated left ventricular EF = 50.3% Left ventricular ejection fraction appears to be 46 - 50%. Normal global longitudinal LV strain (GLS) = -19.6%. Left ventricle strain data was reviewed by the physician and found to be accurate. Normal left ventricular cavity size and wall thickness noted. Left ventricular diastolic function was normal.    Neuro/Psych- negative ROS  GI/Hepatic/Renal/Endo    (+) GERD, thyroid problem hypothyroidism    Musculoskeletal (-) negative ROS    Abdominal    Substance History - negative use     OB/GYN    (+) Pregnant        Other - negative ROS                         Anesthesia Plan    ASA 3     epidural     (37w5d)    Anesthetic plan, risks, benefits, and alternatives have been provided, discussed and informed consent has been obtained with: patient.        CODE STATUS:    Level Of Support Discussed With: Patient  Code Status (Patient has no pulse and is not breathing): CPR (Attempt to Resuscitate)  Medical Interventions (Patient has pulse or is breathing): Full Support

## 2024-07-25 PROCEDURE — 88307 TISSUE EXAM BY PATHOLOGIST: CPT

## 2024-07-25 PROCEDURE — 99232 SBSQ HOSP IP/OBS MODERATE 35: CPT | Performed by: INTERNAL MEDICINE

## 2024-07-25 PROCEDURE — 10907ZC DRAINAGE OF AMNIOTIC FLUID, THERAPEUTIC FROM PRODUCTS OF CONCEPTION, VIA NATURAL OR ARTIFICIAL OPENING: ICD-10-PCS | Performed by: OBSTETRICS & GYNECOLOGY

## 2024-07-25 PROCEDURE — 59410 OBSTETRICAL CARE: CPT | Performed by: OBSTETRICS & GYNECOLOGY

## 2024-07-25 RX ORDER — CARBOPROST TROMETHAMINE 250 UG/ML
250 INJECTION, SOLUTION INTRAMUSCULAR AS NEEDED
Status: DISCONTINUED | OUTPATIENT
Start: 2024-07-25 | End: 2024-07-27 | Stop reason: HOSPADM

## 2024-07-25 RX ORDER — ONDANSETRON 4 MG/1
4 TABLET, ORALLY DISINTEGRATING ORAL EVERY 8 HOURS PRN
Status: DISCONTINUED | OUTPATIENT
Start: 2024-07-25 | End: 2024-07-27 | Stop reason: HOSPADM

## 2024-07-25 RX ORDER — OXYTOCIN/0.9 % SODIUM CHLORIDE 30/500 ML
999 PLASTIC BAG, INJECTION (ML) INTRAVENOUS ONCE
Status: COMPLETED | OUTPATIENT
Start: 2024-07-25 | End: 2024-07-25

## 2024-07-25 RX ORDER — TRAMADOL HYDROCHLORIDE 50 MG/1
50 TABLET ORAL EVERY 6 HOURS PRN
Status: DISCONTINUED | OUTPATIENT
Start: 2024-07-25 | End: 2024-07-27 | Stop reason: HOSPADM

## 2024-07-25 RX ORDER — OXYTOCIN/0.9 % SODIUM CHLORIDE 30/500 ML
250 PLASTIC BAG, INJECTION (ML) INTRAVENOUS CONTINUOUS
Status: ACTIVE | OUTPATIENT
Start: 2024-07-25 | End: 2024-07-25

## 2024-07-25 RX ORDER — OXYTOCIN/0.9 % SODIUM CHLORIDE 30/500 ML
125 PLASTIC BAG, INJECTION (ML) INTRAVENOUS ONCE AS NEEDED
Status: COMPLETED | OUTPATIENT
Start: 2024-07-25 | End: 2024-07-25

## 2024-07-25 RX ORDER — BISACODYL 10 MG
10 SUPPOSITORY, RECTAL RECTAL DAILY PRN
Status: DISCONTINUED | OUTPATIENT
Start: 2024-07-26 | End: 2024-07-27 | Stop reason: HOSPADM

## 2024-07-25 RX ORDER — LEVOTHYROXINE SODIUM 0.05 MG/1
50 TABLET ORAL
Status: DISCONTINUED | OUTPATIENT
Start: 2024-07-25 | End: 2024-07-27 | Stop reason: HOSPADM

## 2024-07-25 RX ORDER — SODIUM CHLORIDE 0.9 % (FLUSH) 0.9 %
1-10 SYRINGE (ML) INJECTION AS NEEDED
Status: DISCONTINUED | OUTPATIENT
Start: 2024-07-25 | End: 2024-07-27 | Stop reason: HOSPADM

## 2024-07-25 RX ORDER — DOCUSATE SODIUM 100 MG/1
100 CAPSULE, LIQUID FILLED ORAL 2 TIMES DAILY
Status: DISCONTINUED | OUTPATIENT
Start: 2024-07-25 | End: 2024-07-27 | Stop reason: HOSPADM

## 2024-07-25 RX ORDER — IBUPROFEN 600 MG/1
600 TABLET ORAL EVERY 6 HOURS PRN
Status: DISCONTINUED | OUTPATIENT
Start: 2024-07-25 | End: 2024-07-27 | Stop reason: HOSPADM

## 2024-07-25 RX ORDER — HYDROCORTISONE 25 MG/G
1 CREAM TOPICAL AS NEEDED
Status: DISCONTINUED | OUTPATIENT
Start: 2024-07-25 | End: 2024-07-27 | Stop reason: HOSPADM

## 2024-07-25 RX ORDER — MISOPROSTOL 200 UG/1
800 TABLET ORAL AS NEEDED
Status: DISCONTINUED | OUTPATIENT
Start: 2024-07-25 | End: 2024-07-27 | Stop reason: HOSPADM

## 2024-07-25 RX ORDER — CALCIUM CARBONATE 500 MG/1
2 TABLET, CHEWABLE ORAL 3 TIMES DAILY PRN
Status: DISCONTINUED | OUTPATIENT
Start: 2024-07-25 | End: 2024-07-27 | Stop reason: HOSPADM

## 2024-07-25 RX ORDER — SIMETHICONE 80 MG
80 TABLET,CHEWABLE ORAL EVERY 6 HOURS PRN
Status: DISCONTINUED | OUTPATIENT
Start: 2024-07-25 | End: 2024-07-27 | Stop reason: HOSPADM

## 2024-07-25 RX ORDER — METHYLERGONOVINE MALEATE 0.2 MG/ML
200 INJECTION INTRAVENOUS ONCE AS NEEDED
Status: DISCONTINUED | OUTPATIENT
Start: 2024-07-25 | End: 2024-07-27 | Stop reason: HOSPADM

## 2024-07-25 RX ORDER — PRENATAL VIT/IRON FUM/FOLIC AC 27MG-0.8MG
1 TABLET ORAL DAILY
Status: DISCONTINUED | OUTPATIENT
Start: 2024-07-25 | End: 2024-07-27 | Stop reason: HOSPADM

## 2024-07-25 RX ORDER — ACETAMINOPHEN 325 MG/1
650 TABLET ORAL EVERY 6 HOURS PRN
Status: DISCONTINUED | OUTPATIENT
Start: 2024-07-25 | End: 2024-07-27 | Stop reason: HOSPADM

## 2024-07-25 RX ADMIN — Medication 999 ML/HR: at 00:05

## 2024-07-25 RX ADMIN — DOCUSATE SODIUM 100 MG: 100 CAPSULE, LIQUID FILLED ORAL at 08:14

## 2024-07-25 RX ADMIN — ACETAMINOPHEN 325MG 650 MG: 325 TABLET ORAL at 21:34

## 2024-07-25 RX ADMIN — IBUPROFEN 600 MG: 600 TABLET, FILM COATED ORAL at 12:40

## 2024-07-25 RX ADMIN — IBUPROFEN 600 MG: 600 TABLET, FILM COATED ORAL at 05:03

## 2024-07-25 RX ADMIN — IBUPROFEN 600 MG: 600 TABLET, FILM COATED ORAL at 19:23

## 2024-07-25 RX ADMIN — Medication 125 ML/HR: at 01:07

## 2024-07-25 RX ADMIN — TRAMADOL HYDROCHLORIDE 50 MG: 50 TABLET ORAL at 12:40

## 2024-07-25 RX ADMIN — PRENATAL VITAMINS-IRON FUMARATE 27 MG IRON-FOLIC ACID 0.8 MG TABLET 1 TABLET: at 08:15

## 2024-07-25 RX ADMIN — DOCUSATE SODIUM 100 MG: 100 CAPSULE, LIQUID FILLED ORAL at 21:34

## 2024-07-25 RX ADMIN — LEVOTHYROXINE SODIUM 50 MCG: 50 TABLET ORAL at 06:20

## 2024-07-25 NOTE — ANESTHESIA POSTPROCEDURE EVALUATION
Patient: Esme Herrera    Procedure Summary       Date: 07/24/24 Room / Location:     Anesthesia Start: 2018 Anesthesia Stop: 07/25/24 0001    Procedure: LABOR ANALGESIA Diagnosis:     Scheduled Providers:  Provider: Jared Taylor MD    Anesthesia Type: epidural ASA Status: 3            Anesthesia Type: epidural    Vitals  Vitals Value Taken Time   /54 07/25/24 0215   Temp 36.8 °C (98.2 °F) 07/24/24 2315   Pulse 69 07/25/24 0219   Resp 18 07/24/24 2315   SpO2 98 % 07/25/24 0219   Vitals shown include unfiled device data.        Post Anesthesia Care and Evaluation      Comments: I or one of my partners was available for labor and the immediate postpartum period.

## 2024-07-25 NOTE — L&D DELIVERY NOTE
[facility]   Vaginal Delivery Note    Patient Name: Esme Herrera  : 1990  MRN: [id number]    Date of Delivery: 2024     Diagnosis     Pre & Post-Delivery:  Intrauterine pregnancy at 37w6d  Labor status: Induced Onset of Labor     Cardiomyopathy             Problem List    Transfer to Postpartum     Review the Delivery Report for details.     Delivery     Delivery: Vaginal, Spontaneous     YOB: 2025    Time of Birth:  Gestational Age 12:01 AM   37w6d     Anesthesia: Epidural     Delivering clinician: Geetha Fraser    Forceps?   No   Vacuum? No    Shoulder dystocia present: No        Delivery narrative:    Preop diagnosis:  34 y.o.  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  who presented at 37w6d with plan for induction of labor. Patient presented and was placed on telemetry. She was started on pitocin, received an epidural for anesthesia. She had AROM with clear fluid. She progressed along a normal labor curve to complete dilation    Findings: Male infant, Apgar 8/9, Weight pending; no lacerations noted; placenta to pathology    QBL:      Procedure:The cervix was noted to be completely dilated, completely effaced, and +5 station. Under continuous electronic fetal heart rate monitoring, the baby delivered with one contraction without maternal valsalva. The head presented in the OA presentation and restituted to maternal left. There was a signle nuchal cord present. The left anterior fetal shoulder was then easily delivered with a gentle downward motion followed by the posterior fetal shoulder, followed by the remainder of the infant without difficulty. The nuchal cord was then reduced.  The infant was immediately vigorous. The cord was clamped 30 seconds following delivery. The cord was cut and the infant was placed  "to maternal abdomen. Cord blood was then collected. The placenta then delivered spontaneously intact, and a three vessel cord was noted. Uterine massage and IV Pitocin were given until the fundus was firm. The cervix, vagina, perineum, and rectum were carefully inspected for lacerations and none were noted. Counts for needles, sponges, laps and instruments were correct times two at the end of the delivery. There were no sponges left in the vagina. There were no major complications. Mother and baby were bonding well at the end of the delivery.            Infant     Findings: male  infant     Infant observations: Weight: No birth weight on file.   Length:   in  Observations/Comments:  LDR5 Panda      Apgars: 8  @ 1 minute /    9  @ 5 minutes         Placenta & Cord         Placenta delivered  Spontaneous  at   7/25/2024 12:05 AM     Cord:   present.   Nuchal Cord?  yes; Number of nuchal loops present:  1    Cord blood obtained: Yes    Cord gases obtained:  No    Cord gas results: Venous:  No results found for: \"PHCVEN\", \"BECVEN\"    Arterial:  No results found for: \"PHCART\", \"BECART\"     Repair     Episiotomy: None     No    Lacerations: No   Estimated Blood Loss: Est. Blood Loss (mL): 150 mL (07/25/24 0000)     Quantitative Blood Loss:          Complications     none    Disposition     Mother to Remain in LD  in stable condition currently.  Baby to remains with mom  in stable condition currently.    Geetha Fraser MD  07/25/24  00:26 EDT        "

## 2024-07-25 NOTE — PROGRESS NOTES
LOS: 1 day   Patient Care Team:  Kayce Wade APRN as PCP - General (Nurse Practitioner)    Chief Complaint: Follow-up with spontaneous coronary artery dissection with CABG, ischemic cardiomyopathy.    Interval History: Successfully delivered without incident.      Vital Signs:  Temp:  [97.6 °F (36.4 °C)-98.5 °F (36.9 °C)] 97.6 °F (36.4 °C)  Heart Rate:  [65-96] 80  Resp:  [16-18] 16  BP: ()/() 101/52    Intake/Output Summary (Last 24 hours) at 7/25/2024 1206  Last data filed at 7/25/2024 0412  Gross per 24 hour   Intake 1517.37 ml   Output 1600 ml   Net -82.63 ml       Physical Exam:   General Appearance:    No acute distress, alert and oriented x4   Lungs:     Clear to auscultation bilaterally     Heart:    Regular rhythm and normal rate.  II/VI SM LLSB.    Abdomen:     Soft, nontender, nondistended.    Extremities:   No clubbing, cyanosis, or edema.     Results Review:            Results from last 7 days   Lab Units 07/24/24  1528   WBC 10*3/mm3 8.15   HEMOGLOBIN g/dL 12.4   HEMATOCRIT % 36.3   PLATELETS 10*3/mm3 256                       I reviewed the patient's new clinical results.        Assessment:  1.  37 weeks pregnancy, admitted for induction of labor  2.  History of spontaneous coronary artery dissection in October 2022.  Status post emergent three-vessel CABG at that time (LIMA to LAD, SVG to OM1, and SVG to ramus).  A subsequent heart catheterization in June 2023 showed HEART to LAD to be atretic, although the LAD tapered to a very small distal vessel.  Both vein grafts were patent.  3.  Ischemic cardiomyopathy with apical akinesis.  Last ejection fraction 45 to 50% on 6/24/2024  4.  Fibromuscular dysplasia involving the bilateral carotid arteries and possibly the left vertebral artery.  5.  Intracranial arachnoid cyst    Plan:  -She delivered without any issues.  She is stable post delivery without chest pain or shortness of breath.    -No evidence of congestive heart failure.  No  evidence of angina.    -Stable, and no cardiac testing needed currently.  Cardiology will sign off for now.  Please call back if needed.    Jose L Rosas MD  07/25/24  12:06 EDT

## 2024-07-25 NOTE — PROGRESS NOTES
Postpartum Progress Note      Status post Vaginal Delivery: Doing well postoperatively.     1) Postpartum care immediately following delivery :    Routine care. Anticipate discharge home in the next   2) History of coronary artery dissection s/p CABG:   Pt overall doing well. Off telemetry now   Net I/Os -82.6   Postpartum hemoglobin pending    Cardiology following  3) Hypothryoidism: on Synthroid    Rh status: RH Positive  Syphilis screen in hospital: nonreactive  Rubella: Immune  Gender: Male, desires circumcision.  Deferred as < 24 hours and awaiting renal ultrasound    Subjective     Postpartum Day 0: Vaginal delivery    The patient feels well; denies SOA or chest pain. The patient denies emotional concerns. Pain is well controlled with current medications. The baby is well. The patient is ambulating well. The patient is tolerating a normal diet.     Objective     Vital signs in last 24 hours:  Temp:  [97.7 °F (36.5 °C)-98.5 °F (36.9 °C)] 98.4 °F (36.9 °C)  Heart Rate:  [65-96] 70  Resp:  [16-18] 16  BP: ()/() 98/60      General:    alert, appears stated age, and cooperative   CV: RRR, no m/r/g   Lungs: CTAB   Abdomen:  Soft, Non-tender    Lochia:  appropriate   Uterine Fundus:   firm   Ext    Edema none   DVT Evaluation:  No evidence of DVT seen on physical exam.     Lab Results   Component Value Date    WBC 8.15 07/24/2024    HGB 12.4 07/24/2024    HCT 36.3 07/24/2024    MCV 88.8 07/24/2024     07/24/2024       Lab Results   Component Value Date    RH Positive 07/24/2024     Lab Results   Component Value Date    TREPONEMAP Non-Reactive 07/24/2024     Lab Results   Component Value Date    RUBELLAABIGG 26.20 02/02/2024           Geetha Fraser MD  7/25/2024  10:18 EDT

## 2024-07-25 NOTE — ANESTHESIA PROCEDURE NOTES
Labor Epidural      Patient reassessed immediately prior to procedure    Patient location during procedure: OB  Start Time: 7/24/2024 8:18 PM  Performed By  Anesthesiologist: Jared Taylor MD  Preanesthetic Checklist  Completed: patient identified, risks and benefits discussed and timeout performed  Prep:  Pt Position:sitting  Sterile Tech:cap, gloves, mask and sterile barrier  Prep:chlorhexidine gluconate and isopropyl alcohol  Monitoring:blood pressure monitoring, continuous pulse oximetry and EKG  Epidural Block Procedure:  Approach:midline  Guidance:landmark technique and palpation technique  Location:L3-L4  Needle Type:Tuohy  Needle Gauge:17 G  Loss of Resistance Medium: saline  Loss of Resistance: 5cm  Cath Depth at skin:10 cm  Paresthesia: none  Aspiration:negative  Test Dose:negative  Number of Attempts: 1  Post Assessment:  Dressing:occlusive dressing applied and secured with tape  Pt Tolerance:patient tolerated the procedure well with no apparent complications  Complications:no

## 2024-07-25 NOTE — LACTATION NOTE
P3. BF first baby for 2 years and 2nd baby for 1 year. Pt reports this baby is BF on both breasts but favors the right breast. She denies questions. She has wearable pump at home but has not received one through insurance. Faxed script for personal pump. Encouraged to call LC as needed.      Lactation Consult Note    Evaluation Completed: 2024 16:00 EDT  Patient Name: Esme Herrera  :  1990  MRN:  [id number]     REFERRAL  INFORMATION:                                         DELIVERY HISTORY:        Skin to skin initiation date/time: 2024  12:05 AM   Skin to skin end date/time:           MATERNAL ASSESSMENT:                               INFANT ASSESSMENT:  Information for the patient's :  KaylierachaelAdrian [email or web address] [[id number]]   No past medical history on file.                                                                                                   MATERNAL INFANT FEEDING:                                                                       EQUIPMENT TYPE:                                 BREAST PUMPING:          LACTATION REFERRALS:

## 2024-07-25 NOTE — PROGRESS NOTES
AROM performed with clear fluid  Cervix 3/80/-2  Fetal status reassuring  Pt comfortable with epidural

## 2024-07-26 LAB
BASOPHILS # BLD AUTO: 0.06 10*3/MM3 (ref 0–0.2)
BASOPHILS NFR BLD AUTO: 0.7 % (ref 0–1.5)
DEPRECATED RDW RBC AUTO: 43.8 FL (ref 37–54)
EOSINOPHIL # BLD AUTO: 0.17 10*3/MM3 (ref 0–0.4)
EOSINOPHIL NFR BLD AUTO: 1.9 % (ref 0.3–6.2)
ERYTHROCYTE [DISTWIDTH] IN BLOOD BY AUTOMATED COUNT: 13.5 % (ref 12.3–15.4)
HCT VFR BLD AUTO: 34.4 % (ref 34–46.6)
HGB BLD-MCNC: 11.6 G/DL (ref 12–15.9)
IMM GRANULOCYTES # BLD AUTO: 0.05 10*3/MM3 (ref 0–0.05)
IMM GRANULOCYTES NFR BLD AUTO: 0.5 % (ref 0–0.5)
LYMPHOCYTES # BLD AUTO: 2.16 10*3/MM3 (ref 0.7–3.1)
LYMPHOCYTES NFR BLD AUTO: 23.7 % (ref 19.6–45.3)
MCH RBC QN AUTO: 30.3 PG (ref 26.6–33)
MCHC RBC AUTO-ENTMCNC: 33.7 G/DL (ref 31.5–35.7)
MCV RBC AUTO: 89.8 FL (ref 79–97)
MONOCYTES # BLD AUTO: 0.84 10*3/MM3 (ref 0.1–0.9)
MONOCYTES NFR BLD AUTO: 9.2 % (ref 5–12)
NEUTROPHILS NFR BLD AUTO: 5.84 10*3/MM3 (ref 1.7–7)
NEUTROPHILS NFR BLD AUTO: 64 % (ref 42.7–76)
NRBC BLD AUTO-RTO: 0 /100 WBC (ref 0–0.2)
PLATELET # BLD AUTO: 227 10*3/MM3 (ref 140–450)
PMV BLD AUTO: 9.4 FL (ref 6–12)
RBC # BLD AUTO: 3.83 10*6/MM3 (ref 3.77–5.28)
WBC NRBC COR # BLD AUTO: 9.12 10*3/MM3 (ref 3.4–10.8)

## 2024-07-26 PROCEDURE — 85025 COMPLETE CBC W/AUTO DIFF WBC: CPT | Performed by: OBSTETRICS & GYNECOLOGY

## 2024-07-26 PROCEDURE — 0503F POSTPARTUM CARE VISIT: CPT

## 2024-07-26 RX ADMIN — DOCUSATE SODIUM 100 MG: 100 CAPSULE, LIQUID FILLED ORAL at 20:42

## 2024-07-26 RX ADMIN — PRENATAL VITAMINS-IRON FUMARATE 27 MG IRON-FOLIC ACID 0.8 MG TABLET 1 TABLET: at 10:15

## 2024-07-26 RX ADMIN — DOCUSATE SODIUM 100 MG: 100 CAPSULE, LIQUID FILLED ORAL at 10:15

## 2024-07-26 RX ADMIN — IBUPROFEN 600 MG: 600 TABLET, FILM COATED ORAL at 10:15

## 2024-07-26 RX ADMIN — IBUPROFEN 600 MG: 600 TABLET, FILM COATED ORAL at 19:13

## 2024-07-26 RX ADMIN — ACETAMINOPHEN 325MG 650 MG: 325 TABLET ORAL at 04:45

## 2024-07-26 RX ADMIN — ACETAMINOPHEN 325MG 650 MG: 325 TABLET ORAL at 10:15

## 2024-07-26 NOTE — PROGRESS NOTES
VAGINAL DELIVERY POSTPARTUM DAY 1    2024  PATIENT: Esme Herrera        MR#:[id number]  LOCATION: [facility]  DATE OF ADMISSION: 2024  ADMISSION  DIAGNOSIS:   Cardiomyopathy     (spontaneous vaginal delivery)     CURRENT DIAGNOSIS: No notes have been filed under this hospital service.  Service: Hospitalist    SERVICE: Obstetrics      Cardiomyopathy     (spontaneous vaginal delivery)    Patient Active Problem List   Diagnosis    Spontaneous dissection of coronary artery    S/P CABG (coronary artery bypass graft)    Ischemic cardiomyopathy    Epigastric pain    Fibromuscular dysplasia of carotid artery    Intracranial arachnoid cyst    Migraine without aura and without status migrainosus, not intractable    Family hx of ALS (amyotrophic lateral sclerosis)    History of Helicobacter pylori infection    Hypothyroidism due to Hashimoto's thyroiditis    Monoallelic mutation of SOD1 gene    Cardiomyopathy     (spontaneous vaginal delivery)           PROCEDURES:  Vaginal, Spontaneous     2024    12:01 AM      RH STATUS: A positive  SYPHILIS SCREEN DELIVERY ADMIT: treponemal antibody non-reactive upon admission  RUBELLA: immune  VARICELLA: unknown immunity  INFANT GENDER: male, desires circumcision, hydrocele and undescended testes noted on peds exam - will recheck prior to circumcision    SUBJECTIVE   Esme feels well.  Patient describes her lochia as less than menses.  Pain is well controlled.     OBJECTIVE   Temp: Temp:  [97.6 °F (36.4 °C)-98.1 °F (36.7 °C)] 97.6 °F (36.4 °C) Temp src: Oral   BP: BP: ()/(43-70) 86/45        Pulse: Heart Rate:  [68-78] 69  RR: Resp:  [16] 16    General:  Awake, alert, no acute distress   Cardiac: Regular rate and rhythm    Respiratory: Lungs clear bilaterally, normal respiratory effort    Abdomen: Soft, non-distended, fundus firm, below umbilicus, appropriately tender   Pelvis: deferred   Extremities: Calves NT bilaterally, DTR 2+, no clonus noted,  trace edema     Lab Results   Component Value Date    WBC 9.12 07/26/2024    HGB 11.6 (L) 07/26/2024    HCT 34.4 07/26/2024     07/26/2024    AST 17 03/06/2024    ALT 9 03/06/2024    URICACID 1.9 (L) 02/02/2024    CREATININE 0.63 03/06/2024     03/06/2024       ASSESSMENT:  Postpartum day 1 after vaginal delivery. Hgb: 11.6.    PLAN:Doing well. Continue routine supportive care. Discontinue IV, advance diet, may shower. Plan for discharge tomorrow as clinical picture allows.     Petty Fragoso CNM  12:56 EDT  July 26, 2024

## 2024-07-26 NOTE — LACTATION NOTE
This note was copied from a baby's chart.  Informed PT, ARMIN is available to help with BF until 2000. Mom reports baby is BF well.  PT denieis any questions. Encouraged to call if needing BF help

## 2024-07-27 VITALS
DIASTOLIC BLOOD PRESSURE: 61 MMHG | RESPIRATION RATE: 16 BRPM | BODY MASS INDEX: 28.7 KG/M2 | OXYGEN SATURATION: 96 % | WEIGHT: 152 LBS | TEMPERATURE: 98.2 F | HEART RATE: 78 BPM | SYSTOLIC BLOOD PRESSURE: 109 MMHG | HEIGHT: 61 IN

## 2024-07-27 PROCEDURE — 0503F POSTPARTUM CARE VISIT: CPT | Performed by: OBSTETRICS & GYNECOLOGY

## 2024-07-27 RX ORDER — IBUPROFEN 600 MG/1
600 TABLET ORAL EVERY 6 HOURS PRN
Qty: 30 TABLET | Refills: 0 | Status: SHIPPED | OUTPATIENT
Start: 2024-07-27

## 2024-07-27 RX ADMIN — ACETAMINOPHEN 325MG 650 MG: 325 TABLET ORAL at 08:33

## 2024-07-27 RX ADMIN — PRENATAL VITAMINS-IRON FUMARATE 27 MG IRON-FOLIC ACID 0.8 MG TABLET 1 TABLET: at 08:29

## 2024-07-27 RX ADMIN — DOCUSATE SODIUM 100 MG: 100 CAPSULE, LIQUID FILLED ORAL at 08:29

## 2024-07-27 RX ADMIN — ACETAMINOPHEN 325MG 650 MG: 325 TABLET ORAL at 01:11

## 2024-07-27 NOTE — PROGRESS NOTES
Postpartum Progress Note      Status post Vaginal Delivery: Doing well postoperatively.     1) postpartum care immediately following delivery : Meeting postpartum milestones.  She is stable for discharge home.  2) history of coronary artery dissection and ischemic cardiomyopathy--she has done well from a cardiac standpoint and cardiology has seen her and signed off.    Rh status: A pos  Syphilis screen delivery admit: Nonreactive  Rubella: Immune  Gender: male--desire circumcision.  Discussed procedure with the parents and they wish to proceed.    Subjective     Postpartum Day 2: Vaginal delivery    The patient feels well. The patient denies emotional concerns. Pain is well controlled with current medications. The baby is well. The patient is ambulating well. The patient is tolerating a normal diet.     Objective     Vital signs in last 24 hours:  Temp:  [97.6 °F (36.4 °C)-98.2 °F (36.8 °C)] 97.8 °F (36.6 °C)  Heart Rate:  [63-80] 63  Resp:  [16] 16  BP: ()/(45-65) 96/58      Physical Exam  Constitutional:       Appearance: Normal appearance.   Cardiovascular:      Rate and Rhythm: Normal rate and regular rhythm.   Pulmonary:      Effort: Pulmonary effort is normal.   Abdominal:      Comments: Fundus firm and below the umbilicus   Musculoskeletal:      Right lower leg: Edema (trace) present.      Left lower leg: Edema (trace) present.   Neurological:      General: No focal deficit present.      Mental Status: She is alert. Mental status is at baseline.   Psychiatric:         Mood and Affect: Mood normal.         Behavior: Behavior normal.   Vitals reviewed.          Lab Results   Component Value Date    WBC 9.12 07/26/2024    HGB 11.6 (L) 07/26/2024    HCT 34.4 07/26/2024    MCV 89.8 07/26/2024     07/26/2024       Indy Agustin MD  7/27/2024  10:09 EDT

## 2024-07-27 NOTE — PLAN OF CARE
Goal Outcome Evaluation:                 VSS, fundus and lochia WDL, up ad heike, voiding without difficulty, pain control with PO meds, breast feeding and pumping. DC today   Problem: Adult Inpatient Plan of Care  Goal: Plan of Care Review  Outcome: Met  Goal: Patient-Specific Goal (Individualized)  Outcome: Met  Goal: Absence of Hospital-Acquired Illness or Injury  Outcome: Met  Intervention: Identify and Manage Fall Risk  Description: Perform standard risk assessment on admission using a validated tool or comprehensive approach appropriate to the patient; reassess fall risk frequently, with change in status or transfer to another level of care.  Communicate fall injury risk to interprofessional healthcare team.  Determine need for increased observation, equipment and environmental modification, such as low bed, signage and supportive, nonskid footwear.  Adjust safety measures to individual developmental age, stage and identified risk factors.  Reinforce the importance of safety and physical activity with patient and family.  Perform regular intentional rounding to assess need for position change, pain assessment and personal needs, including assistance with toileting.  Recent Flowsheet Documentation  Taken 7/27/2024 1015 by Yanira Paul RN  Safety Promotion/Fall Prevention: safety round/check completed  Taken 7/27/2024 0833 by Yanira Paul RN  Safety Promotion/Fall Prevention: safety round/check completed  Intervention: Prevent Skin Injury  Description: Perform a screening for skin injury risk, such as pressure or moisture associated skin damage on admission and at regular intervals throughout hospital stay.  Keep all areas of skin (especially folds) clean and dry.  Maintain adequate skin hydration.  Relieve and redistribute pressure and protect bony prominences; implement measures based on patient-specific risk factors.  Match turning and repositioning schedule to clinical condition.  Encourage weight  shift frequently; assist with reposition if unable to complete independently.  Float heels off bed; avoid pressure on the Achilles tendon.  Keep skin free from extended contact with medical devices.  Encourage functional activity and mobility, as early as tolerated.  Use aids (e.g., slide boards, mechanical lift) during transfer.  Recent Flowsheet Documentation  Taken 7/27/2024 0833 by Yanira Paul RN  Body Position: position changed independently  Intervention: Prevent and Manage VTE (Venous Thromboembolism) Risk  Description: Assess for VTE (venous thromboembolism) risk.  Encourage and assist with early ambulation.  Initiate and maintain compression or other therapy, as indicated, based on identified risk in accordance with organizational protocol and provider order.  Encourage both active and passive leg exercises while in bed, if unable to ambulate.  Recent Flowsheet Documentation  Taken 7/27/2024 0833 by Yanira Paul RN  Activity Management: up ad heike  Intervention: Prevent Infection  Description: Maintain skin and mucous membrane integrity; promote hand, oral and pulmonary hygiene.  Optimize fluid balance, nutrition, sleep and glycemic control to maximize infection resistance.  Identify potential sources of infection early to prevent or mitigate progression of infection (e.g., wound, lines, devices).  Evaluate ongoing need for invasive devices; remove promptly when no longer indicated.  Recent Flowsheet Documentation  Taken 7/27/2024 0833 by Yanira Paul RN  Infection Prevention:   hand hygiene promoted   rest/sleep promoted  Goal: Optimal Comfort and Wellbeing  Outcome: Met  Intervention: Monitor Pain and Promote Comfort  Description: Assess pain level, treatment efficacy and patient response at regular intervals using a consistent pain scale.  Consider the presence and impact of preexisting chronic pain.  Encourage patient and caregiver involvement in pain assessment, interventions and safety  measures.  Recent Flowsheet Documentation  Taken 2024 by Yanira Paul RN  Pain Management Interventions: see MAR  Intervention: Provide Person-Centered Care  Description: Use a family-focused approach to care.  Develop trust and rapport by proactively providing information, encouraging questions, addressing concerns and offering reassurance.  Acknowledge emotional response to hospitalization.  Recognize and utilize personal coping strategies.  Honor spiritual and cultural preferences.  Recent Flowsheet Documentation  Taken 2024 by Yanira Paul RN  Trust Relationship/Rapport:   care explained   choices provided   questions answered   questions encouraged  Goal: Readiness for Transition of Care  Outcome: Met     Problem: Bleeding (Labor)  Goal: Hemostasis  Outcome: Met     Problem: Change in Fetal Wellbeing (Labor)  Goal: Stable Fetal Wellbeing  Outcome: Met  Intervention: Promote and Monitor Fetal Wellbeing  Description: Evaluate fetal heart rate tracing.  Initiate continuous electronic fetal monitoring if patient is experiencing a TOLAC (trial of labor after ).  Facilitate maternal lateral position change to improve uteroplacental blood flow and maternal blood pressure; utilize hip wedge as needed.  Monitor uterine contraction pattern; assess for presence of tachysystole.  Prepare to discontinue oxytocin or labor induction agent in the presence of tachysystole, as applicable.  Anticipate need for tocolytic administration if tachysystole persists.  Prepare for intravenous fluid bolus administration to improve maternal fluid status and treat hypotension.  In the presence of maternal hypoxia, prepare to administer oxygen therapy.  Review maternal medication history for possible impact on fetal heart rate tracing (e.g., corticosteroid).  Prepare for additional procedure, as indicated, to improve fetal wellbeing (e.g., amnioinfusion, fetal stimulation).  Modify pushing effort, if in  second stage of labor.  Prepare for expedited delivery if fetal status does not improve.  Recent Flowsheet Documentation  Taken 2024 by Yanira Paul RN  Body Position: position changed independently     Problem: Delayed Labor Progression (Labor)  Goal: Effective Progression to Delivery  Outcome: Met     Problem: Infection (Labor)  Goal: Absence of Infection Signs and Symptoms  Outcome: Met  Intervention: Prevent or Manage Infection  Description: Verify Group B streptococcus status and presence of known infection.  Limit digital vaginal exams or invasive vaginal procedures, especially after membranes are ruptured.  Promote perineal hygiene.  Prepare to administer antimicrobial therapy prophylaxis within 60 minutes prior to  delivery, unless there is current coverage provided by existing antimicrobial therapy regimen. Note: If  delivery is urgently needed, prophylaxis shou  Obtain cultures prior to initiating antimicrobial therapy when possible, if suspected or known infection. Do not delay treatment for laboratory results in the presence of high suspicion or clinical indicators.  Administer ordered antimicrobial therapy promptly; reassess need regularly.  Provide fever-reduction and comfort measures.  Monitor laboratory value, diagnostic test and clinical status trends for signs of infection progression.  Identify early signs of sepsis, such as increased heart rate and decreased blood pressure, as well as changes in mental state, respiratory pattern or peripheral perfusion.  Prepare for rapid sepsis management, including lactate level, intravenous access, fluid administration and oxygen therapy.  Notify  team for delivery.  Anticipate need for single-dose antimicrobial therapy prophylaxis following anal sphincter injury.  Prepare to send placenta for histology following delivery, as indicated.  Recent Flowsheet Documentation  Taken 2024 by Yanira Paul, NATALIE  Infection  Prevention:   hand hygiene promoted   rest/sleep promoted     Problem: Labor Pain (Labor)  Goal: Acceptable Pain Control  Outcome: Met     Problem: Uterine Tachysystole (Labor)  Goal: Normal Uterine Contraction Pattern  Outcome: Met

## 2024-08-05 ENCOUNTER — MATERNAL SCREENING (OUTPATIENT)
Dept: CALL CENTER | Facility: HOSPITAL | Age: 34
End: 2024-08-05
Payer: MEDICAID

## 2024-08-05 NOTE — OUTREACH NOTE
Maternal Screening Survey      Flowsheet Row Responses   Facility patient discharged from? [place]   Attempt successful? No   Unsuccessful attempts Attempt 1              Leisa العراقي Registered Nurse

## 2024-08-05 NOTE — OUTREACH NOTE
Maternal Screening Survey      Flowsheet Row Responses   [facility]   [facility]? Yes   Call start time    Call end time    EPD Scale: Able to Laugh 0-->as much as she always could   EPD Scale: Looked Forward 0-->as much as she ever did   EPD Scale: Blamed Self 0-->no, never   EPD Scale: Been Anxious 0-->no, not at all   EPD Scale: Felt Panicky 0-->no, not at all   EPD Scale: Things Getting on Top 1-->no, most of the time has coped quite well   EPD Scale: Difficulty Sleeping 0-->no, not at all   EPD Scale: Sad or Miserable 0-->no, not at all   EPD Scale: Crying 0-->no, never   EPD Scale: Thought of Harming Self 0-->never   [place]  Depression Score 1   Did any of your parents have problems with alcohol or drug use? No   Do any of your peers have problems with alcohol or drug use? No   Does your partner have problems with alcohol or drug use? No   Before you were pregnant did you have problems with alcohol or drug use? (past) No   In the past month, did you drink beer, wine, liquor or use any other drugs? (pregnancy) No   Maternal Screening call completed Yes   Call end time 152              Leisa FERNANDEZ - Registered Nurse

## 2024-10-22 ENCOUNTER — TELEPHONE (OUTPATIENT)
Dept: OBSTETRICS AND GYNECOLOGY | Facility: CLINIC | Age: 34
End: 2024-10-22
Payer: MEDICAID

## 2024-10-22 NOTE — TELEPHONE ENCOUNTER
Hub staff attempted to follow warm transfer process and was unsuccessful     Caller: Esme Herrera    Relationship to patient: Self    Best call back number: [phone] (home)       Patient is needing: PT NEEDS TO RESCHEDULE HER PP APPT WITH DR MOSLEY TOMORROW. SHE COULD DO ANYTIME AFTER 2 TOMORROW OR WOULD NEED A DIFFERENT DAY. [facility] DID NOT HAVE AVAILABILITY WITHIN TIME FRAME.

## 2024-10-23 ENCOUNTER — POSTPARTUM VISIT (OUTPATIENT)
Dept: OBSTETRICS AND GYNECOLOGY | Facility: CLINIC | Age: 34
End: 2024-10-23
Payer: MEDICAID

## 2024-10-23 VITALS
SYSTOLIC BLOOD PRESSURE: 90 MMHG | HEIGHT: 61 IN | BODY MASS INDEX: 25.3 KG/M2 | WEIGHT: 134 LBS | HEART RATE: 65 BPM | DIASTOLIC BLOOD PRESSURE: 59 MMHG

## 2024-10-23 DIAGNOSIS — Z15.89: ICD-10-CM

## 2024-10-23 DIAGNOSIS — G93.0 INTRACRANIAL ARACHNOID CYST: ICD-10-CM

## 2024-10-23 DIAGNOSIS — I42.9 CARDIOMYOPATHY, UNSPECIFIED TYPE: ICD-10-CM

## 2024-10-23 DIAGNOSIS — Z95.1 S/P CABG (CORONARY ARTERY BYPASS GRAFT): ICD-10-CM

## 2024-10-23 NOTE — LETTER
2024     Deandra Dumas MD  [address]  [facility] [address]  [facility] [address]    Patient: Esme Herrera   YOB: 1990   Date of Visit: 10/23/2024     Dear Deandra Dumas MD:     I saw our very kind mutual patient, Ms. Esme Herrera, today for her postpartum visit. Her pregnancy went well.  Today, I encouraged her to start contraception of some sort due to her high risk of complications in future pregnancies, but she declined. She was counseled that other pregnancies may have serious complications.  I was uncertain when or if she should schedule with your office, but we are happy to contact her with that information as well. Thank you so much for your time.      If you have questions, please do not hesitate to call me.         Sincerely,        Geetha Fraser MD        CC: No Recipients    Geetha Fraser MD  10/23/24 1425  Sign when Signing Visit  Chief Complaint   Patient presents with   • Postpartum Care        SUBJECTIVE:   Esme Herrera is a 34 y.o.  who presents s/p  Spontaneous Vaginal Delivery on 2024.  Reports she is doing well.  She would like to have another child as she would like to try for a baby girl  Bowel and bladder function have returned to normal  Reports occasional left sided pain  Had intercourse since delivery with mild discomfort/soreness  Mood changes none, EPDS 4  Breast and formula feeding  Has not yet followed up with Cardiology.  Has appt with Neurology/neurosurgery  No SOA, no chest pain, no leg edema    OB History    Para Term  AB Living   4 3 3   1 3   SAB IAB Ectopic Molar Multiple Live Births   1       0 3      # Outcome Date GA Lbr Jv/2nd Weight Sex Type Anes PTL Lv   4 Term 24 37w6d 02:13 / 00:05 3050 g (6 lb 11.6 oz) M Vag-Spont EPI N GRACE      Birth Comments: LDR5 Panda   3 Term 19   3175 g (7 lb) M Vag-Spont   GRACE   2 Term 16   3175 g (7 lb) M Vag-Spont   GRACE   1 SAB                   Past Medical  History:   Diagnosis Date   • Chronic GERD 07/06/2023   • Congenital syphilis, unspecified 05/06/2016   • Coronary artery dissection 10/14/2022   • Disease of thyroid gland     Hypothyroid   • GERD (gastroesophageal reflux disease)    • Hypotension    • Intermittent palpitations 07/06/2023     Past Surgical History:   Procedure Laterality Date   • CARDIAC CATHETERIZATION N/A 10/15/2022    Procedure: Left Heart Cath;  Surgeon: Deandra Dumas MD;  Location: [facility] CATH INVASIVE LOCATION;  Service: Cardiology;  Laterality: N/A;   • CARDIAC CATHETERIZATION N/A 10/15/2022    Procedure: Coronary angiography;  Surgeon: Deandra Dumas MD;  Location: [facility] CATH INVASIVE LOCATION;  Service: Cardiology;  Laterality: N/A;   • CARDIAC CATHETERIZATION N/A 10/15/2022    Procedure: Left ventriculography;  Surgeon: Deandra Dumas MD;  Location: [facility] CATH INVASIVE LOCATION;  Service: Cardiology;  Laterality: N/A;   • CARDIAC CATHETERIZATION N/A 6/22/2023    Procedure: Coronary angiography;  Surgeon: Deandra Dumas MD;  Location: [facility] CATH INVASIVE LOCATION;  Service: Cardiology;  Laterality: N/A;   • CARDIAC CATHETERIZATION N/A 6/22/2023    Procedure: Left Heart Cath;  Surgeon: Deandra Dumas MD;  Location: [facility] CATH INVASIVE LOCATION;  Service: Cardiology;  Laterality: N/A;   • CARDIAC CATHETERIZATION N/A 6/22/2023    Procedure: Left ventriculography;  Surgeon: Deandra Dumas MD;  Location: [facility] CATH INVASIVE LOCATION;  Service: Cardiology;  Laterality: N/A;   • CORONARY ARTERY BYPASS GRAFT N/A 10/15/2022    Procedure: VERONICA, STERNOTOMY, CORONARY ARTERY BYPASS TIMES 3 WITH INTERNAL MAMMARY ARTERY GRAFT AND LEFT GREATER SAPHENOUS VEIN HARVEST, REPAIR OF CORONARY ARTERY DISSECTION,PRP;  Surgeon: Lakshmi Teixeira MD;  Location: [facility];  Service: Cardiothoracic;  Laterality: N/A;   • ENDOSCOPY N/A 1/12/2023    Procedure: ESOPHAGOGASTRODUODENOSCOPY with bxs;  Surgeon: Nish Quiroz MD;  Location: [facility]  "ENDOSCOPY;  Service: Gastroenterology;  Laterality: N/A;  PRE: Epigastric Pain  POST: Normal     Social History     Tobacco Use   • Smoking status: Never     Passive exposure: Never   • Smokeless tobacco: Never   Vaping Use   • Vaping status: Never Used   Substance Use Topics   • Alcohol use: Never   • Drug use: Never     Family History   Family history unknown: Yes       Patient Active Problem List   Diagnosis   • Spontaneous dissection of coronary artery   • S/P CABG (coronary artery bypass graft)   • Ischemic cardiomyopathy   • Epigastric pain   • Fibromuscular dysplasia of carotid artery   • Intracranial arachnoid cyst   • Migraine without aura and without status migrainosus, not intractable   • Family hx of ALS (amyotrophic lateral sclerosis)   • History of Helicobacter pylori infection   • Hypothyroidism due to Hashimoto's thyroiditis   • Monoallelic mutation of SOD1 gene   • Cardiomyopathy   •  (spontaneous vaginal delivery)        OBJECTIVE:   BP 90/59   Pulse 65   Ht 154.9 cm (60.98\")   Wt 60.8 kg (134 lb)   BMI 25.33 kg/m²    Physical Examination:   General appearance - alert, well appearing, and in no distress  Breasts - normal, no masses bilaterally, no nipple retraction,   Chest - no tachypnea, retractions or cyanosis  Heart - normal rate and regular rhythm  Abdomen - soft, nontender, nondistended, no masses or organomegaly;   Pelvic - normal external genitalia, vulva, vagina, cervix, uterus and adnexa, no bleeding  Neurological - screening mental status exam normal  Musculoskeletal - no joint tenderness, deformity or swelling  Psychiatric - normal mood and affect    Lab Results   Component Value Date    WBC 9.12 2024    HGB 11.6 (L) 2024    HCT 34.4 2024    MCV 89.8 2024     2024        ASSESSMENT:   (Z39.2) Postpartum care and examination    (I42.9) Cardiomyopathy, unspecified type    (Z15.89) Monoallelic mutation of SOD1 gene    (Z95.1) S/P CABG (coronary " artery bypass graft)    (G93.0) Intracranial arachnoid cyst      PLAN:   Doing well postpartum  Baby doing well  Contraception - I counseled the patient that I would strongly encourage contraception due to her cardiac history.  She declines contraception at this time. I counseled her that even though this pregnancy went well there is no guarantee that subsequent pregnancies would be without complication, risk of her life, risk to the pregnancy/baby.  She expressed understanding of the serious nature and will reach out to us if she desires to start contraception.   At this time, may resume normal activity including intercourse and lifting.  Reviewed normal precautions.  Reviewed last pap smear Jan 2024 NIL, HPV negative  Recommended follow up for annual exam or sooner with problems

## 2024-10-23 NOTE — PROGRESS NOTES
Chief Complaint   Patient presents with    Postpartum Care        SUBJECTIVE:   Esme Herrera is a 34 y.o.  who presents s/p  Spontaneous Vaginal Delivery on 2024.  Reports she is doing well.  She would like to have another child as she would like to try for a baby girl  Bowel and bladder function have returned to normal  Reports occasional left sided pain  Had intercourse since delivery with mild discomfort/soreness  Mood changes none, EPDS 4  Breast and formula feeding  Has not yet followed up with Cardiology.  Has appt with Neurology/neurosurgery  No SOA, no chest pain, no leg edema    OB History    Para Term  AB Living   4 3 3   1 3   SAB IAB Ectopic Molar Multiple Live Births   1       0 3      # Outcome Date GA Lbr Jv/2nd Weight Sex Type Anes PTL Lv   4 Term 24 37w6d 02:13 / 00:05 3050 g (6 lb 11.6 oz) M Vag-Spont EPI N GRACE      Birth Comments: LDR5 Panda   3 Term 19   3175 g (7 lb) M Vag-Spont   GRACE   2 Term 16   3175 g (7 lb) M Vag-Spont   GRACE   1 SAB                   Past Medical History:   Diagnosis Date    Chronic GERD 2023    Congenital syphilis, unspecified 2016    Coronary artery dissection 10/14/2022    Disease of thyroid gland     Hypothyroid    GERD (gastroesophageal reflux disease)     Hypotension     Intermittent palpitations 2023     Past Surgical History:   Procedure Laterality Date    CARDIAC CATHETERIZATION N/A 10/15/2022    Procedure: Left Heart Cath;  Surgeon: Deandra Dumas MD;  [facility];  Service: Cardiology;  Laterality: N/A;    CARDIAC CATHETERIZATION N/A 10/15/2022    Procedure: Coronary angiography;  Surgeon: Deandra Dumas MD;  Location:  [facility] CATH INVASIVE LOCATION;  Service: Cardiology;  Laterality: N/A;    CARDIAC CATHETERIZATION N/A 10/15/2022    Procedure: Left ventriculography;  Surgeon: Deandra Dumas MD;  Location:  [facility] CATH INVASIVE LOCATION;  Service: Cardiology;   Laterality: N/A;    CARDIAC CATHETERIZATION N/A 2023    Procedure: Coronary angiography;  Surgeon: Deandra Dumas MD;  Location: [facility] CATH INVASIVE LOCATION;  Service: Cardiology;  Laterality: N/A;    CARDIAC CATHETERIZATION N/A 2023    Procedure: Left Heart Cath;  Surgeon: Deandra Dumas MD;  Location: [facility] CATH INVASIVE LOCATION;  Service: Cardiology;  Laterality: N/A;    CARDIAC CATHETERIZATION N/A 2023    Procedure: Left ventriculography;  Surgeon: Deandra Dumas MD;  Location: [facility] CATH INVASIVE LOCATION;  Service: Cardiology;  Laterality: N/A;    CORONARY ARTERY BYPASS GRAFT N/A 10/15/2022    Procedure: VERONICA, STERNOTOMY, CORONARY ARTERY BYPASS TIMES 3 WITH INTERNAL MAMMARY ARTERY GRAFT AND LEFT GREATER SAPHENOUS VEIN HARVEST, REPAIR OF CORONARY ARTERY DISSECTION,PRP;  Surgeon: Lakshmi Teixeira MD;  Location: [facility] CVOR;  Service: Cardiothoracic;  Laterality: N/A;    ENDOSCOPY N/A 2023    Procedure: ESOPHAGOGASTRODUODENOSCOPY with bxs;  Surgeon: Nish Quiroz MD;  Location: [facility] ENDOSCOPY;  Service: Gastroenterology;  Laterality: N/A;  PRE: Epigastric Pain  POST: Normal     Social History     Tobacco Use    Smoking status: Never     Passive exposure: Never    Smokeless tobacco: Never   Vaping Use    Vaping status: Never Used   Substance Use Topics    Alcohol use: Never    Drug use: Never     Family History   Family history unknown: Yes       Patient Active Problem List   Diagnosis    Spontaneous dissection of coronary artery    S/P CABG (coronary artery bypass graft)    Ischemic cardiomyopathy    Epigastric pain    Fibromuscular dysplasia of carotid artery    Intracranial arachnoid cyst    Migraine without aura and without status migrainosus, not intractable    Family hx of ALS (amyotrophic lateral sclerosis)    History of Helicobacter pylori infection    Hypothyroidism due to Hashimoto's thyroiditis    Monoallelic mutation of SOD1 gene    Cardiomyopathy      "(spontaneous vaginal delivery)        OBJECTIVE:   BP 90/59   Pulse 65   Ht 154.9 cm (60.98\")   Wt 60.8 kg (134 lb)   BMI 25.33 kg/m²    Physical Examination:   General appearance - alert, well appearing, and in no distress  Breasts - normal, no masses bilaterally, no nipple retraction,   Chest - no tachypnea, retractions or cyanosis  Heart - normal rate and regular rhythm  Abdomen - soft, nontender, nondistended, no masses or organomegaly;   Pelvic - normal external genitalia, vulva, vagina, cervix, uterus and adnexa, no bleeding  Neurological - screening mental status exam normal  Musculoskeletal - no joint tenderness, deformity or swelling  Psychiatric - normal mood and affect    Lab Results   Component Value Date    WBC 9.12 07/26/2024    HGB 11.6 (L) 07/26/2024    HCT 34.4 07/26/2024    MCV 89.8 07/26/2024     07/26/2024        ASSESSMENT:   (Z39.2) Postpartum care and examination    (I42.9) Cardiomyopathy, unspecified type    (Z15.89) Monoallelic mutation of SOD1 gene    (Z95.1) S/P CABG (coronary artery bypass graft)    (G93.0) Intracranial arachnoid cyst      PLAN:   Doing well postpartum  Baby doing well  Contraception - I counseled the patient that I would strongly encourage contraception due to her cardiac history.  She declines contraception at this time. I counseled her that even though this pregnancy went well there is no guarantee that subsequent pregnancies would be without complication, risk of her life, risk to the pregnancy/baby.  She expressed understanding of the serious nature and will reach out to us if she desires to start contraception.   At this time, may resume normal activity including intercourse and lifting.  Reviewed normal precautions.  Reviewed last pap smear Jan 2024 NIL, HPV negative  Recommended follow up for annual exam or sooner with problems      "

## 2024-11-25 ENCOUNTER — HOSPITAL ENCOUNTER (OUTPATIENT)
Facility: HOSPITAL | Age: 34
Discharge: HOME | End: 2024-11-25
Admitting: NEUROLOGICAL SURGERY
Payer: MEDICAID

## 2024-11-25 DIAGNOSIS — G93.0 ARACHNOID CYST: ICD-10-CM

## 2024-11-25 DIAGNOSIS — I77.3 FIBROMUSCULAR DYSPLASIA OF CAROTID ARTERY: ICD-10-CM

## 2024-11-25 PROCEDURE — 70496 CT ANGIOGRAPHY HEAD: CPT

## 2024-11-25 PROCEDURE — 25510000001 IOPAMIDOL PER 1 ML: Performed by: NEUROLOGICAL SURGERY

## 2024-11-25 PROCEDURE — 70498 CT ANGIOGRAPHY NECK: CPT

## 2024-11-25 RX ORDER — IOPAMIDOL 755 MG/ML
100 INJECTION, SOLUTION INTRAVASCULAR
Status: COMPLETED | OUTPATIENT
Start: 2024-11-25 | End: 2024-11-25

## 2024-11-25 RX ADMIN — IOPAMIDOL 95 ML: 755 INJECTION, SOLUTION INTRAVENOUS at 13:11

## 2024-12-04 NOTE — PROGRESS NOTES
Subjective   History of Present Illness: Esme Herrera is a 34 y.o. female is here today for follow-up for   Arachnoid cyst  Fibromuscular dysplasia of carotid artery   CT head and neck and CTA carotids 11-   She is doing well today.  No new complaints.  Denies any changes in the frequency or severity of her headaches.  Denies any changes in vision.  Denies any strokelike episodes.  Denies any changes in strength or sensation.    History of Present Illness    The following portions of the patient's history were reviewed and updated as appropriate: allergies, current medications, past family history, past medical history, past social history, past surgical history, and problem list.    Past Medical History:   Diagnosis Date    Chronic GERD 07/06/2023    Congenital syphilis, unspecified 05/06/2016    Coronary artery dissection 10/14/2022    Disease of thyroid gland     Hypothyroid    GERD (gastroesophageal reflux disease)     Hypotension     Intermittent palpitations 07/06/2023        Past Surgical History:   Procedure Laterality Date    CARDIAC CATHETERIZATION N/A 10/15/2022    Procedure: Left Heart Cath;  Surgeon: Deandra Dumas MD;  [facility];  Service: Cardiology;  Laterality: N/A;    CARDIAC CATHETERIZATION N/A 10/15/2022    Procedure: Coronary angiography;  Surgeon: Deandra Dumas MD;  [facility];  Service: Cardiology;  Laterality: N/A;    CARDIAC CATHETERIZATION N/A 10/15/2022    Procedure: Left ventriculography;  Surgeon: Deandra Dumas MD;  [facility];  Service: Cardiology;  Laterality: N/A;    CARDIAC CATHETERIZATION N/A 6/22/2023    Procedure: Coronary angiography;  Surgeon: Deandra Dumas MD;  Location:  [facility] CATH INVASIVE LOCATION;  Service: Cardiology;  Laterality: N/A;    CARDIAC CATHETERIZATION N/A 6/22/2023    Procedure: Left Heart Cath;  Surgeon: Deandra Dumas MD;  [facility] C[facility]  "INVASIVE LOCATION;  Service: Cardiology;  Laterality: N/A;    CARDIAC CATHETERIZATION N/A 6/22/2023    Procedure: Left ventriculography;  Surgeon: Deandra Dumas MD;  Location: [facility] CATH INVASIVE LOCATION;  Service: Cardiology;  Laterality: N/A;    CORONARY ARTERY BYPASS GRAFT N/A 10/15/2022    Procedure: VERONICA, STERNOTOMY, CORONARY ARTERY BYPASS TIMES 3 WITH INTERNAL MAMMARY ARTERY GRAFT AND LEFT GREATER SAPHENOUS VEIN HARVEST, REPAIR OF CORONARY ARTERY DISSECTION,PRP;  Surgeon: Lakshmi Teixeira MD;  Location: [facility] CVOR;  Service: Cardiothoracic;  Laterality: N/A;    ENDOSCOPY N/A 1/12/2023    Procedure: ESOPHAGOGASTRODUODENOSCOPY with bxs;  Surgeon: Nish Quiroz MD;  Location: [facility] ENDOSCOPY;  Service: Gastroenterology;  Laterality: N/A;  PRE: Epigastric Pain  POST: Normal          Current Outpatient Medications:     aspirin 81 MG EC tablet, Take 1 tablet by mouth Daily., Disp: , Rfl:     levothyroxine (SYNTHROID, LEVOTHROID) 50 MCG tablet, Take 1 tablet by mouth Daily (Monday-Friday)., Disp: , Rfl:     Prenatal Vit-Fe Fumarate-FA (Prenatal 27-1) 27-1 MG tablet tablet, Take 1 tablet by mouth Daily., Disp: 90 tablet, Rfl: 4     No Known Allergies     Social History     Socioeconomic History    Marital status:    Tobacco Use    Smoking status: Never     Passive exposure: Never    Smokeless tobacco: Never   Vaping Use    Vaping status: Never Used   Substance and Sexual Activity    Alcohol use: Never    Drug use: Never    Sexual activity: Yes     Partners: Male        Family History   Family history unknown: Yes        Review of Systems   Neurological:  Positive for dizziness.   All other systems reviewed and are negative.      Objective     Vitals:    12/09/24 1542   BP: 118/70   Pulse: 68   Temp: 98.9 °F (37.2 °C)   SpO2: 98%   Weight: 61.2 kg (135 lb)   Height: 154 cm (60.63\")     Body mass index is 25.82 kg/m².      Physical Exam  Awake, alert, oriented x3  Face symmetric  Speech is fluent and " clear  Motor exam  Bilateral deltoids 5/5, bilateral biceps 5/5, bilateral triceps 5/5, bilateral wrist extension 5/5 bilateral hand  5/5  Bilateral hip flexion 5/5, bilateral knee extension 5/5, bilateral DF/PF 5/5  No clonus  No Beltran's reflex  Steady normal gait  Able to detect  light touch in all 4 extremities          Assessment & Plan   Independent Review of Radiographic Studies:      I personally reviewed the images from the following studies.  CTA head and neck from 2024 and 2023  The follow-up CTA images show no evidence of severe intracranial or extracranial stenosis.  No significant change of the mild FMD changes.  No significant change of the left sided arachnoid cyst    Medical Decision Makin-year-old female with an incidentally found arachnoid cyst in the left middle cranial fossa and mild bilateral FMD changes within the carotid arteries  -The follow-up CTA images show no change.  She is doing well today.  Denies any strokelike symptoms.  -I will plan to see her back in 2 years with a repeat CTA to evaluate for any change  Diagnoses and all orders for this visit:    1. Arachnoid cyst (Primary)  -     CT Angiogram Head With Contrast; Future  -     CT angiogram carotids; Future    2. Fibromuscular dysplasia of carotid artery  -     CT Angiogram Head With Contrast; Future  -     CT angiogram carotids; Future    3. Other headache syndrome  -     CT Angiogram Head With Contrast; Future  -     CT angiogram carotids; Future      Return in about 2 years (around 2026).  I spent 30 minutes reviewing the medical record, reviewing the CTA images, discussing the management of arachnoid cyst, discussing the management of carotid stenosis and FMD changes

## 2024-12-06 ENCOUNTER — OFFICE VISIT (OUTPATIENT)
Age: 34
End: 2024-12-06
Payer: MEDICAID

## 2024-12-06 VITALS
HEART RATE: 66 BPM | SYSTOLIC BLOOD PRESSURE: 138 MMHG | OXYGEN SATURATION: 99 % | DIASTOLIC BLOOD PRESSURE: 78 MMHG | BODY MASS INDEX: 25.63 KG/M2 | WEIGHT: 135.6 LBS

## 2024-12-06 DIAGNOSIS — I25.42 SPONTANEOUS DISSECTION OF CORONARY ARTERY: Primary | ICD-10-CM

## 2024-12-06 DIAGNOSIS — Z95.1 S/P CABG (CORONARY ARTERY BYPASS GRAFT): ICD-10-CM

## 2024-12-06 DIAGNOSIS — I25.5 ISCHEMIC CARDIOMYOPATHY: ICD-10-CM

## 2024-12-06 DIAGNOSIS — I77.3 FIBROMUSCULAR DYSPLASIA OF CAROTID ARTERY: ICD-10-CM

## 2024-12-06 DIAGNOSIS — E06.3 HYPOTHYROIDISM DUE TO HASHIMOTO'S THYROIDITIS: ICD-10-CM

## 2024-12-06 PROCEDURE — 1160F RVW MEDS BY RX/DR IN RCRD: CPT | Performed by: INTERNAL MEDICINE

## 2024-12-06 PROCEDURE — 99214 OFFICE O/P EST MOD 30 MIN: CPT | Performed by: INTERNAL MEDICINE

## 2024-12-06 PROCEDURE — 1159F MED LIST DOCD IN RCRD: CPT | Performed by: INTERNAL MEDICINE

## 2024-12-06 PROCEDURE — 93000 ELECTROCARDIOGRAM COMPLETE: CPT | Performed by: INTERNAL MEDICINE

## 2024-12-06 RX ORDER — ASPIRIN 81 MG/1
81 TABLET ORAL DAILY
COMMUNITY

## 2024-12-06 NOTE — PROGRESS NOTES
Subjective:     Encounter Date:12/06/2024      Patient ID: Esme Herrera is a 34 y.o. female.    Chief Complaint:  History of Present Illness    This is a 34-year-old with spontaneous coronary artery dissection involving her distal left main, LAD, ramus intermedius, and left circumflex artery requiring CABG x 3, ischemic cardiomyopathy, arachnoid cyst, fibromuscular dysplasia of the bilateral carotid arteries and dominant left vertebral artery, who presents for follow-up.        I saw the patient last in 12/2023.  At that office visit she informing that she was pregnant after taking a home pregnancy test.  She had not yet established care with an obstetrician at that point.  She was otherwise doing well from a cardiac standpoint.  I reached out to her obstetrician Dr. Fraser and recommended a referral to [facility].    She return in 3/2024 and was seen by [facility] Angel.  She was doing well at that office visit without any significant cardiac issues.    It appears that she did undergo repeat echocardiogram in 6/2024 which showed low normal left ventricular function with a EF of 46 to 50%, apical wall motion abnormalities and no Simkin valvular disease.  Overall the findings were unchanged compared to her prior study.    She presented for induction of labor in 7/2024.  She was seen by Dr. Rosas during that admission.  She was 29 was 7 weeks pregnant at the time.  She denied any significant cardiac complaints at that point.  Fortunately she delivered on 7/25/2024 without any issues.    This is her first office visit since then.  Overall she is doing well.  Fortunately her baby boy who is now about 4 months old is sleeping pretty well.  She denies any shortness of breath, chest pain, palpitations, orthopnea, near-syncope or syncope or lower extremity swelling.  She is taking the aspirin although sometimes she forgets to take it.    It appears that she did undergo repeat CT angiogram of the head and neck on  11/25/2024.  This showed mild irregularity of the bilateral internal carotid arteries more prominent on the right and irregularity of the dominant left vertebral artery  which continue to appear consistent with fibromuscular dysplasia and appeared unchanged in appearance.  Arachnoid cyst also appeared to be stable.       Prior History:  I initially saw the patient after she presented to the hospital on 10/14/2022 with complaints of severe substernal chest pain which radiated to her back.  Symptoms started at rest and occurred abruptly.  The pain lasted for a while before resolving on its own.  She ended up going to the emergency room due to the severity of her symptoms.  By the time she got to the emergency room her symptoms had resolved shortly after.  She reported that she had had a similar episode about a day prior that was not as severe but did last longer.  Episodes mainly occurred at rest and were not associated with any diaphoresis or nausea.  She did report associated shortness of breath.  Her initial EKG following her arrival showed mild ST elevation in 1 and aVL and inferior ST depression.  Repeat EKG in the ER showed resolution of her ST changes.  However her initial troponin was elevated at 1.180.  D-dimer was elevated at 2.  proBNP was mildly elevated.  A CT angiogram of the chest showed no evidence of pulmonary embolism.     Following her admission she was started on heparin infusion.  She remained pain-free even with exertion.  Repeat troponins were trending down.  Repeat EKG showed development of anterior T wave changes.  She denies any prior medical history, tobacco or drug use, or any family history of heart disease.  She denied any increased recent stressors.     The patient underwent cardiac catheterization on 10/15/2022.  This showed spontaneous coronary artery dissection involving the distal left main, left circumflex, left anterior descending, and ramus intermedius branches along with  thrombus.  There appeared to be at most SHERLEY I flow distally.  Her right coronary artery appeared to be completely normal.  Left ventriculogram showed akinesis of the mid to distal anterior wall and apex with an EF of about 30 to 35%.  Due to the extent of her spontaneous coronary artery dissection and poor distal flow cardiothoracic surgery was consulted since PCI was not a good option.  She was evaluated by Dr. Teixeira and subsequently taken emergently for CABG x3 with LIMA to LAD, saphenous vein graft to an OM1, and saphenous vein graft to the ramus intermedius.  Postoperatively she progressed slowly.  She had issues with persistent hypotension that eventually resolved.  We were unable to start her on any guideline directed management for her scad or cardiomyopathy outside of aspirin.  An echocardiogram performed on 10/16/2022 showed mildly dilated left ventricle with an EF of 26 to 30% and anterior and apical wall motion abnormalities.  A repeat echocardiogram on 10/19/2022 was performed due to persistent hypotension which showed an EF of 30 to 35% again anterior and apical wall motion abnormalities and swirling of contrast in the apex but no evidence of thrombus.     The patient was finally discharged on 11/2/2022 on aspirin and clopidogrel for 1 month.     In follow-up on 11/8/2022 the patient reported that she was slowly improving.  Blood pressure is little bit better so I tried to start her on low-dose of metoprolol succinate 12.5 mg daily.  I recommended that she complete her month-long course of clopidogrel and then discontinue it.  I also recommended proceeding with a repeat echocardiogram to ensure that she had not developed any evidence of left ventricular apical thrombus and to see if there is any improvement in her LV function.       She underwent a repeat echocardiogram on 11/16/2022 which showed improvement of her left ventricular systolic function to an EF of 52.3% although she continued to have  akinesis of her apex.  Otherwise she had normal diastolic function, normal valvular function, and normal right ventricular systolic pressure.     Shortly after that she was admitted to the observation unit on 11/19/2022 with chest pain and lightheadedness.  Felt that the pain was atypical.  Troponins and were normal and EKG was unchanged.  Her metoprolol succinate was discontinued since she was not tolerating it.  No other changes were made to her management.     She underwent genetic testing as further work-up for her history of SCAD by another provider.  Results of this are unknown.      In 6/2023 she appeared to be doing well and I did not make any changes to her management.  I did recommend proceeding with a CT angiogram of the head and neck and a renal artery ultrasound to look for further evidence of fibromuscular dysplasia as an underlying cause of her spontaneous coronary artery dissection.     Soon after that patient's office visit she ended up returning to the hospital on 6/22/2023 with complaints of chest pain.  She began having episodes of back pain associated with chest tightness and shortness of breath that would last several hours at a time and were occurring on a daily basis.  The episodes started to become longer at which point she opted to come to the emergency room.  She did feel like the symptoms are somewhat similar to her presentation in 10/2022.  Her work-up was unremarkable including normal troponins and stable EKG findings.  She was incidentally noted to have mildly elevated liver enzymes and a low TSH.       Due to her symptoms I opted to proceed with a cardiac catheterization which showed healing of her spontaneous coronary artery dissection with no evidence of any residual dissection, atretic LIMA to LAD, patent saphenous vein graft to the ramus intermedius, and a patent saphenous vein graft to the first obtuse marginal branch.  She was also noted to have chronic occlusion of a very small  caliber distal LAD that was filled by collaterals from her diagonal branch.  LV function appeared to be preserved with stable appearing apical wall motion abnormalities.       Work-up of her elevated liver enzymes were unremarkable including a liver ultrasound that did show evidence of gallbladder sludge but no cholecystitis or gallstones.  Felt that her chest pain was likely due to a gastrointestinal issue and recommended follow-up with Dr. Quiroz who she saw in the past.       She did undergo CT angiogram of the head and neck and renal artery ultrasound in 7/2023.  Her CT showed evidence of mild irregularity involving the cervical bilateral internal carotid arteries which appears more prominent on the right.  There is also evidence of minimal irregularity involving the dominant left vertebral artery.  These findings appear to be consistent with fibromuscular dysplasia.  Incidentally she was also noted to have a large arachnoid cyst involving the left middle cranial fossa and measuring 4.5 x 3.7 x 2.6 cm in size.   Renal artery ultrasound was normal.     Following her last office visit with me in 7/2023 she was referred to neurosurgery regarding her intracranial arachnoid cyst.  Recommended carotid artery ultrasound for further evaluation of her carotid arteries.     Following that office visit she was evaluated by neurosurgery in 9/2023.  Recommended MRI for follow-up to evaluate for any solid components on evidence of mass effect.  Carotid ultrasound was also recommended.  She underwent the MRI in 11/2023 and it showed stable appearing arachnoid cyst with no evidence of mass effect.  She was seen in follow-up by Dr. Sheppard on 12/8/2023.  He recommended repeat CTA of the head and neck peripheral follow-up of the arachnoid cyst and her FMD.     She was evaluated by neurology in 10/2023 in regards to her headaches.  She was diagnosed with migraine and started on therapy for this.     She is also since been evaluated  by gastroenterology for ongoing postprandial upper abdominal pain.  She underwent a HIDA scan that showed decreased ejection fraction of the gallbladder.  She was referred to Dr. Donnell Donovan in 11/2023 by which point her symptoms had resolved.  He did not recommend any further intervention at that time.       Review of Systems   Constitutional: Negative for malaise/fatigue.   HENT:  Negative for hearing loss, hoarse voice, nosebleeds and sore throat.    Eyes:  Negative for pain.   Cardiovascular:  Negative for chest pain, claudication, cyanosis, dyspnea on exertion, irregular heartbeat, leg swelling, near-syncope, orthopnea, palpitations, paroxysmal nocturnal dyspnea and syncope.   Respiratory:  Negative for shortness of breath and snoring.    Endocrine: Negative for cold intolerance, heat intolerance, polydipsia, polyphagia and polyuria.   Skin:  Negative for itching and rash.   Musculoskeletal:  Negative for arthritis, falls, joint pain, joint swelling, muscle cramps, muscle weakness and myalgias.   Gastrointestinal:  Negative for constipation, diarrhea, dysphagia, heartburn, hematemesis, hematochezia, melena, nausea and vomiting.   Genitourinary:  Negative for frequency, hematuria and hesitancy.   Neurological:  Negative for excessive daytime sleepiness, dizziness, headaches, light-headedness, numbness and weakness.   Psychiatric/Behavioral:  Negative for depression. The patient is not nervous/anxious.          Current Outpatient Medications:     levothyroxine (SYNTHROID, LEVOTHROID) 50 MCG tablet, Take 1 tablet by mouth Daily (Monday-Friday)., Disp: , Rfl:     Prenatal Vit-Fe Fumarate-FA (Prenatal 27-1) 27-1 MG tablet tablet, Take 1 tablet by mouth Daily., Disp: 90 tablet, Rfl: 4    aspirin 81 MG EC tablet, Take 1 tablet by mouth Daily., Disp: , Rfl:     Past Medical History:   Diagnosis Date    Chronic GERD 07/06/2023    Congenital syphilis, unspecified 05/06/2016    Coronary artery dissection 10/14/2022     Disease of thyroid gland     Hypothyroid    GERD (gastroesophageal reflux disease)     Hypotension     Intermittent palpitations 07/06/2023       Past Surgical History:   Procedure Laterality Date    CARDIAC CATHETERIZATION N/A 10/15/2022    Procedure: Left Heart Cath;  Surgeon: Deandra Dumas MD;  Location: [facility] CATH INVASIVE LOCATION;  Service: Cardiology;  Laterality: N/A;    CARDIAC CATHETERIZATION N/A 10/15/2022    Procedure: Coronary angiography;  Surgeon: Deandra Dumas MD;  Location: [facility] CATH INVASIVE LOCATION;  Service: Cardiology;  Laterality: N/A;    CARDIAC CATHETERIZATION N/A 10/15/2022    Procedure: Left ventriculography;  Surgeon: Deandra Dumas MD;  Location: [facility] CATH INVASIVE LOCATION;  Service: Cardiology;  Laterality: N/A;    CARDIAC CATHETERIZATION N/A 6/22/2023    Procedure: Coronary angiography;  Surgeon: Deandra Dumas MD;  Location: [facility] CATH INVASIVE LOCATION;  Service: Cardiology;  Laterality: N/A;    CARDIAC CATHETERIZATION N/A 6/22/2023    Procedure: Left Heart Cath;  Surgeon: Deandra Dumas MD;  Location: [facility] CATH INVASIVE LOCATION;  Service: Cardiology;  Laterality: N/A;    CARDIAC CATHETERIZATION N/A 6/22/2023    Procedure: Left ventriculography;  Surgeon: Deandra Dumas MD;  Location: [facility] CATH INVASIVE LOCATION;  Service: Cardiology;  Laterality: N/A;    CORONARY ARTERY BYPASS GRAFT N/A 10/15/2022    Procedure: VERONICA, STERNOTOMY, CORONARY ARTERY BYPASS TIMES 3 WITH INTERNAL MAMMARY ARTERY GRAFT AND LEFT GREATER SAPHENOUS VEIN HARVEST, REPAIR OF CORONARY ARTERY DISSECTION,PRP;  Surgeon: Lakshmi Teixeira MD;  Location: [facility] CVOR;  Service: Cardiothoracic;  Laterality: N/A;    ENDOSCOPY N/A 1/12/2023    Procedure: ESOPHAGOGASTRODUODENOSCOPY with bxs;  Surgeon: Nish Quiroz MD;  Location: [facility] ENDOSCOPY;  Service: Gastroenterology;  Laterality: N/A;  PRE: Epigastric Pain  POST: Normal       Family History   Family history unknown: Yes       Social  History     Tobacco Use    Smoking status: Never     Passive exposure: Never    Smokeless tobacco: Never   Vaping Use    Vaping status: Never Used   Substance Use Topics    Alcohol use: Never    Drug use: Never         ECG 12 Lead    Date/Time: 12/6/2024 2:21 PM  Performed by: Deandra Dumas MD    Authorized by: Deandra Dumas MD  Comparison: compared with previous ECG   Similar to previous ECG  Rhythm: sinus rhythm  T inversion: V2, V3 and V4             Objective:     Visit Vitals  /78 (BP Location: Left arm, Patient Position: Sitting, Cuff Size: Adult)   Pulse 66   Wt 61.5 kg (135 lb 9.6 oz)   SpO2 99%   BMI 25.63 kg/m²         Constitutional:       Appearance: Normal appearance. Well-developed.   Eyes:      General: Lids are normal.      Conjunctiva/sclera: Conjunctivae normal.      Pupils: Pupils are equal, round, and reactive to light.   HENT:      Head: Normocephalic and atraumatic.   Neck:      Vascular: No carotid bruit or JVD.      Lymphadenopathy: No cervical adenopathy.   Pulmonary:      Effort: Pulmonary effort is normal.      Breath sounds: Normal breath sounds.   Cardiovascular:      Normal rate. Regular rhythm.      No gallop.    Pulses:     Radial: 2+ bilaterally.  Edema:     Peripheral edema absent.   Abdominal:      Palpations: Abdomen is soft.   Musculoskeletal:      Cervical back: Full passive range of motion without pain, normal range of motion and neck supple. Skin:     General: Skin is warm and dry.   Neurological:      Mental Status: Alert and oriented to person, place, and time.           Assessment:          Diagnosis Plan   1. Spontaneous dissection of coronary artery        2. S/P CABG (coronary artery bypass graft)        3. Ischemic cardiomyopathy        4. Fibromuscular dysplasia of carotid artery        5. Hypothyroidism due to Hashimoto's thyroiditis               Plan:       1.  Spontaneous coronary artery dissection.  Involving her left main artery and extending anterior  LAD and left circumflex artery.  Required emergent CABG.  Her last cardiac catheterization showed a patent unhealed  Her LV function is low normal.  She continues to have anteroapical T wave changes on her EKG and apical akinesis on her echocardiograms that have been stable.However distal LAD was filled by collaterals. dissection. the bypass or which may be related to  She did have evidence of a distal LAD occlusion of a very small caliber vessel SVG to OM and SVG to ramus intermedius and an atretic appearing LIMA to LAD.  Continue aspirin.  2.  Ischemic 3.  Cardiomyopathy.  Her EF has returned to low normal.  She is continues to have apical wall motion abnormalities.  Blood pressures are too low to tolerate any guideline directed management.  3.  Fibromuscular dysplasia of the carotid arteries and left vertebral artery.  Stable on most recent CT angiogram of the head and neck.  Followed by neurosurgery.  She has follow-up with Dr. Sheppard next week.  4.  Arachnoid cyst.  Stable on recent CT.  Has follow-up with neurosurgery next week.    I will plan on seeing the patient back again in 6 months.

## 2024-12-09 ENCOUNTER — OFFICE VISIT (OUTPATIENT)
Dept: NEUROSURGERY | Facility: CLINIC | Age: 34
End: 2024-12-09
Payer: MEDICAID

## 2024-12-09 VITALS
HEIGHT: 61 IN | WEIGHT: 135 LBS | OXYGEN SATURATION: 98 % | DIASTOLIC BLOOD PRESSURE: 70 MMHG | TEMPERATURE: 98.9 F | SYSTOLIC BLOOD PRESSURE: 118 MMHG | BODY MASS INDEX: 25.49 KG/M2 | HEART RATE: 68 BPM

## 2024-12-09 DIAGNOSIS — G44.89 OTHER HEADACHE SYNDROME: ICD-10-CM

## 2024-12-09 DIAGNOSIS — I77.3 FIBROMUSCULAR DYSPLASIA OF CAROTID ARTERY: ICD-10-CM

## 2024-12-09 DIAGNOSIS — G93.0 ARACHNOID CYST: Primary | ICD-10-CM

## 2024-12-09 PROCEDURE — 99214 OFFICE O/P EST MOD 30 MIN: CPT | Performed by: NEUROLOGICAL SURGERY

## 2024-12-23 ENCOUNTER — PRIOR AUTHORIZATION (OUTPATIENT)
Dept: NEUROLOGY | Facility: CLINIC | Age: 34
End: 2024-12-23
Payer: MEDICAID

## 2024-12-23 NOTE — TELEPHONE ENCOUNTER
Ameena NARAYANAN request for pts ubrelvy- pt needs appt for further assistance   INTERVENTIONAL RADIOLOGY LOCAL ASSESSMENT NOTE:     Patient: Jatin Cobos Date: 11/15/2018   : 1945 Attending: Virgie Conn MD   73 year old male          PROCEDURE ASSESSMENT- LOCAL ANESTHESIA     Preop Diagnosis:    1. Cellulitis of groin    2. Shortness of breath    3. 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 Anemia, unspecified type        Indications for Procedure: Pleural effusion     Planned Procedure: Thoracentesis, right    Planned Anesthetic:  local     MEDICAL HISTORY / COMORBID CONDITIONS:  Reviewed. See below.     Normal mental status:   yes     EXAMINATION PERTINENT TO PROCEDURE BEING PERFORMED  Evaluation of operative site: Examination of operative site completed, WNL.      OTHER FINDINGS  Reviewed current medications and allergies yes     PERTINENT LAB / DIAGNOSTIC TESTSPERTINENT LAB / DIAGNOSTIC TESTS  Reviewed. See below.     INFORMED CONSENT  Consent obtained: yes  Risks / benefits, complications, and alternatives explained, questions answered and patient / personal representative agrees to:  procedure listed above and anesthetic listed above     Александр Cooper MD           INFORMATION REVIEWED  Past Medical History:   Diagnosis Date   • Anemia    • Asthma     seasonal   • Bacteremia 3/13/2013   • BPH (benign prostatic hypertrophy)    • Cellulitis of groin 3/12/2013   • Depression    • Hyperglycemia    • Kidney stones    • Lymphoma (CMS/HCC)     mantle cell lymphoma   • Other and unspecified hyperlipidemia    • Phlebitis and thrombophlebitis of other deep vessels of lower extremity, bilateral (CMS/HCC)    • Right ureteral stone 2014   • Thrombocytopenia, unspecified (CMS/HCC) 03/15/2013    R/T Chemo for mantle cell lymphoma (?)   • Type II or unspecified type diabetes mellitus without mention of complication, not stated as uncontrolled      Patient Active Problem List   Diagnosis   • Unspecified asthma(493.90)   • Benign prostatic  hyperplasia   • Depression   • Type II diabetes mellitus, uncontrolled (CMS/[facility])   • Other and unspecified hyperlipidemia   • Mantle cell lymphoma (CMS/[facility])   • Anemia, unspecified   • Phlebitis and thrombophlebitis of other deep vessels of lower extremities   • Chemotherapy induced neutropenia (CMS/[facility])   • Closed fracture of sternum with retrosternal contusion   • Fracture of ribs, two, closed, right, initial encounter   • ARF (acute renal failure) (CMS/[facility])      Pertinent Labs  PROTIME (sec)   Date Value   11/13/2018 11.1      INR (no units)   Date Value   11/13/2018 1.1     WBC (K/mcL)   Date Value   11/15/2018 5.5     RBC (mil/mcL)   Date Value   11/15/2018 2.79 (L)     HCT (%)   Date Value   11/15/2018 24.7 (L)     HGB (g/dL)   Date Value   11/15/2018 7.8 (L)     PLT (K/mcL)   Date Value   11/15/2018 116 (L)   04/26/2013 274

## 2025-02-26 RX ORDER — PNV,CALCIUM 72/IRON/FOLIC ACID 27 MG-1 MG
1 TABLET ORAL DAILY
Qty: 90 TABLET | Refills: 4 | Status: SHIPPED | OUTPATIENT
Start: 2025-02-26

## 2025-03-07 ENCOUNTER — HOSPITAL ENCOUNTER (EMERGENCY)
Facility: HOSPITAL | Age: 35
Discharge: HOME | End: 2025-03-07
Attending: EMERGENCY MEDICINE
Payer: MEDICAID

## 2025-03-07 ENCOUNTER — APPOINTMENT (OUTPATIENT)
Dept: GENERAL RADIOLOGY | Facility: HOSPITAL | Age: 35
End: 2025-03-07
Payer: MEDICAID

## 2025-03-07 VITALS
DIASTOLIC BLOOD PRESSURE: 54 MMHG | HEART RATE: 99 BPM | TEMPERATURE: 100.1 F | SYSTOLIC BLOOD PRESSURE: 109 MMHG | RESPIRATION RATE: 18 BRPM | OXYGEN SATURATION: 97 %

## 2025-03-07 DIAGNOSIS — B34.9 ACUTE VIRAL SYNDROME: Primary | ICD-10-CM

## 2025-03-07 LAB
FLUAV RNA RESP QL NAA+PROBE: NOT DETECTED
FLUBV RNA RESP QL NAA+PROBE: NOT DETECTED
RSV RNA RESP QL NAA+PROBE: NOT DETECTED
SARS-COV-2 RNA RESP QL NAA+PROBE: NOT DETECTED

## 2025-03-07 PROCEDURE — 25010000002 KETOROLAC TROMETHAMINE PER 15 MG: Performed by: NURSE PRACTITIONER

## 2025-03-07 PROCEDURE — 96372 THER/PROPH/DIAG INJ SC/IM: CPT

## 2025-03-07 PROCEDURE — 99283 EMERGENCY DEPT VISIT LOW MDM: CPT

## 2025-03-07 PROCEDURE — 87637 SARSCOV2&INF A&B&RSV AMP PRB: CPT | Performed by: NURSE PRACTITIONER

## 2025-03-07 PROCEDURE — 71046 X-RAY EXAM CHEST 2 VIEWS: CPT

## 2025-03-07 RX ORDER — BENZONATATE 100 MG/1
100 CAPSULE ORAL 3 TIMES DAILY PRN
Qty: 20 CAPSULE | Refills: 0 | Status: SHIPPED | OUTPATIENT
Start: 2025-03-07

## 2025-03-07 RX ORDER — KETOROLAC TROMETHAMINE 30 MG/ML
30 INJECTION, SOLUTION INTRAMUSCULAR; INTRAVENOUS ONCE
Status: COMPLETED | OUTPATIENT
Start: 2025-03-07 | End: 2025-03-07

## 2025-03-07 RX ORDER — ACETAMINOPHEN 325 MG/1
650 TABLET ORAL ONCE
Status: COMPLETED | OUTPATIENT
Start: 2025-03-07 | End: 2025-03-07

## 2025-03-07 RX ORDER — ACETAMINOPHEN 500 MG
500 TABLET ORAL EVERY 6 HOURS PRN
Qty: 20 TABLET | Refills: 0 | Status: SHIPPED | OUTPATIENT
Start: 2025-03-07

## 2025-03-07 RX ADMIN — KETOROLAC TROMETHAMINE 30 MG: 30 INJECTION, SOLUTION INTRAMUSCULAR at 10:05

## 2025-03-07 RX ADMIN — ACETAMINOPHEN 650 MG: 325 TABLET, FILM COATED ORAL at 10:04

## 2025-03-07 NOTE — ED PROVIDER NOTES
The KEVIN and I have discussed this patient's history, physical exam and treatment plan.  I provided a substantive portion of the care of this patient.  I have reviewed the documentation and personally had a face to face interaction with the patient and personally performed the physical exam, in its entirety.  I affirm the documentation and agree with the treatment and plan.  The following describes my personal findings.  SHARED VISIT: This visit was performed by BOTH a physician and an APC. The substantive portion of the medical decision making was performed by this attesting physician who made or approved the management plan and takes responsibility for patient management. All studies in the APC note (if performed) were independently interpreted by me.       The patient presents complaining of cough, congestion, fatigue, body aches.  Patient denies shortness of air, abdominal pain.    Comprehensive Physical exam:  Patient is nontoxic appearing oriented, conversant awake, alert  HEENT: normocephalic, atraumatic  Neck: no goiter, no pain with ROM  Pulmonary: Nontachypneic, breath sounds heard well bilaterally, no rales or wheezes  cardiovascular: Nontachycardic  Abdomen: Soft, nontender  musculoskeletal: Good range of motion x 4  Neuro/psychiatric:calm, appropriate, cooperative  Skin:warm, dry      Patient with viral syndrome, suspect viral URI, patient voices understanding of need to hydrate well with at least 2 L of fluids a day, Tylenol or ibuprofen as needed for body aches and fever.  Wash your hands frequently     Faiza Osuna MD  03/07/25 0285

## 2025-03-07 NOTE — DISCHARGE INSTRUCTIONS
Rest, drink plenty of water  Over the counter cold medicine to help with congestion  Tylenol as needed for body aches and fever

## 2025-03-07 NOTE — ED PROVIDER NOTES
EMERGENCY DEPARTMENT ENCOUNTER    Room Number:    Date seen:  3/7/2025  PCP: Kayce Wade APRN  Historian/Independent historian: n/a  Chronic or social conditions impacting care: n/a      HPI:  Chief Complaint: cough  A complete HPI/ROS/PMH/PSH/SH/FH are unobtainable due to: n/a  Context: Esme Herrera is a 34 y.o. female who presents to the ED c/o cough.  States for the last 2 days she has been having a cough with fatigue and generalized bodyaches.  She was noted to have a low-grade fever.  She denies any chest pain, shortness of breath, abdominal pain. No known sick contacts.     External Medical record review:   3/13/2024: Cardiology visit patient has a history of spontaneous coronary artery dissection in 2022.  She underwent CABG x 3.  Medication changes      PAST MEDICAL HISTORY  Active Ambulatory Problems     Diagnosis Date Noted    Spontaneous dissection of coronary artery 2022    S/P CABG (coronary artery bypass graft) 2022    Ischemic cardiomyopathy 2022    Epigastric pain 2022    Fibromuscular dysplasia of carotid artery 2023    Intracranial arachnoid cyst 2023    Migraine without aura and without status migrainosus, not intractable 12/15/2023    Family hx of ALS (amyotrophic lateral sclerosis) 2023    History of Helicobacter pylori infection 2023    Hypothyroidism due to Hashimoto's thyroiditis 2023    Monoallelic mutation of SOD1 gene 06/15/2023    Cardiomyopathy 2024     (spontaneous vaginal delivery) 2024     Resolved Ambulatory Problems     Diagnosis Date Noted    ACS (acute coronary syndrome) 10/14/2022    Chest pain 2022    Chest discomfort 2023    Other chest pain 2023    Chronic GERD 2023    Congenital syphilis, unspecified 2016    Intermittent palpitations 2023     Past Medical History:   Diagnosis Date    Coronary artery dissection 10/14/2022    Disease of thyroid gland      GERD (gastroesophageal reflux disease)     Hypotension          PAST SURGICAL HISTORY  Past Surgical History:   Procedure Laterality Date    CARDIAC CATHETERIZATION N/A 10/15/2022    Procedure: Left Heart Cath;  Surgeon: Deandra Dumas MD;  [facility];  Service: Cardiology;  Laterality: N/A;    CARDIAC CATHETERIZATION N/A 10/15/2022    Procedure: Coronary angiography;  Surgeon: Deandra Dumas MD;  [facility];  Service: Cardiology;  Laterality: N/A;    CARDIAC CATHETERIZATION N/A 10/15/2022    Procedure: Left ventriculography;  Surgeon: Deandra Dumas MD;  Location: [facility] CATH INVASIVE LOCATION;  Service: Cardiology;  Laterality: N/A;    CARDIAC CATHETERIZATION N/A 6/22/2023    Procedure: Coronary angiography;  Surgeon: Deandra Dumas MD;  Location: [facility] CATH INVASIVE LOCATION;  Service: Cardiology;  Laterality: N/A;    CARDIAC CATHETERIZATION N/A 6/22/2023    Procedure: Left Heart Cath;  Surgeon: Deandra Dumas MD;  Location: [facility] CATH INVASIVE LOCATION;  Service: Cardiology;  Laterality: N/A;    CARDIAC CATHETERIZATION N/A 6/22/2023    Procedure: Left ventriculography;  Surgeon: Deandra Dumas MD;  Location: [facility] CATH INVASIVE LOCATION;  Service: Cardiology;  Laterality: N/A;    CORONARY ARTERY BYPASS GRAFT N/A 10/15/2022    Procedure: VERONICA, STERNOTOMY, CORONARY ARTERY BYPASS TIMES 3 WITH INTERNAL MAMMARY ARTERY GRAFT AND LEFT GREATER SAPHENOUS VEIN HARVEST, REPAIR OF CORONARY ARTERY DISSECTION,PRP;  Surgeon: Lakshmi Teixeira MD;  Location: [facility] CVOR;  Service: Cardiothoracic;  Laterality: N/A;    ENDOSCOPY N/A 1/12/2023    Procedure: ESOPHAGOGASTRODUODENOSCOPY with bxs;  Surgeon: Nish Quiroz MD;  Location: [facility] ENDOSCOPY;  Service: Gastroenterology;  Laterality: N/A;  PRE: Epigastric Pain  POST: Normal         FAMILY HISTORY  Family History   Family history unknown: Yes         SOCIAL HISTORY  Social History     Socioeconomic  History    Marital status:    Tobacco Use    Smoking status: Never     Passive exposure: Never    Smokeless tobacco: Never   Vaping Use    Vaping status: Never Used   Substance and Sexual Activity    Alcohol use: Never    Drug use: Never    Sexual activity: Yes     Partners: Male         ALLERGIES  Patient has no known allergies.        REVIEW OF SYSTEMS  Per HPI, otherwise negative.       PHYSICAL EXAM  ED Triage Vitals [03/07/25 0937]   Temp Heart Rate Resp BP SpO2   100.1 °F (37.8 °C) (!) 122 18 -- 97 %      Temp src Heart Rate Source Patient Position BP Location FiO2 (%)   -- -- -- -- --       Physical Exam  Vitals and nursing note reviewed.   Constitutional:       Appearance: Normal appearance.   HENT:      Head: Normocephalic and atraumatic.      Right Ear: Tympanic membrane and ear canal normal.      Left Ear: Tympanic membrane and ear canal normal.      Mouth/Throat:      Mouth: Mucous membranes are moist.   Eyes:      Conjunctiva/sclera: Conjunctivae normal.   Cardiovascular:      Rate and Rhythm: Normal rate and regular rhythm.      Pulses: Normal pulses.      Heart sounds: Normal heart sounds.   Pulmonary:      Effort: Pulmonary effort is normal.      Breath sounds: Normal breath sounds. No wheezing.   Abdominal:      General: Bowel sounds are normal.      Palpations: Abdomen is soft.      Tenderness: There is no abdominal tenderness. There is no guarding.   Skin:     General: Skin is warm.      Capillary Refill: Capillary refill takes less than 2 seconds.   Neurological:      Mental Status: She is alert and oriented to person, place, and time. Mental status is at baseline.   Psychiatric:         Mood and Affect: Mood normal.               LAB RESULTS  Recent Results (from the past 24 hours)   COVID-19, FLU A/B, RSV PCR 1 HR TAT - Swab, Nasopharynx    Collection Time: 03/07/25  9:51 AM    Specimen: Nasopharynx; Swab   Result Value Ref Range    COVID19 Not Detected Not Detected - Ref. Range     Influenza A PCR Not Detected Not Detected    Influenza B PCR Not Detected Not Detected    RSV, PCR Not Detected Not Detected       Ordered the above labs and reviewed the results.        RADIOLOGY  XR Chest 2 View    Result Date: 3/7/2025  XR CHEST 2 VW-  DATE OF EXAM: 3/7/2025 10:03 AM  INDICATION: Cough. Flu symptoms.  COMPARISON: Radiographs 6/21/2023 and 11/19/2022. CTA neck 11/25/2024. CT chest 11/20/2022.  TECHNIQUE: PA and lateral views of the chest were obtained.  FINDINGS: Stable sternotomy wires and postoperative changes from CABG. The lungs are well expanded and clear. No pneumothorax. Cardiomediastinal contours within normal limits. No acute osseous abnormality is identified.      No active disease.  This report was finalized on 3/7/2025 10:29 AM by Javier Byrd MD on Workstation: [id number]       Ordered the above noted radiological studies. Reviewed by me in PACS.        MEDICATIONS GIVEN IN ER  Medications   acetaminophen (TYLENOL) tablet 650 mg (650 mg Oral Given 3/7/25 1004)   ketorolac (TORADOL) injection 30 mg (30 mg Intramuscular Given 3/7/25 1005)           MEDICAL DECISION MAKING, PROGRESS, and CONSULTS    All labs have been independently reviewed by me.  All radiology studies have been reviewed by me and I have also reviewed the radiology report.   EKG's independently viewed and interpreted by me.  Discussion below represents my analysis of pertinent findings related to patient's condition, differential diagnosis, treatment plan and final disposition.    34-year-old female who presents with cold and flulike symptoms with low-grade fever.  Flu, COVID, RSV negative.  Chest x-ray negative without any evidence of pneumonia.  Will treat symptomatically with strict return precautions.          Shared decision making/consideration for admission: stable for outpatient follow-up      Orders placed during this visit:  Orders Placed This Encounter   Procedures    COVID-19, FLU A/B, RSV PCR 1 HR TAT -  Swab, Nasopharynx    XR Chest 2 View    Vital Signs Recheck         Differential diagnosis include, but not limited to: PNA, bronchitis, flu, covid      Additional orders considered but not ordered: mucinex     Independent interpretation of labs, radiology studies, and discussions with consultants:    Discussed with Dr. Osuna, who agrees with plan.     ED Course as of 03/07/25 1046   Fri Mar 07, 2025   1036 Per my independent interpretation of the chest x-ray, there is no obvious pneumothorax.   [JG]   1037 COVID19: Not Detected [JG]   1037 Influenza A PCR: Not Detected [JG]   1037 Influenza B PCR: Not Detected [JG]   1037 RSV, PCR: Not Detected [JG]   1042 Updated patient on negative Flu/ Covid/ RSV. Feeling better.  [JG]      ED Course User Index  [JG] [place] Sanderson APRN             DIAGNOSIS  Final diagnoses:   Acute viral syndrome         DISPOSITION  discharge        Latest Documented Vital Signs:  As of 10:46 EST  BP- 109/54 HR- 93 Temp- 100.1 °F (37.8 °C) O2 sat- 97%              --    Please note that portions of this were completed with a voice recognition program.       Note Disclaimer: At [facility], we believe that sharing information builds trust and better relationships. You are receiving this note because you are receiving care at [facility] or recently visited. It is possible you will see health information before a provider has talked with you about it. This kind of information can be easy to misunderstand. To help you fully understand what it means for your health, we urge you to discuss this note with your provider.             [place] Sanderson APRN  03/07/25 1796

## 2025-06-11 ENCOUNTER — OFFICE VISIT (OUTPATIENT)
Age: 35
End: 2025-06-11
Payer: MEDICAID

## 2025-06-11 VITALS
HEART RATE: 66 BPM | SYSTOLIC BLOOD PRESSURE: 110 MMHG | BODY MASS INDEX: 26.37 KG/M2 | DIASTOLIC BLOOD PRESSURE: 68 MMHG | HEIGHT: 60 IN

## 2025-06-11 DIAGNOSIS — I25.5 ISCHEMIC CARDIOMYOPATHY: ICD-10-CM

## 2025-06-11 DIAGNOSIS — I25.42 SPONTANEOUS DISSECTION OF CORONARY ARTERY: Primary | ICD-10-CM

## 2025-06-11 DIAGNOSIS — Z95.1 S/P CABG (CORONARY ARTERY BYPASS GRAFT): ICD-10-CM

## 2025-06-11 PROCEDURE — 1160F RVW MEDS BY RX/DR IN RCRD: CPT | Performed by: NURSE PRACTITIONER

## 2025-06-11 PROCEDURE — 93000 ELECTROCARDIOGRAM COMPLETE: CPT | Performed by: NURSE PRACTITIONER

## 2025-06-11 PROCEDURE — 1159F MED LIST DOCD IN RCRD: CPT | Performed by: NURSE PRACTITIONER

## 2025-06-11 PROCEDURE — 99214 OFFICE O/P EST MOD 30 MIN: CPT | Performed by: NURSE PRACTITIONER

## 2025-06-11 NOTE — PROGRESS NOTES
Subjective:     Encounter Date:06/11/2025      Patient ID: Esme Herrera is a 35 y.o. female.    Chief Complaint:follow SCAD  History of Present Illness  This is a 32 y/o female who follows with Dr. Dumas and is known to me. She has a pmhx of spontaneous coronary artery dissection involving her distal left main, LAD, ramus intermedius and left circumflex requiring CABG x 3, ischemic cardiomyopathy, arachnoid cyst, fibromuscular dysplasia of the bilateral carotid arteries and dominant left vertebral artery.    She is here today for a follow-up visit.  She has been doing well since she was last seen.  She says sometimes she will get a little tightness between her shoulders when she is really tired but this does not happen often.  She has not been having any complaints of shortness of breath, palpitations, dizziness or syncope.  No swelling in her legs, orthopnea or PND.  Blood pressures have been very well-controlled.  She is still following with neurosurgery as well.    Prior history:  Dr. Dumas began seeing the patient in October 2022 when she presented to the ED with severe substernal chest pain. EKG revealed mild ST elevation in 1 and aVL and inferior ST depression. Initial troponin was elevated and d-dimer was elevated as well. A CTA of the chest showed no PE. Repeat EKG showed development of anterior T wave changes. She underwent a cardiac catheterization that showed spontaneous coronary artery dissection involving the distal left main, left circumflex, left anterior descending artery and ramus intermedius branch along with thrombus. LV gram showed akinesis of the mid to distal anterior wall and apex with an EF of 30-35%.     Due to the extent of her spontaneous coronary artery dissection and poor distal flow cardiothoracic surgery was consulted since PCI was not a good option.  She was evaluated by Dr. Teixeira and subsequently taken emergently for CABG x3 with LIMA to LAD, saphenous vein graft to an OM1, and  saphenous vein graft to the ramus intermedius.  Postoperatively she progressed slowly.  She had issues with persistent hypotension that eventually resolved.  We were unable to start her on any guideline directed management for her scad or cardiomyopathy outside of aspirin.  An echocardiogram performed on 10/16/2022 showed mildly dilated left ventricle with an EF of 26 to 30% and anterior and apical wall motion abnormalities.  A repeat echocardiogram on 10/19/2022 was performed due to persistent hypotension which showed an EF of 30 to 35% again anterior and apical wall motion abnormalities and swirling of contrast in the apex but no evidence of thrombus. She was discharged on aspirin and clopidogrel for 1 month.    In follow up, she was started on low-dose Toprol XL. Repeat echo showed improvement of her left ventricular systolic function to an EF of 52.3% although she continued to have akinesis of her apex.  Otherwise she had normal diastolic function, normal valvular function, and normal right ventricular systolic pressure.     Shortly after that she was admitted to the observation unit on 11/19/2022 with chest pain and lightheadedness.  Felt that the pain was atypical.  Troponins and were normal and EKG was unchanged.  Her metoprolol succinate was discontinued since she was not tolerating it.  No other changes were made to her management.     She underwent genetic testing as further work-up for her history of SCAD by another provider.  Results of this are unknown.      She saw Dr. Dumas in June 2023 and a CTA of the head and neck as well as a renal artery ultrasound was recommended to look for further evidence of FMD. Soon after this visit, she ended up returning to the hospital with chest pain. Work-up was unremarkable. She underwent a cardiac catheterization which showed healing of her spontaneous coronary artery dissection with no evidence of any residual dissection, atretic LIMA to LAD, patent saphenous vein  graft to the ramus intermedius, and a patent saphenous vein graft to the first obtuse marginal branch.  She was also noted to have chronic occlusion of a very small caliber distal LAD that was filled by collaterals from her diagonal branch.  LV function appeared to be preserved with stable appearing apical wall motion abnormalities.       She did undergo CT angiogram of the head and neck and renal artery ultrasound in 7/2023.  Her CT showed evidence of mild irregularity involving the cervical bilateral internal carotid arteries which appears more prominent on the right.  There is also evidence of minimal irregularity involving the dominant left vertebral artery.  These findings appear to be consistent with fibromuscular dysplasia.  Incidentally she was also noted to have a large arachnoid cyst involving the left middle cranial fossa and measuring 4.5 x 3.7 x 2.6 cm in size.   Renal artery ultrasound was normal.     Following that office visit she was evaluated by neurosurgery in 9/2023.  Recommended MRI for follow-up to evaluate for any solid components on evidence of mass effect.  Carotid ultrasound was also recommended.  She underwent the MRI in 11/2023 and it showed stable appearing arachnoid cyst with no evidence of mass effect.  She was seen in follow-up by Dr. Sheppard on 12/8/2023.  He recommended repeat CTA of the head and neck peripheral follow-up of the arachnoid cyst and her FMD.     I have reviewed and updated as appropriate allergies, current medications, past family history, past medical history, past surgical history and problem list.    Review of Systems   Constitutional: Negative for fever, malaise/fatigue, weight gain and weight loss.   HENT:  Negative for congestion, hoarse voice and sore throat.    Eyes:  Negative for blurred vision and double vision.   Cardiovascular:  Negative for chest pain, dyspnea on exertion, leg swelling, orthopnea, palpitations and syncope.   Respiratory:  Negative for  cough, shortness of breath and wheezing.    Gastrointestinal:  Negative for abdominal pain, hematemesis, hematochezia and melena.   Genitourinary:  Negative for dysuria and hematuria.   Neurological:  Negative for dizziness, headaches, light-headedness and numbness.   Psychiatric/Behavioral:  Negative for depression. The patient is not nervous/anxious.          Current Outpatient Medications:     acetaminophen (TYLENOL) 500 MG tablet, Take 1 tablet by mouth Every 6 (Six) Hours As Needed for Mild Pain, Moderate Pain or Fever., Disp: 20 tablet, Rfl: 0    aspirin 81 MG EC tablet, Take 1 tablet by mouth Daily., Disp: , Rfl:     benzonatate (TESSALON) 100 MG capsule, Take 1 capsule by mouth 3 (Three) Times a Day As Needed for Cough. (Patient not taking: Reported on 6/11/2025), Disp: 20 capsule, Rfl: 0    levothyroxine (SYNTHROID, LEVOTHROID) 50 MCG tablet, Take 1 tablet by mouth Daily (Monday-Friday)., Disp: , Rfl:     Prenatal Vit-Fe Fumarate-FA (WesTab Plus) 27-1 MG tablet, TAKE 1 TABLET BY MOUTH DAILY, Disp: 90 tablet, Rfl: 4    Past Medical History:   Diagnosis Date    Chronic GERD 07/06/2023    Congenital syphilis, unspecified 05/06/2016    Coronary artery dissection 10/14/2022    Disease of thyroid gland     Hypothyroid    GERD (gastroesophageal reflux disease)     Hypotension     Intermittent palpitations 07/06/2023       Past Surgical History:   Procedure Laterality Date    CARDIAC CATHETERIZATION N/A 10/15/2022    Procedure: Left Heart Cath;  Surgeon: Deandra Dumas MD;  Location: [facility] CATH INVASIVE LOCATION;  Service: Cardiology;  Laterality: N/A;    CARDIAC CATHETERIZATION N/A 10/15/2022    Procedure: Coronary angiography;  Surgeon: Deandra Dumas MD;  Location: [facility] CATH INVASIVE LOCATION;  Service: Cardiology;  Laterality: N/A;    CARDIAC CATHETERIZATION N/A 10/15/2022    Procedure: Left ventriculography;  Surgeon: Deandra Dumas MD;  Location: [facility] CATH INVASIVE LOCATION;  Service: Cardiology;   "Laterality: N/A;    CARDIAC CATHETERIZATION N/A 6/22/2023    Procedure: Coronary angiography;  Surgeon: Deandra Dumas MD;  Location: [facility] CATH INVASIVE LOCATION;  Service: Cardiology;  Laterality: N/A;    CARDIAC CATHETERIZATION N/A 6/22/2023    Procedure: Left Heart Cath;  Surgeon: Deandra Dumas MD;  Location: [facility] CATH INVASIVE LOCATION;  Service: Cardiology;  Laterality: N/A;    CARDIAC CATHETERIZATION N/A 6/22/2023    Procedure: Left ventriculography;  Surgeon: Deandra Dumas MD;  Location: [facility] CATH INVASIVE LOCATION;  Service: Cardiology;  Laterality: N/A;    CORONARY ARTERY BYPASS GRAFT N/A 10/15/2022    Procedure: VERONICA, STERNOTOMY, CORONARY ARTERY BYPASS TIMES 3 WITH INTERNAL MAMMARY ARTERY GRAFT AND LEFT GREATER SAPHENOUS VEIN HARVEST, REPAIR OF CORONARY ARTERY DISSECTION,PRP;  Surgeon: Lakshmi Teixeira MD;  Location: [facility] CVOR;  Service: Cardiothoracic;  Laterality: N/A;    ENDOSCOPY N/A 1/12/2023    Procedure: ESOPHAGOGASTRODUODENOSCOPY with bxs;  Surgeon: Nish Quiroz MD;  Location: [facility] ENDOSCOPY;  Service: Gastroenterology;  Laterality: N/A;  PRE: Epigastric Pain  POST: Normal       Family History   Family history unknown: Yes       Social History     Tobacco Use    Smoking status: Never     Passive exposure: Never    Smokeless tobacco: Never   Vaping Use    Vaping status: Never Used   Substance Use Topics    Alcohol use: Never    Drug use: Never         ECG 12 Lead    Date/Time: 6/11/2025 3:37 PM  Performed by: Roxanna Gutierrez APRN    Authorized by: Roxanna Gutierrez APRN  Comparison: compared with previous ECG from 12/6/2024  Similar to previous ECG  Rhythm: sinus rhythm             Objective:     Visit Vitals  /68 (BP Location: Right arm, Patient Position: Sitting, Cuff Size: Adult)   Pulse 66   Ht 152.4 cm (60\")   BMI 26.37 kg/m²             Physical Exam  Constitutional:       Appearance: Normal appearance. She is normal weight.   HENT:      Head: Normocephalic.   Neck: "      Vascular: No carotid bruit.   Cardiovascular:      Rate and Rhythm: Normal rate and regular rhythm.      Chest Wall: PMI is not displaced.      Pulses: Normal pulses.           Radial pulses are 2+ on the right side and 2+ on the left side.        Posterior tibial pulses are 2+ on the right side and 2+ on the left side.      Heart sounds: Normal heart sounds. No murmur heard.     No friction rub. No gallop.   Pulmonary:      Effort: Pulmonary effort is normal.      Breath sounds: Normal breath sounds.   Abdominal:      General: Bowel sounds are normal. There is no distension.      Palpations: Abdomen is soft.   Musculoskeletal:      Right lower leg: No edema.      Left lower leg: No edema.   Skin:     General: Skin is warm and dry.      Capillary Refill: Capillary refill takes less than 2 seconds.   Neurological:      Mental Status: She is alert and oriented to person, place, and time.   Psychiatric:         Mood and Affect: Mood normal.         Behavior: Behavior normal.         Thought Content: Thought content normal.        Lab Review:         Cardiac Procedures:       Assessment:         Diagnoses and all orders for this visit:    1. Spontaneous dissection of coronary artery (Primary)    2. S/P CABG (coronary artery bypass graft)    3. Ischemic cardiomyopathy            Plan:       Spontaneous coronary artery dissection: in October 2022 s/p CABG x 3. Repeat cardiac catheterization showed that dissections have healed. LIMA to LAD is atretic but she still has patent SVG to OM and SVG to ramus intermedius. Distal LDL filled by left to left collaterals. No recent chest pain or symptoms similar to SCAD  History of ICM:She continues to have apical akinesis on echocardiograms but this is all stable. Overall left ventricular function has normalized. Appears compensated on exam and no symptoms concerning for decline in heart function.  FMD: involving bilateral carotid arteries and possibly involving her dominant  left vertebral artery. Follows with neurosurgery.   Intracranial arachnoid cyst: felt to be congenital. Also followed by neurosurgery      Thank you for allowing me to participate in this patient's care. Please call with any questions or concerns. Ms. Herrera will follow up with Dr. Dumas in 6 months.          Your medication list            Accurate as of June 11, 2025  1:56 PM. If you have any questions, ask your nurse or doctor.                CONTINUE taking these medications        Instructions Last Dose Given Next Dose Due   acetaminophen 500 MG tablet  Commonly known as: TYLENOL      Take 1 tablet by mouth Every 6 (Six) Hours As Needed for Mild Pain, Moderate Pain or Fever.       aspirin 81 MG EC tablet      Take 1 tablet by mouth Daily.       benzonatate 100 MG capsule  Commonly known as: TESSALON      Take 1 capsule by mouth 3 (Three) Times a Day As Needed for Cough.       levothyroxine 50 MCG tablet  Commonly known as: SYNTHROID, LEVOTHROID      Take 1 tablet by mouth Daily (Monday-Friday).       WesTab Plus 27-1 MG tablet      TAKE 1 TABLET BY MOUTH DAILY                  SHIRLENE Dolan  06/11/25  1:19 PM EDT

## (undated) DEVICE — ST PERFUS M/

## (undated) DEVICE — SOL ISO/ALC RUB 70PCT 4OZ

## (undated) DEVICE — OASIS DRAIN, SINGLE, INLINE & ATS COMPATIBLE: Brand: OASIS

## (undated) DEVICE — LN SMPL CO2 SHTRM SD STREAM W/M LUER

## (undated) DEVICE — SPNG GZ WOVN 4X4IN 12PLY 10/BX STRL

## (undated) DEVICE — MARKR SKIN W/RULR AND LBL

## (undated) DEVICE — SCANLAN® VASCU-STATT® II SINGLE-USE BULLDOG CLAMP W/FIRMER CLAMPING PRESS - MIDI ANGLED 45° (YELLOW), CLAMPING PRESSURE 75-80 G (2/STERILE PKG): Brand: SCANLAN® VASCU-STATT® II SINGLE-USE BULLDOG CLAMP W/FIRMER CLAMPING PRESS

## (undated) DEVICE — TBG ART PRESS 60 IN

## (undated) DEVICE — PK ATS CUST W CARDIOTOMY RESEVOIR

## (undated) DEVICE — OPTIFOAM GENTLE SA, POSTOP, 4X12: Brand: MEDLINE

## (undated) DEVICE — NDL PERC 1PRT THNWALL W/BASEPLT 18G 7CM

## (undated) DEVICE — HEMOCONCENTRATOR PERFUS LPS06

## (undated) DEVICE — GLV SURG BIOGEL LTX PF 8

## (undated) DEVICE — SENSR CERBRL O2 PK/2

## (undated) DEVICE — SYS VASOVIEW HEMOPRO ENDOSCOPIC HARVST VESL

## (undated) DEVICE — 8 FOOT DISPOSABLE EXTENSION CABLE WITH SAFE CONNECT / ALLIGATOR CLIP

## (undated) DEVICE — BIOPATCH™ ANTIMICROBIAL DRESSING WITH CHLORHEXIDINE GLUCONATE IS A HYDROPHILLIC POLYURETHANE ABSORPTIVE FOAM WITH CHLORHEXIDINE GLUCONATE (CHG) WHICH INHIBITS BACTERIAL GROWTH UNDER THE DRESSING. THE DRESSING IS INTENDED TO BE USED TO ABSORB EXUDATE, COVER A WOUND CAUSED BY VASCULAR AND NONVASCULAR PERCUTANEOUS MEDICAL DEVICES DURING SURGERY, AS WELL AS REDUCE LOCAL INFECTION AND COLONIZATION OF MICROORGANISMS.: Brand: BIOPATCH

## (undated) DEVICE — Device

## (undated) DEVICE — ACCESSRAIL PLATFORM (STANDARD BLADE): Brand: ACCESSRAIL PLATFORM (STANDARD BLADE)

## (undated) DEVICE — KT ORCA ORCAPOD DISP STRL

## (undated) DEVICE — 28 FR STRAIGHT – SOFT PVC CATHETER: Brand: PVC THORACIC CATHETERS

## (undated) DEVICE — BLOWER/MISTER AXIOUS OPCAB W/TBG

## (undated) DEVICE — SUMP INTRACARDIAC W CONN 1/4IN 12F12IN

## (undated) DEVICE — KT MANIFLD CARDIAC

## (undated) DEVICE — CANN IRR VEN BVL TP

## (undated) DEVICE — LABEL SHEET CUSTOM 2X2 YELLOW: Brand: MEDLINE INDUSTRIES, INC.

## (undated) DEVICE — DRP SLUSH WARMR MACH RECTG 66X44IN

## (undated) DEVICE — SENSR O2 OXIMAX FNGR A/ 18IN NONSTR

## (undated) DEVICE — ADHS SKIN SURG TISS VISC PREMIERPRO EXOFIN HI/VISC FAST/DRY

## (undated) DEVICE — ST TOURNI COMPL A/ 7IN

## (undated) DEVICE — ROTATING SURGICAL PUNCHES, 1 PER POUCH: Brand: A&E MEDICAL / ROTATING SURGICAL PUNCHES

## (undated) DEVICE — PK HEART OPN 40

## (undated) DEVICE — DRESSING, SURESITE SELECT, 2.8'X3.50': Brand: MEDLINE

## (undated) DEVICE — STPCK 3WY INTRALOCK TRNSP

## (undated) DEVICE — PK PERFUS W/TB CUST ADLT

## (undated) DEVICE — PK PERFUS CUST W/CARDIOPLEGIA

## (undated) DEVICE — CATH IV INSYTE AUTOGARD SHLD 20G 1.88IN

## (undated) DEVICE — TEMP PACING WIRE: Brand: MYO/WIRE

## (undated) DEVICE — GLIDESHEATH BASIC HYDROPHILIC COATED INTRODUCER SHEATH: Brand: GLIDESHEATH

## (undated) DEVICE — FRCP BX RADJAW4 NDL 2.8 240CM LG OG BX40

## (undated) DEVICE — CATH DIAG IMPULSE FR4 5F 100CM

## (undated) DEVICE — TBG INSUFFLATION LUER LOCK: Brand: MEDLINE INDUSTRIES, INC.

## (undated) DEVICE — CANN ART EOPA DIL J TP NV W/CONN 18F

## (undated) DEVICE — CLAMP INSERT: Brand: STEALTH® CLAMP INSERT

## (undated) DEVICE — CANN AORT ROOT DLP VNT 14G 7F

## (undated) DEVICE — SYS PERFUS SEP PLATLT W TIPS CUST

## (undated) DEVICE — BNDG ELAS ELITE V/CLOSE 6IN 5YD LF STRL

## (undated) DEVICE — CORONARY ARTERY BYPASS GRAFT MARKERS, STAINLESS STEEL, DISTAL, WITHOUT HOLDER: Brand: ANASTOMARK CORONARY ARTERY BYPASS GRAFT MARKERS, STAINLESS STEEL, DISTAL

## (undated) DEVICE — ADAPT CLN BIOGUARD AIR/H2O DISP

## (undated) DEVICE — GW EMR FIX EXCHG J STD .035 3MM 260CM

## (undated) DEVICE — ST. SORBAVIEW ULTIMATE IJ SYSTEM A,C: Brand: CENTURION

## (undated) DEVICE — BITEBLOCK OMNI BLOC

## (undated) DEVICE — TUBING, SUCTION, 1/4" X 10', STRAIGHT: Brand: MEDLINE

## (undated) DEVICE — CANN O2 ETCO2 FITS ALL CONN CO2 SMPL A/ 7IN DISP LF

## (undated) DEVICE — BG TRANSF W/COUPLER SPK 600ML

## (undated) DEVICE — PK CATH CARD 40

## (undated) DEVICE — CATH DIAG IMPULSE FL3.5 5F 100CM

## (undated) DEVICE — BNDG ELAS ELITE V/CLOSE 4IN 5YD LF STRL

## (undated) DEVICE — SPONGE,DISSECTOR,K,XRAY,9/16"X1/4",STRL: Brand: MEDLINE

## (undated) DEVICE — CANN VESL FREE FLO 2MM

## (undated) DEVICE — CATH VENT MIV RADL PIG ST TIP 5F 110CM